# Patient Record
Sex: FEMALE | Race: OTHER | Employment: UNEMPLOYED | URBAN - METROPOLITAN AREA
[De-identification: names, ages, dates, MRNs, and addresses within clinical notes are randomized per-mention and may not be internally consistent; named-entity substitution may affect disease eponyms.]

---

## 2022-03-09 ENCOUNTER — APPOINTMENT (OUTPATIENT)
Dept: GENERAL RADIOLOGY | Age: 27
DRG: 280 | End: 2022-03-09
Payer: MEDICARE

## 2022-03-09 ENCOUNTER — APPOINTMENT (OUTPATIENT)
Dept: CT IMAGING | Age: 27
DRG: 280 | End: 2022-03-09
Payer: MEDICARE

## 2022-03-09 ENCOUNTER — HOSPITAL ENCOUNTER (INPATIENT)
Age: 27
LOS: 1 days | Discharge: HOME | DRG: 280 | End: 2022-03-10
Attending: EMERGENCY MEDICINE | Admitting: INTERNAL MEDICINE
Payer: MEDICARE

## 2022-03-09 DIAGNOSIS — E80.6 HYPERBILIRUBINEMIA: Primary | ICD-10-CM

## 2022-03-09 PROBLEM — E87.6 HYPOKALEMIA: Status: ACTIVE | Noted: 2022-03-09

## 2022-03-09 PROBLEM — R79.89 ABNORMAL LFTS: Status: ACTIVE | Noted: 2022-03-09

## 2022-03-09 PROBLEM — Z90.49 S/P CHOLECYSTECTOMY: Status: ACTIVE | Noted: 2022-03-09

## 2022-03-09 PROBLEM — F10.10 ALCOHOL ABUSE: Status: ACTIVE | Noted: 2022-03-09

## 2022-03-09 PROBLEM — E88.09 HYPOALBUMINEMIA: Status: ACTIVE | Noted: 2022-03-09

## 2022-03-09 PROBLEM — K70.31 ASCITES DUE TO ALCOHOLIC CIRRHOSIS (HCC): Status: ACTIVE | Noted: 2022-03-09

## 2022-03-09 PROBLEM — K74.60 LIVER CIRRHOSIS (HCC): Status: ACTIVE | Noted: 2022-03-09

## 2022-03-09 LAB
-: ABNORMAL
ABSOLUTE EOS #: 0 K/UL (ref 0–0.44)
ABSOLUTE IMMATURE GRANULOCYTE: 0.11 K/UL (ref 0–0.3)
ABSOLUTE LYMPH #: 1.26 K/UL (ref 1.1–3.7)
ABSOLUTE MONO #: 1.26 K/UL (ref 0.1–1.2)
ALBUMIN SERPL-MCNC: 2.7 G/DL (ref 3.5–5.2)
ALBUMIN/GLOBULIN RATIO: 0.8 (ref 1–2.5)
ALP BLD-CCNC: 240 U/L (ref 35–104)
ALT SERPL-CCNC: 40 U/L (ref 5–33)
AMMONIA: 27 UMOL/L (ref 11–51)
ANION GAP SERPL CALCULATED.3IONS-SCNC: 17 MMOL/L (ref 9–17)
AST SERPL-CCNC: 285 U/L
BASOPHILS # BLD: 0 % (ref 0–2)
BASOPHILS ABSOLUTE: 0 K/UL (ref 0–0.2)
BILIRUB SERPL-MCNC: 10.81 MG/DL (ref 0.3–1.2)
BILIRUBIN DIRECT: 8.2 MG/DL
BILIRUBIN URINE: ABNORMAL
BILIRUBIN, INDIRECT: 2.61 MG/DL (ref 0–1)
BUN BLDV-MCNC: 2 MG/DL (ref 6–20)
CALCIUM SERPL-MCNC: 8 MG/DL (ref 8.6–10.4)
CASTS UA: ABNORMAL /LPF (ref 0–2)
CHLORIDE BLD-SCNC: 96 MMOL/L (ref 98–107)
CO2: 23 MMOL/L (ref 20–31)
COLOR: ABNORMAL
CREAT SERPL-MCNC: <0.2 MG/DL (ref 0.5–0.9)
EOSINOPHILS RELATIVE PERCENT: 0 % (ref 1–4)
EPITHELIAL CELLS UA: ABNORMAL /HPF (ref 0–5)
GFR AFRICAN AMERICAN: ABNORMAL ML/MIN
GFR NON-AFRICAN AMERICAN: ABNORMAL ML/MIN
GFR SERPL CREATININE-BSD FRML MDRD: ABNORMAL ML/MIN/{1.73_M2}
GLUCOSE BLD-MCNC: 79 MG/DL (ref 70–99)
GLUCOSE URINE: NEGATIVE
HCG QUALITATIVE: NEGATIVE
HCT VFR BLD CALC: 25.2 % (ref 36.3–47.1)
HEMOGLOBIN: 8.7 G/DL (ref 11.9–15.1)
IMMATURE GRANULOCYTES: 1 %
INR BLD: 1.5
KETONES, URINE: ABNORMAL
LACTIC ACID, WHOLE BLOOD: 3.7 MMOL/L (ref 0.7–2.1)
LEUKOCYTE ESTERASE, URINE: ABNORMAL
LIPASE: 11 U/L (ref 13–60)
LYMPHOCYTES # BLD: 12 % (ref 24–43)
MCH RBC QN AUTO: 36.6 PG (ref 25.2–33.5)
MCHC RBC AUTO-ENTMCNC: 34.5 G/DL (ref 28.4–34.8)
MCV RBC AUTO: 105.9 FL (ref 82.6–102.9)
MONOCYTES # BLD: 12 % (ref 3–12)
MORPHOLOGY: ABNORMAL
MORPHOLOGY: ABNORMAL
MUCUS: ABNORMAL
NITRITE, URINE: NEGATIVE
NRBC AUTOMATED: 0 PER 100 WBC
PDW BLD-RTO: 22.9 % (ref 11.8–14.4)
PH UA: 5.5 (ref 5–8)
PLATELET # BLD: 167 K/UL (ref 138–453)
PMV BLD AUTO: 11.6 FL (ref 8.1–13.5)
POTASSIUM SERPL-SCNC: 3.2 MMOL/L (ref 3.7–5.3)
PROTEIN UA: NEGATIVE
PROTHROMBIN TIME: 15.5 SEC (ref 9.1–12.3)
RBC # BLD: 2.38 M/UL (ref 3.95–5.11)
RBC UA: ABNORMAL /HPF (ref 0–2)
REASON FOR REJECTION: NORMAL
SEG NEUTROPHILS: 75 % (ref 36–65)
SEGMENTED NEUTROPHILS ABSOLUTE COUNT: 7.87 K/UL (ref 1.5–8.1)
SODIUM BLD-SCNC: 136 MMOL/L (ref 135–144)
SPECIFIC GRAVITY UA: 1.02 (ref 1–1.03)
TOTAL PROTEIN: 5.9 G/DL (ref 6.4–8.3)
TURBIDITY: ABNORMAL
URINE HGB: NEGATIVE
UROBILINOGEN, URINE: NORMAL
WBC # BLD: 10.5 K/UL (ref 3.5–11.3)
WBC UA: ABNORMAL /HPF (ref 0–5)
ZZ NTE CLEAN UP: ORDERED TEST: NORMAL
ZZ NTE WITH NAME CLEAN UP: SPECIMEN SOURCE: NORMAL

## 2022-03-09 PROCEDURE — C9113 INJ PANTOPRAZOLE SODIUM, VIA: HCPCS | Performed by: EMERGENCY MEDICINE

## 2022-03-09 PROCEDURE — 80076 HEPATIC FUNCTION PANEL: CPT

## 2022-03-09 PROCEDURE — 81001 URINALYSIS AUTO W/SCOPE: CPT

## 2022-03-09 PROCEDURE — 85025 COMPLETE CBC W/AUTO DIFF WBC: CPT

## 2022-03-09 PROCEDURE — 6360000002 HC RX W HCPCS: Performed by: EMERGENCY MEDICINE

## 2022-03-09 PROCEDURE — 87186 SC STD MICRODIL/AGAR DIL: CPT

## 2022-03-09 PROCEDURE — 72040 X-RAY EXAM NECK SPINE 2-3 VW: CPT

## 2022-03-09 PROCEDURE — 2580000003 HC RX 258

## 2022-03-09 PROCEDURE — 83605 ASSAY OF LACTIC ACID: CPT

## 2022-03-09 PROCEDURE — 96375 TX/PRO/DX INJ NEW DRUG ADDON: CPT

## 2022-03-09 PROCEDURE — 6360000002 HC RX W HCPCS

## 2022-03-09 PROCEDURE — A4216 STERILE WATER/SALINE, 10 ML: HCPCS | Performed by: EMERGENCY MEDICINE

## 2022-03-09 PROCEDURE — 99285 EMERGENCY DEPT VISIT HI MDM: CPT

## 2022-03-09 PROCEDURE — 2580000003 HC RX 258: Performed by: STUDENT IN AN ORGANIZED HEALTH CARE EDUCATION/TRAINING PROGRAM

## 2022-03-09 PROCEDURE — 84703 CHORIONIC GONADOTROPIN ASSAY: CPT

## 2022-03-09 PROCEDURE — 6370000000 HC RX 637 (ALT 250 FOR IP)

## 2022-03-09 PROCEDURE — 83690 ASSAY OF LIPASE: CPT

## 2022-03-09 PROCEDURE — 87086 URINE CULTURE/COLONY COUNT: CPT

## 2022-03-09 PROCEDURE — 87077 CULTURE AEROBIC IDENTIFY: CPT

## 2022-03-09 PROCEDURE — 99223 1ST HOSP IP/OBS HIGH 75: CPT | Performed by: INTERNAL MEDICINE

## 2022-03-09 PROCEDURE — 2580000003 HC RX 258: Performed by: EMERGENCY MEDICINE

## 2022-03-09 PROCEDURE — 6360000004 HC RX CONTRAST MEDICATION: Performed by: EMERGENCY MEDICINE

## 2022-03-09 PROCEDURE — 74177 CT ABD & PELVIS W/CONTRAST: CPT

## 2022-03-09 PROCEDURE — 85610 PROTHROMBIN TIME: CPT

## 2022-03-09 PROCEDURE — 82140 ASSAY OF AMMONIA: CPT

## 2022-03-09 PROCEDURE — 1200000000 HC SEMI PRIVATE

## 2022-03-09 PROCEDURE — 96374 THER/PROPH/DIAG INJ IV PUSH: CPT

## 2022-03-09 PROCEDURE — 6360000002 HC RX W HCPCS: Performed by: STUDENT IN AN ORGANIZED HEALTH CARE EDUCATION/TRAINING PROGRAM

## 2022-03-09 PROCEDURE — 80048 BASIC METABOLIC PNL TOTAL CA: CPT

## 2022-03-09 RX ORDER — LORAZEPAM 2 MG/ML
1 INJECTION INTRAMUSCULAR
Status: DISCONTINUED | OUTPATIENT
Start: 2022-03-09 | End: 2022-03-10 | Stop reason: HOSPADM

## 2022-03-09 RX ORDER — 0.9 % SODIUM CHLORIDE 0.9 %
1000 INTRAVENOUS SOLUTION INTRAVENOUS ONCE
Status: COMPLETED | OUTPATIENT
Start: 2022-03-09 | End: 2022-03-09

## 2022-03-09 RX ORDER — LORAZEPAM 1 MG/1
1 TABLET ORAL
Status: DISCONTINUED | OUTPATIENT
Start: 2022-03-09 | End: 2022-03-10 | Stop reason: HOSPADM

## 2022-03-09 RX ORDER — POLYETHYLENE GLYCOL 3350 17 G/17G
17 POWDER, FOR SOLUTION ORAL DAILY PRN
Status: DISCONTINUED | OUTPATIENT
Start: 2022-03-09 | End: 2022-03-10 | Stop reason: HOSPADM

## 2022-03-09 RX ORDER — POTASSIUM CHLORIDE 20 MEQ/1
40 TABLET, EXTENDED RELEASE ORAL PRN
Status: DISCONTINUED | OUTPATIENT
Start: 2022-03-09 | End: 2022-03-10 | Stop reason: HOSPADM

## 2022-03-09 RX ORDER — SODIUM CHLORIDE 0.9 % (FLUSH) 0.9 %
5-40 SYRINGE (ML) INJECTION PRN
Status: DISCONTINUED | OUTPATIENT
Start: 2022-03-09 | End: 2022-03-10 | Stop reason: HOSPADM

## 2022-03-09 RX ORDER — LORAZEPAM 2 MG/ML
1 INJECTION INTRAMUSCULAR ONCE
Status: DISCONTINUED | OUTPATIENT
Start: 2022-03-09 | End: 2022-03-09

## 2022-03-09 RX ORDER — ONDANSETRON 2 MG/ML
4 INJECTION INTRAMUSCULAR; INTRAVENOUS EVERY 6 HOURS PRN
Status: DISCONTINUED | OUTPATIENT
Start: 2022-03-09 | End: 2022-03-10 | Stop reason: HOSPADM

## 2022-03-09 RX ORDER — SODIUM CHLORIDE 0.9 % (FLUSH) 0.9 %
5-40 SYRINGE (ML) INJECTION EVERY 12 HOURS SCHEDULED
Status: DISCONTINUED | OUTPATIENT
Start: 2022-03-09 | End: 2022-03-10 | Stop reason: HOSPADM

## 2022-03-09 RX ORDER — SODIUM CHLORIDE 9 MG/ML
10 INJECTION INTRAVENOUS DAILY
Status: DISCONTINUED | OUTPATIENT
Start: 2022-03-09 | End: 2022-03-10

## 2022-03-09 RX ORDER — LORAZEPAM 2 MG/ML
4 INJECTION INTRAMUSCULAR
Status: DISCONTINUED | OUTPATIENT
Start: 2022-03-09 | End: 2022-03-10 | Stop reason: HOSPADM

## 2022-03-09 RX ORDER — ONDANSETRON 4 MG/1
4 TABLET, ORALLY DISINTEGRATING ORAL EVERY 8 HOURS PRN
Status: DISCONTINUED | OUTPATIENT
Start: 2022-03-09 | End: 2022-03-10 | Stop reason: HOSPADM

## 2022-03-09 RX ORDER — SODIUM CHLORIDE 9 MG/ML
25 INJECTION, SOLUTION INTRAVENOUS PRN
Status: DISCONTINUED | OUTPATIENT
Start: 2022-03-09 | End: 2022-03-10 | Stop reason: HOSPADM

## 2022-03-09 RX ORDER — MORPHINE SULFATE 4 MG/ML
4 INJECTION, SOLUTION INTRAMUSCULAR; INTRAVENOUS ONCE
Status: COMPLETED | OUTPATIENT
Start: 2022-03-09 | End: 2022-03-09

## 2022-03-09 RX ORDER — LORAZEPAM 2 MG/1
2 TABLET ORAL
Status: DISCONTINUED | OUTPATIENT
Start: 2022-03-09 | End: 2022-03-10 | Stop reason: HOSPADM

## 2022-03-09 RX ORDER — PANTOPRAZOLE SODIUM 40 MG/10ML
40 INJECTION, POWDER, LYOPHILIZED, FOR SOLUTION INTRAVENOUS ONCE
Status: COMPLETED | OUTPATIENT
Start: 2022-03-09 | End: 2022-03-09

## 2022-03-09 RX ORDER — POTASSIUM CHLORIDE 7.45 MG/ML
10 INJECTION INTRAVENOUS PRN
Status: DISCONTINUED | OUTPATIENT
Start: 2022-03-09 | End: 2022-03-10 | Stop reason: HOSPADM

## 2022-03-09 RX ORDER — LORAZEPAM 2 MG/ML
3 INJECTION INTRAMUSCULAR
Status: DISCONTINUED | OUTPATIENT
Start: 2022-03-09 | End: 2022-03-10 | Stop reason: HOSPADM

## 2022-03-09 RX ORDER — SODIUM CHLORIDE 9 MG/ML
10 INJECTION INTRAVENOUS ONCE
Status: COMPLETED | OUTPATIENT
Start: 2022-03-09 | End: 2022-03-09

## 2022-03-09 RX ORDER — HEPARIN SODIUM 5000 [USP'U]/ML
5000 INJECTION, SOLUTION INTRAVENOUS; SUBCUTANEOUS EVERY 8 HOURS SCHEDULED
Status: DISCONTINUED | OUTPATIENT
Start: 2022-03-09 | End: 2022-03-10

## 2022-03-09 RX ORDER — LORAZEPAM 2 MG/ML
2 INJECTION INTRAMUSCULAR
Status: DISCONTINUED | OUTPATIENT
Start: 2022-03-09 | End: 2022-03-10 | Stop reason: HOSPADM

## 2022-03-09 RX ORDER — LORAZEPAM 2 MG/1
4 TABLET ORAL
Status: DISCONTINUED | OUTPATIENT
Start: 2022-03-09 | End: 2022-03-10 | Stop reason: HOSPADM

## 2022-03-09 RX ORDER — MORPHINE SULFATE 2 MG/ML
1 INJECTION, SOLUTION INTRAMUSCULAR; INTRAVENOUS ONCE
Status: COMPLETED | OUTPATIENT
Start: 2022-03-09 | End: 2022-03-09

## 2022-03-09 RX ORDER — PANTOPRAZOLE SODIUM 40 MG/10ML
40 INJECTION, POWDER, LYOPHILIZED, FOR SOLUTION INTRAVENOUS DAILY
Status: DISCONTINUED | OUTPATIENT
Start: 2022-03-10 | End: 2022-03-10

## 2022-03-09 RX ORDER — ACETAMINOPHEN 650 MG/1
650 SUPPOSITORY RECTAL EVERY 6 HOURS PRN
Status: DISCONTINUED | OUTPATIENT
Start: 2022-03-09 | End: 2022-03-10 | Stop reason: HOSPADM

## 2022-03-09 RX ORDER — FOLIC ACID 1 MG/1
1 TABLET ORAL DAILY
Status: DISCONTINUED | OUTPATIENT
Start: 2022-03-09 | End: 2022-03-10 | Stop reason: HOSPADM

## 2022-03-09 RX ORDER — ONDANSETRON 2 MG/ML
4 INJECTION INTRAMUSCULAR; INTRAVENOUS ONCE
Status: COMPLETED | OUTPATIENT
Start: 2022-03-09 | End: 2022-03-09

## 2022-03-09 RX ORDER — LANOLIN ALCOHOL/MO/W.PET/CERES
100 CREAM (GRAM) TOPICAL DAILY
Status: DISCONTINUED | OUTPATIENT
Start: 2022-03-09 | End: 2022-03-10 | Stop reason: HOSPADM

## 2022-03-09 RX ORDER — ACETAMINOPHEN 325 MG/1
650 TABLET ORAL EVERY 6 HOURS PRN
Status: DISCONTINUED | OUTPATIENT
Start: 2022-03-09 | End: 2022-03-10 | Stop reason: HOSPADM

## 2022-03-09 RX ORDER — LORAZEPAM 2 MG/ML
0.5 INJECTION INTRAMUSCULAR ONCE
Status: COMPLETED | OUTPATIENT
Start: 2022-03-09 | End: 2022-03-09

## 2022-03-09 RX ORDER — FENTANYL CITRATE 50 UG/ML
50 INJECTION, SOLUTION INTRAMUSCULAR; INTRAVENOUS ONCE
Status: COMPLETED | OUTPATIENT
Start: 2022-03-09 | End: 2022-03-09

## 2022-03-09 RX ADMIN — FENTANYL CITRATE 50 MCG: 50 INJECTION, SOLUTION INTRAMUSCULAR; INTRAVENOUS at 12:22

## 2022-03-09 RX ADMIN — IOPAMIDOL 75 ML: 755 INJECTION, SOLUTION INTRAVENOUS at 14:18

## 2022-03-09 RX ADMIN — FOLIC ACID 1 MG: 1 TABLET ORAL at 21:47

## 2022-03-09 RX ADMIN — HEPARIN SODIUM 5000 UNITS: 5000 INJECTION, SOLUTION INTRAVENOUS; SUBCUTANEOUS at 21:48

## 2022-03-09 RX ADMIN — CEFTRIAXONE SODIUM 1000 MG: 1 INJECTION, POWDER, FOR SOLUTION INTRAMUSCULAR; INTRAVENOUS at 15:26

## 2022-03-09 RX ADMIN — SODIUM CHLORIDE, PRESERVATIVE FREE 10 ML: 5 INJECTION INTRAVENOUS at 20:52

## 2022-03-09 RX ADMIN — PANTOPRAZOLE SODIUM 40 MG: 40 INJECTION, POWDER, FOR SOLUTION INTRAVENOUS at 12:22

## 2022-03-09 RX ADMIN — POTASSIUM CHLORIDE 40 MEQ: 1500 TABLET, EXTENDED RELEASE ORAL at 18:33

## 2022-03-09 RX ADMIN — Medication 100 MG: at 21:47

## 2022-03-09 RX ADMIN — ONDANSETRON 4 MG: 2 INJECTION INTRAMUSCULAR; INTRAVENOUS at 12:22

## 2022-03-09 RX ADMIN — MORPHINE SULFATE 4 MG: 4 INJECTION INTRAVENOUS at 14:36

## 2022-03-09 RX ADMIN — SODIUM CHLORIDE 10 ML: 9 INJECTION, SOLUTION INTRAMUSCULAR; INTRAVENOUS; SUBCUTANEOUS at 12:22

## 2022-03-09 RX ADMIN — MORPHINE SULFATE 1 MG: 2 INJECTION, SOLUTION INTRAMUSCULAR; INTRAVENOUS at 20:38

## 2022-03-09 RX ADMIN — SODIUM CHLORIDE 1000 ML: 9 INJECTION, SOLUTION INTRAVENOUS at 12:21

## 2022-03-09 RX ADMIN — LORAZEPAM 0.5 MG: 2 INJECTION INTRAMUSCULAR; INTRAVENOUS at 15:25

## 2022-03-09 ASSESSMENT — PAIN DESCRIPTION - PAIN TYPE
TYPE: ACUTE PAIN
TYPE: ACUTE PAIN
TYPE: OTHER (COMMENT)

## 2022-03-09 ASSESSMENT — ENCOUNTER SYMPTOMS
SORE THROAT: 0
COUGH: 0
COLOR CHANGE: 1
FACIAL SWELLING: 0
BLOOD IN STOOL: 0
CONSTIPATION: 0
SINUS PAIN: 0
BACK PAIN: 0
VOMITING: 0
ABDOMINAL PAIN: 1
RHINORRHEA: 0
ABDOMINAL DISTENTION: 1
DIARRHEA: 0
WHEEZING: 0
NAUSEA: 0
ALLERGIC/IMMUNOLOGIC NEGATIVE: 1
CONSTIPATION: 1
CHEST TIGHTNESS: 0
SHORTNESS OF BREATH: 0

## 2022-03-09 ASSESSMENT — PAIN SCALES - GENERAL
PAINLEVEL_OUTOF10: 7
PAINLEVEL_OUTOF10: 9
PAINLEVEL_OUTOF10: 8
PAINLEVEL_OUTOF10: 0
PAINLEVEL_OUTOF10: 9

## 2022-03-09 ASSESSMENT — PAIN DESCRIPTION - LOCATION: LOCATION: ABDOMEN;NECK

## 2022-03-09 ASSESSMENT — PAIN DESCRIPTION - FREQUENCY: FREQUENCY: CONTINUOUS

## 2022-03-09 ASSESSMENT — PAIN DESCRIPTION - ONSET: ONSET: ON-GOING

## 2022-03-09 ASSESSMENT — PAIN DESCRIPTION - ORIENTATION: ORIENTATION: POSTERIOR

## 2022-03-09 ASSESSMENT — PAIN - FUNCTIONAL ASSESSMENT
PAIN_FUNCTIONAL_ASSESSMENT: ACTIVITIES ARE NOT PREVENTED
PAIN_FUNCTIONAL_ASSESSMENT: 0-10

## 2022-03-09 ASSESSMENT — PAIN DESCRIPTION - PROGRESSION: CLINICAL_PROGRESSION: NOT CHANGED

## 2022-03-09 ASSESSMENT — PAIN DESCRIPTION - DESCRIPTORS: DESCRIPTORS: DULL;ACHING

## 2022-03-09 NOTE — ED PROVIDER NOTES
[facility] HANDOFF       Handoff taken on the following patient from prior Attending Physician:  Pt Name: Luis Ortiz  PCP:  Jaclyn Robledo  I was available and discussed any additional care issues that arose and coordinated the management plans with the resident(s) caring for the patient during my duty period. Any areas of disagreement with resident's documentation of care or procedures are noted on the chart. I was personally present for the key portions of any/all procedures during my duty period. I have documented in the chart those procedures where I was not present during the key portions. CHIEF COMPLAINT       Chief Complaint   Patient presents with    Abdominal Pain     x3-4 days    Bloated    Jaundice     approx 1 week         CURRENT MEDICATIONS     Previous Medications  Current Discharge Medication List      CONTINUE these medications which have NOT CHANGED    Details   !! acetaminophen-codeine (TYLENOL/CODEINE #3) 300-30 MG per tablet Take 1 tablet by mouth 3 times daily as needed for Pain for up to 10 doses. Qty: 10 tablet, Refills: 0      !! ibuprofen (ADVIL;MOTRIN) 600 MG tablet Take 1 tablet by mouth every 6 hours as needed for Pain. Qty: 30 tablet, Refills: 0      !! acetaminophen-codeine (TYLENOL/CODEINE #3) 300-30 MG per tablet Take 1 tablet by mouth 3 times daily as needed for Pain for up to 10 doses. Qty: 10 tablet, Refills: 0      !! ibuprofen (ADVIL;MOTRIN) 800 MG tablet Take 1 tablet by mouth every 8 hours as needed for Pain. Qty: 30 tablet, Refills: 0      venlafaxine (EFFEXOR-XR) 150 MG XR capsule Take 1 capsule by mouth daily (with breakfast). Qty: 30 capsule, Refills: 0      ARIPiprazole (ABILIFY) 10 MG tablet Take 1 tablet by mouth daily. Qty: 30 tablet, Refills: 0       !! - Potential duplicate medications found. Please discuss with provider.           Encounter Medications  Orders Placed This Encounter   Medications  0.9 % sodium chloride bolus    AND Linked Order Group     pantoprazole (PROTONIX) injection 40 mg     sodium chloride (PF) 0.9 % injection 10 mL    ondansetron (ZOFRAN) injection 4 mg    fentaNYL (SUBLIMAZE) injection 50 mcg    iopamidol (ISOVUE-370) 76 % injection 75 mL    morphine injection 4 mg    DISCONTD: LORazepam (ATIVAN) injection 1 mg    LORazepam (ATIVAN) injection 0.5 mg    cefTRIAXone (ROCEPHIN) 1000 mg IVPB in 50 mL D5W minibag     Order Specific Question:   Antimicrobial Indications     Answer:   Intra-Abdominal Infection       ALLERGIES     has No Known Allergies. RECENT VITALS:   Temp: 99.3 °F (37.4 °C),  Pulse: 113, Resp: 22, BP: 110/75    RADIOLOGY:   CT ABDOMEN PELVIS W IV CONTRAST Additional Contrast? None   Final Result   Severe fatty infiltration of the liver with hepatomegaly. Recanalized   periumbilical vein as well as several portosystemic collaterals consistent   with portal hypertension      Colonic wall thickening with fat deposition suspicious for chronic colitis   which may be infectious or inflammatory in nature. Status post cholecystectomy. Ill-defined nodular infiltrate in the right lower lobe suspicious for   atypical infectious etiology.       Ascites      RECOMMENDATIONS:   Unavailable             LABS:  Labs Reviewed   URINALYSIS WITH MICROSCOPIC - Abnormal; Notable for the following components:       Result Value    Color, UA Dark Yellow (*)     Turbidity UA Cloudy (*)     Bilirubin Urine LARGE (*)     Ketones, Urine LARGE (*)     Leukocyte Esterase, Urine SMALL (*)     Mucus, UA 3+ (*)     All other components within normal limits   PROTIME-INR - Abnormal; Notable for the following components:    Protime 15.5 (*)     All other components within normal limits   CBC WITH AUTO DIFFERENTIAL - Abnormal; Notable for the following components:    RBC 2.38 (*)     Hemoglobin 8.7 (*)     Hematocrit 25.2 (*)     .9 (*)     MCH 36.6 (*)     RDW 22.9 (*) Immature Granulocytes 1 (*)     Seg Neutrophils 75 (*)     Lymphocytes 12 (*)     Eosinophils % 0 (*)     Absolute Mono # 1.26 (*)     All other components within normal limits   LACTIC ACID - Abnormal; Notable for the following components:    Lactic Acid, Whole Blood 3.7 (*)     All other components within normal limits   BASIC METABOLIC PANEL - Abnormal; Notable for the following components:    BUN 2 (*)     CREATININE <0.20 (*)     Calcium 8.0 (*)     Potassium 3.2 (*)     Chloride 96 (*)     All other components within normal limits   LIPASE - Abnormal; Notable for the following components:    Lipase 11 (*)     All other components within normal limits   HEPATIC FUNCTION PANEL - Abnormal; Notable for the following components:    Albumin 2.7 (*)     Alkaline Phosphatase 240 (*)     ALT 40 (*)      (*)     Total Bilirubin 10.81 (*)     Bilirubin, Direct 8.20 (*)     Bilirubin, Indirect 2.61 (*)     Total Protein 5.9 (*)     Albumin/Globulin Ratio 0.8 (*)     All other components within normal limits   CULTURE, URINE   HCG, SERUM, QUALITATIVE   SPECIMEN REJECTION   AMMONIA   LACTATE, SEPSIS   PREVIOUS SPECIMEN     Alcoholic liver disease, having abdominal pain, bloating, jaundice. Worsening laboratory studies. Will treat for spontaneous bacterial peritonitis, pending diagnostic tap per the admitting team.      PLAN/ TASKS OUTSTANDING     Admission, awaiting bed placement      (Please note that portions of this note were completed with a voice recognition program.  Efforts were made to edit the dictations but occasionally words are mis-transcribed. )    Nataliya Barnett MD,, MD, F.A.C.E.P.   Attending Emergency Physician        Nataliya Barnett MD  03/09/22 0469

## 2022-03-09 NOTE — ED PROVIDER NOTES
[facility]  ED  Emergency Department  Emergency Medicine Resident Sign-out     Care of Ciaran Contreras was assumed from Dr. Stephan Ham and is being seen for Abdominal Pain (x3-4 days), Bloated, and Jaundice (approx 1 week)  . The patient's initial evaluation and plan have been discussed with the prior provider who initially evaluated the patient.      EMERGENCY DEPARTMENT COURSE / MEDICAL DECISION MAKING:       MEDICATIONS GIVEN:  Orders Placed This Encounter   Medications    0.9 % sodium chloride bolus    AND Linked Order Group     pantoprazole (PROTONIX) injection 40 mg     sodium chloride (PF) 0.9 % injection 10 mL    ondansetron (ZOFRAN) injection 4 mg    fentaNYL (SUBLIMAZE) injection 50 mcg    iopamidol (ISOVUE-370) 76 % injection 75 mL    morphine injection 4 mg    DISCONTD: LORazepam (ATIVAN) injection 1 mg    LORazepam (ATIVAN) injection 0.5 mg       LABS / RADIOLOGY:     Labs Reviewed   URINALYSIS WITH MICROSCOPIC - Abnormal; Notable for the following components:       Result Value    Color, UA Dark Yellow (*)     Turbidity UA Cloudy (*)     Bilirubin Urine LARGE (*)     Ketones, Urine LARGE (*)     Leukocyte Esterase, Urine SMALL (*)     Mucus, UA 3+ (*)     All other components within normal limits   PROTIME-INR - Abnormal; Notable for the following components:    Protime 15.5 (*)     All other components within normal limits   CBC WITH AUTO DIFFERENTIAL - Abnormal; Notable for the following components:    RBC 2.38 (*)     Hemoglobin 8.7 (*)     Hematocrit 25.2 (*)     .9 (*)     MCH 36.6 (*)     RDW 22.9 (*)     Immature Granulocytes 1 (*)     Seg Neutrophils 75 (*)     Lymphocytes 12 (*)     Eosinophils % 0 (*)     Absolute Mono # 1.26 (*)     All other components within normal limits   LACTIC ACID - Abnormal; Notable for the following components:    Lactic Acid, Whole Blood 3.7 (*)     All other components within normal limits   BASIC METABOLIC PANEL - Abnormal; Notable for the following components:    BUN 2 (*)     CREATININE <0.20 (*)     Calcium 8.0 (*)     Potassium 3.2 (*)     Chloride 96 (*)     All other components within normal limits   LIPASE - Abnormal; Notable for the following components:    Lipase 11 (*)     All other components within normal limits   HEPATIC FUNCTION PANEL - Abnormal; Notable for the following components:    Albumin 2.7 (*)     Alkaline Phosphatase 240 (*)     ALT 40 (*)      (*)     Total Bilirubin 10.81 (*)     Bilirubin, Direct 8.20 (*)     Bilirubin, Indirect 2.61 (*)     Total Protein 5.9 (*)     Albumin/Globulin Ratio 0.8 (*)     All other components within normal limits   CULTURE, URINE   HCG, SERUM, QUALITATIVE   SPECIMEN REJECTION   LACTATE, SEPSIS   PREVIOUS SPECIMEN   AMMONIA       No results found. RECENT VITALS:     Temp: 99.3 °F (37.4 °C),  Pulse: 110, Resp: 16, BP: 114/77, SpO2: 98 %      This patient is a 27 y.o. Female with abdominal pain, cirrhosis secondary to alcohol abuse, scleral icterus, ascites, admission 2 weeks ago, left ama, elvated cresencio with elevated lactic acid, recommend dx tap (paracentesis),            OUTSTANDING TASKS / RECOMMENDATIONS:    1. Ct pending  2. Possible tap  3. Admit to medicine      FINAL IMPRESSION:     1. Hyperbilirubinemia        DISPOSITION:         DISPOSITION:  []  Discharge   []  Transfer -    [x]  Admission - MEDICINE     []  Against Medical Advice   []  Eloped   FOLLOW-UP: No follow-up provider specified.    DISCHARGE MEDICATIONS: New Prescriptions    No medications on file          Tereso Guerrier MD  Emergency Medicine Resident  [facility]       Tereso Guerrier MD  Resident  03/09/22 0439

## 2022-03-09 NOTE — H&P
Attending Supervising Physicians Attestation Statement  I have seen and examined Javier Kent and the key elements of all parts of the encounter have been performed by me. I agree with the assessment, plan and orders as documented by the Advanced Practice Provider. I discussed the findings and plans with the resident physician and agree as documented in their note . In addition to the pertinent positives and negatives as stated within HPI and the review of systems as documented in the notes, all other systems were reviewed when able to and are reported negative. Additional Comments:   27 F presented with abd pain/distension  Symptoms for the past few days  CT abd demonstrating ascites, LFTs elevated  History of heavy alcohol use, 1/2 pint per day     Principal Problem:    Ascites due to alcoholic cirrhosis ([address]  Active Problems:    Hyperbilirubinemia    Liver cirrhosis (HCC)    Alcohol abuse  Resolved Problems:    * No resolved hospital problems.  *    - GI consult  - paracentesis  - CIWA  - alcohol cessation     Electronically signed by Seferino Pedraza MD on 3/9/2022 at 5:27 PM

## 2022-03-09 NOTE — ED NOTES
Report given to Blanco Carlson. All questions comments and concerns addressed.       Socorro Hart RN  03/09/22 0140

## 2022-03-09 NOTE — H&P
[facility]     Department of Internal Medicine - Staff Internal Medicine Teaching Service          ADMISSION NOTE/HISTORY AND PHYSICAL EXAMINATION   Date: 3/9/2022  Patient Name: Francisco Peres  Date of admission: 3/9/2022 11:25 AM  YOB: 1995  PCP: Lea Cox  History Obtained From: chart review and patient    CHIEF COMPLAINT     Chief complaint: Abdominal distention, abdominal pain    HISTORY OF PRESENTING ILLNESS     The patient is a 27 y.o. female who has a past medical history of liver cirrhosis secondary to alcohol abuse presents to the ED today due to abdominal distention and pain. As per the patient it has been ongoing for about 4 days, patient initially did not come to ED as she thought it would resolve on its own but it is worsening, so she came in today. Patient denied any fevers, chills, chest pain, change in urinary or bowel habits. Patient also has history of alcohol abuse, her last alcohol intake was last night about a pint. As per patient and chart review patient was admitted in [facility] on 2/26 for recurrent pancreatitis, alcohol use disorder. Patient received transfusion there for a hemoglobin below 7, she left AMA from there. On arrival to the ED, patient was awake, alert and oriented to time, place and person. T-max 99.3, tachycardic at around 121 otherwise other vital signs were stable. Was saturating well on room air. Pertinent labs were ordered, potassium 3.2, chloride 96, albumin 2.7, LFTs were deranged, bilirubin 10.81, ammonia was normal though. CBC-hemoglobin 8.7, WBC 10.5 and platelets 568. CT abdomen was done which showed colonic wall thickening with fat deposition suspicions of chronic colitis, severe fatty infiltration of the liver with hepatomegaly. When I went to evaluate the patient, the patient was mildly tachycardic, otherwise other vital signs were stable.   Patient was awake, alert and oriented to time, place and person. Patient had scleral icterus, jaundice, abdomen was mildly distended. Recent Hospitalization:  -In [facility] on 2/26 for epigastric/abdominal pain: Was evaluated by GI, they performed an EGD on 2/28 which showed multifocal white adherence in esophagus consistent with Candida. Hemoglobin was 6.8, was replaced with 1 unit packed RBC. But before other treatment could be started patient left AMA. PMH:  -Alcohol abuse  -Liver cirrhosis  -S/p cholecystectomy in April 2021    Social:  -Tobacco: Smoker 5 cigarettes/day for 15 years  -Alcohol: Chronic alcohol use  -Illicit Drug Use: Negative    Review of Systems:  Review of Systems   Constitutional: Positive for fatigue. Negative for diaphoresis and fever. HENT: Negative for facial swelling, hearing loss, sinus pain, sore throat and tinnitus. Respiratory: Negative for cough, chest tightness, shortness of breath and wheezing. Cardiovascular: Negative for chest pain, palpitations and leg swelling. Gastrointestinal: Positive for abdominal distention and abdominal pain (generalized). Negative for constipation, diarrhea, nausea and vomiting. Endocrine: Negative. Genitourinary: Negative for difficulty urinating, dysuria, flank pain and hematuria. Musculoskeletal: Negative for back pain. Skin: Positive for color change. Allergic/Immunologic: Negative. Neurological: Positive for weakness. Negative for dizziness, seizures and headaches. Hematological: Negative. Psychiatric/Behavioral: Negative for agitation, confusion, hallucinations and suicidal ideas. PAST MEDICAL HISTORY     Past Medical History:   Diagnosis Date    ADHD (attention deficit hyperactivity disorder)     Anxiety     Bipolar 1 disorder ([facility] [address]     Depression        PAST SURGICAL HISTORY     History reviewed. No pertinent surgical history. ALLERGIES     Patient has no known allergies.     MEDICATIONS PRIOR TO ADMISSION     Prior to Admission medications    Medication Sig Start Date End Date Taking? Authorizing Provider   acetaminophen-codeine (TYLENOL/CODEINE #3) 300-30 MG per tablet Take 1 tablet by mouth 3 times daily as needed for Pain for up to 10 doses. 2/19/15   Dany Cooper PA-C   ibuprofen (ADVIL;MOTRIN) 600 MG tablet Take 1 tablet by mouth every 6 hours as needed for Pain. 14   Mp Villela MD   acetaminophen-codeine (TYLENOL/CODEINE #3) 300-30 MG per tablet Take 1 tablet by mouth 3 times daily as needed for Pain for up to 10 doses. 11/3/14   GEOFFREY Riley   ibuprofen (ADVIL;MOTRIN) 800 MG tablet Take 1 tablet by mouth every 8 hours as needed for Pain. 10/30/14   Satya Villaseñor MD   venlafaxine (EFFEXOR-XR) 150 MG XR capsule Take 1 capsule by mouth daily (with breakfast). 9/15/14   Cristy Haskins MD   ARIPiprazole (ABILIFY) 10 MG tablet Take 1 tablet by mouth daily. 9/15/14   Cristy Haskins MD       SOCIAL HISTORY     Social History     Tobacco Use    Smoking status: Current Every Day Smoker     Packs/day: 0.50     Types: Cigarettes    Smokeless tobacco: Never Used   Substance Use Topics    Alcohol use: Yes     Comment: pt states approx 1/2 pint of wine per day since she was 11yo    Drug use: Yes     Types: Marijuana Don Safer)     Comment: Homegrown pot         FAMILY HISTORY     Family History   Problem Relation Age of Onset    Hypertension Mother     Depression Mother     Bipolar Disorder Father     Alcohol Abuse Father     Cancer Other         Uncle ? PHYSICAL EXAM     Vitals: /65   Pulse 113   Temp 99.7 °F (37.6 °C) (Oral)   Resp 16   Ht 5' 4\" (1.626 m)   Wt 106 lb (48.1 kg)   SpO2 99%   BMI 18.19 kg/m²   Tmax: Temp (24hrs), Av.5 °F (37.5 °C), Min:99.3 °F (37.4 °C), Max:99.7 °F (37.6 °C)    Last Body weight:   Wt Readings from Last 3 Encounters:   22 106 lb (48.1 kg)     Body Mass Index : Body mass index is 18.19 kg/m². PHYSICAL EXAMINATION:  Physical Exam  Vitals reviewed. Constitutional:       General: She is awake. Appearance: Normal appearance. She is normal weight. HENT:      Head: Normocephalic. Eyes:      General: Lids are normal. Scleral icterus present. Pupils: Pupils are equal, round, and reactive to light. Cardiovascular:      Rate and Rhythm: Regular rhythm. Tachycardia present. Pulses: Normal pulses. Heart sounds: Normal heart sounds. Pulmonary:      Effort: Pulmonary effort is normal.      Breath sounds: Normal breath sounds and air entry. Abdominal:      General: Bowel sounds are normal. There is distension (mild). Tenderness: There is generalized abdominal tenderness (mild). Musculoskeletal:      Right lower leg: No swelling. Left lower leg: No swelling. Skin:     General: Skin is warm. Coloration: Skin is jaundiced. Neurological:      Mental Status: She is alert and oriented to person, place, and time. Psychiatric:         Attention and Perception: Attention normal.         Mood and Affect: Mood normal.         Speech: Speech normal.         Behavior: Behavior is cooperative. INVESTIGATIONS     Laboratory Testing:     Recent Results (from the past 24 hour(s))   HCG Qualitative, Serum    Collection Time: 03/09/22 11:54 AM   Result Value Ref Range    hCG Qual NEGATIVE NEGATIVE   Protime-INR    Collection Time: 03/09/22 11:54 AM   Result Value Ref Range    Protime 15.5 (H) 9.1 - 12.3 sec    INR 1.5    SPECIMEN REJECTION    Collection Time: 03/09/22 11:54 AM   Result Value Ref Range    Specimen Source . BLOOD     Ordered Test CDP LACDS, LIP, LIVP, BMP     Reason for Rejection Unable to perform testing: Specimen clotted.     Urinalysis with Microscopic    Collection Time: 03/09/22 12:21 PM   Result Value Ref Range    Color, UA Dark Yellow (A) Yellow    Turbidity UA Cloudy (A) Clear    Glucose, Ur NEGATIVE NEGATIVE    Bilirubin Urine LARGE (A) NEGATIVE    Ketones, Urine LARGE (A) NEGATIVE    Specific [place], UA [place] Can not be calculated >60 mL/min    GFR Comment         Lipase    Collection Time: 03/09/22 12:22 PM   Result Value Ref Range    Lipase 11 (L) 13 - 60 U/L   Hepatic Function Panel    Collection Time: 03/09/22 12:22 PM   Result Value Ref Range    Albumin 2.7 (L) 3.5 - 5.2 g/dL    Alkaline Phosphatase 240 (H) 35 - 104 U/L    ALT 40 (H) 5 - 33 U/L     (H) <32 U/L    Total Bilirubin 10.81 (H) 0.3 - 1.2 mg/dL    Bilirubin, Direct 8.20 (H) <0.31 mg/dL    Bilirubin, Indirect 2.61 (H) 0.00 - 1.00 mg/dL    Total Protein 5.9 (L) 6.4 - 8.3 g/dL    Albumin/Globulin Ratio 0.8 (L) 1.0 - 2.5   Ammonia    Collection Time: 03/09/22  2:41 PM   Result Value Ref Range    Ammonia 27 11 - 51 umol/L       Imaging:   CT ABDOMEN PELVIS W IV CONTRAST Additional Contrast? None    Result Date: 3/9/2022  Severe fatty infiltration of the liver with hepatomegaly. Recanalized periumbilical vein as well as several portosystemic collaterals consistent with portal hypertension Colonic wall thickening with fat deposition suspicious for chronic colitis which may be infectious or inflammatory in nature. Status post cholecystectomy. Ill-defined nodular infiltrate in the right lower lobe suspicious for atypical infectious etiology. Ascites RECOMMENDATIONS: Unavailable       ASSESSMENT & PLAN     Principal Problem:    Ascites due to alcoholic cirrhosis (HCC)  Active Problems:    Hyperbilirubinemia    Liver cirrhosis (HCC)    Alcohol abuse    S/P cholecystectomy    Abnormal LFTs    Hypokalemia    Hypoalbuminemia  Resolved Problems:    * No resolved hospital problems. *    IMPRESSION  This is a 27 y.o. female who presented with abdominal distention, abdominal pain and found to have elevated bilirubin, ascites. Patient admitted to inpatient status for the management of ascites. Ascites likely secondary to liver cirrhosis secondary to alcohol abuse. -IR consulted for paracentesis. Fluid cytology.   Start on ceftriaxone for prophylaxis for SBP.  GI consulted. Continue to monitor LFTs. Alcohol abuse, with history of withdrawals in the past.  -CIWA protocol in place. Continue thiamine and folic acid. Advised on cessation. Social work consulted. UDS ordered. Hypokalemia. -Replace potassium, continue to monitor. Check magnesium levels. History of recurrent pancreatitis. S/p cholecystectomy.     DVT ppx: Heparin  GI ppx: Protonix  Diet: Regular diet  PT/OT: Consulted  Case Management: Consulted  Social work: Christian Pinzon MD  PGY-1, Internal Medicine Resident  [place], [place]         3/9/2022, 7:32 PM

## 2022-03-09 NOTE — ED PROVIDER NOTES
[facility] ED  [address] [facility] [address]  Phone: [phone]        Pt Name: Roxana Love  MRN: [id number]  [place] 1995  Date of evaluation: 3/9/22      CHIEF COMPLAINT     Chief Complaint   Patient presents with    Abdominal Pain     x3-4 days    Bloated    Jaundice     approx 1 week         HISTORY OF PRESENT ILLNESS  (Location/Symptom, Timing/Onset, Context/Setting, Quality, Duration, Modifying Factors, Severity.)    Roxana Love is a 27 y.o. female who presents with epigastric abdominal pain for 3-4 days. The patient describes the pain as stabbing, burning and pressure. It radiates throughout her abdomen. She is bloated. She is not nauseated or vomiting. She feels constipated. She had a small, hard bowel movement this morning. She denies fever. She does report chills. She denies dysuria, frequency, or urgency. No vaginal bleeding or discharge. Her LMP was 2/24/22 and was lighter than usual. No hematochezia, melena, or hematemesis. She has had a cholecystectomy in the past (2021). The patient drinks a half of pint to a full pint of wine per day since age 13years old. The patient also reports neck discomfort. Review of Care Everywhere shows that the patient had a [facility]) for alcohol use disorder, recurrent pancreatitis, esophageal candidiasis. The patient required a blood transfusion during that admission due to a hgb of 6.8. No recorded hematemesis. She was under [facility] protocol, and had vitamin replacement. An abdominal ultrasound showed hepatic steatosis without signs of intrahepatic or extrahepatic duct dilation. The patient ultimately left AMA from that admission. REVIEW OF SYSTEMS    (2-9 systems for level 4, 10 or more for level 5)     Review of Systems   Constitutional: Positive for chills. Negative for fever. HENT: Negative for congestion, rhinorrhea and sore throat.     Respiratory: Negative for cough, chest tightness and shortness of breath. Cardiovascular: Positive for palpitations. Negative for chest pain. Gastrointestinal: Positive for abdominal pain and constipation. Negative for blood in stool, nausea and vomiting. Genitourinary: Negative for dysuria, frequency and urgency. Musculoskeletal: Negative for back pain and neck pain. Skin: Positive for color change. Negative for rash and wound. Neurological: Positive for headaches. Negative for dizziness and light-headedness. Hematological: Negative for adenopathy. Bruises/bleeds easily. PAST MEDICAL HISTORY    has a past medical history of ADHD (attention deficit hyperactivity disorder), Anxiety, Bipolar 1 disorder ([facility]), and Depression. SURGICAL HISTORY      has no past surgical history on file. CURRENTMEDICATIONS       Previous Medications    ACETAMINOPHEN-CODEINE (TYLENOL/CODEINE #3) 300-30 MG PER TABLET    Take 1 tablet by mouth 3 times daily as needed for Pain for up to 10 doses. ACETAMINOPHEN-CODEINE (TYLENOL/CODEINE #3) 300-30 MG PER TABLET    Take 1 tablet by mouth 3 times daily as needed for Pain for up to 10 doses. ARIPIPRAZOLE (ABILIFY) 10 MG TABLET    Take 1 tablet by mouth daily. IBUPROFEN (ADVIL;MOTRIN) 600 MG TABLET    Take 1 tablet by mouth every 6 hours as needed for Pain. IBUPROFEN (ADVIL;MOTRIN) 800 MG TABLET    Take 1 tablet by mouth every 8 hours as needed for Pain. VENLAFAXINE (EFFEXOR-XR) 150 MG XR CAPSULE    Take 1 capsule by mouth daily (with breakfast). ALLERGIES     has No Known Allergies. FAMILY HISTORY     She indicated that the status of her mother is unknown. She indicated that the status of her father is unknown. She indicated that the status of her other is unknown.     family history includes Alcohol Abuse in her father; Bipolar Disorder in her father; Cancer in an other family member; Depression in her mother; Hypertension in her mother.     SOCIAL HISTORY      reports that she has been smoking cigarettes. She has been smoking about 0.50 packs per day. She has never used smokeless tobacco. She reports current alcohol use. She reports current drug use. Drug: Marijuana Don Safer). PHYSICAL EXAM    (up to 7 for level 4, 8 or more for level 5)   INITIAL VITALS:  height is 5' 4\" (1.626 m) and weight is 106 lb (48.1 kg). Her oral temperature is 99.3 °F (37.4 °C). Her blood pressure is 110/75 and her pulse is 113. Her respiration is 22 and oxygen saturation is 100%. Physical Exam  Vitals and nursing note reviewed. Constitutional:       Appearance: She is ill-appearing. HENT:      Head: Normocephalic and atraumatic. Cardiovascular:      Rate and Rhythm: Regular rhythm. Tachycardia present. Heart sounds: Normal heart sounds. No murmur heard. No friction rub. No gallop. Pulmonary:      Effort: Pulmonary effort is normal.      Breath sounds: Normal breath sounds. No wheezing, rhonchi or rales. Abdominal:      General: Abdomen is protuberant. Bowel sounds are normal. There is distension. Palpations: Abdomen is soft. Tenderness: There is abdominal tenderness in the epigastric area. Negative signs include Padilla's sign and Rovsing's sign. Skin:     General: Skin is warm and dry. Capillary Refill: Capillary refill takes less than 2 seconds. Coloration: Skin is jaundiced. Neurological:      General: No focal deficit present. Mental Status: She is alert and oriented to person, place, and time. Psychiatric:         Mood and Affect: Mood normal.         Behavior: Behavior normal.         DIFFERENTIAL DIAGNOSIS/ MDM:     Abdominal pain with history of alcohol abuse. Plan for admission,.      DIAGNOSTIC RESULTS     EKG: All EKG's are interpreted by the Emergency Department Physician who either signs or Co-signs this chart in the absence of a cardiologist.    none    RADIOLOGY:        Interpretation per the Radiologist below, if available at the time of this note:    CT ABDOMEN PELVIS W IV CONTRAST Additional Contrast? None    Result Date: 3/9/2022  EXAMINATION: CT OF THE ABDOMEN AND PELVIS WITH CONTRAST 3/9/2022 2:13 pm TECHNIQUE: CT of the abdomen and pelvis was performed with the administration of intravenous contrast. Multiplanar reformatted images are provided for review. Dose modulation, iterative reconstruction, and/or weight based adjustment of the mA/kV was utilized to reduce the radiation dose to as low as reasonably achievable. COMPARISON: None. HISTORY: ORDERING SYSTEM PROVIDED HISTORY: abdominal pain TECHNOLOGIST PROVIDED HISTORY: abdominal pain Decision Support Exception - unselect if not a suspected or confirmed emergency medical condition->Emergency Medical Condition (MA) Reason for Exam: abdominal pain FINDINGS: Lower Chest: There is ill-defined nodular interstitial infiltrate in the right lower lobe without pleural effusion. Organs: There is severe fatty infiltration of the liver. The adrenal glands, kidneys, pancreas are unremarkable in appearance the patient is status post cholecystectomy. There is hepatomegaly. .  Portosystemic collaterals consistent with portal hypertension GI/Bowel: There is wall thickening in the colon most pronounced in the cecum and ascending colon but no evidence for bowel obstruction. Pelvis: The bladder is unremarkable in appearance. There is ascites in the pelvis. Peritoneum/Retroperitoneum: Negative for aneurysm Bones/Soft Tissues: There is a small amount of ascites scattered in the mesentery     Severe fatty infiltration of the liver with hepatomegaly. Recanalized periumbilical vein as well as several portosystemic collaterals consistent with portal hypertension Colonic wall thickening with fat deposition suspicious for chronic colitis which may be infectious or inflammatory in nature. Status post cholecystectomy. Ill-defined nodular infiltrate in the right lower lobe suspicious for atypical infectious etiology. Ascites RECOMMENDATIONS: Unavailable       LABS:  Results for orders placed or performed during the hospital encounter of 03/09/22   HCG Qualitative, Serum   Result Value Ref Range    hCG Qual NEGATIVE NEGATIVE   Urinalysis with Microscopic   Result Value Ref Range    Color, UA Dark Yellow (A) Yellow    Turbidity UA Cloudy (A) Clear    Glucose, Ur NEGATIVE NEGATIVE    Bilirubin Urine LARGE (A) NEGATIVE    Ketones, Urine LARGE (A) NEGATIVE    Specific Gravity, UA 1.017 1.005 - 1.030    Urine Hgb NEGATIVE NEGATIVE    pH, UA 5.5 5.0 - 8.0    Protein, UA NEGATIVE NEGATIVE    Urobilinogen, Urine Normal Normal    Nitrite, Urine NEGATIVE NEGATIVE    Leukocyte Esterase, Urine SMALL (A) NEGATIVE    -          WBC, UA 10 TO 20 0 - 5 /HPF    RBC, UA 0 TO 2 0 - 2 /HPF    Casts UA 5 TO 10 0 - 2 /LPF    Epithelial Cells UA 5 TO 10 0 - 5 /HPF    Mucus, UA 3+ (A) None   Protime-INR   Result Value Ref Range    Protime 15.5 (H) 9.1 - 12.3 sec    INR 1.5    SPECIMEN REJECTION   Result Value Ref Range    Specimen Source . BLOOD     Ordered Test CDP LACDS, LIP, LIVP, BMP     Reason for Rejection Unable to perform testing: Specimen clotted.     CBC with Auto Differential   Result Value Ref Range    WBC 10.5 3.5 - 11.3 k/uL    RBC 2.38 (L) 3.95 - 5.11 m/uL    Hemoglobin 8.7 (L) 11.9 - 15.1 g/dL    Hematocrit 25.2 (L) 36.3 - 47.1 %    .9 (H) 82.6 - 102.9 fL    MCH 36.6 (H) 25.2 - 33.5 pg    MCHC 34.5 28.4 - 34.8 g/dL    RDW 22.9 (H) 11.8 - 14.4 %    Platelets 398 653 - 879 k/uL    MPV 11.6 8.1 - 13.5 fL    NRBC Automated 0.0 0.0 per 100 WBC    Immature Granulocytes 1 (H) 0 %    Seg Neutrophils 75 (H) 36 - 65 %    Lymphocytes 12 (L) 24 - 43 %    Monocytes 12 3 - 12 %    Eosinophils % 0 (L) 1 - 4 %    Basophils 0 0 - 2 %    Absolute Immature Granulocyte 0.11 0.00 - 0.30 k/uL    Segs Absolute 7.87 1.50 - 8.10 k/uL    Absolute Lymph # 1.26 1.10 - 3.70 k/uL    Absolute Mono # 1.26 (H) 0.10 - 1.20 k/uL    Absolute Eos # 0.00 0.00 - 0.44 k/uL    Basophils Absolute 0.00 0.00 - 0.20 k/uL    Morphology MACROCYTOSIS PRESENT     Morphology ANISOCYTOSIS PRESENT    Lactic Acid   Result Value Ref Range    Lactic Acid, Whole Blood 3.7 (H) 0.7 - 2.1 mmol/L   Basic Metabolic Panel   Result Value Ref Range    Glucose 79 70 - 99 mg/dL    BUN 2 (L) 6 - 20 mg/dL    CREATININE <0.20 (L) 0.50 - 0.90 mg/dL    Calcium 8.0 (L) 8.6 - 10.4 mg/dL    Sodium 136 135 - 144 mmol/L    Potassium 3.2 (L) 3.7 - 5.3 mmol/L    Chloride 96 (L) 98 - 107 mmol/L    CO2 23 20 - 31 mmol/L    Anion Gap 17 9 - 17 mmol/L    GFR Non-African American Can not be calculated >60 mL/min    GFR  Can not be calculated >60 mL/min    GFR Comment         Lipase   Result Value Ref Range    Lipase 11 (L) 13 - 60 U/L   Hepatic Function Panel   Result Value Ref Range    Albumin 2.7 (L) 3.5 - 5.2 g/dL    Alkaline Phosphatase 240 (H) 35 - 104 U/L    ALT 40 (H) 5 - 33 U/L     (H) <32 U/L    Total Bilirubin 10.81 (H) 0.3 - 1.2 mg/dL    Bilirubin, Direct 8.20 (H) <0.31 mg/dL    Bilirubin, Indirect 2.61 (H) 0.00 - 1.00 mg/dL    Total Protein 5.9 (L) 6.4 - 8.3 g/dL    Albumin/Globulin Ratio 0.8 (L) 1.0 - 2.5       +anemia, elevated lactic acid    EMERGENCY DEPARTMENT COURSE:   Vitals:    Vitals:    03/09/22 1257 03/09/22 1259 03/09/22 1329 03/09/22 1359   BP:  110/78 114/76 110/75   Pulse: 110 110 111 113   Resp:   25 22   Temp:       TempSrc:       SpO2: 98% 99% 99% 100%   Weight:       Height:         -------------------------  BP: 110/75, Temp: 99.3 °F (37.4 °C), Pulse: 113, Resp: 22          CONSULTS:  none    PROCEDURES:  None    FINAL IMPRESSION      1. Hyperbilirubinemia          DISPOSITION/PLAN   DISPOSITION        CONDITION ON DISPOSITION:   fair    PATIENT REFERRED TO:  No follow-up provider specified.     DISCHARGE MEDICATIONS:  New Prescriptions    No medications on file       (Please note that portions of this note were completed with a voicerecognition program.  Efforts were made to edit the dictations but occasionally words are mis-transcribed.)    Mariana Brandt MD  Attending Emergency Medicine Physician        Mariana Brandt MD  03/09/22 [id number]

## 2022-03-09 NOTE — ED NOTES
The following labs labeled with pt sticker and tubed to lab:     [x] Blue     [x] Lavender   [x] on ice  [] Green/yellow  [x] Green/black [] on ice  [x] Yellow  [] Red  [] Pink      [] COVID-19 swab    [] Rapid  [] PCR  [] Flu swab  [] Peds Viral Panel     [] Urine Sample  [] Pelvic Cultures  [] Blood Cultures                   Taylor Carcamo RN  03/09/22 [id number]

## 2022-03-09 NOTE — ED TRIAGE NOTES
Patient to ED with c/o abd pain for approx 3 days and states distention. Pt concerned for constipation with last known BM unknown per pt. Pt states she is a daily ETOH drinker since age 13. States approx 1/2 pint of wine daily. States her amount of ETOH per day has slowly decreased. Pt has yellow eyes upon ED arrival and denies having this issue in the past. States approx 1 week of noticing the jaundiced eyes. Denies hx of liver dx or known liver failure. Pt denies n/v at this time. Pt states last drink was last evening. No ETOH consumption today.

## 2022-03-10 ENCOUNTER — APPOINTMENT (OUTPATIENT)
Dept: ULTRASOUND IMAGING | Age: 27
DRG: 280 | End: 2022-03-10
Payer: MEDICARE

## 2022-03-10 VITALS
RESPIRATION RATE: 17 BRPM | SYSTOLIC BLOOD PRESSURE: 96 MMHG | WEIGHT: 106 LBS | DIASTOLIC BLOOD PRESSURE: 62 MMHG | HEIGHT: 64 IN | BODY MASS INDEX: 18.1 KG/M2 | OXYGEN SATURATION: 96 % | HEART RATE: 108 BPM | TEMPERATURE: 98.9 F

## 2022-03-10 PROBLEM — K76.0 FATTY LIVER: Status: ACTIVE | Noted: 2022-03-09

## 2022-03-10 PROBLEM — K76.6 PORTAL HYPERTENSION (HCC): Status: ACTIVE | Noted: 2022-03-10

## 2022-03-10 PROBLEM — K70.11 ASCITES DUE TO ALCOHOLIC HEPATITIS: Status: ACTIVE | Noted: 2022-03-09

## 2022-03-10 LAB
ABSOLUTE EOS #: 0.15 K/UL (ref 0–0.4)
ABSOLUTE IMMATURE GRANULOCYTE: 0 K/UL (ref 0–0.3)
ABSOLUTE LYMPH #: 1.2 K/UL (ref 1–4.8)
ABSOLUTE MONO #: 0.6 K/UL (ref 0.1–0.8)
ALBUMIN SERPL-MCNC: 2.4 G/DL (ref 3.5–5.2)
ALBUMIN/GLOBULIN RATIO: 0.9 (ref 1–2.5)
ALP BLD-CCNC: 198 U/L (ref 35–104)
ALT SERPL-CCNC: 33 U/L (ref 5–33)
ANION GAP SERPL CALCULATED.3IONS-SCNC: 10 MMOL/L (ref 9–17)
AST SERPL-CCNC: 235 U/L
BASOPHILS # BLD: 0 % (ref 0–2)
BASOPHILS ABSOLUTE: 0 K/UL (ref 0–0.2)
BILIRUB SERPL-MCNC: 11.08 MG/DL (ref 0.3–1.2)
BUN BLDV-MCNC: <2 MG/DL (ref 6–20)
CALCIUM SERPL-MCNC: 7.6 MG/DL (ref 8.6–10.4)
CHLORIDE BLD-SCNC: 99 MMOL/L (ref 98–107)
CO2: 25 MMOL/L (ref 20–31)
CREAT SERPL-MCNC: <0.2 MG/DL (ref 0.5–0.9)
EOSINOPHILS RELATIVE PERCENT: 2 % (ref 1–4)
GFR AFRICAN AMERICAN: ABNORMAL ML/MIN
GFR NON-AFRICAN AMERICAN: ABNORMAL ML/MIN
GFR SERPL CREATININE-BSD FRML MDRD: ABNORMAL ML/MIN/{1.73_M2}
GLUCOSE BLD-MCNC: 62 MG/DL (ref 70–99)
HCT VFR BLD CALC: 22.9 % (ref 36.3–47.1)
HEMOGLOBIN: 7.5 G/DL (ref 11.9–15.1)
IMMATURE GRANULOCYTES: 0 %
LYMPHOCYTES # BLD: 16 % (ref 24–44)
MAGNESIUM: 1.6 MG/DL (ref 1.6–2.6)
MCH RBC QN AUTO: 36.4 PG (ref 25.2–33.5)
MCHC RBC AUTO-ENTMCNC: 32.8 G/DL (ref 28.4–34.8)
MCV RBC AUTO: 111.2 FL (ref 82.6–102.9)
MONOCYTES # BLD: 8 % (ref 1–7)
MORPHOLOGY: ABNORMAL
MORPHOLOGY: ABNORMAL
NRBC AUTOMATED: 0 PER 100 WBC
PDW BLD-RTO: 22.8 % (ref 11.8–14.4)
PLATELET # BLD: ABNORMAL K/UL (ref 138–453)
PLATELET, FLUORESCENCE: 154 K/UL (ref 138–453)
PLATELET, IMMATURE FRACTION: 6.3 % (ref 1.1–10.3)
POTASSIUM SERPL-SCNC: 3.4 MMOL/L (ref 3.7–5.3)
RBC # BLD: 2.06 M/UL (ref 3.95–5.11)
RBC # BLD: ABNORMAL 10*6/UL
SEG NEUTROPHILS: 74 % (ref 36–66)
SEGMENTED NEUTROPHILS ABSOLUTE COUNT: 5.55 K/UL (ref 1.8–7.7)
SODIUM BLD-SCNC: 134 MMOL/L (ref 135–144)
TOTAL PROTEIN: 5.2 G/DL (ref 6.4–8.3)
WBC # BLD: 7.5 K/UL (ref 3.5–11.3)

## 2022-03-10 PROCEDURE — 2580000003 HC RX 258

## 2022-03-10 PROCEDURE — 80053 COMPREHEN METABOLIC PANEL: CPT

## 2022-03-10 PROCEDURE — 99239 HOSP IP/OBS DSCHRG MGMT >30: CPT | Performed by: INTERNAL MEDICINE

## 2022-03-10 PROCEDURE — 76705 ECHO EXAM OF ABDOMEN: CPT

## 2022-03-10 PROCEDURE — 36415 COLL VENOUS BLD VENIPUNCTURE: CPT

## 2022-03-10 PROCEDURE — 6370000000 HC RX 637 (ALT 250 FOR IP): Performed by: INTERNAL MEDICINE

## 2022-03-10 PROCEDURE — 83735 ASSAY OF MAGNESIUM: CPT

## 2022-03-10 PROCEDURE — 99254 IP/OBS CNSLTJ NEW/EST MOD 60: CPT | Performed by: INTERNAL MEDICINE

## 2022-03-10 PROCEDURE — 6360000002 HC RX W HCPCS

## 2022-03-10 PROCEDURE — C9113 INJ PANTOPRAZOLE SODIUM, VIA: HCPCS

## 2022-03-10 PROCEDURE — 85025 COMPLETE CBC W/AUTO DIFF WBC: CPT

## 2022-03-10 PROCEDURE — 85055 RETICULATED PLATELET ASSAY: CPT

## 2022-03-10 PROCEDURE — 6370000000 HC RX 637 (ALT 250 FOR IP)

## 2022-03-10 RX ORDER — MAGNESIUM SULFATE IN WATER 40 MG/ML
2000 INJECTION, SOLUTION INTRAVENOUS ONCE
Status: COMPLETED | OUTPATIENT
Start: 2022-03-10 | End: 2022-03-10

## 2022-03-10 RX ORDER — FLUCONAZOLE 200 MG/1
200 TABLET ORAL DAILY
Status: DISCONTINUED | OUTPATIENT
Start: 2022-03-10 | End: 2022-03-10 | Stop reason: HOSPADM

## 2022-03-10 RX ORDER — POLYETHYLENE GLYCOL 3350 17 G/17G
17 POWDER, FOR SOLUTION ORAL ONCE
Status: COMPLETED | OUTPATIENT
Start: 2022-03-10 | End: 2022-03-10

## 2022-03-10 RX ORDER — FLUCONAZOLE 200 MG/1
200 TABLET ORAL DAILY
Qty: 14 TABLET | Refills: 0 | Status: SHIPPED | OUTPATIENT
Start: 2022-03-10 | End: 2022-03-24

## 2022-03-10 RX ORDER — LANOLIN ALCOHOL/MO/W.PET/CERES
100 CREAM (GRAM) TOPICAL DAILY
Qty: 30 TABLET | Refills: 3 | Status: SHIPPED | OUTPATIENT
Start: 2022-03-11

## 2022-03-10 RX ORDER — FOLIC ACID 1 MG/1
1 TABLET ORAL DAILY
Qty: 30 TABLET | Refills: 3 | Status: SHIPPED | OUTPATIENT
Start: 2022-03-11

## 2022-03-10 RX ORDER — CYCLOBENZAPRINE HCL 5 MG
5 TABLET ORAL ONCE
Status: COMPLETED | OUTPATIENT
Start: 2022-03-10 | End: 2022-03-10

## 2022-03-10 RX ADMIN — BISACODYL 10 MG: 5 TABLET, COATED ORAL at 12:20

## 2022-03-10 RX ADMIN — Medication 100 MG: at 09:29

## 2022-03-10 RX ADMIN — POTASSIUM CHLORIDE 40 MEQ: 1500 TABLET, EXTENDED RELEASE ORAL at 10:42

## 2022-03-10 RX ADMIN — SODIUM CHLORIDE, PRESERVATIVE FREE 10 ML: 5 INJECTION INTRAVENOUS at 09:32

## 2022-03-10 RX ADMIN — FLUCONAZOLE 200 MG: 200 TABLET ORAL at 12:20

## 2022-03-10 RX ADMIN — CYCLOBENZAPRINE HYDROCHLORIDE 5 MG: 5 TABLET, FILM COATED ORAL at 09:29

## 2022-03-10 RX ADMIN — SODIUM CHLORIDE, PRESERVATIVE FREE 10 ML: 5 INJECTION INTRAVENOUS at 09:29

## 2022-03-10 RX ADMIN — MAGNESIUM SULFATE 2000 MG: 2 INJECTION INTRAVENOUS at 10:47

## 2022-03-10 RX ADMIN — PANTOPRAZOLE SODIUM 40 MG: 40 INJECTION, POWDER, FOR SOLUTION INTRAVENOUS at 09:29

## 2022-03-10 RX ADMIN — FOLIC ACID 1 MG: 1 TABLET ORAL at 09:29

## 2022-03-10 RX ADMIN — SODIUM CHLORIDE 25 ML: 9 INJECTION, SOLUTION INTRAVENOUS at 10:44

## 2022-03-10 RX ADMIN — POLYETHYLENE GLYCOL 3350 17 G: 17 POWDER, FOR SOLUTION ORAL at 10:42

## 2022-03-10 NOTE — PROGRESS NOTES
US of all 4 abdominal quadrants showed trace amounts of ascites not suitable for percutaneous US guided paracentesis.

## 2022-03-10 NOTE — CARE COORDINATION
Consult received for consideration of rehab  Met with pt who stated she lives with her boyfriend. Stated she does not work, has [place] [facility] in place  Pt reports she drinks daily and has drank most days since age 13. Stated she drinks a 1/2 pint - 1 pint of wine per day. She denies all drug use. Pt stated she is \"not really\" concerned about her drinking as she feels she is slowing down. No prior tx. Pt stated she was not interested in getting into a tx program.  Pt stated she plans to eventually quit but wants to wean herself off. Pt stated she tried to quit before a few months ago and lasted 5 days, went thru withdrawal.  Pt is not agreeable to a tx program but did agree to receiving tx resources, which were provided. Pt to advise if she changes her mind and wishes to pursue tx at discharge.

## 2022-03-10 NOTE — CARE COORDINATION
Discharge 1 [facility] Case Management Department  Written by: Sallie Washington RN    Patient Name: Aurelio Valencia  Attending Provider: Kaleb Piper MD  Admit Date: 3/9/2022 11:25 AM  MRN: [id number]  Account: [de-identified]                     : 1995  Discharge Date: 3/10      Disposition: home with boyfriend    Sallie Washington RN

## 2022-03-10 NOTE — CARE COORDINATION
Case Management Initial Discharge Plan  Gurwinder Alejandro,             Met with:patient to discuss discharge plans. Information verified: address, contacts, phone number, , insurance Yes  Insurance Provider: Fritz Carlson [employer]    Emergency Contact/Next of Kin name & number: hitesh Young [phone]  Who are involved in patient's support system? boyfriend    PCP: Charlotte Medina  Date of last visit: unknown      Discharge Planning    Living Arrangements:  Spouse/Significant Alvino Mijares has 1 stories  4 stairs to climb to get into front door, 0 stairs to climb to reach second floor  Location of bedroom/bathroom in home main level    Patient able to perform ADL's:Independent    Current Services (outpatient & in home) n/a  DME equipment: n/a  DME provider: n/a    Is patient receiving oral anticoagulation therapy? No        Potential Assistance Needed:  N/A    Patient agreeable to home care: No  [place] of choice provided:  n/a    Prior SNF/Rehab Placement and Facility: no  Agreeable to SNF/Rehab: No  [place] of choice provided: n/a     Evaluation: yes    Expected Discharge date:  22    Patient expects to be discharged to: home      If home: is the family and/or caregiver wiling & able to provide support at home? yes  Who will be providing this support? boyfriend    Follow Up Appointment: Best Day/ Time:     Transportation provider: family or boyfriend  Transportation arrangements needed for discharge: No    Readmission Risk              Risk of Unplanned Readmission:  10             Does patient have a readmission risk score greater than 14?: No  If yes, follow-up appointment must be made within 7 days of discharge.      Goals of Care: safety      Educated patient on transitional options, provided freedom of choice and are agreeable with plan      Discharge Plan: home, independent with family          Electronically signed by Jimmie Bass RN on 3/10/22 at 9:09 AM EST

## 2022-03-10 NOTE — PROGRESS NOTES
Spoke with  Dr. Wayman Goodpasture, MD regarding the patient's morning lab work. No new orders noted at this time.

## 2022-03-10 NOTE — PROGRESS NOTES
Physical Therapy        Physical Therapy Cancel Note      DATE: 3/10/2022    NAME: Jerrell Andre  MRN: [id number]   : 1995      Patient not seen this date for Physical Therapy due to:    Patient independent with functional mobility. Will defer PT evaluation at this time. Please reorder PT if future needs arise. Discussed with RN and pt who both agree pt has been getting around in her room independently. Pt denies PT needs.   Educated pt re: being up in the chair throughout the day/complications of bed rest.  Pt agreeable to sit up after testing procedure done this AM.        Electronically signed by Allie Rodriguez PT on 3/10/2022 at 8:15 AM

## 2022-03-10 NOTE — PROGRESS NOTES
[facility]  Internal Medicine Teaching Residency Program  Inpatient Daily Progress Note  ______________________________________________________________________________    Patient: Zelda Larios  YOB: 1995   CMU:[id number]    Acct: [de-identified]     Room: [address] [place], [place] [address]  Admit date: 3/9/2022  Today's date: 03/10/22  Number of days in the hospital: 1    SUBJECTIVE   CC: Abdominal Pain (x3-4 days), Bloated, and Jaundice (approx 1 week)    Pt examined at bedside. Chart & results reviewed. - VSS, pt is saturating well on RA. Afebrile. - labs reviewed, K 3.4. Neck pain overnight, xray neck unremarkable. C/o abdominal pain  - hb 8.7>7.5  - no acute events overnight.   - Patient denies headache, vision problems, nausea, vomiting, chest pain, cough, changes in bowel or urinary habits, and swelling.     ROS:  General ROS: Completed and except as mentioned above were negative   HEENT ROS: Completed and except as mentioned above were negative   Allergy and Immunology ROS:  Completed and except as mentioned above were negative  Hematological and Lymphatic ROS:  Completed and except as mentioned above were negative  Respiratory ROS:  Completed and except as mentioned above were negative  Cardiovascular ROS:  Completed and except as mentioned above were negative  Gastrointestinal ROS: Completed and except as mentioned above were negative  Genito-Urinary ROS:  Completed and except as mentioned above were negative  Musculoskeletal ROS:  Completed and except as mentioned above were negative  Neurological ROS:  Completed and except as mentioned above were negative  Skin & Dermatological ROS:  Completed and except as mentioned above were negative  Psychological ROS:  Completed and except as mentioned above were negative  BRIEF HISTORY     The patient is a 27 y.o. female who has a past medical history of liver cirrhosis secondary to alcohol abuse presents to the ED today due to abdominal distention and pain. As per the patient it has been ongoing for about 4 days, patient initially did not come to ED as she thought it would resolve on its own but it is worsening, so she came in today. Patient denied any fevers, chills, chest pain, change in urinary or bowel habits. Patient also has history of alcohol abuse, her last alcohol intake was last night about a pint. As per patient and chart review patient was admitted in [facility] on 2/26 for recurrent pancreatitis, alcohol use disorder. Patient received transfusion there for a hemoglobin below 7, she left AMA from there.     On arrival to the ED, patient was awake, alert and oriented to time, place and person. T-max 99.3, tachycardic at around 121 otherwise other vital signs were stable. Was saturating well on room air. Pertinent labs were ordered, potassium 3.2, chloride 96, albumin 2.7, LFTs were deranged, bilirubin 10.81, ammonia was normal though. CBC-hemoglobin 8.7, WBC 10.5 and platelets 897. CT abdomen was done which showed colonic wall thickening with fat deposition suspicions of chronic colitis, severe fatty infiltration of the liver with hepatomegaly.     When I went to evaluate the patient, the patient was mildly tachycardic, otherwise other vital signs were stable. Patient was awake, alert and oriented to time, place and person. Patient had scleral icterus, jaundice, abdomen was mildly distended.      Recent Hospitalization:  -In [facility] on 2/26 for epigastric/abdominal pain: Was evaluated by GI, they performed an EGD on 2/28 which showed multifocal white adherence in esophagus consistent with Candida. Hemoglobin was 6.8, was replaced with 1 unit packed RBC. But before other treatment could be started patient left AMA.      PMH:  -Alcohol abuse  -Liver cirrhosis  -S/p cholecystectomy in April 2021    OBJECTIVE     Vital Signs:  /72   Pulse 123   Temp 99.7 °F (37.6 °C) (Oral)   Resp 16   Ht 5' 4\" (1.626 m)   Wt 106 lb (48.1 kg)   SpO2 98%   BMI 18.19 kg/m²     Temp (24hrs), Av.6 °F (37.6 °C), Min:99.3 °F (37.4 °C), Max:99.7 °F (37.6 °C)    In: 1000   Out: -     Physical Exam:  Vitals reviewed. Constitutional:       General: She is awake. Appearance: Normal appearance. She is normal weight. HENT:      Head: Normocephalic. Eyes:      General: Lids are normal. Scleral icterus present. Pupils: Pupils are equal, round, and reactive to light. Cardiovascular:      Rate and Rhythm: Regular rhythm. Tachycardia present. Pulses: Normal pulses. Heart sounds: Normal heart sounds. Pulmonary:      Effort: Pulmonary effort is normal.      Breath sounds: Normal breath sounds and air entry. Abdominal:      General: Bowel sounds are normal. There is distension (mild). Tenderness: There is generalized abdominal tenderness (mild). Musculoskeletal:      Right lower leg: No swelling. Left lower leg: No swelling. Skin:     General: Skin is warm. Coloration: Skin is jaundiced. Neurological:      Mental Status: She is alert and oriented to person, place, and time. Psychiatric:         Attention and Perception: Attention normal.         Mood and Affect: Mood normal.         Speech: Speech normal.         Behavior: Behavior is cooperative.         Medications:  Scheduled Medications:    sodium chloride flush  5-40 mL IntraVENous 2 times per day    heparin (porcine)  5,000 Units SubCUTAneous 3 times per day    cefTRIAXone (ROCEPHIN) IV  1,000 mg IntraVENous Q24H    thiamine  100 mg Oral Daily    folic acid  1 mg Oral Daily    pantoprazole  40 mg IntraVENous Daily    And    sodium chloride (PF)  10 mL IntraVENous Daily     Continuous Infusions:    sodium chloride       PRN Medicationssodium chloride flush, 5-40 mL, PRN  sodium chloride, 25 mL, PRN  ondansetron, 4 mg, Q8H PRN   Or  ondansetron, 4 mg, Q6H PRN  polyethylene glycol, 17 g, Daily PRN  acetaminophen, 650 mg, Q6H PRN   Or  acetaminophen, 650 mg, Q6H PRN  potassium chloride, 40 mEq, PRN   Or  potassium alternative oral replacement, 40 mEq, PRN   Or  potassium chloride, 10 mEq, PRN  LORazepam, 1 mg, Q1H PRN   Or  LORazepam, 1 mg, Q1H PRN   Or  LORazepam, 2 mg, Q1H PRN   Or  LORazepam, 2 mg, Q1H PRN   Or  LORazepam, 3 mg, Q1H PRN   Or  LORazepam, 3 mg, Q1H PRN   Or  LORazepam, 4 mg, Q1H PRN   Or  LORazepam, 4 mg, Q1H PRN        Diagnostic Labs:  CBC:   Recent Labs     03/09/22  1222   WBC 10.5   RBC 2.38*   HGB 8.7*   HCT 25.2*   .9*   RDW 22.9*        BMP:   Recent Labs     03/09/22  1222      K 3.2*   CL 96*   CO2 23   BUN 2*   CREATININE <0.20*     BNP: No results for input(s): BNP in the last 72 hours. PT/INR:   Recent Labs     03/09/22  1154   PROTIME 15.5*   INR 1.5     APTT: No results for input(s): APTT in the last 72 hours. CARDIAC ENZYMES: No results for input(s): CKMB, CKMBINDEX, TROPONINI in the last 72 hours. Invalid input(s): CKTOTAL;3  FASTING LIPID PANEL:No results found for: CHOL, HDL, TRIG  LIVER PROFILE:   Recent Labs     03/09/22  1222   *   ALT 40*   BILIDIR 8.20*   BILITOT 10.81*   ALKPHOS 240*      MICROBIOLOGY: No results found for: CULTURE    Imaging:    XR CERVICAL SPINE (2-3 VIEWS)    Result Date: 3/10/2022  Unremarkable radiographic views of the cervical spine. CT ABDOMEN PELVIS W IV CONTRAST Additional Contrast? None    Result Date: 3/9/2022  Severe fatty infiltration of the liver with hepatomegaly. Recanalized periumbilical vein as well as several portosystemic collaterals consistent with portal hypertension Colonic wall thickening with fat deposition suspicious for chronic colitis which may be infectious or inflammatory in nature. Status post cholecystectomy. Ill-defined nodular infiltrate in the right lower lobe suspicious for atypical infectious etiology.  Ascites RECOMMENDATIONS: Unavailable       ASSESSMENT & PLAN     Principal Problem:    Ascites due to alcoholic cirrhosis (HCC)  Active Problems:    Hyperbilirubinemia    Liver cirrhosis (HCC)    Alcohol abuse    S/P cholecystectomy    Abnormal LFTs    Hypokalemia    Hypoalbuminemia  Resolved Problems:    * No resolved hospital problems. *    IMPRESSION  This is a 27 y.o. female who presented with abdominal distention, abdominal pain and found to have elevated bilirubin, ascites. Patient admitted to inpatient status for the management of ascites.      Ascites likely secondary to liver cirrhosis secondary to alcohol abuse. -Going for IR US guided paracentesis. Fluid cytology. Continue ceftriaxone for prophylaxis for SBP. GI consulted. Continue to monitor LFTs. Alcohol abuse, with history of withdrawals in the past.  -CIWA protocol in place. Continue thiamine and folic acid. Advised on cessation. Social work consulted. UDS pending. Hypokalemia. -Replace potassium as needed, continue to monitor. Mag- 1.6- ordered replacement. History of recurrent pancreatitis. S/p cholecystectomy.     DVT ppx: Heparin  GI ppx: Protonix  Diet: Regular diet  PT/OT: Consulted  Case Management: Consulted  Social work: Consulted    Disposition: Monitor inpatient.      Kriss White MD  PGY-1, Internal Medicine Resident  [facility], [facility]         3/10/2022, 4:06 AM

## 2022-03-10 NOTE — CONSULTS
[facility]    GASTROENTEROLOGY CONSULT    Patient:   José Luis Wylie   :    1995   Facility:   [facility]   Date:    3/10/2022  Admission Dx:  Hyperbilirubinemia [E80.6]  [facility] [K74.60]  Requesting physician: Faustino Sharpe MD  Reason for consult:  Hyperbilirubinemia h/o cirrhosis   CC : Abdominal pain and distention     SUBJECTIVE     HISTORY OF PRESENT ILLNESS  This is a 27 y.o.  female who was admitted 3/9/2022 with Hyperbilirubinemia [E80.6]  Liver cirrhosis ([facility] [K74.60]. We have been asked to see the patient in consultation by Faustino Sharpe MD for hyperbilirubinemia h/o cirrhosis. 53-year-old female with a history of ADHD, bipolar disorder, anxiety/depression, cholecystectomy 2021, recent COVID, alcohol abuse with alcohol risk associated recurrent pancreatitis, severe hepatic steatosis with hepatomegaly, portal hypertension, ascites, thrombocytopenia and microcytic anemia who presented to the hospital with reports of 4-day history of abdominal distention and pain. Patient was seen and examined. Patient reports she has been experiencing abdominal distention and diffuse abdominal pain for the past 4 days. No nausea, vomiting, or fevers. She does admit to mild constipation with last stool yesterday though very small stool. No melena or hematochezia. Patient had CT abdomen pelvis completed showing small amount of ascitic fluid. Patient went for ultrasound today which showed too little fluid for. prparacentesis. Patient denies any prior paracentesis. She reports drinking 1.0-1.5 pints of wine daily since age 13. She smokes 5 cigarettes/day. No history of illicit drug use. Patient was seen at [facility] for epigastric abdominal pain. Patient was found to be anemic with hemoglobin 6.8. She received 1 unit PRBC 2022. EGD 2022 showed mild focal white adherence in esophagus consistent with Candida. HIV was negative. Patient left AMA on 3/2/2022 before follow-up arrangements and medical management discussed. Summary of imaging completed at this time:      Summary of labs completed at this time:        Previous GI history:   EGD 02/28/2022    Impression:           - Normal proximal esophagus and distal                                 esophagus. Biopsied.                                 - Mild focal patchy white adherent exudate in                                 the mid esophagus (possible Candida fundus vs                                 pill debris). Biopsied.                                 - Z-line, 35 cm from the incisors.                                 - Normal cardia and gastric fundus.                                 - Mild nodular mucosa in the gastric body and                                 antrum of the stomach, biopsied. Random                                 incisura angularis biopsy also obtained.                                 - Benign appearing nodule appreciated in the                                 pyloric channel of the stomach. Biopsied.                                 - Normal duodenal bulb and second portion of                                 the duodenum. Biopsied. Recommendation:   - [facility] for ongoing                                 care.                                 - Await pathology results.                                 - Follow an antireflux regimen. He Montalvo MD   2/28/2022 1:31:06 PM       Final Pathologic Diagnosis  1. Second portion of duodenum, biopsy:      Fragments of duodenal mucosa with no histologic abnormalities. 2. Duodenal bulb, biopsy:      Duodenal bulb mucosa with no histologic abnormalities. 3. Pyloric channel, biopsy:      Mild-to-moderate chronic gastritis.      No Helicobacter pylori organisms seen on H&E stain.     4. Stomach, biopsy:      Mild-to-moderate chronic gastritis.      No Helicobacter pylori organisms seen on H&E stain. Comment: Confirmatory immunostains for Helicobacter pylori organisms are performed on part 3 and part 4 and are negative. 5. Distal esophagus, biopsy:      Esophageal squamous mucosa with no histologic abnormalities.      No chronic or active inflammation, eosinophilia or dysplasia identified. 6. Mid esophagus, biopsy:      Candida esophagitis. Comment: PAS stain for fungi is performed with appropriate controls and demonstrates numerous fungal pseudohyphae morphologically consistent with Candida. 7. Proximal esophagus, biopsy:      Esophageal squamous mucosa with no histologic abnormalities.      No chronic or active inflammation, eosinophilia or dysplasia identified.                         **Report Electronically Signed Out**  nxk/3/2/2022 Judith Hill MD         OBJECTIVE:     PAST MEDICAL/SURGICAL HISTORY  Past Medical History:   Diagnosis Date    ADHD (attention deficit hyperactivity disorder)     Anxiety     Bipolar 1 disorder ([facility]     Depression      History reviewed. No pertinent surgical history. ALLERGIES:  No Known Allergies    HOME MEDICATIONS:  Prior to Admission medications    Medication Sig Start Date End Date Taking? Authorizing Provider   acetaminophen-codeine (TYLENOL/CODEINE #3) 300-30 MG per tablet Take 1 tablet by mouth 3 times daily as needed for Pain for up to 10 doses. 2/19/15   Dany Cooper PA-C   ibuprofen (ADVIL;MOTRIN) 600 MG tablet Take 1 tablet by mouth every 6 hours as needed for Pain. 11/8/14   Jessica Cantu MD   acetaminophen-codeine (TYLENOL/CODEINE #3) 300-30 MG per tablet Take 1 tablet by mouth 3 times daily as needed for Pain for up to 10 doses. 11/3/14   GEOFFREY Riley   ibuprofen (ADVIL;MOTRIN) 800 MG tablet Take 1 tablet by mouth every 8 hours as needed for Pain. 10/30/14   Krista Chavez MD   venlafaxine (EFFEXOR-XR) 150 MG XR capsule Take 1 capsule by mouth daily (with breakfast).  9/15/14   Kym Harris MD ARIPiprazole (ABILIFY) 10 MG tablet Take 1 tablet by mouth daily. 9/15/14   Gaudencio Solano MD       CURRENT MEDICATIONS:  Scheduled Meds:   magnesium sulfate  2,000 mg IntraVENous Once    bisacodyl  10 mg Oral Once    sodium chloride flush  5-40 mL IntraVENous 2 times per day    thiamine  100 mg Oral Daily    folic acid  1 mg Oral Daily     Continuous Infusions:   sodium chloride 25 mL (03/10/22 1044)     PRN Meds:sodium chloride flush, sodium chloride, ondansetron **OR** ondansetron, polyethylene glycol, acetaminophen **OR** acetaminophen, potassium chloride **OR** potassium alternative oral replacement **OR** potassium chloride, LORazepam **OR** LORazepam **OR** LORazepam **OR** LORazepam **OR** LORazepam **OR** LORazepam **OR** LORazepam **OR** LORazepam    SOCIAL HISTORY:     Tobacco:   reports that she has been smoking cigarettes. She has been smoking about 0.50 packs per day. She has never used smokeless tobacco.  Alcohol:   reports current alcohol use. Illicit drugs:  reports current drug use. Drug: Marijuana Fermín Dior. FAMILY HISTORY:     Family History   Problem Relation Age of Onset    Hypertension Mother     Depression Mother     Bipolar Disorder Father     Alcohol Abuse Father     Cancer Other         Uncle ? REVIEW OF SYSTEMS:    Constitutional: No fever, no chills, no lethargy, no weakness. HEENT:  No headache, otalgia, itchy eyes, nasal discharge or sore throat. Cardiac:  No chest pain, dyspnea, orthopnea or PND. Chest:   No cough, phlegm or wheezing. Abdomen:  + abdominal pain and distention, no nausea or vomiting. Neuro:  No focal weakness, abnormal movements or seizure like activity. Skin:   No rashes, no itching. :   No hematuria, no pyuria, no dysuria, no flank pain. Extremities:  No swelling or joint pains. ROS was otherwise negative except as mentioned in the [address]      PHYSICAL EXAM:    BP 96/62   Pulse 108   Temp 98.9 °F (37.2 °C) (Temporal)   Resp 17   Ht 5' 4\" (1.626 m)   Wt 106 lb (48.1 kg)   SpO2 96%   BMI 18.19 kg/m²     GENERAL:  Jaundice, Well developed, undernourished, No apparent distress  HEAD:   Normocephalic, Atraumatic  EENT:   EOMI, Sclera not icteric, Oropharynx moist   NECK:   Supple, Trachea midline  LUNGS:  CTA Bilaterally  HEART:  RRR, No murmur  ABDOMEN:   Soft, mild tenderness to palpation, mildly distended, BS WNL  EXT:   No clubbing. No cyanosis. No edema. SKIN:   No rashes. Jaundice. No stigmata of liver disease. MUSC/SKEL:   Adequate muscle bulk for patient's age, No significant synovitis, No deformities  NEURO:  A&O x Three, CN II- XII grossly intact      LABS AND IMAGING:     CBC  Recent Labs     03/09/22 1222 03/10/22  0614   WBC 10.5 7.5   HGB 8.7* 7.5*   HCT 25.2* 22.9*   .9* 111.2*   MCH 36.6* 36.4*   MCHC 34.5 32.8    See Reflexed IPF Result       IMMATURE PLTs  Lab Results   Component Value Date    PLTFLUORE 154 03/10/2022       BMP  Recent Labs     03/09/22  1222 03/10/22  0614    134*   K 3.2* 3.4*   CL 96* 99   CO2 23 25   BUN 2* <2*   CREATININE <0.20* <0.20*   GLUCOSE 79 62*   CALCIUM 8.0* 7.6*       LFTS  Recent Labs     03/09/22  1222 03/10/22  0614   ALKPHOS 240* 198*   ALT 40* 33   * 235*   PROT 5.9* 5.2*   BILITOT 10.81* 11.08*   BILIDIR 8.20*  --    LABALBU 2.7* 2.4*       AMYLASE/LIPASE/AMMONIA  Recent Labs     03/09/22  1222 03/09/22  1441   LIPASE 11*  --    AMMONIA  --  27       PT/INR  Recent Labs     03/09/22  1154   PROTIME 15.5*   INR 1.5       Lactic acid:  Recent Labs     03/09/22  1222   LACTACIDWB 3.7*           IMAGING  XR CERVICAL SPINE (2-3 VIEWS)    Result Date: 3/10/2022  EXAMINATION: 3 XRAY VIEWS OF THE CERVICAL SPINE 3/9/2022 11:51 pm COMPARISON: None. HISTORY: ORDERING SYSTEM PROVIDED HISTORY: neck pain TECHNOLOGIST PROVIDED HISTORY: neck pain FINDINGS: The cervical spine demonstrates normal lordotic curvature and alignment. Bone mineralization is within normal limits. consistent with portal hypertension Colonic wall thickening with fat deposition suspicious for chronic colitis which may be infectious or inflammatory in nature. Status post cholecystectomy. Ill-defined nodular infiltrate in the right lower lobe suspicious for atypical infectious etiology. Ascites RECOMMENDATIONS: Unavailable       IMPRESSION:     1. Alcohol use disorder with risk associated recurrent pancreatitis, severe hepatic steatosis with hepatomegaly, portal hypertension, ascites, thrombocytopenia and microcytic anemia. 2. Hyperbilirubinemia - labs consistent alcoholic hepatitis. Also has underlying severe fatty infiltration of the liver.-Anticipate transaminase levels to continue to decline while bilirubin continues to increase over the next few days then decline as long as patient abstains from alcohol. Madrey's DF -26.9. Patient does not qualify for steroid  3. Alcohol use disorder   4. Abdominal pain and distention -likely secondary to constipation. 5.  Candida esophagitis      Old records, labs and imaging reviewed. PLAN   1. Alcohol abstinence discussed with patient who is willing to get help to stop. 2.  MiraLAX 17 g x 1 with Dulcolax 10 mg x 1  3. Madrey's DF 26.9. Patient does not qualify for steroids  4. Start Fluconazole 200 mg daily x 2 weeks   5. Recommend high-protein diet-discussed with patient  6. Patient can follow-up with GI in the outpatient setting    This plan was formulated in collaboration with Dr. Kalyani Martínez   Thank you for allowing us to participate in the care of your patient. Electronically signed by: ADAM Brady CNP on 3/10/2022 at 11:08 AM     Please note that this note was generated using a voice recognition dictation software. Although every effort was made to ensure the accuracy of this automated transcription, some errors in transcription may have occurred.

## 2022-03-10 NOTE — PROGRESS NOTES
CLINICAL PHARMACY NOTE: MEDS TO BEDS    Total # of Prescriptions Filled: 4   The following medications were delivered to the patient:  · Fluconazole 903 mg tab  · Folic acid 1 mg tab  · Thiamine 100 mg tab  · Bisacodyl ec 5 mg tab    Additional Documentation:Medications delivered to the patient in room 322 @ 1:40pm .

## 2022-03-11 LAB
CULTURE: ABNORMAL
SPECIMEN DESCRIPTION: ABNORMAL

## 2022-03-13 NOTE — DISCHARGE SUMMARY
[address]     Department of Internal Medicine - Staff Internal Medicine Teaching Service    INPATIENT DISCHARGE SUMMARY      Patient Identification:  Roxana Love is a 27 y.o. female. :  1995  MRN: [id number]     Acct: [de-identified]   PCP: Trista Rucker  Admit Date:  3/9/2022  Discharge date and time: 3/10/2022  1:47 PM   Attending Provider: No att. providers found                                     [address] Problem Lists:  Principal Problem:    Ascites due to alcoholic hepatitis  Active Problems:    Hyperbilirubinemia    Fatty liver    Alcohol abuse    S/P cholecystectomy    Abnormal LFTs    Hypokalemia    Hypoalbuminemia    Portal hypertension ([address]  Resolved Problems:    * No resolved hospital problems. *      HOSPITAL STAY     Brief Inpatient course:   Roxana Love is a 27 y.o. female who presented with abdominal distention and pain since 4 days prior to admission. As per the patient, she did not come prior as she thought it would resolve on its own but it started to get worse. Patient denied any fevers, chills, chest pain, change in urinary or bowel habits. Patient does have a history of alcohol abuse, however the last alcohol intake was last night prior to admission. On arrival to the ED patient was awake, alert and oriented to time, place and person. Vital signs were stable was saturating well on room air LFTs were deranged. CT abdomen was done which showed colonic wall thickening with fat deposition suspicious of chronic colitis, severe fatty infiltration of the liver with hepatomegaly. There were concern of ascites. GI was consulted, plan was to do a paracentesis, USG just showed trace amounts of ascites not suitable for paracentesis. GI recommended alcohol abstinence and miralax. Patient had h/o candida esophagitis which was diagnosed during her admission in [facility] from where she left AMA.  Pt advised to refrain from alcohol and take her meds as prescribed. Patient discharged in a stable condition. PMH:  -Alcohol abuse  -Liver cirrhosis  -S/p cholecystectomy in April 2021       NO PROCEDURES PERFORMED    Consults:     Consults:     Final Specialist Recommendations/Findings:   IP CONSULT TO INTERNAL MEDICINE  IP CONSULT TO GI  IP CONSULT TO CASE MANAGEMENT  IP CONSULT TO SOCIAL WORK      Any Hospital Acquired Infections: none    Discharge Functional Status:  stable    DISCHARGE PLAN     Disposition: home    Patient Instructions:   Discharge Medication List as of 3/10/2022 12:26 PM      START taking these medications    Details   folic acid (FOLVITE) 1 MG tablet Take 1 tablet by mouth daily, Disp-30 tablet, R-3Normal      bisacodyl (DULCOLAX) 5 MG EC tablet Take 2 tablets by mouth daily as needed for Constipation, Disp-20 tablet, R-0Normal      thiamine 100 MG tablet Take 1 tablet by mouth daily, Disp-30 tablet, R-3Normal      fluconazole (DIFLUCAN) 200 MG tablet Take 1 tablet by mouth daily for 14 doses, Disp-14 tablet, R-0Normal         CONTINUE these medications which have NOT CHANGED    Details   ibuprofen (ADVIL;MOTRIN) 600 MG tablet Take 1 tablet by mouth every 6 hours as needed for Pain., Disp-30 tablet, R-0      venlafaxine (EFFEXOR-XR) 150 MG XR capsule Take 1 capsule by mouth daily (with breakfast). , Disp-30 capsule, R-0      ARIPiprazole (ABILIFY) 10 MG tablet Take 1 tablet by mouth daily. , Disp-30 tablet, R-0         STOP taking these medications       acetaminophen-codeine (TYLENOL/CODEINE #3) 300-30 MG per tablet Comments:   Reason for Stopping:         acetaminophen-codeine (TYLENOL/CODEINE #3) 300-30 MG per tablet Comments:   Reason for Stopping:               Activity: activity as tolerated    Diet: regular diet, high protein diet    Follow-up:    Chante Post  [facility] [id number] [phone]    Schedule an appointment as soon as possible for a visit      Ronnell Jansen MD  [address]  [address]  [phone]    Schedule an appointment as soon as possible for a visit        Patient Instructions: Follow up with PCP. Call for appointment ASAP. Follow up with GI. Call for appointment. Stop drinking. Wean off alcohol slowly. Treatment information given to be reviewed. Return immediately for new or worsening concerns. Note that over 30 minutes was spent in preparing discharge papers, discussing discharge with patient, medication review, etc.      Lavell Viera MD  Internal Medicine Resident, PGY-1  [address], [place]  3/13/2022, 10:08 AM

## 2022-12-15 ENCOUNTER — APPOINTMENT (OUTPATIENT)
Dept: CT IMAGING | Age: 27
DRG: 812 | End: 2022-12-15
Payer: MEDICARE

## 2022-12-15 ENCOUNTER — APPOINTMENT (OUTPATIENT)
Dept: GENERAL RADIOLOGY | Age: 27
DRG: 812 | End: 2022-12-15
Payer: MEDICARE

## 2022-12-15 ENCOUNTER — HOSPITAL ENCOUNTER (INPATIENT)
Age: 27
LOS: 25 days | Discharge: HOME | DRG: 812 | End: 2023-01-09
Attending: EMERGENCY MEDICINE | Admitting: INTERNAL MEDICINE
Payer: MEDICARE

## 2022-12-15 DIAGNOSIS — T50.904A DRUG OVERDOSE OF UNDETERMINED INTENT, INITIAL ENCOUNTER: Primary | ICD-10-CM

## 2022-12-15 LAB
ABSOLUTE EOS #: 0.1 K/UL (ref 0–0.4)
ABSOLUTE IMMATURE GRANULOCYTE: 0 K/UL (ref 0–0.3)
ABSOLUTE LYMPH #: 2.69 K/UL (ref 1–4.8)
ABSOLUTE MONO #: 0.91 K/UL (ref 0.1–0.8)
ACETAMINOPHEN LEVEL: <5 UG/ML (ref 10–30)
ALBUMIN SERPL-MCNC: 3.8 G/DL (ref 3.5–5.2)
ALBUMIN/GLOBULIN RATIO: 1.1 (ref 1–2.5)
ALLEN TEST: POSITIVE
ALP BLD-CCNC: 123 U/L (ref 35–104)
ALT SERPL-CCNC: 120 U/L (ref 5–33)
AMMONIA: 61 UMOL/L (ref 11–51)
AMPHETAMINE SCREEN URINE: NEGATIVE
ANION GAP SERPL CALCULATED.3IONS-SCNC: 16 MMOL/L (ref 9–17)
ANION GAP: 14 MMOL/L (ref 7–16)
AST SERPL-CCNC: 635 U/L
BARBITURATE SCREEN URINE: NEGATIVE
BASOPHILS # BLD: 0 % (ref 0–2)
BASOPHILS ABSOLUTE: 0 K/UL (ref 0–0.2)
BENZODIAZEPINE SCREEN, URINE: NEGATIVE
BILIRUB SERPL-MCNC: 0.8 MG/DL (ref 0.3–1.2)
BILIRUBIN DIRECT: 0.3 MG/DL
BILIRUBIN URINE: ABNORMAL
BILIRUBIN, INDIRECT: 0.5 MG/DL (ref 0–1)
BUN BLDV-MCNC: 2 MG/DL (ref 6–20)
CALCIUM SERPL-MCNC: 8.8 MG/DL (ref 8.6–10.4)
CANNABINOID SCREEN URINE: NEGATIVE
CASTS UA: ABNORMAL /LPF (ref 0–8)
CHLORIDE BLD-SCNC: 100 MMOL/L (ref 98–107)
CO2: 18 MMOL/L (ref 20–31)
COCAINE METABOLITE, URINE: NEGATIVE
COLOR: ABNORMAL
CREAT SERPL-MCNC: 0.33 MG/DL (ref 0.5–0.9)
EGFR, POC: >60 ML/MIN/1.73M2
EOSINOPHILS RELATIVE PERCENT: 2 % (ref 1–4)
EPITHELIAL CELLS UA: ABNORMAL /HPF (ref 0–5)
ETHANOL PERCENT: 0.27 %
ETHANOL: 266 MG/DL
FENTANYL URINE: NEGATIVE
FIO2: 100
GFR SERPL CREATININE-BSD FRML MDRD: >60 ML/MIN/1.73M2
GLUCOSE BLD-MCNC: 100 MG/DL (ref 65–105)
GLUCOSE BLD-MCNC: 130 MG/DL (ref 70–99)
GLUCOSE BLD-MCNC: 145 MG/DL (ref 74–100)
GLUCOSE URINE: NEGATIVE
HCO3 VENOUS: 20.1 MMOL/L (ref 22–29)
HCT VFR BLD CALC: 32.9 % (ref 36.3–47.1)
HEMOGLOBIN: 11.3 G/DL (ref 11.9–15.1)
IMMATURE GRANULOCYTES: 0 %
INR BLD: 1.2
KETONES, URINE: ABNORMAL
LACTIC ACID, WHOLE BLOOD: 3.4 MMOL/L (ref 0.7–2.1)
LEUKOCYTE ESTERASE, URINE: NEGATIVE
LYMPHOCYTES # BLD: 56 % (ref 24–44)
MAGNESIUM: 1.8 MG/DL (ref 1.6–2.6)
MCH RBC QN AUTO: 39.9 PG (ref 25.2–33.5)
MCHC RBC AUTO-ENTMCNC: 34.3 G/DL (ref 28.4–34.8)
MCV RBC AUTO: 116.3 FL (ref 82.6–102.9)
METHADONE SCREEN, URINE: NEGATIVE
MONOCYTES # BLD: 19 % (ref 1–7)
MORPHOLOGY: ABNORMAL
MORPHOLOGY: ABNORMAL
MYOGLOBIN: 52 NG/ML (ref 25–58)
NEGATIVE BASE EXCESS, ART: 5 (ref 0–2)
NEGATIVE BASE EXCESS, VEN: 4 (ref 0–2)
NITRITE, URINE: NEGATIVE
NRBC AUTOMATED: 0 PER 100 WBC
O2 DEVICE/FLOW/%: ABNORMAL
O2 SAT, VEN: 83 % (ref 60–85)
OPIATES, URINE: NEGATIVE
OXYCODONE SCREEN URINE: NEGATIVE
PCO2, VEN: 34.3 MM HG (ref 41–51)
PDW BLD-RTO: 15 % (ref 11.8–14.4)
PH UA: 6 (ref 5–8)
PH VENOUS: 7.38 (ref 7.32–7.43)
PHENCYCLIDINE, URINE: NEGATIVE
PLATELET # BLD: ABNORMAL K/UL (ref 138–453)
PLATELET, FLUORESCENCE: 133 K/UL (ref 138–453)
PLATELET, IMMATURE FRACTION: 5.9 % (ref 1.1–10.3)
PO2, VEN: 47.7 MM HG (ref 30–50)
POC BUN: <3 MG/DL (ref 8–26)
POC CHLORIDE: 109 MMOL/L (ref 98–107)
POC CREATININE: 0.46 MG/DL (ref 0.51–1.19)
POC HCO3: 18.5 MMOL/L (ref 21–28)
POC HEMATOCRIT: 38 % (ref 36–46)
POC HEMOGLOBIN: 13 G/DL (ref 12–16)
POC IONIZED CALCIUM: 1.08 MMOL/L (ref 1.15–1.33)
POC LACTIC ACID: 2.9 MMOL/L (ref 0.56–1.39)
POC O2 SATURATION: 100 % (ref 94–98)
POC PCO2: 29.8 MM HG (ref 35–48)
POC PH: 7.4 (ref 7.35–7.45)
POC PO2: 605.6 MM HG (ref 83–108)
POC POTASSIUM: 3.8 MMOL/L (ref 3.5–4.5)
POC SODIUM: 141 MMOL/L (ref 138–146)
POC TCO2: 19 MMOL/L (ref 22–30)
POTASSIUM SERPL-SCNC: 4 MMOL/L (ref 3.7–5.3)
PROTEIN UA: ABNORMAL
PROTHROMBIN TIME: 12.4 SEC (ref 9.1–12.3)
RBC # BLD: 2.83 M/UL (ref 3.95–5.11)
RBC UA: ABNORMAL /HPF (ref 0–4)
SALICYLATE LEVEL: <1 MG/DL (ref 3–10)
SAMPLE SITE: ABNORMAL
SARS-COV-2, RAPID: DETECTED
SEG NEUTROPHILS: 23 % (ref 36–66)
SEGMENTED NEUTROPHILS ABSOLUTE COUNT: 1.1 K/UL (ref 1.8–7.7)
SODIUM BLD-SCNC: 134 MMOL/L (ref 135–144)
SPECIFIC GRAVITY UA: 1.02 (ref 1–1.03)
SPECIMEN DESCRIPTION: ABNORMAL
TEST INFORMATION: NORMAL
TOTAL CK: 51 U/L (ref 26–192)
TOTAL PROTEIN: 7.2 G/DL (ref 6.4–8.3)
TOXIC TRICYCLIC SC,BLOOD: NEGATIVE
TURBIDITY: CLEAR
URINE HGB: ABNORMAL
UROBILINOGEN, URINE: NORMAL
WBC # BLD: 4.8 K/UL (ref 3.5–11.3)
WBC UA: ABNORMAL /HPF (ref 0–5)

## 2022-12-15 PROCEDURE — 82947 ASSAY GLUCOSE BLOOD QUANT: CPT

## 2022-12-15 PROCEDURE — 6360000002 HC RX W HCPCS

## 2022-12-15 PROCEDURE — 36600 WITHDRAWAL OF ARTERIAL BLOOD: CPT

## 2022-12-15 PROCEDURE — 94002 VENT MGMT INPAT INIT DAY: CPT

## 2022-12-15 PROCEDURE — 85014 HEMATOCRIT: CPT

## 2022-12-15 PROCEDURE — 80051 ELECTROLYTE PANEL: CPT

## 2022-12-15 PROCEDURE — 85025 COMPLETE CBC W/AUTO DIFF WBC: CPT

## 2022-12-15 PROCEDURE — 87641 MR-STAPH DNA AMP PROBE: CPT

## 2022-12-15 PROCEDURE — 80179 DRUG ASSAY SALICYLATE: CPT

## 2022-12-15 PROCEDURE — 83735 ASSAY OF MAGNESIUM: CPT

## 2022-12-15 PROCEDURE — 82803 BLOOD GASES ANY COMBINATION: CPT

## 2022-12-15 PROCEDURE — 83605 ASSAY OF LACTIC ACID: CPT

## 2022-12-15 PROCEDURE — 2500000003 HC RX 250 WO HCPCS

## 2022-12-15 PROCEDURE — 2000000000 HC ICU R&B

## 2022-12-15 PROCEDURE — 2700000000 HC OXYGEN THERAPY PER DAY

## 2022-12-15 PROCEDURE — 80048 BASIC METABOLIC PNL TOTAL CA: CPT

## 2022-12-15 PROCEDURE — 83874 ASSAY OF MYOGLOBIN: CPT

## 2022-12-15 PROCEDURE — 5A1955Z RESPIRATORY VENTILATION, GREATER THAN 96 CONSECUTIVE HOURS: ICD-10-PCS | Performed by: EMERGENCY MEDICINE

## 2022-12-15 PROCEDURE — 0BH18EZ INSERTION OF ENDOTRACHEAL AIRWAY INTO TRACHEA, VIA NATURAL OR ARTIFICIAL OPENING ENDOSCOPIC: ICD-10-PCS | Performed by: EMERGENCY MEDICINE

## 2022-12-15 PROCEDURE — 82550 ASSAY OF CK (CPK): CPT

## 2022-12-15 PROCEDURE — 82140 ASSAY OF AMMONIA: CPT

## 2022-12-15 PROCEDURE — 2500000003 HC RX 250 WO HCPCS: Performed by: HEALTH CARE PROVIDER

## 2022-12-15 PROCEDURE — 80076 HEPATIC FUNCTION PANEL: CPT

## 2022-12-15 PROCEDURE — 80143 DRUG ASSAY ACETAMINOPHEN: CPT

## 2022-12-15 PROCEDURE — 85055 RETICULATED PLATELET ASSAY: CPT

## 2022-12-15 PROCEDURE — 96374 THER/PROPH/DIAG INJ IV PUSH: CPT

## 2022-12-15 PROCEDURE — 80307 DRUG TEST PRSMV CHEM ANLYZR: CPT

## 2022-12-15 PROCEDURE — 82565 ASSAY OF CREATININE: CPT

## 2022-12-15 PROCEDURE — 81001 URINALYSIS AUTO W/SCOPE: CPT

## 2022-12-15 PROCEDURE — 6360000002 HC RX W HCPCS: Performed by: HEALTH CARE PROVIDER

## 2022-12-15 PROCEDURE — 94761 N-INVAS EAR/PLS OXIMETRY MLT: CPT

## 2022-12-15 PROCEDURE — 87040 BLOOD CULTURE FOR BACTERIA: CPT

## 2022-12-15 PROCEDURE — 87635 SARS-COV-2 COVID-19 AMP PRB: CPT

## 2022-12-15 PROCEDURE — 99285 EMERGENCY DEPT VISIT HI MDM: CPT

## 2022-12-15 PROCEDURE — 84520 ASSAY OF UREA NITROGEN: CPT

## 2022-12-15 PROCEDURE — 2580000003 HC RX 258: Performed by: HEALTH CARE PROVIDER

## 2022-12-15 PROCEDURE — G0480 DRUG TEST DEF 1-7 CLASSES: HCPCS

## 2022-12-15 PROCEDURE — 85610 PROTHROMBIN TIME: CPT

## 2022-12-15 PROCEDURE — 72125 CT NECK SPINE W/O DYE: CPT

## 2022-12-15 PROCEDURE — 99291 CRITICAL CARE FIRST HOUR: CPT | Performed by: INTERNAL MEDICINE

## 2022-12-15 PROCEDURE — 70450 CT HEAD/BRAIN W/O DYE: CPT

## 2022-12-15 PROCEDURE — 82330 ASSAY OF CALCIUM: CPT

## 2022-12-15 PROCEDURE — 71045 X-RAY EXAM CHEST 1 VIEW: CPT

## 2022-12-15 PROCEDURE — 6360000002 HC RX W HCPCS: Performed by: STUDENT IN AN ORGANIZED HEALTH CARE EDUCATION/TRAINING PROGRAM

## 2022-12-15 PROCEDURE — 2580000003 HC RX 258: Performed by: STUDENT IN AN ORGANIZED HEALTH CARE EDUCATION/TRAINING PROGRAM

## 2022-12-15 RX ORDER — PROPOFOL 10 MG/ML
INJECTION, EMULSION INTRAVENOUS
Status: COMPLETED
Start: 2022-12-15 | End: 2022-12-15

## 2022-12-15 RX ORDER — NALOXONE HYDROCHLORIDE 1 MG/ML
INJECTION INTRAMUSCULAR; INTRAVENOUS; SUBCUTANEOUS
Status: DISPENSED
Start: 2022-12-15 | End: 2022-12-16

## 2022-12-15 RX ORDER — SODIUM CHLORIDE 9 MG/ML
INJECTION, SOLUTION INTRAVENOUS CONTINUOUS
Status: DISCONTINUED | OUTPATIENT
Start: 2022-12-15 | End: 2022-12-17

## 2022-12-15 RX ORDER — MIDAZOLAM HYDROCHLORIDE 2 MG/2ML
2 INJECTION, SOLUTION INTRAMUSCULAR; INTRAVENOUS EVERY 4 HOURS
Status: DISCONTINUED | OUTPATIENT
Start: 2022-12-15 | End: 2022-12-15

## 2022-12-15 RX ORDER — DEXMEDETOMIDINE HYDROCHLORIDE 4 UG/ML
.1-1.5 INJECTION, SOLUTION INTRAVENOUS CONTINUOUS
Status: DISCONTINUED | OUTPATIENT
Start: 2022-12-15 | End: 2022-12-24

## 2022-12-15 RX ORDER — ONDANSETRON 4 MG/1
4 TABLET, ORALLY DISINTEGRATING ORAL EVERY 8 HOURS PRN
Status: DISCONTINUED | OUTPATIENT
Start: 2022-12-15 | End: 2023-01-09 | Stop reason: HOSPADM

## 2022-12-15 RX ORDER — ACETAMINOPHEN 325 MG/1
650 TABLET ORAL EVERY 6 HOURS PRN
Status: DISCONTINUED | OUTPATIENT
Start: 2022-12-15 | End: 2023-01-09 | Stop reason: HOSPADM

## 2022-12-15 RX ORDER — SODIUM CHLORIDE 0.9 % (FLUSH) 0.9 %
5-40 SYRINGE (ML) INJECTION PRN
Status: DISCONTINUED | OUTPATIENT
Start: 2022-12-15 | End: 2022-12-31 | Stop reason: SDUPTHER

## 2022-12-15 RX ORDER — POLYETHYLENE GLYCOL 3350 17 G/17G
17 POWDER, FOR SOLUTION ORAL DAILY PRN
Status: DISCONTINUED | OUTPATIENT
Start: 2022-12-15 | End: 2023-01-09 | Stop reason: HOSPADM

## 2022-12-15 RX ORDER — SODIUM CHLORIDE 0.9 % (FLUSH) 0.9 %
5-40 SYRINGE (ML) INJECTION EVERY 12 HOURS SCHEDULED
Status: DISCONTINUED | OUTPATIENT
Start: 2022-12-15 | End: 2022-12-25 | Stop reason: SDUPTHER

## 2022-12-15 RX ORDER — ACETAMINOPHEN 650 MG/1
650 SUPPOSITORY RECTAL EVERY 6 HOURS PRN
Status: DISCONTINUED | OUTPATIENT
Start: 2022-12-15 | End: 2023-01-09 | Stop reason: HOSPADM

## 2022-12-15 RX ORDER — ONDANSETRON 2 MG/ML
4 INJECTION INTRAMUSCULAR; INTRAVENOUS EVERY 6 HOURS PRN
Status: DISCONTINUED | OUTPATIENT
Start: 2022-12-15 | End: 2023-01-09 | Stop reason: HOSPADM

## 2022-12-15 RX ORDER — 0.9 % SODIUM CHLORIDE 0.9 %
1000 INTRAVENOUS SOLUTION INTRAVENOUS ONCE
Status: COMPLETED | OUTPATIENT
Start: 2022-12-15 | End: 2022-12-15

## 2022-12-15 RX ORDER — SODIUM CHLORIDE 9 MG/ML
INJECTION, SOLUTION INTRAVENOUS PRN
Status: DISCONTINUED | OUTPATIENT
Start: 2022-12-15 | End: 2022-12-27

## 2022-12-15 RX ORDER — ENOXAPARIN SODIUM 100 MG/ML
30 INJECTION SUBCUTANEOUS DAILY
Status: DISCONTINUED | OUTPATIENT
Start: 2022-12-15 | End: 2023-01-09 | Stop reason: HOSPADM

## 2022-12-15 RX ORDER — PROPOFOL 10 MG/ML
5-50 INJECTION, EMULSION INTRAVENOUS ONCE
Status: COMPLETED | OUTPATIENT
Start: 2022-12-15 | End: 2022-12-15

## 2022-12-15 RX ORDER — PROPOFOL 10 MG/ML
5-50 INJECTION, EMULSION INTRAVENOUS CONTINUOUS
Status: DISCONTINUED | OUTPATIENT
Start: 2022-12-15 | End: 2022-12-19

## 2022-12-15 RX ORDER — MIDAZOLAM HYDROCHLORIDE 2 MG/2ML
2 INJECTION, SOLUTION INTRAMUSCULAR; INTRAVENOUS EVERY 4 HOURS PRN
Status: DISCONTINUED | OUTPATIENT
Start: 2022-12-15 | End: 2022-12-16

## 2022-12-15 RX ADMIN — PROPOFOL 35 MCG/KG/MIN: 10 INJECTION, EMULSION INTRAVENOUS at 19:52

## 2022-12-15 RX ADMIN — PROPOFOL 10 MCG/KG/MIN: 10 INJECTION, EMULSION INTRAVENOUS at 13:33

## 2022-12-15 RX ADMIN — SODIUM CHLORIDE 1000 ML: 9 INJECTION, SOLUTION INTRAVENOUS at 12:26

## 2022-12-15 RX ADMIN — SODIUM CHLORIDE, PRESERVATIVE FREE 10 ML: 5 INJECTION INTRAVENOUS at 21:11

## 2022-12-15 RX ADMIN — MIDAZOLAM HYDROCHLORIDE 2 MG: 2 INJECTION, SOLUTION INTRAMUSCULAR; INTRAVENOUS at 16:15

## 2022-12-15 RX ADMIN — SODIUM CHLORIDE: 9 INJECTION, SOLUTION INTRAVENOUS at 16:28

## 2022-12-15 RX ADMIN — MIDAZOLAM HYDROCHLORIDE 2 MG: 2 INJECTION, SOLUTION INTRAMUSCULAR; INTRAVENOUS at 22:47

## 2022-12-15 RX ADMIN — THIAMINE HYDROCHLORIDE: 100 INJECTION, SOLUTION INTRAMUSCULAR; INTRAVENOUS at 16:56

## 2022-12-15 RX ADMIN — DEXMEDETOMIDINE HYDROCHLORIDE 0.2 MCG/KG/HR: 4 INJECTION, SOLUTION INTRAVENOUS at 16:48

## 2022-12-15 RX ADMIN — SODIUM CHLORIDE, PRESERVATIVE FREE 20 MG: 5 INJECTION INTRAVENOUS at 21:10

## 2022-12-15 RX ADMIN — PROPOFOL 30 MCG/KG/MIN: 10 INJECTION, EMULSION INTRAVENOUS at 18:36

## 2022-12-15 RX ADMIN — Medication 4 MG/HR: at 16:09

## 2022-12-15 ASSESSMENT — PULMONARY FUNCTION TESTS
PIF_VALUE: 16
PIF_VALUE: 16
PIF_VALUE: 21
PIF_VALUE: 16
PIF_VALUE: 21

## 2022-12-15 NOTE — ED NOTES
20 mg etomidate given by Israel Payne RN per Dr Negrito Mtz verbal orders.       Oral JAEL Valverde  12/15/22 [address], RN  12/15/22 [facility], RN  12/15/22 6246

## 2022-12-15 NOTE — ED NOTES
Labeled urine specimen sent to lab via tube system.    [x] Urine Sample   []  Clean catch   [] Straight cath   [] Urine voided   [x]  Indwelling catheter   []  Suprapubic catheter     Breanna Dimas RN  12/15/22 0171

## 2022-12-15 NOTE — FLOWSHEET NOTE
Pt transferred to MICU at 1515. Pt belongings included undergarments, shirt, socks, 2 pants, and earrings. Bedside report received from [facility]. Shortly after arrival, the pt became increasingly agitated. The pt was pulling to removes lines/tubes and hitting/kicking staff. All sedation adjustments were ordered and approved by Dr. Laura Lilly for safety and vent compliance.     Electronically signed by Cullen Buck RN on 12/15/2022 at 5:28 PM

## 2022-12-15 NOTE — ED NOTES
NG tube placed by Mil Donato. Tube at 60 cm, connected to intermittent wall suction.      Gali Cruz RN  12/15/22 1233       Gali Cruz RN  12/15/22 1247

## 2022-12-15 NOTE — H&P
Critical Care - History and Physical Examination    Patient's name:  Lucille Burris  Medical Record Number: [id number]  Patient's account/billing number: [de-identified]  Patient's YOB: 1995  Age: 32 y.o. Date of Admission: 12/15/2022 12:08 PM  Date of History and Physical Examination: 12/15/2022    Primary Care Physician: Kristofer Carroll  Attending Physician: Ashly Waller MD     Code Status: Full Code    Chief complaint:   Chief Complaint   Patient presents with    Drug Overdose       HISTORY OF PRESENT ILLNESS:   Lucille Burris is a 32 y.o. female w/ hx of recetn COVID infection, Alcoholic liver disease who presented to ER via EMS for unresponsiveness. Per report, her boyfriend was speaking with her when the patient \"suddenly became unresponsive. \"   Given 6mg of Narcan by EMS w/o improvement in mental status, however respirations were noted to have improved. Patient was GCS 3 on arrival to ER. Noted to have pinpoint, nonreactive pupils. Was intubated for airway protection. CTH/CT cervical spine negative. Notable labs on workup include:  LA 3.4  Ammonia 61  EtOH 266  //    UDS resulted negative. CXR and UA negative for any acute processes     At time of initial evaluation patient was intubated, on propofol, hemodynamically stable. Some spontaneous movement noted. In tact cough/gag/corneals. Past Medical History:        Diagnosis Date    ADHD (attention deficit hyperactivity disorder)     Anxiety     Bipolar 1 disorder (HCC)     Depression        Past Surgical History:  No past surgical history on file. Allergies:    No Known Allergies      Home Meds:   Prior to Admission medications    Medication Sig Start Date End Date Taking?  Authorizing Provider   folic acid (FOLVITE) 1 MG tablet Take 1 tablet by mouth daily 3/11/22   Caryn Oscar DO   bisacodyl (DULCOLAX) 5 MG EC tablet Take 2 tablets by mouth daily as needed for Constipation 3/10/22   Caryn Oscar DO thiamine 100 MG tablet Take 1 tablet by mouth daily 3/11/22   Johnnie Li DO   ibuprofen (ADVIL;MOTRIN) 600 MG tablet Take 1 tablet by mouth every 6 hours as needed for Pain. 11/8/14   Elisa Mitchell MD   venlafaxine (EFFEXOR-XR) 150 MG XR capsule Take 1 capsule by mouth daily (with breakfast). 9/15/14   Sherrie Deng MD   ARIPiprazole (ABILIFY) 10 MG tablet Take 1 tablet by mouth daily. 9/15/14   Sherrie Deng MD       Social History:   TOBACCO:   reports that she has been smoking cigarettes. She has been smoking an average of .5 packs per day. She has never used smokeless tobacco.  ETOH:   reports current alcohol use. DRUGS:  reports current drug use. Drug: Marijuana [employer]). OCCUPATION:      Family History:       Problem Relation Age of Onset    Hypertension Mother     Depression Mother     Bipolar Disorder Father     Alcohol Abuse Father     Cancer Other         Uncle ? REVIEW OF SYSTEMS (ROS):  Could not be evaluated - intubated. Physical Exam:    Vitals: BP (!) 112/90   Pulse 73   Temp 97.5 °F (36.4 °C) (Oral)   Resp 16   Ht 5' 4\" (1.626 m)   Wt 106 lb (48.1 kg)   SpO2 100%   BMI 18.19 kg/m²     Last Body weight:   Wt Readings from Last 3 Encounters:   12/15/22 106 lb (48.1 kg)   03/09/22 106 lb (48.1 kg)       Body Mass Index : Body mass index is 18.19 kg/m². PHYSICAL EXAMINATION :  Constitutional: intubated, sedated.  +cough/gag/corneals  EENT: PERRLA, EOMI, sclera clear, no lesions, neck supple with midline trachea, ETT in place  Neck: Supple, symmetrical, trachea midline, no adenopathy, thyroid symmetric, no jvd skin normal  Respiratory: clear to auscultation, no wheezes or rales  Cardiovascular: regular rate and rhythm, normal S1, S2, no murmur noted and 2+ pulses throughout  Abdomen: soft, nontender, nondistended, no masses or organomegaly  Extremities:  peripheral pulses normal, no pedal edema, no clubbing or cyanosis    MEDICATIONS:  Scheduled Meds:   naloxone        sodium chloride flush  5-40 mL IntraVENous 2 times per day    enoxaparin  30 mg SubCUTAneous Daily       Continuous Infusions:   sodium chloride      propofol         PRN Meds:   sodium chloride flush, 5-40 mL, PRN  sodium chloride, , PRN  ondansetron, 4 mg, Q8H PRN   Or  ondansetron, 4 mg, Q6H PRN  polyethylene glycol, 17 g, Daily PRN  acetaminophen, 650 mg, Q6H PRN   Or  acetaminophen, 650 mg, Q6H PRN    SUPPORT DEVICES: [x] Ventilator [] BIPAP  [] Nasal Cannula [] Room Air    VENT SETTINGS (Comprehensive) (if applicable): Additional Respiratory Assessments  Heart Rate: 73  Resp: 16  SpO2: 100 %  End Tidal CO2: 26 (%)  Position: Supine  Humidification Source: [facility]  No results found for: IFIO2, MODE, SETTIDVOL, SETPEEP    LABS:   No results found for: PH, PCO2, PO2, HCO3, O2SAT    CBC:   Recent Labs     12/15/22  1228   WBC 4.8   HGB 11.3*   PLT See Reflexed IPF Result     BMP:    Recent Labs     12/15/22  1211 12/15/22  1228   NA  --  134*   K  --  4.0   CL  --  100   CO2  --  18*   BUN  --  2*   CREATININE 0.46* 0.33*   GLUCOSE  --  130*     S. Calcium:  Recent Labs     12/15/22  1228   CALCIUM 8.8     S. Ionized Calcium:No results for input(s): IONCA in the last 72 hours. S. Magnesium:  Recent Labs     12/15/22  1228   MG 1.8     S. Phosphorus:No results for input(s): PHOS in the last 72 hours. S. Glucose:  Recent Labs     12/15/22  1211   POCGLU 145*     Glycosylated hemoglobin A1C: No results for input(s): LABA1C in the last 72 hours. INR:   Recent Labs     12/15/22  1228   INR 1.2     Hepatic functions:   Recent Labs     12/15/22  1228   ALKPHOS 123*   *   *   PROT 7.2   BILITOT 0.8   BILIDIR 0.3*   LABALBU 3.8     Pancreatic functions:No results for input(s): LACTA, AMYLASE in the last 72 hours. S. Lactic Acid: No results for input(s): LACTA in the last 72 hours. Cardiac enzymes:No results for input(s): CKTOTAL, CKMB, CKMBINDEX, TROPONINI in the last 72 hours.   BNP:No results for input(s): BNP in the last 72 hours.  Lipid profile: No results for input(s): CHOL, TRIG, HDL, LDL, LDLCALC in the last 72 hours.  Blood Gases: No results found for: PH, PCO2, PO2, HCO3, O2SAT  Thyroid functions: No results found for: T4, TSH     Urinalysis:   Recent Labs     12/15/22  1249   COLORU Dark Yellow*   PHUR 6.0   WBCUA 0 TO 2   RBCUA 2 TO 5   SPECGRAV 1.020   LEUKOCYTESUR NEGATIVE   UROBILINOGEN Normal   BILIRUBINUR NEGATIVE  Verified by ictotest.*       Microbiology:    RADIOLOGY:  CT HEAD WO CONTRAST   Final Result   No acute intracranial abnormality.      Ethmoid sinusitis.  Left maxillary sinusitis versus retention cyst or polyp.         CT CERVICAL SPINE WO CONTRAST   Final Result   No acute abnormality of the cervical spine.         XR CHEST PORTABLE   Final Result   No active pulmonary or pleural disease.      Tubes in place.           CARDIOLOGY:  Recent Cardiac Catheterization summary:   No results found for this or any previous visit.    Electrocardiogram:     Last Echocardiogram findings:   No results found for this or any previous visit.    No results found for this or any previous visit.      Assessment and Plan     Patient Active Problem List   Diagnosis    Bipolar I disorder, most recent episode depressed (HCC)    Hyperbilirubinemia    Fatty liver    Ascites due to alcoholic hepatitis    Alcohol abuse    S/P cholecystectomy    Abnormal LFTs    Hypokalemia    Hypoalbuminemia    Portal hypertension (HCC)    Drug overdose of undetermined intent, initial encounter     1) Altered Mental Status likely 2/2 drug overdose  2) Acute Respiratory Failure 2/2 AMS  3) Alcohol Intoxication  4) Transaminitis  5) Hx EtOH abuse  6) Hx hepatic steatosis, March 2022    PLAN/MEDICAL DECISION MAKING:  Neurologic:   Neuro intact  Neuro checks per protocol  Currently on propofol, will take off once gets to ICU to evaluate mental status/exam  Altered Mental Status likely 2/2 ?drug overdose  CTH negative  UDS negative  Urine tox  +EtOH 266  Hx of EtOH abuse, EtOH level  Monitor to EtOH withdraw  Seizure precautions  Daily thiamine and folate   CT cervical spine negative    Cardiovascular:  Hemodynamically stable, no pressor support at this time  MAP goal > 65    Pulmonary:  Maintain oxygen sats >92%  Pulmonary toilet  Acute Respiratory Failure 2/2 AMS  Intubated, on PRVC 18/450/8/100  ABG 7.4/30/605/18  No concern for infectious process on CXR    GI/Nutrition  Glycolax PRN  Ulcer Prophylaxis: Pepcid BID  Diet:Diet NPO  //  Ammonia 61    Renal/Fluid/Electrolyte  IV Fluids: [email or web address] mL/Hr   I/O: In: 1008 [I.V.:8]  Out: -   Monitor UOP  Monitor electrolytes, replace PRN   Na 134/K 4.9  BUN 2/Cr 0.33  CK 51/Santhosh 52    ID  WBC:   Lab Results   Component Value Date    WBC 4.8 12/15/2022     Tmax: Temp (24hrs), Av.5 °F (36.4 °C), Min:97.5 °F (36.4 °C), Max:97.5 °F (36.4 °C)  No concern for acute infectious process  Antimicrobials: not indicated at this time  Bcx NGTD  UA negaative    Hematology:  Recent Labs     12/15/22  1228   HGB 11.3*    Plts 133  Pt 12.4/INR 1.2    Endocrine:   glucose controlled - most recent BGL is   Recent Labs     12/15/22  1228   GLUCOSE 130*   No hx of diabetes. Cont to monitor. DVT Prophylaxis  Lovenox    Discharge Needs:  PT/OT, CM      CODE STATUS: Full Code    DISPOSITION:  [x] To remain ICU  [] OK for out of ICU from [facility], DO  Emergency Medicine Resident  Critical Care Service      Attending Physician Statement  I have discussed the care of Julia Cabrera, including pertinent history and exam findings with the resident. I have reviewed the key elements of all parts of the encounter with the resident. I have seen and examined the patient with the resident. I agree with the assessment and plan and status of the problem list as documented. Please see full H/P note by critical care resident.   Patient was seen in the emergency room on request of ER staff for admission to medical ICU. History is obtained from the nursing in the ER and from the chart patient was brought in unresponsive apparently patient was with boyfriend and became unresponsive EMS was called. Patient had received Narcan at least 2 times without much response she was intubated because of low GCS/altered mental status and placed on propofol apparently she was moving when she went for CT scan so placed on propofol. CT scan of the cervical spine and CT head was reported negative chest x-ray did not show infiltrate. Available labs shows bicarbonate of 18 anion gap was 16 AST AST is 635 ALT is 120  Serum drug screen shows alcohol level was 445 tricyclic negative acetaminophen and salicylate level were less than 5 and less than 1 respectively WBC count was 4.8. Urine drug screen was pending. When I saw the patient patient did have coronary plaques and sluggish pupil reflex she was on 40 mcg of propofol she does have some spontaneous movement on suctioning endotracheal tube she does have cough on suctioning. Acute respiratory failure/respiratory insufficiency. Altered mental status/depressed mentation. Alcohol intoxication/history of alcohol use with history of hepatic asteatosis March 2022. Patient is on ventilator. Ventilator setting PRVC/18/450/8/1 100%. ABG was 7.4 0/30/605/18. Ventilator adjustments were done decrease respiratory rate and decrease FiO2 PEEP to 5 FiO2 40% and rate to 16. Follow-up mentation and neurological status. Discussed with ER staff and asked patient go to the MICU should be taken off propofol for neurological status and mentation assessment. Will need to watch for alcohol withdrawal.  IV fluids. Thiamine and folate to be started. Follow-up urine output renal function. Discussed with nursing staff, treatment and plan discussed.   Discussed with respiratory therapist.     Total critical care time caring for this patient with life threatening, unstable organ failure, including direct patient contact, management of life support systems, review of data including imaging and labs, discussions with other team members and physicians at least 39  Min so far today, excluding procedures. Please note that this chart was generated using voice recognition Dragon dictation software. Although every effort was made to ensure the accuracy of this automated transcription, some errors in transcription may have occurred.       Shell Hernandez MD  12/15/2022 2:41 PM

## 2022-12-15 NOTE — ED NOTES
Attempted report to [address] RN, RN not on floor at this time.   Will call back     Lesly Knott RN  12/15/22 4672

## 2022-12-15 NOTE — ED NOTES
[facility] [address] [facility] with non-violent restraints in place     Orpha Showers, RN  12/15/22 0143

## 2022-12-15 NOTE — ED NOTES
4 mg narcan given by Amalia Reyes RN per Dr. Nereyda Corrales verbal orders.      Rosa Carter RN  12/15/22 [address] [facility], RN  12/15/22 6416

## 2022-12-15 NOTE — PROGRESS NOTES
Attending Physician Statement  I have discussed the care of Sulaiman Barron, including pertinent history and exam findings with the resident. I have reviewed the key elements of all parts of the encounter with the resident. I have seen and examined the patient with the resident. I agree with the assessment and plan and status of the problem list as documented. Please see full H/P note by critical care resident. Patient was seen in the emergency room on request of ER staff for admission to medical ICU. History is obtained from the nursing in the ER and from the chart patient was brought in unresponsive apparently patient was with boyfriend and became unresponsive EMS was called. Patient had received Narcan at least 2 times without much response she was intubated because of low GCS/altered mental status and placed on propofol apparently she was moving when she went for CT scan so placed on propofol. CT scan of the cervical spine and CT head was reported negative chest x-ray did not show infiltrate. Available labs shows bicarbonate of 18 anion gap was 16 AST AST is 635 ALT is 120  Serum drug screen shows alcohol level was 102 tricyclic negative acetaminophen and salicylate level were less than 5 and less than 1 respectively WBC count was 4.8. Urine drug screen was pending. When I saw the patient patient did have coronary plaques and sluggish pupil reflex she was on 40 mcg of propofol she does have some spontaneous movement on suctioning endotracheal tube she does have cough on suctioning. Acute respiratory failure/respiratory insufficiency. Altered mental status/depressed mentation. Alcohol intoxication/history of alcohol use with history of hepatic asteatosis March 2022. Patient is on ventilator. Ventilator setting PRVC/18/450/8/1 100%. ABG was 7.4 0/30/605/18. Ventilator adjustments were done decrease respiratory rate and decrease FiO2 PEEP to 5 FiO2 40% and rate to 16.   Follow-up mentation and neurological status. Discussed with ER staff and asked patient go to the MICU should be taken off propofol for neurological status and mentation assessment. Will need to watch for alcohol withdrawal.  IV fluids. Thiamine and folate to be started. Follow-up urine output renal function. Discussed with nursing staff, treatment and plan discussed. Discussed with respiratory therapist.    Total critical care time caring for this patient with life threatening, unstable organ failure, including direct patient contact, management of life support systems, review of data including imaging and labs, discussions with other team members and physicians at least 39  Min so far today, excluding procedures. Please note that this chart was generated using voice recognition Dragon dictation software. Although every effort was made to ensure the accuracy of this automated transcription, some errors in transcription may have occurred.      Brigitte Estrada MD  12/15/2022 2:41 PM

## 2022-12-15 NOTE — ED PROVIDER NOTES
[facility] ED  Emergency Department Encounter  Emergency Medicine Resident     Pt Gay Mims  MRN: [id number]  [place] 1995  Date of evaluation: 12/15/22  PCP:  Lolly Beyer      CHIEF COMPLAINT       Chief Complaint   Patient presents with    Drug Overdose       HISTORY OF PRESENT ILLNESS  (Location/Symptom, Timing/Onset, Context/Setting, Quality, Duration, Modifying Factors, Severity.)      Oswaldo Flood is a 32 y.o. female who presents with history of recent COVID infection and alcoholic liver disease presenting with EMS for unresponsiveness. Was reportedly found in bed with unknown downtime. Boyfriend reportedly was speaking with patient at the time she became unresponsive. Was given 6mg of narcan by EMS which improved respirations but did not improve GCS. Patient is GCS of 3 on arrival and unable to provide history. PAST MEDICAL / SURGICAL / SOCIAL / FAMILY HISTORY      has a past medical history of ADHD (attention deficit hyperactivity disorder), Anxiety, Bipolar 1 disorder ([address] and Depression. has no past surgical history on file.       Social History     Socioeconomic History    Marital status: Single     Spouse name: Not on file    Number of children: Not on file    Years of education: Not on file    Highest education level: Not on file   Occupational History    Not on file   Tobacco Use    Smoking status: Every Day     Packs/day: 0.50     Types: Cigarettes    Smokeless tobacco: Never   Substance and Sexual Activity    Alcohol use: Yes     Comment: pt states approx 1/2 pint of wine per day since she was 11yo    Drug use: Yes     Types: Marijuana Shellye Burkitt)     Comment: Homegrown pot    Sexual activity: Yes     Partners: Male     Comment: Patient is trying to get on disability   Other Topics Concern    Not on file   Social History Narrative    Not on file     Social Determinants of Health     Financial Resource Strain: Not on file   Food Insecurity: Not on file   Transportation Needs: Not on file   Physical Activity: Not on file   Stress: Not on file   Social Connections: Not on file   Intimate Partner Violence: Not on file   Housing Stability: Not on file       Family History   Problem Relation Age of Onset    Hypertension Mother     Depression Mother     Bipolar Disorder Father     Alcohol Abuse Father     Cancer Other         Uncle ? Allergies:  Patient has no known allergies. Home Medications:  Prior to Admission medications    Medication Sig Start Date End Date Taking? Authorizing Provider   folic acid (FOLVITE) 1 MG tablet Take 1 tablet by mouth daily 3/11/22   Billingsley Salvage, DO   bisacodyl (DULCOLAX) 5 MG EC tablet Take 2 tablets by mouth daily as needed for Constipation 3/10/22   Sanchez Salvage, DO   thiamine 100 MG tablet Take 1 tablet by mouth daily 3/11/22   Billingsley Salvage, DO   ibuprofen (ADVIL;MOTRIN) 600 MG tablet Take 1 tablet by mouth every 6 hours as needed for Pain. 11/8/14   Victoria Harrison MD   venlafaxine (EFFEXOR-XR) 150 MG XR capsule Take 1 capsule by mouth daily (with breakfast). 9/15/14   Red Amato MD   ARIPiprazole (ABILIFY) 10 MG tablet Take 1 tablet by mouth daily. 9/15/14   Red Amato MD       REVIEW OF SYSTEMS    (2-9 systems for level 4, 10 or more for level 5)      Review of Systems   Unable to perform ROS: Mental status change     PHYSICAL EXAM   (up to 7 for level 4, 8 or more for level 5)      INITIAL VITALS:   BP (!) 112/90   Pulse 73   Temp 97.5 °F (36.4 °C) (Oral)   Resp 16   Ht 5' 4\" (1.626 m)   Wt 106 lb (48.1 kg)   SpO2 100%   BMI 18.19 kg/m²     Physical Exam  Vitals reviewed. Constitutional:       General: She is not in acute distress. HENT:      Head: Normocephalic and atraumatic. Comments: Head is atraumatic     Nose: Nose normal.      Mouth/Throat:      Mouth: Mucous membranes are moist.      Pharynx: Oropharynx is clear.       Comments: Oropharynx is clear, dentition intact  Eyes: General: No scleral icterus. Conjunctiva/sclera: Conjunctivae normal.      Comments: Pupils are pinpoint, nonreactive. Same after repeat dose of Narcan   Neck:      Comments: C-collar placed at arrival  Cardiovascular:      Rate and Rhythm: Normal rate and regular rhythm. Pulses: Normal pulses. Comments: Intact radial and DP pulses  Pulmonary:      Effort: Pulmonary effort is normal. No respiratory distress. Breath sounds: Normal breath sounds. Comments: CTA BL without wheezes or rhonchi  Abdominal:      General: There is no distension. Palpations: Abdomen is soft. Tenderness: There is no abdominal tenderness. There is no guarding. Musculoskeletal:      Cervical back: No muscular tenderness. Right lower leg: No edema. Left lower leg: No edema. Comments: Extremities are atraumatic. No significant lower extremity edema   Skin:     General: Skin is warm and dry. Capillary Refill: Capillary refill takes less than 2 seconds. Comments: Residue from stickers from previous hospitalization still present on chest and abdomen   Neurological:      Mental Status: She is alert. Comments: GCS of 3. Unresponsive to pain in all 4 extremities.   No response to sternal rub or supraorbital pressure       DIFFERENTIAL  DIAGNOSIS     PLAN (LABS / IMAGING / EKG):  Orders Placed This Encounter   Procedures    Culture, Blood 1    Culture, Blood 1    XR CHEST PORTABLE    CT HEAD WO CONTRAST    CT CERVICAL SPINE WO CONTRAST    ELECTROLYTES PLUS    Hemoglobin and hematocrit, blood    CALCIUM, IONIC (POC)    Urine Drug Screen    Urinalysis with Microscopic    Lactic Acid    Hepatic Function Panel    TOX SCR, BLD, ED    CBC with Auto Differential    Basic Metabolic Panel    Magnesium    Protime-INR    Immature Platelet Fraction    Ammonia    CK    Myoglobin, Blood    CBC with Auto Differential    Basic Metabolic Panel    Magnesium    Phosphorus    Hepatic Function Panel    Diet NPO    Telemetry monitoring - 72 hour duration    Vital signs per unit routine    Daily weights    Intake and output    Place intermittent pneumatic compression device    Admission/Observation order previously placed    Up with assistance    Full Code    Inpatient consult to Critical Care    ABG draw    End Tidal CO2 Continuous    Pulse oximetry, continuous    Initiate Oxygen Therapy Protocol    Mechanical ventilation    Initiate Oxygen Therapy Protocol    Respiratory care evaluation only    Venous Blood Gas, POC    Creatinine W/GFR Point of Care    POCT urea (BUN)    Lactic Acid, POC    POCT Glucose    Arterial Blood Gas, POC    Insert peripheral IV    ADMIT TO INPATIENT    Restraints non-violent or non-self-destructive       MEDICATIONS ORDERED:  Orders Placed This Encounter   Medications    naloxone (NARCAN) 2 MG/2ML injection     TESFAYE MORSE: cabinet override    0.9 % sodium chloride bolus    propofol 1000 MG/100ML injection     Ai Brownlee: cabinet override    propofol injection     Order Specific Question:   Titrate Infusion? Answer:   Yes     Order Specific Question:   Initial Infusion Dose:      Answer:   20 mcg/kg/min     Order Specific Question:   Goal of Therapy:     Answer:   RASS of -1 to 0     Order Specific Question:   Contact Provider if:     Answer:   New onset HR less than 50 bpm     Order Specific Question:   Contact Provider if:     Answer:   New onset SBP less than 90 mmHg     Order Specific Question:   Contact Provider if:     Answer:   Patient is receiving maximum dose and is not achieving the goal of therapy     Order Specific Question:   Contact Provider if:     Answer:   Triglycerides greater than 500 mg/dL    sodium chloride flush 0.9 % injection 5-40 mL    sodium chloride flush 0.9 % injection 5-40 mL    0.9 % sodium chloride infusion    enoxaparin Sodium (LOVENOX) injection 30 mg     Order Specific Question:   Indication of Use     Answer:   Prophylaxis-DVT/PE    OR Linked Order Group     ondansetron (ZOFRAN-ODT) disintegrating tablet 4 mg     ondansetron (ZOFRAN) injection 4 mg    polyethylene glycol (GLYCOLAX) packet 17 g    OR Linked Order Group     acetaminophen (TYLENOL) tablet 650 mg     acetaminophen (TYLENOL) suppository 650 mg       DIAGNOSTIC RESULTS / EMERGENCY DEPARTMENT COURSE / MDM   LAB RESULTS:  Results for orders placed or performed during the hospital encounter of 12/15/22   Culture, Blood 1    Specimen: Blood   Result Value Ref Range    Specimen Description . BLOOD     Special Requests R FA  9ML     Culture NO GROWTH <24 HRS    Culture, Blood 1    Specimen: Blood   Result Value Ref Range    Specimen Description . BLOOD     Special Requests L FA  10ML     Culture NO GROWTH <24 HRS    ELECTROLYTES PLUS   Result Value Ref Range    POC Sodium 141 138 - 146 mmol/L    POC Potassium 3.8 3.5 - 4.5 mmol/L    POC Chloride 109 (H) 98 - 107 mmol/L    POC TCO2 19 (L) 22 - 30 mmol/L    Anion Gap 14 7 - 16 mmol/L   Hemoglobin and hematocrit, blood   Result Value Ref Range    POC Hemoglobin 13.0 12.0 - 16.0 g/dL    POC Hematocrit 38 36 - 46 %   CALCIUM, IONIC (POC)   Result Value Ref Range    POC Ionized Calcium 1.08 (L) 1.15 - 1.33 mmol/L   Urinalysis with Microscopic   Result Value Ref Range    Color, UA Dark Yellow (A) Yellow    Turbidity UA Clear Clear    Glucose, Ur NEGATIVE NEGATIVE    Bilirubin Urine NEGATIVE  Verified by ictotest. (A) NEGATIVE    Ketones, Urine SMALL (A) NEGATIVE    Specific Gravity, UA 1.020 1.005 - 1.030    Urine Hgb MODERATE (A) NEGATIVE    pH, UA 6.0 5.0 - 8.0    Protein, UA TRACE (A) NEGATIVE    Urobilinogen, Urine Normal Normal    Nitrite, Urine NEGATIVE NEGATIVE    Leukocyte Esterase, Urine NEGATIVE NEGATIVE    WBC, UA 0 TO 2 0 - 5 /HPF    RBC, UA 2 TO 5 0 - 4 /HPF    Casts UA  0 - 8 /LPF     5 TO 10 HYALINE Reference range defined for non-centrifuged specimen.     Epithelial Cells UA 2 TO 5 0 - 5 /HPF   Lactic Acid   Result Value Ref Range    Lactic Acid, Whole Blood 3.4 (H) 0.7 - 2.1 mmol/L   Hepatic Function Panel   Result Value Ref Range    Albumin 3.8 3.5 - 5.2 g/dL    Alkaline Phosphatase 123 (H) 35 - 104 U/L     (H) 5 - 33 U/L     (H) <32 U/L    Total Bilirubin 0.8 0.3 - 1.2 mg/dL    Bilirubin, Direct 0.3 (H) <0.3 mg/dL    Bilirubin, Indirect 0.5 0.0 - 1.0 mg/dL    Total Protein 7.2 6.4 - 8.3 g/dL    Albumin/Globulin Ratio 1.1 1.0 - 2.5   TOX SCR, BLD, ED   Result Value Ref Range    Acetaminophen Level <5 (L) 10 - 30 ug/mL    Ethanol 266 (H) <10 mg/dL    Ethanol percent 0.266 (H) <9.566 %    Salicylate Lvl <1 (L) 3 - 10 mg/dL    Toxic Tricyclic Sc,Blood NEGATIVE NEGATIVE   CBC with Auto Differential   Result Value Ref Range    WBC 4.8 3.5 - 11.3 k/uL    RBC 2.83 (L) 3.95 - 5.11 m/uL    Hemoglobin 11.3 (L) 11.9 - 15.1 g/dL    Hematocrit 32.9 (L) 36.3 - 47.1 %    .3 (H) 82.6 - 102.9 fL    MCH 39.9 (H) 25.2 - 33.5 pg    MCHC 34.3 28.4 - 34.8 g/dL    RDW 15.0 (H) 11.8 - 14.4 %    Platelets See Reflexed IPF Result 138 - 453 k/uL    NRBC Automated 0.0 0.0 per 100 WBC    Immature Granulocytes 0 0 %    Seg Neutrophils 23 (L) 36 - 66 %    Lymphocytes 56 (H) 24 - 44 %    Monocytes 19 (H) 1 - 7 %    Eosinophils % 2 1 - 4 %    Basophils 0 0 - 2 %    Absolute Immature Granulocyte 0.00 0.00 - 0.30 k/uL    Segs Absolute 1.10 (L) 1.8 - 7.7 k/uL    Absolute Lymph # 2.69 1.0 - 4.8 k/uL    Absolute Mono # 0.91 (H) 0.1 - 0.8 k/uL    Absolute Eos # 0.10 0.0 - 0.4 k/uL    Basophils Absolute 0.00 0.0 - 0.2 k/uL    Morphology ANISOCYTOSIS PRESENT     Morphology MACROCYTOSIS PRESENT    Basic Metabolic Panel   Result Value Ref Range    Glucose 130 (H) 70 - 99 mg/dL    BUN 2 (L) 6 - 20 mg/dL    Creatinine 0.33 (L) 0.50 - 0.90 mg/dL    Est, Glom Filt Rate >60 >60 mL/min/1.73m2    Calcium 8.8 8.6 - 10.4 mg/dL    Sodium 134 (L) 135 - 144 mmol/L    Potassium 4.0 3.7 - 5.3 mmol/L    Chloride 100 98 - 107 mmol/L    CO2 18 (L) 20 - 31 mmol/L    Anion Gap 16 9 - 17 mmol/L   Magnesium   Result Value Ref Range    Magnesium 1.8 1.6 - 2.6 mg/dL   Protime-INR   Result Value Ref Range    Protime 12.4 (H) 9.1 - 12.3 sec    INR 1.2    Immature Platelet Fraction   Result Value Ref Range    Platelet, Immature Fraction 5.9 1.1 - 10.3 %    Platelet, Fluorescence 133 (L) 138 - 453 k/uL   Ammonia   Result Value Ref Range    Ammonia 61 (H) 11 - 51 umol/L   Venous Blood Gas, POC   Result Value Ref Range    pH, Doni 7.377 7.320 - 7.430    pCO2, Doni 34.3 (L) 41.0 - 51.0 mm Hg    pO2, Doni 47.7 30.0 - 50.0 mm Hg    HCO3, Venous 20.1 (L) 22.0 - 29.0 mmol/L    Negative Base Excess, Doni 4 (H) 0.0 - 2.0    O2 Sat, Doni 83 60.0 - 85.0 %   Creatinine W/GFR Point of Care   Result Value Ref Range    POC Creatinine 0.46 (L) 0.51 - 1.19 mg/dL    eGFR, POC >60 mL/min/1.73m2   POCT urea (BUN)   Result Value Ref Range    POC BUN <3 (L) 8 - 26 mg/dL   Lactic Acid, POC   Result Value Ref Range    POC Lactic Acid 2.90 (H) 0.56 - 1.39 mmol/L   POCT Glucose   Result Value Ref Range    POC Glucose 145 (H) 74 - 100 mg/dL   Arterial Blood Gas, POC   Result Value Ref Range    POC pH 7.400 7.350 - 7.450    POC pCO2 29.8 (L) 35.0 - 48.0 mm Hg    POC PO2 605.6 (H) 83.0 - 108.0 mm Hg    POC HCO3 18.5 (L) 21.0 - 28.0 mmol/L    Negative Base Excess, Art 5 (H) 0.0 - 2.0    POC O2  (H) 94.0 - 98.0 %    O2 Device/Flow/% Adult Ventilator     Douglas Test POSITIVE     Sample Site Arterial Line     FIO2 100.0        IMPRESSION: Shey Blanca is a 32 y.o. woman presenting for unresponsiveness found by boyfriend. She is GCS of 3 on arrival.  Attempted 4 more milligrams of Narcan with no effect. Intubated for airway protection. Exam is atraumatic, but unknown history. Supposed recent covid infection. Per chart hx of alcoholic liver cirrhosis. Will proceed w/intubation and broad AMS w/u    RADIOLOGY:  CT HEAD WO CONTRAST   Final Result   No acute intracranial abnormality. Ethmoid sinusitis.   Left maxillary sinusitis versus retention cyst or polyp. CT CERVICAL SPINE WO CONTRAST   Final Result   No acute abnormality of the cervical spine. XR CHEST PORTABLE   Final Result   No active pulmonary or pleural disease. Tubes in place. EKG  none    All EKG's are interpreted by the Emergency Department Physician who either signs or Co-signs this chart in the absence of a cardiologist.    EMERGENCY DEPARTMENT COURSE:      ED Course as of 12/15/22 1440   Thu Dec 15, 2022   1255 Lactic Acid, Whole Blood(!): 3.4 [LR]   1255 WBC: 4.8 [LR]   1255 Hemoglobin Quant(!): 11.3 [LR]   1325 Endotracheal  tube tip 3 cm above ed. NG tube distal end in stomach. IMPRESSION:  No active pulmonary or pleural disease. [LR]   1346 IMPRESSION:  No acute intracranial abnormality. Ethmoid sinusitis. Left maxillary sinusitis versus retention cyst or polyp. [LR]   1347 AMMONIA, PLASMA, [id number](!): 61 [LR]   1348 BUN,BUNPL(!): 2 [LR]   1348 Creatinine(!): 0.33 [LR]   1348 Sodium(!): 134 [LR]   1348 Potassium: 4.0 [LR]   1348 CO2(!): 18 [LR]   1348 ETHANOL,ETHA(!): 266 [LR]   1348 Alk Phos(!): 123 [LR]   1348 ALT(!): 120 [LR]   1348 AST(!): 635 [LR]   1348 Bilirubin: 0.8 [LR]      ED Course User Index  [LR] Chasidy Best DO       No notes of EC Admission Criteria type on file. PROCEDURES:  PROCEDURE NOTE - EMERGENCY INTUBATION    PATIENT NAME: [address] [place] [address] N RECORD NO. [id number]  DATE: 12/15/2022  ATTENDING PHYSICIAN: Dr. York Eye DIAGNOSIS:  Acute Respiratory Failure  POSTOPERATIVE DIAGNOSIS:  Same  PROCEDURE PERFORMED:  Emergency endotracheal intubation  PERFORMING PHYSICIAN: Chasidy Best DO    MEDICATIONS: etomidate intravenously and succinycholine intravenously    DISCUSSION:  Ronaldo Guevara is a 32y.o.-year-old female who requires intubation and ventilatory support due to impending airway compromise and airway protection.   The history and physical examination were reviewed and confirmed. CONSENT: Unable to be obtained due to patient's condition. PROCEDURE:  A timeout was initiated by the bedside nurse and was confirmed by those present. The patient was placed in the appropriate position with cervical spine immobilization maintained throughout the procedure. Cricoid pressure was not required. Intubation was performed by direct laryngoscopy using a laryngoscope and a 7.5 cuffed endotracheal tube. The cuff was then inflated and the tube was secured appropriately at a distance of 23 cm to the dental ridge. Initial confirmation of placement included bilateral breath sounds, an end tidal CO2 detector, tube fogging, adequate chest rise, and adequate pulse oximetry reading. A chest x-ray to verify correct placement of the tube showed appropriate tube position. The patient tolerated the procedure well. COMPLICATIONS:  None     Chasidy Best DO  2:40 PM, 12/15/22      CONSULTS:  IP CONSULT TO CRITICAL CARE    CRITICAL CARE:  See attending note    FINAL IMPRESSION      1. Drug overdose of undetermined intent, initial encounter          [facility] Admitted 12/15/2022 02:31:53 PM      PATIENT REFERRED TO:  No follow-up provider specified.     DISCHARGE MEDICATIONS:  New Prescriptions    No medications on file       Riley Cagle DO  Emergency Medicine Resident    (Please note that portions of thisnote were completed with a voice recognition program.  Efforts were made to edit the dictations but occasionally words are mis-transcribed.)       Riley Cagle DO  Resident  12/15/22 0238

## 2022-12-15 NOTE — ED NOTES
Labeled blood specimens sent to lab via tube system.     [x] Lavender   [x] on ice   [] Blue   [] Green/yellow  [] Green/black [] on ice  [] Pink  [] Red  [] Yellow  [] Blood Cultures        Solis JAEL Cagle  12/15/22 6476

## 2022-12-15 NOTE — PLAN OF CARE
Problem: Safety - Medical Restraint  Goal: Remains free of injury from restraints (Restraint for Interference with Medical Device)  Description: INTERVENTIONS:  1. Determine that other, less restrictive measures have been tried or would not be effective before applying the restraint  2. Evaluate the patient's condition at the time of restraint application  3. Inform patient/family regarding the reason for restraint  4.  Q2H: Monitor safety, psychosocial status, comfort, nutrition and hydration  Outcome: Progressing  Flowsheets  Taken 12/15/2022 1800  Remains free of injury from restraints (restraint for interference with medical device):   Determine that other, less restrictive measures have been tried or would not be effective before applying the restraint   Evaluate the patient's condition at the time of restraint application   Inform patient/family regarding the reason for restraint   Every 2 hours: Monitor safety, psychosocial status, comfort, nutrition and hydration  Taken 12/15/2022 1606  Remains free of injury from restraints (restraint for interference with medical device):   Determine that other, less restrictive measures have been tried or would not be effective before applying the restraint   Evaluate the patient's condition at the time of restraint application   Inform patient/family regarding the reason for restraint   Every 2 hours: Monitor safety, psychosocial status, comfort, nutrition and hydration  Taken 12/15/2022 1600  Remains free of injury from restraints (restraint for interference with medical device):   Determine that other, less restrictive measures have been tried or would not be effective before applying the restraint   Evaluate the patient's condition at the time of restraint application   Inform patient/family regarding the reason for restraint   Every 2 hours: Monitor safety, psychosocial status, comfort, nutrition and hydration  Taken 12/15/2022 1533  Remains free of injury from restraints (restraint for interference with medical device):   Determine that other, less restrictive measures have been tried or would not be effective before applying the restraint   Evaluate the patient's condition at the time of restraint application   Inform patient/family regarding the reason for restraint   Every 2 hours: Monitor safety, psychosocial status, comfort, nutrition and hydration     Problem: Discharge Planning  Goal: Discharge to home or other facility with appropriate resources  Outcome: Progressing  Flowsheets (Taken 12/15/2022 1515)  Discharge to home or other facility with appropriate resources:   Identify barriers to discharge with patient and caregiver   Arrange for needed discharge resources and transportation as appropriate   Identify discharge learning needs (meds, wound care, etc)     Problem: Skin/Tissue Integrity  Goal: Absence of new skin breakdown  Description: 1. Monitor for areas of redness and/or skin breakdown  2. Assess vascular access sites hourly  3. Every 4-6 hours minimum:  Change oxygen saturation probe site  4. Every 4-6 hours:  If on nasal continuous positive airway pressure, respiratory therapy assess nares and determine need for appliance change or resting period.   Outcome: Progressing     Problem: ABCDS Injury Assessment  Goal: Absence of physical injury  Outcome: Progressing     Problem: Safety - Adult  Goal: Free from fall injury  Outcome: Progressing     Problem: Pain  Goal: Verbalizes/displays adequate comfort level or baseline comfort level  Outcome: Progressing  Flowsheets (Taken 12/15/2022 1600)  Verbalizes/displays adequate comfort level or baseline comfort level:   Assess pain using appropriate pain scale   Administer analgesics based on type and severity of pain and evaluate response   Implement non-pharmacological measures as appropriate and evaluate response     Problem: Confusion  Goal: Confusion, delirium, dementia, or psychosis is improved or at baseline  Description: INTERVENTIONS:  1. Assess for possible contributors to thought disturbance, including medications, impaired vision or hearing, underlying metabolic abnormalities, dehydration, psychiatric diagnoses, and notify attending LIP  2. [place] high risk fall precautions, as indicated  3. Provide frequent short contacts to provide reality reorientation, refocusing and direction  4. Decrease environmental stimuli, including noise as appropriate  5. Monitor and intervene to maintain adequate nutrition, hydration, elimination, sleep and activity  6. If unable to ensure safety without constant attention obtain sitter and review sitter guidelines with assigned personnel  7.  Initiate Psychosocial CNS and Spiritual Care consult, as indicated  Outcome: Progressing  Flowsheets (Taken 12/15/2022 1515)  Effect of thought disturbance (confusion, delirium, dementia, or psychosis) are managed with adequate functional status:   Assess for contributors to thought disturbance, including medications, impaired vision or hearing, underlying metabolic abnormalities, dehydration, psychiatric diagnoses, notify Esteban Scales high risk fall precautions, as indicated   Provide frequent short contacts to provide reality reorientation, refocusing and direction   Decrease environmental stimuli, including noise as appropriate

## 2022-12-15 NOTE — ED NOTES
100 mg succs given by Keren Phillips RN per Dr. Velazco Human verbal orders.       Parminder Silverio, RN  12/15/22 [id number] [facility], RN  12/15/22 [id number] [facility], RN  12/15/22 0375

## 2022-12-15 NOTE — ED NOTES
Pt arrives to ED 15 via LS4. Pt found unresponsive by LS4 in her bed. Boyfriend states they were talking and she just went unresponsive. Boyfriend states she was just recently discharge from the hospital for [facility]. Pt was found with her leg bent behind her, by LS4. LS4 administered 6 mg narcan with pinpoint pupils, airway intact. Pt is unresponsive, bed is in the lowest position. Will continue to follow plan of care.         Chantelle Prakash RN  12/15/22 [id number]

## 2022-12-15 NOTE — ED PROVIDER NOTES
[facility] ED  eMERGENCY dEPARTMENT eNCOUnter   Attending Attestation     Pt Name: Renetta Salcedo  MRN: [id number]  [place] 1995  Date of evaluation: 12/15/22       Renetta Salcedo is a 32 y.o. female who presents with Drug Overdose      History: PT presents with sudden loss of consciousness while talking to boyfriend. Pt found laying awkwardly in bed by ems. Pt given 8 of narcan without significant improvement. Pt recently in the hospital for covid. Exam: HRRR, lungs CTABL, abdomen soft and non tender. Pt is not moving, does not respond to pain. Pt has fixed pin point pupils. Pt is maintaining her airway. Pt intubated without difficulty with first pass success. Pt tolerated intubation. Plan for ams workup and admit to ICU. Uncdertain altered mental status. Metabolic vs drug related, consider RX drug overdose, consider ICH. I performed a history and physical examination of the patient and discussed management with the resident. I reviewed the residents note and agree with the documented findings and plan of care. Any areas of disagreement are noted on the chart. I was personally present for the key portions of any procedures. I have documented in the chart those procedures where I was not present during the key portions. I have personally reviewed all images and agree with the resident's interpretation. I have reviewed the emergency nurses triage note. I agree with the chief complaint, past medical history, past surgical history, allergies, medications, social and family history as documented unless otherwise noted below. Documentation of the HPI, Physical Exam and Medical Decision Making performed by medical students or scribes is based on my personal performance of the HPI, PE and MDM.  For Phys Assistant/ Nurse Practitioner cases/documentation I have had a face to face evaluation of this patient and have completed at least one if not all key elements of the E/M (history, physical exam, and MDM). Additional findings are as noted. For APC cases I have personally evaluated and examined the patient in conjunction with the APC and agree with the treatment plan and disposition of the patient as recorded by the APC.     Jono Hickman MD  Attending Emergency  Physician       Letitia Bradshaw MD  12/15/22 9510

## 2022-12-15 NOTE — ED NOTES
RRT at [facility]. Kerry pharmacist at bedside.       Leandro Winn RN  12/15/22 2084

## 2022-12-16 LAB
ABSOLUTE EOS #: 0.08 K/UL (ref 0–0.4)
ABSOLUTE IMMATURE GRANULOCYTE: 0 K/UL (ref 0–0.3)
ABSOLUTE LYMPH #: 0.89 K/UL (ref 1–4.8)
ABSOLUTE MONO #: 0.22 K/UL (ref 0.1–0.8)
ALBUMIN SERPL-MCNC: 3.1 G/DL (ref 3.5–5.2)
ALBUMIN/GLOBULIN RATIO: 1.1 (ref 1–2.5)
ALLEN TEST: POSITIVE
ALP BLD-CCNC: 102 U/L (ref 35–104)
ALT SERPL-CCNC: 91 U/L (ref 5–33)
AMMONIA: 57 UMOL/L (ref 11–51)
ANION GAP SERPL CALCULATED.3IONS-SCNC: 11 MMOL/L (ref 9–17)
AST SERPL-CCNC: 462 U/L
BASOPHILS # BLD: 0 % (ref 0–2)
BASOPHILS ABSOLUTE: 0 K/UL (ref 0–0.2)
BILIRUB SERPL-MCNC: 0.9 MG/DL (ref 0.3–1.2)
BILIRUBIN DIRECT: 0.7 MG/DL
BILIRUBIN, INDIRECT: 0.2 MG/DL (ref 0–1)
BUN BLDV-MCNC: 4 MG/DL (ref 6–20)
CALCIUM SERPL-MCNC: 7.4 MG/DL (ref 8.6–10.4)
CHLORIDE BLD-SCNC: 108 MMOL/L (ref 98–107)
CO2: 20 MMOL/L (ref 20–31)
CREAT SERPL-MCNC: 0.34 MG/DL (ref 0.5–0.9)
EOSINOPHILS RELATIVE PERCENT: 3 % (ref 1–4)
FIO2: 40
GFR SERPL CREATININE-BSD FRML MDRD: >60 ML/MIN/1.73M2
GLUCOSE BLD-MCNC: 102 MG/DL (ref 74–100)
GLUCOSE BLD-MCNC: 114 MG/DL (ref 70–99)
HCT VFR BLD CALC: 29.7 % (ref 36.3–47.1)
HEMOGLOBIN: 10 G/DL (ref 11.9–15.1)
IMMATURE GRANULOCYTES: 0 %
LYMPHOCYTES # BLD: 33 % (ref 24–44)
MAGNESIUM: 1.2 MG/DL (ref 1.6–2.6)
MCH RBC QN AUTO: 39.4 PG (ref 25.2–33.5)
MCHC RBC AUTO-ENTMCNC: 33.7 G/DL (ref 28.4–34.8)
MCV RBC AUTO: 116.9 FL (ref 82.6–102.9)
MODE: ABNORMAL
MONOCYTES # BLD: 8 % (ref 1–7)
MORPHOLOGY: NORMAL
MRSA, DNA, NASAL: NEGATIVE
NEGATIVE BASE EXCESS, ART: 3 (ref 0–2)
NRBC AUTOMATED: 0 PER 100 WBC
O2 DEVICE/FLOW/%: ABNORMAL
PDW BLD-RTO: 15.5 % (ref 11.8–14.4)
PHOSPHORUS: 2.5 MG/DL (ref 2.6–4.5)
PLATELET # BLD: ABNORMAL K/UL (ref 138–453)
PLATELET, FLUORESCENCE: 63 K/UL (ref 138–453)
PLATELET, IMMATURE FRACTION: 4.4 % (ref 1.1–10.3)
POC HCO3: 19.7 MMOL/L (ref 21–28)
POC O2 SATURATION: 100 % (ref 94–98)
POC PCO2: 26 MM HG (ref 35–48)
POC PH: 7.49 (ref 7.35–7.45)
POC PO2: 310.8 MM HG (ref 83–108)
POTASSIUM SERPL-SCNC: 3 MMOL/L (ref 3.7–5.3)
RBC # BLD: 2.54 M/UL (ref 3.95–5.11)
RBC # BLD: ABNORMAL 10*6/UL
SAMPLE SITE: ABNORMAL
SEG NEUTROPHILS: 56 % (ref 36–66)
SEGMENTED NEUTROPHILS ABSOLUTE COUNT: 1.51 K/UL (ref 1.8–7.7)
SODIUM BLD-SCNC: 139 MMOL/L (ref 135–144)
SPECIMEN DESCRIPTION: NORMAL
TOTAL PROTEIN: 5.8 G/DL (ref 6.4–8.3)
WBC # BLD: 2.7 K/UL (ref 3.5–11.3)

## 2022-12-16 PROCEDURE — 6360000002 HC RX W HCPCS: Performed by: HEALTH CARE PROVIDER

## 2022-12-16 PROCEDURE — 2580000003 HC RX 258: Performed by: HEALTH CARE PROVIDER

## 2022-12-16 PROCEDURE — 87070 CULTURE OTHR SPECIMN AEROBIC: CPT

## 2022-12-16 PROCEDURE — 6360000002 HC RX W HCPCS

## 2022-12-16 PROCEDURE — 2000000000 HC ICU R&B

## 2022-12-16 PROCEDURE — 82947 ASSAY GLUCOSE BLOOD QUANT: CPT

## 2022-12-16 PROCEDURE — 6370000000 HC RX 637 (ALT 250 FOR IP): Performed by: HEALTH CARE PROVIDER

## 2022-12-16 PROCEDURE — 94761 N-INVAS EAR/PLS OXIMETRY MLT: CPT

## 2022-12-16 PROCEDURE — 6370000000 HC RX 637 (ALT 250 FOR IP)

## 2022-12-16 PROCEDURE — 6360000002 HC RX W HCPCS: Performed by: STUDENT IN AN ORGANIZED HEALTH CARE EDUCATION/TRAINING PROGRAM

## 2022-12-16 PROCEDURE — 94003 VENT MGMT INPAT SUBQ DAY: CPT

## 2022-12-16 PROCEDURE — 80076 HEPATIC FUNCTION PANEL: CPT

## 2022-12-16 PROCEDURE — 85055 RETICULATED PLATELET ASSAY: CPT

## 2022-12-16 PROCEDURE — 6360000002 HC RX W HCPCS: Performed by: INTERNAL MEDICINE

## 2022-12-16 PROCEDURE — 82140 ASSAY OF AMMONIA: CPT

## 2022-12-16 PROCEDURE — 2500000003 HC RX 250 WO HCPCS: Performed by: INTERNAL MEDICINE

## 2022-12-16 PROCEDURE — 99291 CRITICAL CARE FIRST HOUR: CPT | Performed by: INTERNAL MEDICINE

## 2022-12-16 PROCEDURE — 2500000003 HC RX 250 WO HCPCS: Performed by: HEALTH CARE PROVIDER

## 2022-12-16 PROCEDURE — 36415 COLL VENOUS BLD VENIPUNCTURE: CPT

## 2022-12-16 PROCEDURE — 2700000000 HC OXYGEN THERAPY PER DAY

## 2022-12-16 PROCEDURE — 85025 COMPLETE CBC W/AUTO DIFF WBC: CPT

## 2022-12-16 PROCEDURE — 2580000003 HC RX 258: Performed by: INTERNAL MEDICINE

## 2022-12-16 PROCEDURE — 82803 BLOOD GASES ANY COMBINATION: CPT

## 2022-12-16 PROCEDURE — 87205 SMEAR GRAM STAIN: CPT

## 2022-12-16 PROCEDURE — 84100 ASSAY OF PHOSPHORUS: CPT

## 2022-12-16 PROCEDURE — 36600 WITHDRAWAL OF ARTERIAL BLOOD: CPT

## 2022-12-16 PROCEDURE — 83735 ASSAY OF MAGNESIUM: CPT

## 2022-12-16 PROCEDURE — 80048 BASIC METABOLIC PNL TOTAL CA: CPT

## 2022-12-16 RX ORDER — MAGNESIUM SULFATE HEPTAHYDRATE 40 MG/ML
4000 INJECTION, SOLUTION INTRAVENOUS ONCE
Status: COMPLETED | OUTPATIENT
Start: 2022-12-16 | End: 2022-12-16

## 2022-12-16 RX ORDER — ARIPIPRAZOLE 10 MG/1
10 TABLET ORAL DAILY
Status: DISCONTINUED | OUTPATIENT
Start: 2022-12-16 | End: 2022-12-16

## 2022-12-16 RX ORDER — CHLORDIAZEPOXIDE HYDROCHLORIDE 25 MG/1
25 CAPSULE, GELATIN COATED ORAL 3 TIMES DAILY
Status: DISCONTINUED | OUTPATIENT
Start: 2022-12-16 | End: 2022-12-17

## 2022-12-16 RX ORDER — MIDAZOLAM HYDROCHLORIDE 2 MG/2ML
2 INJECTION, SOLUTION INTRAMUSCULAR; INTRAVENOUS
Status: DISCONTINUED | OUTPATIENT
Start: 2022-12-16 | End: 2022-12-21

## 2022-12-16 RX ORDER — VENLAFAXINE HYDROCHLORIDE 150 MG/1
150 CAPSULE, EXTENDED RELEASE ORAL
Status: DISCONTINUED | OUTPATIENT
Start: 2022-12-17 | End: 2022-12-16

## 2022-12-16 RX ADMIN — SODIUM CHLORIDE: 9 INJECTION, SOLUTION INTRAVENOUS at 21:07

## 2022-12-16 RX ADMIN — PROPOFOL 50 MCG/KG/MIN: 10 INJECTION, EMULSION INTRAVENOUS at 04:34

## 2022-12-16 RX ADMIN — SODIUM CHLORIDE: 9 INJECTION, SOLUTION INTRAVENOUS at 08:14

## 2022-12-16 RX ADMIN — DEXMEDETOMIDINE HYDROCHLORIDE 1.2 MCG/KG/HR: 4 INJECTION, SOLUTION INTRAVENOUS at 15:22

## 2022-12-16 RX ADMIN — PROPOFOL 25 MCG/KG/MIN: 10 INJECTION, EMULSION INTRAVENOUS at 13:02

## 2022-12-16 RX ADMIN — SODIUM CHLORIDE: 9 INJECTION, SOLUTION INTRAVENOUS at 00:28

## 2022-12-16 RX ADMIN — DEXMEDETOMIDINE HYDROCHLORIDE 1.4 MCG/KG/HR: 4 INJECTION, SOLUTION INTRAVENOUS at 21:43

## 2022-12-16 RX ADMIN — POTASSIUM BICARBONATE 40 MEQ: 782 TABLET, EFFERVESCENT ORAL at 09:38

## 2022-12-16 RX ADMIN — THIAMINE HYDROCHLORIDE: 100 INJECTION, SOLUTION INTRAMUSCULAR; INTRAVENOUS at 08:51

## 2022-12-16 RX ADMIN — ACETAMINOPHEN 650 MG: 325 TABLET ORAL at 02:35

## 2022-12-16 RX ADMIN — MAGNESIUM SULFATE HEPTAHYDRATE 4000 MG: 40 INJECTION, SOLUTION INTRAVENOUS at 09:50

## 2022-12-16 RX ADMIN — CHLORDIAZEPOXIDE HYDROCHLORIDE 25 MG: 25 CAPSULE ORAL at 20:16

## 2022-12-16 RX ADMIN — SODIUM CHLORIDE, PRESERVATIVE FREE 20 MG: 5 INJECTION INTRAVENOUS at 08:53

## 2022-12-16 RX ADMIN — SODIUM CHLORIDE, PRESERVATIVE FREE 10 ML: 5 INJECTION INTRAVENOUS at 08:52

## 2022-12-16 RX ADMIN — MIDAZOLAM HYDROCHLORIDE 2 MG: 2 INJECTION, SOLUTION INTRAMUSCULAR; INTRAVENOUS at 00:50

## 2022-12-16 RX ADMIN — THIAMINE HYDROCHLORIDE: 100 INJECTION, SOLUTION INTRAMUSCULAR; INTRAVENOUS at 08:14

## 2022-12-16 RX ADMIN — DEXMEDETOMIDINE HYDROCHLORIDE 1.2 MCG/KG/HR: 4 INJECTION, SOLUTION INTRAVENOUS at 02:07

## 2022-12-16 RX ADMIN — DEXMEDETOMIDINE HYDROCHLORIDE 1.2 MCG/KG/HR: 4 INJECTION, SOLUTION INTRAVENOUS at 08:57

## 2022-12-16 RX ADMIN — CHLORDIAZEPOXIDE HYDROCHLORIDE 25 MG: 25 CAPSULE ORAL at 15:24

## 2022-12-16 RX ADMIN — SODIUM CHLORIDE, PRESERVATIVE FREE 20 MG: 5 INJECTION INTRAVENOUS at 20:16

## 2022-12-16 RX ADMIN — POTASSIUM BICARBONATE 40 MEQ: 782 TABLET, EFFERVESCENT ORAL at 20:16

## 2022-12-16 RX ADMIN — Medication 5 MG/HR: at 06:52

## 2022-12-16 ASSESSMENT — PULMONARY FUNCTION TESTS
PIF_VALUE: 22
PIF_VALUE: 16
PIF_VALUE: 13
PIF_VALUE: 18
PIF_VALUE: 17
PIF_VALUE: 16
PIF_VALUE: 13
PIF_VALUE: 13

## 2022-12-16 NOTE — PROGRESS NOTES
Writer attempted to call mother, Valerie Cole but the phone number listed was incorrect. Will attempt to get phone number from boyfriend.   Electronically signed by Tamara Bateman RN on 12/15/2022 at 10:16 PM

## 2022-12-16 NOTE — CONSULTS
Comprehensive Nutrition Assessment    Type and Reason for Visit:  Initial, Consult (TF Order and Management)    Nutrition Recommendations/Plan:   As able, start Tube Feedings of Standard without Fiber (Osmolite 1.2) at 10 mL/hr with slow advancement to goal 45 mL/hr = 1296 kcals, 60 gm pro/day. Monitor TF tolerance/adequacy of intakes and rate of propofol - modify as needed. Will monitor labs, weights, and plan of care. Malnutrition Assessment:  Malnutrition Status:  Insufficient data (12/16/22 1504)    Context:  Acute Illness     Findings of the 6 clinical characteristics of malnutrition:  Energy Intake:  Mild decrease in energy intake  Weight Loss:  Unable to assess     Body Fat Loss:  Unable to assess   Muscle Mass Loss:  Unable to assess  Fluid Accumulation:  No significant fluid accumulation   Strength:  Not Performed    Nutrition Assessment:    Consulted for Tube Feedings. Admitted s/p found unresponsive/drug overdose. Pt currently intubated and sedated. Propofol running at 7.2 mL/hr. Pt with +COVID-19 and under droplet plus isolation. Labs reviewed: K 3.0- mmol/L<  mmol/L, Mg 1.2 mg/dL, Phos 2.5 mg/dL, Ammonia 57 umol/L. Meds include: Effer-K. Nutrition Related Findings:    Labs/Meds reviewed. Hypoactive bowel sounds. Wound Type: None       Current Nutrition Intake & Therapies:    Average Meal Intake: NPO  Average Supplements Intake: NPO  Diet NPO  ADULT TUBE FEEDING; Orogastric; Standard without Fiber; Continuous; 10; Yes; 10; Q 4 hours; 45; 30; Q 4 hours  Additional Calorie Sources:  Propofol running at 7.2 mL/hr = 190 kcals/day    Anthropometric Measures:  Height: 5' 5\" (165.1 cm)  Ideal Body Weight (IBW): 125 lbs (57 kg)    Admission Body Weight: 102 lb 15.3 oz (46.7 kg)  Current Body Weight: 109 lb 2 oz (49.5 kg), 87.3 % IBW.  Weight Source: Bed Scale  Current BMI (kg/m2): 18.2                          BMI Categories: Underweight (BMI less than 18.5)    Estimated Daily Nutrient Needs:  Energy Requirements Based On: Kcal/kg  Weight Used for Energy Requirements: Admission  Energy (kcal/day): 5856-9972 kcals/day  Weight Used for Protein Requirements: Admission  Protein (g/day): 60-75 gm pro/day  Method Used for Fluid Requirements: Other (Comment)  Fluid (ml/day): per MD    Nutrition Diagnosis:   Inadequate oral intake related to impaired respiratory function as evidenced by NPO or clear liquid status due to medical condition (need for enteral nutrition support)    Nutrition Interventions:   Food and/or Nutrient Delivery: Continue NPO, Start Tube Feeding (Suggest Tube Feedings of Standard without Fiber formula slowly advancing to goal rate of 45 mL/hr = 1260 kcals, 60 gm pro/day.)  Nutrition Education/Counseling: No recommendation at this time  Coordination of Nutrition Care: Continue to monitor while inpatient  Plan of Care discussed with: RN    Goals:  Previous Goal Met:  (Goal Set)  Goals: Meet at least 75% of estimated needs, prior to discharge       Nutrition Monitoring and Evaluation:   Behavioral-Environmental Outcomes: None Identified  Food/Nutrient Intake Outcomes: Enteral Nutrition Intake/Tolerance  Physical Signs/Symptoms Outcomes: Biochemical Data, GI Status, Fluid Status or Edema, Nutrition Focused Physical Findings, Skin, Weight    Discharge Planning:     Too soon to determine     Usman Chiang, [address] [place] [address],   Contact: [phone]

## 2022-12-16 NOTE — PLAN OF CARE
Problem: Respiratory - Adult  Goal: Achieves optimal ventilation and oxygenation  Outcome: Progressing  Flowsheets (Taken 12/16/2022 0134)  Achieves optimal ventilation and oxygenation:   Assess for changes in respiratory status   Position to facilitate oxygenation and minimize respiratory effort   Assess the need for suctioning and aspirate as needed   Respiratory therapy support as indicated   Assess and instruct to report shortness of breath or any respiratory difficulty   Encourage broncho-pulmonary hygiene including cough, deep breathe, incentive spirometry   Oxygen supplementation based on oxygen saturation or arterial blood gases   Assess for changes in mentation and behavior

## 2022-12-16 NOTE — PLAN OF CARE
Problem: Safety - Medical Restraint  Goal: Remains free of injury from restraints (Restraint for Interference with Medical Device)  Description: INTERVENTIONS:  1. Determine that other, less restrictive measures have been tried or would not be effective before applying the restraint  2. Evaluate the patient's condition at the time of restraint application  3. Inform patient/family regarding the reason for restraint  4.  Q2H: Monitor safety, psychosocial status, comfort, nutrition and hydration  12/16/2022 0914 by Jeanmarie Sahu RN  Outcome: Progressing  12/16/2022 0646 by Kana Merino RN  Outcome: Progressing  Flowsheets  Taken 12/16/2022 0600  Remains free of injury from restraints (restraint for interference with medical device): Every 2 hours: Monitor safety, psychosocial status, comfort, nutrition and hydration  Taken 12/16/2022 0400  Remains free of injury from restraints (restraint for interference with medical device): Every 2 hours: Monitor safety, psychosocial status, comfort, nutrition and hydration  Taken 12/16/2022 0200  Remains free of injury from restraints (restraint for interference with medical device): Every 2 hours: Monitor safety, psychosocial status, comfort, nutrition and hydration  Taken 12/16/2022 0000  Remains free of injury from restraints (restraint for interference with medical device): Every 2 hours: Monitor safety, psychosocial status, comfort, nutrition and hydration  Taken 12/15/2022 2200  Remains free of injury from restraints (restraint for interference with medical device): Every 2 hours: Monitor safety, psychosocial status, comfort, nutrition and hydration  Taken 12/15/2022 2000  Remains free of injury from restraints (restraint for interference with medical device):   Determine that other, less restrictive measures have been tried or would not be effective before applying the restraint   Evaluate the patient's condition at the time of restraint application   Inform patient/family regarding the reason for restraint   Every 2 hours: Monitor safety, psychosocial status, comfort, nutrition and hydration     Problem: Discharge Planning  Goal: Discharge to home or other facility with appropriate resources  12/16/2022 0914 by Riley Lucero RN  Outcome: Progressing  12/16/2022 0646 by Marques Dennison RN  Outcome: Progressing     Problem: Skin/Tissue Integrity  Goal: Absence of new skin breakdown  Description: 1. Monitor for areas of redness and/or skin breakdown  2. Assess vascular access sites hourly  3. Every 4-6 hours minimum:  Change oxygen saturation probe site  4. Every 4-6 hours:  If on nasal continuous positive airway pressure, respiratory therapy assess nares and determine need for appliance change or resting period.   12/16/2022 0914 by Riley Lucero RN  Outcome: Progressing  12/16/2022 0646 by Marques Dennison RN  Outcome: Progressing     Problem: ABCDS Injury Assessment  Goal: Absence of physical injury  12/16/2022 0914 by Riley Lucero RN  Outcome: Progressing  Flowsheets (Taken 12/16/2022 0900)  Absence of Physical Injury: Implement safety measures based on patient assessment  12/16/2022 0646 by Marques Dennison RN  Outcome: Progressing  Flowsheets (Taken 12/16/2022 0646)  Absence of Physical Injury: Implement safety measures based on patient assessment     Problem: Safety - Adult  Goal: Free from fall injury  12/16/2022 0914 by Riley Lucero RN  Outcome: Progressing  Flowsheets (Taken 12/16/2022 0900)  Free From Fall Injury: Based on caregiver fall risk screen, instruct family/caregiver to ask for assistance with transferring infant if caregiver noted to have fall risk factors  12/16/2022 0646 by Marques Dennison RN  Outcome: Progressing  Flowsheets (Taken 12/16/2022 0646)  Free From Fall Injury: Instruct family/caregiver on patient safety     Problem: Pain  Goal: Verbalizes/displays adequate comfort level or baseline comfort level  12/16/2022 0914 by Michael Sheehan RN  Outcome: Progressing  12/16/2022 0646 by Desiree Merritt RN  Outcome: Progressing  Flowsheets (Taken 12/15/2022 2000)  Verbalizes/displays adequate comfort level or baseline comfort level: Encourage patient to monitor pain and request assistance     Problem: Confusion  Goal: Confusion, delirium, dementia, or psychosis is improved or at baseline  Description: INTERVENTIONS:  1. Assess for possible contributors to thought disturbance, including medications, impaired vision or hearing, underlying metabolic abnormalities, dehydration, psychiatric diagnoses, and notify attending LIP  2. [place] high risk fall precautions, as indicated  3. Provide frequent short contacts to provide reality reorientation, refocusing and direction  4. Decrease environmental stimuli, including noise as appropriate  5. Monitor and intervene to maintain adequate nutrition, hydration, elimination, sleep and activity  6. If unable to ensure safety without constant attention obtain sitter and review sitter guidelines with assigned personnel  7.  Initiate Psychosocial CNS and Spiritual Care consult, as indicated  Outcome: Progressing  Flowsheets (Taken 12/16/2022 0800)  Effect of thought disturbance (confusion, delirium, dementia, or psychosis) are managed with adequate functional status:   Assess for contributors to thought disturbance, including medications, impaired vision or hearing, underlying metabolic abnormalities, dehydration, psychiatric diagnoses, notify [facility] high risk fall precautions, as indicated     Problem: Respiratory - Adult  Goal: Achieves optimal ventilation and oxygenation  12/16/2022 0914 by Michael Sheehan RN  Outcome: Progressing  Flowsheets (Taken 12/16/2022 0800)  Achieves optimal ventilation and oxygenation: Position to facilitate oxygenation and minimize respiratory effort  12/16/2022 0134 by Lor Emerson RCP  Outcome: Progressing  Flowsheets (Taken 12/16/2022 0134)  Desiree Merritt optimal ventilation and oxygenation:   Assess for changes in respiratory status   Position to facilitate oxygenation and minimize respiratory effort   Assess the need for suctioning and aspirate as needed   Respiratory therapy support as indicated   Assess and instruct to report shortness of breath or any respiratory difficulty   Encourage broncho-pulmonary hygiene including cough, deep breathe, incentive spirometry   Oxygen supplementation based on oxygen saturation or arterial blood gases   Assess for changes in mentation and behavior

## 2022-12-16 NOTE — PROGRESS NOTES
Called patients boyfriend to further discuss patients substance abuse. Alexis (boyfriend) reports that patient has been drinking since around age 13, she drinks everyday, typically 1 bottle of wine. States he is not aware of her using any drugs (at least not since they have been together), no hx of abusing opioids. Asked about whether or not she takes her Abilify and Effexor, he said no. Discussed what happened when patient became unresponsive, states that she was sitting down and her eyes were open and she was apparently breathing ok but was not responding to voice. When patient arrived to ED GCS was 3. Alexis says this has never happened before.      Electronically signed by Susy Andujar MD on 12/16/2022 at 12:54 PM

## 2022-12-16 NOTE — PLAN OF CARE
Problem: Safety - Medical Restraint  Goal: Remains free of injury from restraints (Restraint for Interference with Medical Device)  Description: INTERVENTIONS:  1. Determine that other, less restrictive measures have been tried or would not be effective before applying the restraint  2. Evaluate the patient's condition at the time of restraint application  3. Inform patient/family regarding the reason for restraint  4.  Q2H: Monitor safety, psychosocial status, comfort, nutrition and hydration  12/16/2022 0646 by Socorro Tafoya RN  Outcome: Progressing  Flowsheets  Taken 12/16/2022 0600  Remains free of injury from restraints (restraint for interference with medical device): Every 2 hours: Monitor safety, psychosocial status, comfort, nutrition and hydration  Taken 12/16/2022 0400  Remains free of injury from restraints (restraint for interference with medical device): Every 2 hours: Monitor safety, psychosocial status, comfort, nutrition and hydration  Taken 12/16/2022 0200  Remains free of injury from restraints (restraint for interference with medical device): Every 2 hours: Monitor safety, psychosocial status, comfort, nutrition and hydration  Taken 12/16/2022 0000  Remains free of injury from restraints (restraint for interference with medical device): Every 2 hours: Monitor safety, psychosocial status, comfort, nutrition and hydration  Taken 12/15/2022 2200  Remains free of injury from restraints (restraint for interference with medical device): Every 2 hours: Monitor safety, psychosocial status, comfort, nutrition and hydration  Taken 12/15/2022 2000  Remains free of injury from restraints (restraint for interference with medical device):   Determine that other, less restrictive measures have been tried or would not be effective before applying the restraint   Evaluate the patient's condition at the time of restraint application   Inform patient/family regarding the reason for restraint   Every 2 hours: Monitor safety, psychosocial status, comfort, nutrition and hydration     Problem: Discharge Planning  Goal: Discharge to home or other facility with appropriate resources  12/16/2022 0646 by Meaghan Polo RN  Outcome: Progressing     Problem: Skin/Tissue Integrity  Goal: Absence of new skin breakdown  Description: 1. Monitor for areas of redness and/or skin breakdown  2. Assess vascular access sites hourly  3. Every 4-6 hours minimum:  Change oxygen saturation probe site  4. Every 4-6 hours:  If on nasal continuous positive airway pressure, respiratory therapy assess nares and determine need for appliance change or resting period.   12/16/2022 0646 by Meaghan Polo RN  Outcome: Progressing     Problem: ABCDS Injury Assessment  Goal: Absence of physical injury  12/16/2022 0646 by Meaghan Polo RN  Outcome: Progressing     Problem: Safety - Adult  Goal: Free from fall injury  12/16/2022 0646 by Meaghan Polo RN  Outcome: Progressing     Problem: Pain  Goal: Verbalizes/displays adequate comfort level or baseline comfort level  12/16/2022 0646 by Meaghan Polo RN  Outcome: Progressing  Flowsheets (Taken 12/15/2022 2000)  Verbalizes/displays adequate comfort level or baseline comfort level: Encourage patient to monitor pain and request assistance

## 2022-12-16 NOTE — PLAN OF CARE
BRONCHOSPASM/BRONCHOCONSTRICTION     [x]         IMPROVE AERATION/BREATH SOUNDS  [x]   ADMINISTER BRONCHODILATOR THERAPY AS APPROPRIATE  [x]   ASSESS BREATH SOUNDS  [x]   IMPLEMENT AEROSOL/MDI PROTOCOL  [x]   PATIENT EDUCATION AS NEEDED   PROVIDE ADEQUATE OXYGENATION WITH ACCEPTABLE SP02/ABG'S    [x]  IDENTIFY APPROPRIATE OXYGEN THERAPY  [x]   MONITOR SP02/ABG'S AS NEEDED   [x]   PATIENT EDUCATION AS NEEDED   MECHANICAL VENTILATION     [x]  PROVIDE OPTIMAL VENTILATION  [x]   ASSESS FOR EXTUBATION READINESS  [x]   ASSESS FOR WEANING READINESS  [x]  EXTUBATE AS TOLERATED  [x]  IMPLEMENT ADULT MECHANICAL VENTILATION PROTOCOL  [x]  MAINTAIN ADEQUATE OXYGENATION    Problem: Respiratory - Adult  Goal: Achieves optimal ventilation and oxygenation  12/16/2022 0948 by Jossue Cruz RCP  Outcome: Progressing  12/16/2022 0914 by Woody Hall RN  Outcome: Progressing  Flowsheets (Taken 12/16/2022 0800)  Achieves optimal ventilation and oxygenation: Position to facilitate oxygenation and minimize respiratory effort  12/16/2022 0134 by Michaela Delgado RCP  Outcome: Progressing  Flowsheets (Taken 12/16/2022 0134)  Achieves optimal ventilation and oxygenation:   Assess for changes in respiratory status   Position to facilitate oxygenation and minimize respiratory effort   Assess the need for suctioning and aspirate as needed   Respiratory therapy support as indicated   Assess and instruct to report shortness of breath or any respiratory difficulty   Encourage broncho-pulmonary hygiene including cough, deep breathe, incentive spirometry   Oxygen supplementation based on oxygen saturation or arterial blood gases   Assess for changes in mentation and behavior     PERFORM SPONTANEOUS WEANING TRIAL AS TOLERATED

## 2022-12-17 LAB
ABSOLUTE EOS #: 0 K/UL (ref 0–0.4)
ABSOLUTE IMMATURE GRANULOCYTE: 0 K/UL (ref 0–0.3)
ABSOLUTE LYMPH #: 0.95 K/UL (ref 1–4.8)
ABSOLUTE MONO #: 0.65 K/UL (ref 0.1–0.8)
ALLEN TEST: POSITIVE
ANION GAP SERPL CALCULATED.3IONS-SCNC: 11 MMOL/L (ref 9–17)
BASOPHILS # BLD: 1 % (ref 0–2)
BASOPHILS ABSOLUTE: 0.04 K/UL (ref 0–0.2)
BUN BLDV-MCNC: 4 MG/DL (ref 6–20)
CALCIUM IONIZED: 0.83 MMOL/L (ref 1.13–1.33)
CALCIUM SERPL-MCNC: 7.2 MG/DL (ref 8.6–10.4)
CHLORIDE BLD-SCNC: 105 MMOL/L (ref 98–107)
CO2: 15 MMOL/L (ref 20–31)
CREAT SERPL-MCNC: 0.2 MG/DL (ref 0.5–0.9)
CULTURE: ABNORMAL
DIRECT EXAM: ABNORMAL
EOSINOPHILS RELATIVE PERCENT: 0 % (ref 1–4)
FIO2: 30
GFR SERPL CREATININE-BSD FRML MDRD: >60 ML/MIN/1.73M2
GLUCOSE BLD-MCNC: 109 MG/DL (ref 70–99)
GLUCOSE BLD-MCNC: 130 MG/DL (ref 74–100)
HCT VFR BLD CALC: 31.9 % (ref 36.3–47.1)
HEMOGLOBIN: 10.2 G/DL (ref 11.9–15.1)
IMMATURE GRANULOCYTES: 0 %
LYMPHOCYTES # BLD: 22 % (ref 24–44)
MAGNESIUM: 2.1 MG/DL (ref 1.6–2.6)
MCH RBC QN AUTO: 39.1 PG (ref 25.2–33.5)
MCHC RBC AUTO-ENTMCNC: 32 G/DL (ref 28.4–34.8)
MCV RBC AUTO: 122.2 FL (ref 82.6–102.9)
MODE: ABNORMAL
MONOCYTES # BLD: 15 % (ref 1–7)
MORPHOLOGY: ABNORMAL
MORPHOLOGY: ABNORMAL
NEGATIVE BASE EXCESS, ART: 4 (ref 0–2)
NRBC AUTOMATED: 0 PER 100 WBC
O2 DEVICE/FLOW/%: ABNORMAL
PDW BLD-RTO: 15.4 % (ref 11.8–14.4)
PHOSPHORUS: 3.4 MG/DL (ref 2.6–4.5)
PLATELET # BLD: 71 K/UL (ref 138–453)
PMV BLD AUTO: 11.4 FL (ref 8.1–13.5)
POC HCO3: 19.5 MMOL/L (ref 21–28)
POC O2 SATURATION: 99 % (ref 94–98)
POC PCO2: 29.8 MM HG (ref 35–48)
POC PH: 7.42 (ref 7.35–7.45)
POC PO2: 131.1 MM HG (ref 83–108)
POTASSIUM SERPL-SCNC: 4.1 MMOL/L (ref 3.7–5.3)
RBC # BLD: 2.61 M/UL (ref 3.95–5.11)
SAMPLE SITE: ABNORMAL
SEG NEUTROPHILS: 62 % (ref 36–66)
SEGMENTED NEUTROPHILS ABSOLUTE COUNT: 2.66 K/UL (ref 1.8–7.7)
SODIUM BLD-SCNC: 131 MMOL/L (ref 135–144)
SPECIMEN DESCRIPTION: ABNORMAL
WBC # BLD: 4.3 K/UL (ref 3.5–11.3)

## 2022-12-17 PROCEDURE — 2580000003 HC RX 258: Performed by: INTERNAL MEDICINE

## 2022-12-17 PROCEDURE — 36600 WITHDRAWAL OF ARTERIAL BLOOD: CPT

## 2022-12-17 PROCEDURE — 84100 ASSAY OF PHOSPHORUS: CPT

## 2022-12-17 PROCEDURE — 82947 ASSAY GLUCOSE BLOOD QUANT: CPT

## 2022-12-17 PROCEDURE — 2500000003 HC RX 250 WO HCPCS: Performed by: INTERNAL MEDICINE

## 2022-12-17 PROCEDURE — 82330 ASSAY OF CALCIUM: CPT

## 2022-12-17 PROCEDURE — 2000000000 HC ICU R&B

## 2022-12-17 PROCEDURE — 83735 ASSAY OF MAGNESIUM: CPT

## 2022-12-17 PROCEDURE — 36415 COLL VENOUS BLD VENIPUNCTURE: CPT

## 2022-12-17 PROCEDURE — 2500000003 HC RX 250 WO HCPCS: Performed by: HEALTH CARE PROVIDER

## 2022-12-17 PROCEDURE — 85025 COMPLETE CBC W/AUTO DIFF WBC: CPT

## 2022-12-17 PROCEDURE — 2580000003 HC RX 258: Performed by: HEALTH CARE PROVIDER

## 2022-12-17 PROCEDURE — 94761 N-INVAS EAR/PLS OXIMETRY MLT: CPT

## 2022-12-17 PROCEDURE — 99291 CRITICAL CARE FIRST HOUR: CPT | Performed by: INTERNAL MEDICINE

## 2022-12-17 PROCEDURE — 6360000002 HC RX W HCPCS: Performed by: STUDENT IN AN ORGANIZED HEALTH CARE EDUCATION/TRAINING PROGRAM

## 2022-12-17 PROCEDURE — 2580000003 HC RX 258

## 2022-12-17 PROCEDURE — 82803 BLOOD GASES ANY COMBINATION: CPT

## 2022-12-17 PROCEDURE — 80048 BASIC METABOLIC PNL TOTAL CA: CPT

## 2022-12-17 PROCEDURE — 2700000000 HC OXYGEN THERAPY PER DAY

## 2022-12-17 PROCEDURE — 6370000000 HC RX 637 (ALT 250 FOR IP)

## 2022-12-17 PROCEDURE — 6370000000 HC RX 637 (ALT 250 FOR IP): Performed by: HEALTH CARE PROVIDER

## 2022-12-17 PROCEDURE — 94003 VENT MGMT INPAT SUBQ DAY: CPT

## 2022-12-17 PROCEDURE — 6360000002 HC RX W HCPCS: Performed by: HEALTH CARE PROVIDER

## 2022-12-17 PROCEDURE — 6360000002 HC RX W HCPCS: Performed by: INTERNAL MEDICINE

## 2022-12-17 RX ORDER — CALCIUM GLUCONATE 20 MG/ML
2000 INJECTION, SOLUTION INTRAVENOUS ONCE
Status: COMPLETED | OUTPATIENT
Start: 2022-12-17 | End: 2022-12-17

## 2022-12-17 RX ORDER — SODIUM CHLORIDE 9 MG/ML
INJECTION, SOLUTION INTRAVENOUS CONTINUOUS
Status: DISCONTINUED | OUTPATIENT
Start: 2022-12-17 | End: 2022-12-24

## 2022-12-17 RX ORDER — CHLORDIAZEPOXIDE HYDROCHLORIDE 25 MG/1
50 CAPSULE, GELATIN COATED ORAL 3 TIMES DAILY
Status: DISCONTINUED | OUTPATIENT
Start: 2022-12-17 | End: 2022-12-18

## 2022-12-17 RX ADMIN — DEXMEDETOMIDINE HYDROCHLORIDE 1.2 MCG/KG/HR: 4 INJECTION, SOLUTION INTRAVENOUS at 17:47

## 2022-12-17 RX ADMIN — Medication 5 MG/HR: at 02:01

## 2022-12-17 RX ADMIN — PROPOFOL 20 MCG/KG/MIN: 10 INJECTION, EMULSION INTRAVENOUS at 21:16

## 2022-12-17 RX ADMIN — CALCIUM GLUCONATE 2000 MG: 20 INJECTION, SOLUTION INTRAVENOUS at 05:32

## 2022-12-17 RX ADMIN — CHLORDIAZEPOXIDE HYDROCHLORIDE 50 MG: 25 CAPSULE ORAL at 20:08

## 2022-12-17 RX ADMIN — ACETAMINOPHEN 650 MG: 325 TABLET ORAL at 23:57

## 2022-12-17 RX ADMIN — CHLORDIAZEPOXIDE HYDROCHLORIDE 25 MG: 25 CAPSULE ORAL at 08:12

## 2022-12-17 RX ADMIN — SODIUM CHLORIDE, PRESERVATIVE FREE 20 MG: 5 INJECTION INTRAVENOUS at 08:21

## 2022-12-17 RX ADMIN — SODIUM CHLORIDE, PRESERVATIVE FREE 20 MG: 5 INJECTION INTRAVENOUS at 20:08

## 2022-12-17 RX ADMIN — DEXMEDETOMIDINE HYDROCHLORIDE 1.4 MCG/KG/HR: 4 INJECTION, SOLUTION INTRAVENOUS at 23:57

## 2022-12-17 RX ADMIN — ENOXAPARIN SODIUM 30 MG: 100 INJECTION SUBCUTANEOUS at 08:30

## 2022-12-17 RX ADMIN — CHLORDIAZEPOXIDE HYDROCHLORIDE 50 MG: 25 CAPSULE ORAL at 13:09

## 2022-12-17 RX ADMIN — Medication 8 MG/HR: at 21:11

## 2022-12-17 RX ADMIN — SODIUM CHLORIDE, PRESERVATIVE FREE 10 ML: 5 INJECTION INTRAVENOUS at 08:15

## 2022-12-17 RX ADMIN — THIAMINE HYDROCHLORIDE: 100 INJECTION, SOLUTION INTRAMUSCULAR; INTRAVENOUS at 09:35

## 2022-12-17 RX ADMIN — PROPOFOL 15 MCG/KG/MIN: 10 INJECTION, EMULSION INTRAVENOUS at 02:02

## 2022-12-17 RX ADMIN — DEXMEDETOMIDINE HYDROCHLORIDE 1 MCG/KG/HR: 4 INJECTION, SOLUTION INTRAVENOUS at 09:37

## 2022-12-17 RX ADMIN — THIAMINE HYDROCHLORIDE: 100 INJECTION, SOLUTION INTRAMUSCULAR; INTRAVENOUS at 08:21

## 2022-12-17 RX ADMIN — POLYETHYLENE GLYCOL 3350 17 G: 17 POWDER, FOR SOLUTION ORAL at 08:12

## 2022-12-17 RX ADMIN — SODIUM CHLORIDE: 9 INJECTION, SOLUTION INTRAVENOUS at 05:26

## 2022-12-17 RX ADMIN — SODIUM CHLORIDE: 9 INJECTION, SOLUTION INTRAVENOUS at 21:13

## 2022-12-17 RX ADMIN — DEXMEDETOMIDINE HYDROCHLORIDE 1.4 MCG/KG/HR: 4 INJECTION, SOLUTION INTRAVENOUS at 03:56

## 2022-12-17 ASSESSMENT — PULMONARY FUNCTION TESTS
PIF_VALUE: 18
PIF_VALUE: 19
PIF_VALUE: 13
PIF_VALUE: 19

## 2022-12-17 NOTE — PLAN OF CARE
Problem: Safety - Medical Restraint  Goal: Remains free of injury from restraints (Restraint for Interference with Medical Device)  Description: INTERVENTIONS:  1. Determine that other, less restrictive measures have been tried or would not be effective before applying the restraint  2. Evaluate the patient's condition at the time of restraint application  3. Inform patient/family regarding the reason for restraint  4.  Q2H: Monitor safety, psychosocial status, comfort, nutrition and hydration  Outcome: Progressing  Flowsheets  Taken 12/17/2022 0400 by Marques Dennison RN  Remains free of injury from restraints (restraint for interference with medical device): Every 2 hours: Monitor safety, psychosocial status, comfort, nutrition and hydration  Taken 12/17/2022 0200 by Marques Dennison RN  Remains free of injury from restraints (restraint for interference with medical device): Every 2 hours: Monitor safety, psychosocial status, comfort, nutrition and hydration  Taken 12/17/2022 0000 by Marques Dennison RN  Remains free of injury from restraints (restraint for interference with medical device): Every 2 hours: Monitor safety, psychosocial status, comfort, nutrition and hydration  Taken 12/16/2022 2200 by Marques Dennison RN  Remains free of injury from restraints (restraint for interference with medical device): Every 2 hours: Monitor safety, psychosocial status, comfort, nutrition and hydration  Taken 12/16/2022 2000 by Marques Dennison RN  Remains free of injury from restraints (restraint for interference with medical device):   Determine that other, less restrictive measures have been tried or would not be effective before applying the restraint   Evaluate the patient's condition at the time of restraint application   Inform patient/family regarding the reason for restraint   Every 2 hours: Monitor safety, psychosocial status, comfort, nutrition and hydration    Problem: Discharge Planning  Goal: Discharge to home or other facility with appropriate resources  Outcome: Progressing  Flowsheets (Taken 12/16/2022 2000)  Discharge to home or other facility with appropriate resources: Identify barriers to discharge with patient and caregiver     Problem: Skin/Tissue Integrity  Goal: Absence of new skin breakdown  Description: 1. Monitor for areas of redness and/or skin breakdown  2. Assess vascular access sites hourly  3. Every 4-6 hours minimum:  Change oxygen saturation probe site  4. Every 4-6 hours:  If on nasal continuous positive airway pressure, respiratory therapy assess nares and determine need for appliance change or resting period.   Outcome: Progressing     Problem: ABCDS Injury Assessment  Goal: Absence of physical injury  Outcome: Progressing     Problem: Safety - Adult  Goal: Free from fall injury  Outcome: Progressing

## 2022-12-17 NOTE — PROGRESS NOTES
Critical Care Team - Daily Progress Note      Date and time: 12/17/2022 6:49 AM  Patient's name:  Virgil Brown  Medical Record Number: [id number]  Patient's account/billing number: [de-identified]  Patient's YOB: 1995  Age: 32 y.o. Date of Admission: 12/15/2022 12:08 PM  Length of stay during current admission: 2      Primary Care Physician: Sasha Hendricks  ICU Attending Physician: Dr. Rene Thomas Status: Full Code    Reason for ICU admission:   Chief Complaint   Patient presents with    Drug Overdose       Subjective:     OVERNIGHT EVENTS:         Patient seen and examined at bedside, overnight team report that Lovenox was held, as patient was apparently having some vaginal bleeding and clots from menses. Hb remains stable today at 10.2 which is around her baseline. Discussed with nurse about coming down on propofol as patient is not arousable at all, she did tolerate CPAP trial yesterday for several hours. ID consulted due to Covid-19 positive   Platelets 85-> 71  Tube feeds started   Hold off on Effexor and Abilify, as boyfriend reports patient was not taking the medication  Librium 25 TID added yesterday    Propofol 15 mcg, Versed 4 mg, Precedex 1.4 mcg    Ventilator: PRVC 8/460/5/30, tolerated CPAP trial on 12/16 for 11-12 hours per nurse.   ABG: pH 7.4, pCO2 29.8, HCO3 19.5, pO2 131  Vitals: Tmax: 99.3, RR 15, HR 79, BP 97/67 (MAP 77)  I: 3428, O: 520  IV     AWAKE & FOLLOWING COMMANDS:  [x] No   [] Yes    CURRENT VENTILATION STATUS:     [x] Ventilator  [] BIPAP  [] Nasal Cannula [] Room Air      IF INTUBATED, ET TUBE MARKING AT LOWER LIP:       cms    SECRETIONS Amount:  [x] Small [] Moderate  [] Large  [] None  Color:     [] White [] Colored  [] Bloody    SEDATION:  RAAS Score:  [x] Propofol gtt  [x] Versed gtt  [] Ativan gtt   [] No Sedation    PARALYZED:  [x] No    [] Yes    DIARRHEA:                [x] No                [] Yes  (C. Difficile status: [] positive [] negative                                                                                                                     [] pending)    VASOPRESSORS:  [x] No    [] Yes    If yes -   [] Levophed       [] Dopamine     [] Vasopressin       [] Dobutamine  [] Phenylephrine         [] Epinephrine    CENTRAL LINES:     [x] No   [] Yes   (Date of Insertion:   )           If yes -     [] Right IJ     [] Left IJ [] Right Femoral [] Left Femoral                   [] Right Subclavian [] Left Subclavian       PLUMMER'S CATHETER:   [] No   [x] Yes  (Date of Insertion:   )     URINE OUTPUT:            [x] Good   [] Low              [] Anuric    REVIEW OF SYSTEMS:Unable to be performed       OBJECTIVE:     VITAL SIGNS:  BP 97/67   Pulse 79   Temp 99 °F (37.2 °C) (Bladder)   Resp 15   Ht 5' 5\" (1.651 m)   Wt 109 lb 2 oz (49.5 kg)   SpO2 100%   BMI 18.16 kg/m²   Tmax over 24 hours:  Temp (24hrs), Av.7 °F (37.1 °C), Min:97.5 °F (36.4 °C), Max:99.3 °F (37.4 °C)      Patient Vitals for the past 8 hrs:   BP Temp Temp src Pulse Resp SpO2   22 0600 97/67 99 °F (37.2 °C) Bladder 79 15 100 %   22 0508 -- -- -- 81 12 100 %   22 0500 96/66 -- -- 81 14 100 %   22 0453 -- -- -- 81 14 100 %   22 0413 -- -- -- 86 19 100 %   22 0400 92/64 99 °F (37.2 °C) Bladder 83 15 100 %   22 0300 94/67 -- -- 84 13 100 %   22 0200 93/65 99 °F (37.2 °C) Bladder 80 15 100 %   22 0100 94/67 -- -- 79 15 100 %   22 0001 -- -- -- 80 17 100 %   22 0000 96/69 99.3 °F (37.4 °C) Bladder 80 19 100 %   22 2300 93/65 -- -- 77 16 100 %         Intake/Output Summary (Last 24 hours) at 2022 0649  Last data filed at 2022 0648  Gross per 24 hour   Intake 3428.93 ml   Output 520 ml   Net 2908.93 ml     Date 22 0000 - 22 2359   Shift 0769-3643 5565-7298 7063-2633 24 Hour Total INTAKE   I.V.(mL/kg) 893. 6(18.1)   893. 6(18.1)   NG/GT(mL/kg) 314(6.3)   314(6.3)   IV Piggyback(mL/kg) 49.6(1)   49.6(1)   Shift Total(mL/kg) 1257. 2(25.4)   1257. 2(25.4)   OUTPUT   Urine(mL/kg/hr) 305   305   Shift Total(mL/kg) 305(6.2)   305(6.2)   Weight (kg) 49.5 49.5 49.5 49.5     Wt Readings from Last 3 Encounters:   12/16/22 109 lb 2 oz (49.5 kg)   03/09/22 106 lb (48.1 kg)     Body mass index is 18.16 kg/m². PHYSICAL EXAM:  Constitutional: intubated, sedated  HEENT: sclera clear, anicteric  Respiratory: clear to auscultation, no wheezes or rales and unlabored breathing. Cardiovascular: regular rate and rhythm, normal S1, S2, no murmur noted and 2+ pulses throughout  Abdomen: soft, nontender, nondistended, no masses or organomegaly  NEUROLOGIC: Intubated and sedated, no arousable  Extremities:  peripheral pulses normal, no pedal edema,.       Any additional physical findings:      MEDICATIONS:  Scheduled Meds:   calcium gluconate  2,000 mg IntraVENous Once    chlordiazePOXIDE  25 mg Oral TID    sodium chloride flush  5-40 mL IntraVENous 2 times per day    [Held by provider] enoxaparin  30 mg SubCUTAneous Daily    thiamine and folic acid IVPB   IntraVENous Daily    thiamine and folic acid IVPB   IntraVENous Daily    famotidine (PEPCID) injection  20 mg IntraVENous BID     Continuous Infusions:   sodium chloride      propofol 15 mcg/kg/min (12/17/22 3223)    sodium chloride 125 mL/hr at 12/17/22 0526    midazolam 4 mg/hr (12/17/22 4128)    dexmedetomidine 1.4 mcg/kg/hr (12/17/22 6981)     PRN Meds:   midazolam, 2 mg, Q2H PRN  sodium phosphate IVPB, 0.16 mmol/kg, PRN   Or  sodium phosphate IVPB, 0.32 mmol/kg, PRN  sodium chloride flush, 5-40 mL, PRN  sodium chloride, , PRN  ondansetron, 4 mg, Q8H PRN   Or  ondansetron, 4 mg, Q6H PRN  polyethylene glycol, 17 g, Daily PRN  acetaminophen, 650 mg, Q6H PRN   Or  acetaminophen, 650 mg, Q6H PRN        SUPPORT DEVICES: [x] Ventilator [] BIPAP  [] Nasal Cannula [] Room Air    VENT SETTINGS (Comprehensive) (if applicable):  Vent Information  Ventilator ID: [id number]  Equipment Changed: HME, Expiratory Filter  Ventilator Initiate: Yes  Vent Mode: AC/PRVC  Additional Respiratory Assessments  Heart Rate: 79  Resp: 15  SpO2: 100 %  End Tidal CO2: 29 (%)  Position: Semi-Chang's  Humidification Source: HME    ABGs:     Lab Results   Component Value Date/Time    FIO2 30.0 12/17/2022 04:40 AM     Lactic Acid: No results found for: LACTA      DATA:  Complete Blood Count:   Recent Labs     12/15/22  1228 12/16/22  0743 12/17/22  0424   WBC 4.8 2.7* 4.3   HGB 11.3* 10.0* 10.2*   .3* 116.9* 122.2*   PLT See Reflexed IPF Result See Reflexed IPF Result 71*   RBC 2.83* 2.54* 2.61*   HCT 32.9* 29.7* 31.9*   MCH 39.9* 39.4* 39.1*   MCHC 34.3 33.7 32.0   RDW 15.0* 15.5* 15.4*   MPV  --   --  11.4        PT/INR:    Lab Results   Component Value Date/Time    PROTIME 12.4 12/15/2022 12:28 PM    INR 1.2 12/15/2022 12:28 PM     PTT:  No results found for: APTT, PTT    Basal Metabolic Profile:   Recent Labs     12/15/22  1228 12/16/22  0743 12/17/22  0424   * 139 131*   K 4.0 3.0* 4.1   BUN 2* 4* 4*   CREATININE 0.33* 0.34* 0.20*    108* 105   CO2 18* 20 15*      Magnesium:   Lab Results   Component Value Date/Time    MG 2.1 12/17/2022 04:24 AM    MG 1.2 12/16/2022 07:43 AM    MG 1.8 12/15/2022 12:28 PM     Phosphorus:   Lab Results   Component Value Date/Time    PHOS 3.4 12/17/2022 04:24 AM    PHOS 2.5 12/16/2022 07:43 AM     S. Calcium:  Recent Labs     12/17/22 0424   CALCIUM 7.2*     S. Ionized Calcium:No results for input(s): IONCA in the last 72 hours.       Urinalysis:   Lab Results   Component Value Date/Time    NITRU NEGATIVE 12/15/2022 12:49 PM    COLORU Dark Yellow 12/15/2022 12:49 PM    PHUR 6.0 12/15/2022 12:49 PM    WBCUA 0 TO 2 12/15/2022 12:49 PM    RBCUA 2 TO 5 12/15/2022 12:49 PM    MUCUS 3+ 03/09/2022 12:21 PM    TRICHOMONAS NOT REPORTED 09/20/2014 06:19 AM    YEAST NOT REPORTED 09/20/2014 06:19 AM    BACTERIA FEW 09/20/2014 06:19 AM    CLARITYU slightly cloudy 07/22/2014 11:58 AM    SPECGRAV 1.020 12/15/2022 12:49 PM    LEUKOCYTESUR NEGATIVE 12/15/2022 12:49 PM    UROBILINOGEN Normal 12/15/2022 12:49 PM    BILIRUBINUR NEGATIVE  Verified by ictotest. 12/15/2022 12:49 PM    BILIRUBINUR negative 07/22/2014 11:58 AM    BLOODU negative 07/22/2014 11:58 AM    GLUCOSEU NEGATIVE 12/15/2022 12:49 PM    KETUA SMALL 12/15/2022 12:49 PM    AMORPHOUS NOT REPORTED 09/20/2014 06:19 AM       CARDIAC ENZYMES: No results for input(s): CKMB, CKMBINDEX, TROPONINI in the last 72 hours. Invalid input(s): CKTOTAL;3  BNP: No results for input(s): BNP in the last 72 hours. LFTS  Recent Labs     12/15/22  1228 12/16/22  0743   ALKPHOS 123* 102   * 91*   * 462*   BILITOT 0.8 0.9   BILIDIR 0.3* 0.7*   LABALBU 3.8 3.1*       AMYLASE/LIPASE/AMMONIA  Recent Labs     12/15/22  1255 12/16/22  0743   AMMONIA 61* 57*       Last 3 Blood Glucose:   Recent Labs     12/15/22  1228 12/16/22  0743 12/17/22  0424   GLUCOSE 130* 114* 109*      HgBA1c:  No results found for: LABA1C      TSH:  No results found for: TSH  ANEMIA STUDIES  No results for input(s): LABIRON, TIBC, FERRITIN, TLCQEEYR22, FOLATE, OCCULTBLD in the last 72 hours. ASSESSMENT:     Principal Problem:    Drug overdose of undetermined intent, initial encounter  Resolved Problems:    * No resolved hospital problems.  *        PLAN:     Neurologic  -Altered Mental Status likely 2/2 alcohol intoxication  - UDS negative  -Hx of EtOH abuse, +EtOH 266  - Monitor for EtOH withdraw  - Seizure precautions  - Daily thiamine and folate   -Neuro checks per protocol  -Wean sedation, started on Librium 25mg TID on 12/16  -CT cervical spine negative  -Hx Bipolar disorder, does not take her Abilify and Effexor      Cardiovascular  -Hemodynamically stable, no pressor support at this time  -MAP goal > 65     Pulmonary  -Acute Respiratory Failure, intubated for airway protection  -Covid-19 positive, ID consulted  -ABG: pH 7.44, pCO2 26, HCO3 19.7, pO2 310  -Maintain oxygen sats >92%  -Pulmonary toilet  -No concern for infectious process on CXR  - Tolerating breathing trials     GI/Nutrition  -Transaminitis, improving  -Hepatic steatosis, march 2022  -Glycolax PRN  -Ulcer Prophylaxis: Pepcid BID  -Diet:Tube feeds  -->462/->91/->102  -Ammonia 61->57     Renal/Fluid/Electrolyte  -IV Fluids: [email or web address] mL/Hr   -I: 3428, O: 520     ID  -No concern for acute infectious process  -Antimicrobials: not indicated at this time  -UA negaative     Hematology  -Plts 63->71, thrombocytopenia      Endocrine:   -glucose controlled  -No hx of diabetes. Cont to monitor. DVT Prophylaxis: Lovenox   Discharge Needs:  PT/OT, CM    CODE STATUS: Full Code     DISPOSITION:  [x] To remain ICU  [] OK for out of ICU from [facility] [place],           Department of Internal Medicine/ Critical care  [facility] ([place]             12/17/2022, 6:49 AM    Attending Physician Statement  I have discussed the care of Denise Downey, including pertinent history and exam findings with the resident. I have reviewed the key elements of all parts of the encounter with the resident. I have seen and examined the patient with the resident. I agree with the assessment and plan and status of the problem list as documented. I seen the patient during around today, chart reviewed, labs and medications reviewed overnight events noted. Patient is on Precedex drip on Versed drip and on propofol but on lower dose of propofol. Platelet count is stable 71 today there was some vaginal bleeding reported we will continue with Lovenox hemoglobin has been stable. Ventilator setting PRVC/8/460/5/30 percent ABG was 7.4 0/30/131/19. Will increase respiratory rate to 14 patient has been breathing 18.   Will wean off propofol continue with Precedex and Versed. Will increase Librium to 50 mg 3 times a day when I saw the patient patient was arousable restless did not follow command but respond to name. Will start tube feeding. Decrease IV fluid to 50 mils an hour. Continue with ventilatory support/daily spontaneous breathing trial    Discussed with nursing staff, treatment and plan discussed. Discussed with respiratory therapist.    Total critical care time caring for this patient with life threatening, unstable organ failure, including direct patient contact, management of life support systems, review of data including imaging and labs, discussions with other team members and physicians at least 35 min so far today, excluding procedures. Please note that this chart was generated using voice recognition Dragon dictation software. Although every effort was made to ensure the accuracy of this automated transcription, some errors in transcription may have occurred.      Yessica Winter MD  12/17/2022 12:27 PM

## 2022-12-17 NOTE — PLAN OF CARE
Problem: Safety - Medical Restraint  Goal: Remains free of injury from restraints (Restraint for Interference with Medical Device)  Description: INTERVENTIONS:  1. Determine that other, less restrictive measures have been tried or would not be effective before applying the restraint  2. Evaluate the patient's condition at the time of restraint application  3. Inform patient/family regarding the reason for restraint  4.  Q2H: Monitor safety, psychosocial status, comfort, nutrition and hydration  12/17/2022 0729 by Jadyn Noe RN  Outcome: Progressing  Flowsheets (Taken 12/17/2022 0600 by Kaylen Eldridge RN)  Remains free of injury from restraints (restraint for interference with medical device): Every 2 hours: Monitor safety, psychosocial status, comfort, nutrition and hydration  12/17/2022 0504 by Kaylen Eldridge RN  Outcome: Progressing  Flowsheets  Taken 12/17/2022 0400 by Kaylen Eldridge RN  Remains free of injury from restraints (restraint for interference with medical device): Every 2 hours: Monitor safety, psychosocial status, comfort, nutrition and hydration  Taken 12/17/2022 0200 by Kaylen Eldridge RN  Remains free of injury from restraints (restraint for interference with medical device): Every 2 hours: Monitor safety, psychosocial status, comfort, nutrition and hydration  Taken 12/17/2022 0000 by Kaylen Eldridge RN  Remains free of injury from restraints (restraint for interference with medical device): Every 2 hours: Monitor safety, psychosocial status, comfort, nutrition and hydration  Taken 12/16/2022 2200 by Kaylen Eldridge RN  Remains free of injury from restraints (restraint for interference with medical device): Every 2 hours: Monitor safety, psychosocial status, comfort, nutrition and hydration  Taken 12/16/2022 2000 by Kaylen Eldridge RN  Remains free of injury from restraints (restraint for interference with medical device):   Determine that other, less restrictive measures have been tried or would not be effective before applying the restraint   Evaluate the patient's condition at the time of restraint application   Inform patient/family regarding the reason for restraint   Every 2 hours: Monitor safety, psychosocial status, comfort, nutrition and hydration  Taken 12/16/2022 1800 by Subhash Newton RN  Remains free of injury from restraints (restraint for interference with medical device): Every 2 hours: Monitor safety, psychosocial status, comfort, nutrition and hydration  Taken 12/16/2022 1600 by Jadyn Noe RN  Remains free of injury from restraints (restraint for interference with medical device): Every 2 hours: Monitor safety, psychosocial status, comfort, nutrition and hydration     Problem: Discharge Planning  Goal: Discharge to home or other facility with appropriate resources  12/17/2022 0729 by Jadyn Noe RN  Outcome: Progressing  12/17/2022 0504 by Kaylen Eldridge RN  Outcome: Progressing  Flowsheets (Taken 12/16/2022 2000)  Discharge to home or other facility with appropriate resources: Identify barriers to discharge with patient and caregiver     Problem: Skin/Tissue Integrity  Goal: Absence of new skin breakdown  Description: 1. Monitor for areas of redness and/or skin breakdown  2. Assess vascular access sites hourly  3. Every 4-6 hours minimum:  Change oxygen saturation probe site  4. Every 4-6 hours:  If on nasal continuous positive airway pressure, respiratory therapy assess nares and determine need for appliance change or resting period.   12/17/2022 0729 by Jadyn Noe RN  Outcome: Progressing  12/17/2022 0504 by Kaylen Eldridge RN  Outcome: Progressing     Problem: ABCDS Injury Assessment  Goal: Absence of physical injury  12/17/2022 0729 by Jadyn Noe RN  Outcome: Progressing  Flowsheets (Taken 12/17/2022 0727)  Absence of Physical Injury: Implement safety measures based on patient assessment  12/17/2022 0504 by Kaylen Eldridge RN  Outcome: Progressing     Problem: Safety - Adult  Goal: Free from fall injury  12/17/2022 0729 by Sherie Marrero RN  Outcome: Progressing  Flowsheets (Taken 12/17/2022 0727)  Free From Fall Injury: Based on caregiver fall risk screen, instruct family/caregiver to ask for assistance with transferring infant if caregiver noted to have fall risk factors  12/17/2022 0504 by Estuardo Galeana RN  Outcome: Progressing     Problem: Pain  Goal: Verbalizes/displays adequate comfort level or baseline comfort level  Outcome: Progressing  Flowsheets (Taken 12/17/2022 0400 by Estuardo Galeana RN)  Verbalizes/displays adequate comfort level or baseline comfort level: Encourage patient to monitor pain and request assistance     Problem: Confusion  Goal: Confusion, delirium, dementia, or psychosis is improved or at baseline  Description: INTERVENTIONS:  1. Assess for possible contributors to thought disturbance, including medications, impaired vision or hearing, underlying metabolic abnormalities, dehydration, psychiatric diagnoses, and notify attending LIP  2. [place] high risk fall precautions, as indicated  3. Provide frequent short contacts to provide reality reorientation, refocusing and direction  4. Decrease environmental stimuli, including noise as appropriate  5. Monitor and intervene to maintain adequate nutrition, hydration, elimination, sleep and activity  6. If unable to ensure safety without constant attention obtain sitter and review sitter guidelines with assigned personnel  7.  Initiate Psychosocial CNS and Spiritual Care consult, as indicated  Outcome: Progressing     Problem: Respiratory - Adult  Goal: Achieves optimal ventilation and oxygenation  Outcome: Progressing     Problem: Nutrition Deficit:  Goal: Optimize nutritional status  Outcome: Progressing

## 2022-12-17 NOTE — PROGRESS NOTES
12/17/22 0808   NICU Vent Information   Vent Mode (S)  CPAP   Pt weaned on CPAP from 0443-1686. RN relates pt became combative and developed increase WOB, with increased RR and decrease VT Continue with supportive care   - guaifenesin, nasal saline, duonebs PRN.

## 2022-12-18 PROBLEM — R06.89 ACUTE RESPIRATORY INSUFFICIENCY: Status: ACTIVE | Noted: 2022-12-18

## 2022-12-18 PROBLEM — U07.1 COVID-19: Status: ACTIVE | Noted: 2022-12-18

## 2022-12-18 PROBLEM — F10.929 ALCOHOLIC INTOXICATION WITH COMPLICATION (HCC): Status: ACTIVE | Noted: 2022-12-18

## 2022-12-18 PROBLEM — R41.82 ALTERED MENTAL STATUS: Status: ACTIVE | Noted: 2022-12-18

## 2022-12-18 LAB
ABSOLUTE EOS #: 0.04 K/UL (ref 0–0.4)
ABSOLUTE IMMATURE GRANULOCYTE: 0.04 K/UL (ref 0–0.3)
ABSOLUTE LYMPH #: 1.6 K/UL (ref 1–4.8)
ABSOLUTE MONO #: 0.53 K/UL (ref 0.1–0.8)
ALLEN TEST: POSITIVE
ANION GAP SERPL CALCULATED.3IONS-SCNC: 12 MMOL/L (ref 9–17)
ANION GAP SERPL CALCULATED.3IONS-SCNC: 9 MMOL/L (ref 9–17)
BASOPHILS # BLD: 0 % (ref 0–2)
BASOPHILS ABSOLUTE: 0 K/UL (ref 0–0.2)
BUN BLDV-MCNC: 4 MG/DL (ref 6–20)
CALCIUM IONIZED: 1.17 MMOL/L (ref 1.13–1.33)
CALCIUM SERPL-MCNC: 7.7 MG/DL (ref 8.6–10.4)
CHLORIDE BLD-SCNC: 104 MMOL/L (ref 98–107)
CHLORIDE BLD-SCNC: 105 MMOL/L (ref 98–107)
CO2: 17 MMOL/L (ref 20–31)
CO2: 19 MMOL/L (ref 20–31)
CREAT SERPL-MCNC: 0.32 MG/DL (ref 0.5–0.9)
EOSINOPHILS RELATIVE PERCENT: 1 % (ref 1–4)
GFR SERPL CREATININE-BSD FRML MDRD: >60 ML/MIN/1.73M2
GLUCOSE BLD-MCNC: 171 MG/DL (ref 74–100)
GLUCOSE BLD-MCNC: 174 MG/DL (ref 70–99)
HCT VFR BLD CALC: 29.6 % (ref 36.3–47.1)
HEMOGLOBIN: 9.7 G/DL (ref 11.9–15.1)
IMMATURE GRANULOCYTES: 1 %
LYMPHOCYTES # BLD: 42 % (ref 24–44)
MAGNESIUM: 1.6 MG/DL (ref 1.6–2.6)
MCH RBC QN AUTO: 39.4 PG (ref 25.2–33.5)
MCHC RBC AUTO-ENTMCNC: 32.8 G/DL (ref 28.4–34.8)
MCV RBC AUTO: 120.3 FL (ref 82.6–102.9)
MODE: AC
MONOCYTES # BLD: 14 % (ref 1–7)
MORPHOLOGY: ABNORMAL
MORPHOLOGY: ABNORMAL
NEGATIVE BASE EXCESS, ART: 4 (ref 0–2)
NRBC AUTOMATED: 0 PER 100 WBC
NUCLEATED RED BLOOD CELLS: 1 PER 100 WBC
PDW BLD-RTO: 14.9 % (ref 11.8–14.4)
PHOSPHORUS: 3.2 MG/DL (ref 2.6–4.5)
PLATELET # BLD: 73 K/UL (ref 138–453)
PMV BLD AUTO: 12.2 FL (ref 8.1–13.5)
POC HCO3: 19.1 MMOL/L (ref 21–28)
POC O2 SATURATION: 99 % (ref 94–98)
POC PCO2: 29.2 MM HG (ref 35–48)
POC PH: 7.42 (ref 7.35–7.45)
POC PO2: 126.9 MM HG (ref 83–108)
POTASSIUM SERPL-SCNC: 3 MMOL/L (ref 3.7–5.3)
POTASSIUM SERPL-SCNC: 4 MMOL/L (ref 3.7–5.3)
RBC # BLD: 2.46 M/UL (ref 3.95–5.11)
SAMPLE SITE: ABNORMAL
SEG NEUTROPHILS: 42 % (ref 36–66)
SEGMENTED NEUTROPHILS ABSOLUTE COUNT: 1.59 K/UL (ref 1.8–7.7)
SODIUM BLD-SCNC: 130 MMOL/L (ref 135–144)
SODIUM BLD-SCNC: 136 MMOL/L (ref 135–144)
WBC # BLD: 3.8 K/UL (ref 3.5–11.3)

## 2022-12-18 PROCEDURE — 82330 ASSAY OF CALCIUM: CPT

## 2022-12-18 PROCEDURE — 6370000000 HC RX 637 (ALT 250 FOR IP): Performed by: HEALTH CARE PROVIDER

## 2022-12-18 PROCEDURE — 2700000000 HC OXYGEN THERAPY PER DAY

## 2022-12-18 PROCEDURE — 84100 ASSAY OF PHOSPHORUS: CPT

## 2022-12-18 PROCEDURE — 99222 1ST HOSP IP/OBS MODERATE 55: CPT | Performed by: NURSE PRACTITIONER

## 2022-12-18 PROCEDURE — 6360000002 HC RX W HCPCS: Performed by: HEALTH CARE PROVIDER

## 2022-12-18 PROCEDURE — 2580000003 HC RX 258: Performed by: INTERNAL MEDICINE

## 2022-12-18 PROCEDURE — 6360000002 HC RX W HCPCS: Performed by: INTERNAL MEDICINE

## 2022-12-18 PROCEDURE — 6360000002 HC RX W HCPCS: Performed by: STUDENT IN AN ORGANIZED HEALTH CARE EDUCATION/TRAINING PROGRAM

## 2022-12-18 PROCEDURE — 6360000002 HC RX W HCPCS

## 2022-12-18 PROCEDURE — 80051 ELECTROLYTE PANEL: CPT

## 2022-12-18 PROCEDURE — 2580000003 HC RX 258: Performed by: HEALTH CARE PROVIDER

## 2022-12-18 PROCEDURE — 2500000003 HC RX 250 WO HCPCS: Performed by: INTERNAL MEDICINE

## 2022-12-18 PROCEDURE — 6370000000 HC RX 637 (ALT 250 FOR IP)

## 2022-12-18 PROCEDURE — 82803 BLOOD GASES ANY COMBINATION: CPT

## 2022-12-18 PROCEDURE — 82947 ASSAY GLUCOSE BLOOD QUANT: CPT

## 2022-12-18 PROCEDURE — 2000000000 HC ICU R&B

## 2022-12-18 PROCEDURE — 94761 N-INVAS EAR/PLS OXIMETRY MLT: CPT

## 2022-12-18 PROCEDURE — 36600 WITHDRAWAL OF ARTERIAL BLOOD: CPT

## 2022-12-18 PROCEDURE — 36415 COLL VENOUS BLD VENIPUNCTURE: CPT

## 2022-12-18 PROCEDURE — 94003 VENT MGMT INPAT SUBQ DAY: CPT

## 2022-12-18 PROCEDURE — 83735 ASSAY OF MAGNESIUM: CPT

## 2022-12-18 PROCEDURE — 85025 COMPLETE CBC W/AUTO DIFF WBC: CPT

## 2022-12-18 PROCEDURE — 99291 CRITICAL CARE FIRST HOUR: CPT | Performed by: INTERNAL MEDICINE

## 2022-12-18 PROCEDURE — 80048 BASIC METABOLIC PNL TOTAL CA: CPT

## 2022-12-18 PROCEDURE — 2500000003 HC RX 250 WO HCPCS: Performed by: HEALTH CARE PROVIDER

## 2022-12-18 RX ORDER — MAGNESIUM SULFATE IN WATER 40 MG/ML
2000 INJECTION, SOLUTION INTRAVENOUS ONCE
Status: COMPLETED | OUTPATIENT
Start: 2022-12-18 | End: 2022-12-18

## 2022-12-18 RX ORDER — CHLORDIAZEPOXIDE HYDROCHLORIDE 25 MG/1
50 CAPSULE, GELATIN COATED ORAL EVERY 8 HOURS
Status: DISCONTINUED | OUTPATIENT
Start: 2022-12-18 | End: 2022-12-20

## 2022-12-18 RX ORDER — MIDODRINE HYDROCHLORIDE 5 MG/1
5 TABLET ORAL 3 TIMES DAILY
Status: DISCONTINUED | OUTPATIENT
Start: 2022-12-18 | End: 2022-12-18

## 2022-12-18 RX ORDER — POTASSIUM CHLORIDE 20MEQ/15ML
40 LIQUID (ML) ORAL 2 TIMES DAILY
Status: DISCONTINUED | OUTPATIENT
Start: 2022-12-18 | End: 2022-12-18

## 2022-12-18 RX ORDER — POTASSIUM CHLORIDE 20MEQ/15ML
40 LIQUID (ML) ORAL 2 TIMES DAILY
Status: COMPLETED | OUTPATIENT
Start: 2022-12-18 | End: 2022-12-19

## 2022-12-18 RX ORDER — MIDODRINE HYDROCHLORIDE 5 MG/1
5 TABLET ORAL 3 TIMES DAILY PRN
Status: DISCONTINUED | OUTPATIENT
Start: 2022-12-18 | End: 2022-12-30

## 2022-12-18 RX ADMIN — SODIUM CHLORIDE, PRESERVATIVE FREE 10 ML: 5 INJECTION INTRAVENOUS at 08:45

## 2022-12-18 RX ADMIN — ACETAMINOPHEN 650 MG: 325 TABLET ORAL at 23:42

## 2022-12-18 RX ADMIN — MAGNESIUM SULFATE HEPTAHYDRATE 2000 MG: 40 INJECTION, SOLUTION INTRAVENOUS at 09:55

## 2022-12-18 RX ADMIN — CHLORDIAZEPOXIDE HYDROCHLORIDE 50 MG: 25 CAPSULE ORAL at 16:10

## 2022-12-18 RX ADMIN — SODIUM CHLORIDE, PRESERVATIVE FREE 20 MG: 5 INJECTION INTRAVENOUS at 08:44

## 2022-12-18 RX ADMIN — POTASSIUM CHLORIDE 40 MEQ: 40 SOLUTION ORAL at 22:10

## 2022-12-18 RX ADMIN — THIAMINE HYDROCHLORIDE: 100 INJECTION, SOLUTION INTRAMUSCULAR; INTRAVENOUS at 09:17

## 2022-12-18 RX ADMIN — DEXMEDETOMIDINE HYDROCHLORIDE 1.4 MCG/KG/HR: 4 INJECTION, SOLUTION INTRAVENOUS at 22:52

## 2022-12-18 RX ADMIN — SODIUM CHLORIDE, PRESERVATIVE FREE 20 MG: 5 INJECTION INTRAVENOUS at 22:10

## 2022-12-18 RX ADMIN — THIAMINE HYDROCHLORIDE: 100 INJECTION, SOLUTION INTRAMUSCULAR; INTRAVENOUS at 08:45

## 2022-12-18 RX ADMIN — CHLORDIAZEPOXIDE HYDROCHLORIDE 50 MG: 25 CAPSULE ORAL at 08:36

## 2022-12-18 RX ADMIN — MIDODRINE HYDROCHLORIDE 5 MG: 5 TABLET ORAL at 09:06

## 2022-12-18 RX ADMIN — SODIUM CHLORIDE, PRESERVATIVE FREE 10 ML: 5 INJECTION INTRAVENOUS at 22:21

## 2022-12-18 RX ADMIN — POTASSIUM CHLORIDE 40 MEQ: 40 SOLUTION ORAL at 09:04

## 2022-12-18 RX ADMIN — ENOXAPARIN SODIUM 30 MG: 100 INJECTION SUBCUTANEOUS at 08:44

## 2022-12-18 RX ADMIN — POLYETHYLENE GLYCOL 3350 17 G: 17 POWDER, FOR SOLUTION ORAL at 08:36

## 2022-12-18 RX ADMIN — DEXMEDETOMIDINE HYDROCHLORIDE 1.4 MCG/KG/HR: 4 INJECTION, SOLUTION INTRAVENOUS at 16:20

## 2022-12-18 RX ADMIN — Medication 7 MG/HR: at 11:34

## 2022-12-18 RX ADMIN — DEXMEDETOMIDINE HYDROCHLORIDE 1.4 MCG/KG/HR: 4 INJECTION, SOLUTION INTRAVENOUS at 05:36

## 2022-12-18 RX ADMIN — DEXMEDETOMIDINE HYDROCHLORIDE 1.4 MCG/KG/HR: 4 INJECTION, SOLUTION INTRAVENOUS at 11:31

## 2022-12-18 RX ADMIN — CHLORDIAZEPOXIDE HYDROCHLORIDE 50 MG: 25 CAPSULE ORAL at 23:42

## 2022-12-18 ASSESSMENT — PULMONARY FUNCTION TESTS
PIF_VALUE: 18
PIF_VALUE: 12
PIF_VALUE: 19
PIF_VALUE: 20
PIF_VALUE: 13
PIF_VALUE: 19
PIF_VALUE: 18
PIF_VALUE: 13

## 2022-12-18 NOTE — PLAN OF CARE
Problem: Respiratory - Adult  Goal: Achieves optimal ventilation and oxygenation  Outcome: Progressing  Flowsheets (Taken 12/18/2022 0014)  Achieves optimal ventilation and oxygenation:   Assess for changes in respiratory status   Assess for changes in mentation and behavior   Position to facilitate oxygenation and minimize respiratory effort   Oxygen supplementation based on oxygen saturation or arterial blood gases   Initiate smoking cessation protocol as indicated   Encourage broncho-pulmonary hygiene including cough, deep breathe, incentive spirometry   Assess the need for suctioning and aspirate as needed   Assess and instruct to report shortness of breath or any respiratory difficulty   Respiratory therapy support as indicated

## 2022-12-18 NOTE — PROGRESS NOTES
2015- Pt agitated and kicking writing. Writer gave patient ordered prn sedation and titrated up on sedation gtts. Bilateral ankle restraints applied as patient was still kicking writer. Order obtained per Dr. Templeton Found.   2100- Pt still agitated and biting rube, RR 30-35. Writer was instructed per Dr. Templeton Found to restart propofol gtt at this time and to try to wean off slowly later when patient was more calm.   Electronically signed by Danette Beauchamp RN on 12/17/2022 at 11:49 PM

## 2022-12-18 NOTE — PLAN OF CARE
Problem: Respiratory - Adult  Goal: Achieves optimal ventilation and oxygenation  12/18/2022 1338 by [place] Mayoros, RCP  Flowsheets (Taken 12/18/2022 1338)  Achieves optimal ventilation and oxygenation:   Assess for changes in respiratory status   Position to facilitate oxygenation and minimize respiratory effort   Assess the need for suctioning and aspirate as needed   Respiratory therapy support as indicated   Assess and instruct to report shortness of breath or any respiratory difficulty   Oxygen supplementation based on oxygen saturation or arterial blood gases   Assess for changes in mentation and behavior

## 2022-12-18 NOTE — PROGRESS NOTES
Critical Care Team - Daily Progress Note      Date and time: 12/18/2022 6:49 AM  Patient's name:  Corwin Aguirre  Medical Record Number: [id number]  Patient's account/billing number: [de-identified]  Patient's YOB: 1995  Age: 32 y.o. Date of Admission: 12/15/2022 12:08 PM  Length of stay during current admission: 3      Primary Care Physician: Kenyatta Mreino  ICU Attending Physician: Dr. Sedrick Pereira Status: Full Code    Reason for ICU admission:   Chief Complaint   Patient presents with    Drug Overdose       Subjective:     OVERNIGHT EVENTS:         Patient seen and examined at bedside. Night team reports patient became agitated last night requiring restarting propofol and using 4 point restraints. Patient currently off of propofol this morning, nurse states they are using it intermittently. Librium was increased to 50 mg TID, nurse explained that she is getting is every 6 hours and not throughout the night which causes pt to be agitated in the AM. Dosing switched to q8h. Tolerating CPAP trials but will remain intubated until we can get down on sedation. ID has been consulted for covid-19, has not yet been seen.   Platelets 64-> 73  Hb 10.2-> 9.7   K 3.0, replaced  Mg 1.6, replaced  Tolerating tube feeds     Off propofol, versed 7 mg, precedex 1.4 mcg    Ventilator: PRVC 14/460/5/30  ABG:pH 7.4,pCO2 29.2, HCO3 19.1, pO2 126.9  Vitals: Tmax 101.3, RR 15, HR 74, BP 80/50 (MAP 59-83),Midodrine PRN started if MAP <65  I:3410 O: 2615  IV NS 50cc/h    AWAKE & FOLLOWING COMMANDS:  [x] No   [] Yes    CURRENT VENTILATION STATUS:     [x] Ventilator  [] BIPAP  [] Nasal Cannula [] Room Air      IF INTUBATED, ET TUBE MARKING AT LOWER LIP:    23   cms    SECRETIONS Amount:  [x] Small [] Moderate  [] Large  [] None  Color:     [] White [] Colored  [] Bloody    SEDATION:  RAAS Score:  [] Propofol gtt  [x] Versed gtt  [] Ativan gtt   [] No Sedation    PARALYZED:  [x] No    [] Yes    DIARRHEA:                [x] No [] Yes  (C. Difficile status: [] positive                                                                                                                       [] negative                                                                                                                     [] pending)    VASOPRESSORS:  [x] No    [] Yes    If yes -   [] Levophed       [] Dopamine     [] Vasopressin       [] Dobutamine  [] Phenylephrine         [] Epinephrine    CENTRAL LINES:     [x] No   [] Yes   (Date of Insertion:   )           If yes -     [] Right IJ     [] Left IJ [] Right Femoral [] Left Femoral                   [] Right Subclavian [] Left Subclavian       PLUMMER'S CATHETER:   [] No   [x] Yes  (Date of Insertion:   )     URINE OUTPUT:            [x] Good   [] Low              [] Anuric    REVIEW OF SYSTEMS: Unable to be performed       OBJECTIVE:     VITAL SIGNS:  BP (!) 86/53   Pulse 77   Temp 98.8 °F (37.1 °C) (Bladder)   Resp 16   Ht 5' 5\" (1.651 m)   Wt 112 lb 10.5 oz (51.1 kg)   SpO2 100%   BMI 18.75 kg/m²   Tmax over 24 hours:  Temp (24hrs), Av.1 °F (37.8 °C), Min:98.8 °F (37.1 °C), Max:101.3 °F (38.5 °C)      Patient Vitals for the past 8 hrs:   BP Temp Temp src Pulse Resp SpO2 Weight   22 0536 -- -- -- -- -- -- 112 lb 10.5 oz (51.1 kg)   22 0500 (!) 86/53 -- -- 77 16 100 % --   22 0418 -- -- -- 81 15 100 % --   22 0400 (!) 90/54 98.8 °F (37.1 °C) Bladder 83 16 100 % --   22 0305 -- -- -- 79 14 100 % --   22 0300 (!) 87/49 -- -- 78 14 -- --   22 0200 (!) 90/54 99.7 °F (37.6 °C) Bladder 79 14 100 % --   22 0100 (!) 93/55 -- -- 84 14 -- --   22 0006 -- -- -- 82 15 100 % --   22 0000 (!) 97/56 (!) 100.6 °F (38.1 °C) Bladder 82 16 100 % --   22 2300 (!) 98/55 -- -- 83 15 100 % --         Intake/Output Summary (Last 24 hours) at 2022 0649  Last data filed at 2022 0559  Gross per 24 hour   Intake 3334.05 ml   Output 2615 ml   Net 719.05 ml     Date 12/18/22 0000 - 12/18/22 2359   Shift 1929-1385 4050-2610 9984-1861 24 Hour Total   INTAKE   I.V.(mL/kg) 690. 9(13.5)   690. 9(13.5)   NG/GT(mL/kg) 495(9.7)   495(9.7)   Shift Total(mL/kg) 1185. 9(23.2)   1185. 9(23.2)   OUTPUT   Urine(mL/kg/hr) 535   535   Shift Total(mL/kg) 535(10.5)   535(10.5)   Weight (kg) 51.1 51.1 51.1 51.1     Wt Readings from Last 3 Encounters:   12/18/22 112 lb 10.5 oz (51.1 kg)   03/09/22 106 lb (48.1 kg)     Body mass index is 18.75 kg/m². PHYSICAL EXAM:  Constitutional: intubated, sedated  Respiratory: clear to auscultation, no wheezes or rales and unlabored breathing. Cardiovascular: regular rate and rhythm, normal S1, S2, no murmur noted and 2+ pulses throughout  Abdomen: soft, nontender, slightly distended - pt has not had BM yet  NEUROLOGIC: Intubated, sedated, not awake or following commands   Extremities:  peripheral pulses normal, no pedal edema,.     Any additional physical findings:      MEDICATIONS:  Scheduled Meds:   chlordiazePOXIDE  50 mg Oral TID    sodium chloride flush  5-40 mL IntraVENous 2 times per day    enoxaparin  30 mg SubCUTAneous Daily    thiamine and folic acid IVPB   IntraVENous Daily    thiamine and folic acid IVPB   IntraVENous Daily    famotidine (PEPCID) injection  20 mg IntraVENous BID     Continuous Infusions:   sodium chloride 50 mL/hr at 12/18/22 0559    sodium chloride      propofol Stopped (12/18/22 0457)    midazolam 8 mg/hr (12/18/22 0559)    dexmedetomidine 1.4 mcg/kg/hr (12/18/22 0559)     PRN Meds:   midazolam, 2 mg, Q2H PRN  sodium phosphate IVPB, 0.16 mmol/kg, PRN   Or  sodium phosphate IVPB, 0.32 mmol/kg, PRN  sodium chloride flush, 5-40 mL, PRN  sodium chloride, , PRN  ondansetron, 4 mg, Q8H PRN   Or  ondansetron, 4 mg, Q6H PRN  polyethylene glycol, 17 g, Daily PRN  acetaminophen, 650 mg, Q6H PRN   Or  acetaminophen, 650 mg, Q6H PRN        SUPPORT DEVICES: [x] Ventilator [] BIPAP  [] Nasal Cannula [] Room Air    VENT SETTINGS (Comprehensive) (if applicable):  Vent Information  Ventilator ID: [id number]  Equipment Changed: HME, Expiratory Filter, Suction catheter  Ventilator Initiate: Yes  Vent Mode: (S) AC/PRVC  Additional Respiratory Assessments  Heart Rate: 77  Resp: 16  SpO2: 100 %  End Tidal CO2: 27 (%)  Position: Semi-Chang's  Humidification Source: HME  Cuff Pressure (cm H2O):  (mlt)    ABGs:     Lab Results   Component Value Date/Time    FIO2 30.0 12/17/2022 04:40 AM     Lactic Acid: No results found for: LACTA      DATA:  Complete Blood Count:   Recent Labs     12/16/22  0743 12/17/22  0424 12/18/22  0554   WBC 2.7* 4.3 3.8   HGB 10.0* 10.2* 9.7*   .9* 122.2* 120.3*   PLT See Reflexed IPF Result 71* 73*   RBC 2.54* 2.61* 2.46*   HCT 29.7* 31.9* 29.6*   MCH 39.4* 39.1* 39.4*   MCHC 33.7 32.0 32.8   RDW 15.5* 15.4* 14.9*   MPV  --  11.4 12.2        PT/INR:    Lab Results   Component Value Date/Time    PROTIME 12.4 12/15/2022 12:28 PM    INR 1.2 12/15/2022 12:28 PM     PTT:  No results found for: APTT, PTT    Basal Metabolic Profile:   Recent Labs     12/15/22  1228 12/16/22  0743 12/17/22 0424   * 139 131*   K 4.0 3.0* 4.1   BUN 2* 4* 4*   CREATININE 0.33* 0.34* 0.20*    108* 105   CO2 18* 20 15*      Magnesium:   Lab Results   Component Value Date/Time    MG 2.1 12/17/2022 04:24 AM    MG 1.2 12/16/2022 07:43 AM    MG 1.8 12/15/2022 12:28 PM     Phosphorus:   Lab Results   Component Value Date/Time    PHOS 3.4 12/17/2022 04:24 AM    PHOS 2.5 12/16/2022 07:43 AM     S. Calcium:  Recent Labs     12/17/22  0424   CALCIUM 7.2*     S. Ionized Calcium:No results for input(s): IONCA in the last 72 hours.       Urinalysis:   Lab Results   Component Value Date/Time    NITRU NEGATIVE 12/15/2022 12:49 PM    COLORU Dark Yellow 12/15/2022 12:49 PM    PHUR 6.0 12/15/2022 12:49 PM    WBCUA 0 TO 2 12/15/2022 12:49 PM    RBCUA 2 TO 5 12/15/2022 12:49 PM    MUCUS 3+ 03/09/2022 12:21 PM    TRICHOMONAS NOT REPORTED 09/20/2014 06:19 AM    YEAST NOT REPORTED 09/20/2014 06:19 AM    BACTERIA FEW 09/20/2014 06:19 AM    CLARITYU slightly cloudy 07/22/2014 11:58 AM    SPECGRAV 1.020 12/15/2022 12:49 PM    LEUKOCYTESUR NEGATIVE 12/15/2022 12:49 PM    UROBILINOGEN Normal 12/15/2022 12:49 PM    BILIRUBINUR NEGATIVE  Verified by ictotest. 12/15/2022 12:49 PM    BILIRUBINUR negative 07/22/2014 11:58 AM    BLOODU negative 07/22/2014 11:58 AM    GLUCOSEU NEGATIVE 12/15/2022 12:49 PM    KETUA SMALL 12/15/2022 12:49 PM    AMORPHOUS NOT REPORTED 09/20/2014 06:19 AM       CARDIAC ENZYMES: No results for input(s): CKMB, CKMBINDEX, TROPONINI in the last 72 hours. Invalid input(s): CKTOTAL;3  BNP: No results for input(s): BNP in the last 72 hours. LFTS  Recent Labs     12/15/22  1228 12/16/22  0743   ALKPHOS 123* 102   * 91*   * 462*   BILITOT 0.8 0.9   BILIDIR 0.3* 0.7*   LABALBU 3.8 3.1*       AMYLASE/LIPASE/AMMONIA  Recent Labs     12/15/22  1255 12/16/22  0743   AMMONIA 61* 57*       Last 3 Blood Glucose:   Recent Labs     12/15/22  1228 12/16/22  0743 12/17/22  0424   GLUCOSE 130* 114* 109*      HgBA1c:  No results found for: LABA1C      TSH:  No results found for: TSH  ANEMIA STUDIES  No results for input(s): LABIRON, TIBC, FERRITIN, QFIQTFGS50, FOLATE, OCCULTBLD in the last 72 hours. ASSESSMENT:     Principal Problem:    Drug overdose of undetermined intent, initial encounter  Resolved Problems:    * No resolved hospital problems.  *        PLAN:     Neurologic  -Altered Mental Status likely 2/2 alcohol intoxication  - UDS negative  -Hx of EtOH abuse, +EtOH 266  - Monitor for EtOH withdraw  - Seizure precautions  - Daily thiamine and folate   -Neuro checks per protocol  -Wean sedation, Librium increased to 50 mg TID  -CT cervical spine negative  -Hx Bipolar disorder, does not take her Abilify and Effexor      Cardiovascular  -Hemodynamically stable, no pressor support at this time  -MAP goal > 65  -Midodrine 5mg TID PRN started for fluctuating soft BPs     Pulmonary  -Acute Respiratory Failure, intubated for airway protection  -Covid-19 positive, ID consulted  -Maintain oxygen sats >92%  -Pulmonary toilet  -No concern for infectious process on CXR  -Tolerating breathing trials     GI/Nutrition  -Transaminitis, improving  -Hepatic steatosis, march 2022  -Glycolax PRN  -Ulcer Prophylaxis: Pepcid BID  -Diet:Tube feeds  -->462/->91/->102  -Ammonia 61->57     Renal/Fluid/Electrolyte  -IV Fluids: [email or web address] mL/Hr   -O:2615     ID  -No concern for acute infectious process  -Antimicrobials: not indicated at this time  -UA negaative  -Sputum culture negative     Hematology  -Plts 71->73, thrombocytopenia      Endocrine:   -glucose controlled  -No hx of diabetes. Cont to monitor. DVT Prophylaxis: Lovenox   Discharge Needs:  PT/OT, CM    CODE STATUS: Full Code     DISPOSITION:  [x] To remain ICU  [] OK for out of ICU from Critical Care standpoint       Bren Hanson MD, MHENRIETTA. Department of Internal Medicine/ Critical care  [address] [place] [address]             12/18/2022, 6:49 AM    Attending Physician Statement  I have discussed the care of Tanja Ayers, including pertinent history and exam findings with the resident. I have reviewed the key elements of all parts of the encounter with the resident. I have seen and examined the patient with the resident. I agree with the assessment and plan and status of the problem list as documented. I seen the patient during my round today, chart reviewed, labs and overnight events noted. Patient had been on low-dose of propofol currently off propofol, she is on Versed drip 7 mg and on Precedex drip. She is on Librium 50 mg 3 times a day.   When I saw her she did not follow command per nursing staff she has been restless and agitated we will try to reduce sedation  Platelet count is 73 stable hemoglobin is 9.7 maintaining hemoglobin potassium is 3 replace magnesium and potassium. Ventilator setting PRVC/14/460/5/30 percent ABG seven-point 4/29/126/19. She did have a T-max of 101 she is COVID-positive followed with infectious disease currently. Will decrease fluid, urine output is 2650 last 24 hours. Continue with tube feeding  Daily spontaneous breathing trial and when able to come down on Versed drip plan will be to keep Librium and Precedex once delirium/withdrawal is better then will need extubation    Discussed with nursing staff, treatment and plan discussed. Discussed with respiratory therapist.    Total critical care time caring for this patient with life threatening, unstable organ failure, including direct patient contact, management of life support systems, review of data including imaging and labs, discussions with other team members and physicians at least 35 min so far today, excluding procedures. Please note that this chart was generated using voice recognition Dragon dictation software. Although every effort was made to ensure the accuracy of this automated transcription, some errors in transcription may have occurred.      Pamela Hayes MD  12/18/2022 12:38 PM

## 2022-12-18 NOTE — PLAN OF CARE
Problem: Safety - Medical Restraint  Goal: Remains free of injury from restraints (Restraint for Interference with Medical Device)  Description: INTERVENTIONS:  1. Determine that other, less restrictive measures have been tried or would not be effective before applying the restraint  2. Evaluate the patient's condition at the time of restraint application  3. Inform patient/family regarding the reason for restraint  4.  Q2H: Monitor safety, psychosocial status, comfort, nutrition and hydration  Outcome: Progressing  Flowsheets  Taken 12/18/2022 0400 by Socorro Tafoya RN  Remains free of injury from restraints (restraint for interference with medical device): Every 2 hours: Monitor safety, psychosocial status, comfort, nutrition and hydration  Taken 12/18/2022 0200 by Socorro Tafoya RN  Remains free of injury from restraints (restraint for interference with medical device): Every 2 hours: Monitor safety, psychosocial status, comfort, nutrition and hydration  Taken 12/18/2022 0000 by Socorro Tafoya RN  Remains free of injury from restraints (restraint for interference with medical device): Every 2 hours: Monitor safety, psychosocial status, comfort, nutrition and hydration  Taken 12/17/2022 2200 by Socorro Tafoya RN  Remains free of injury from restraints (restraint for interference with medical device): Every 2 hours: Monitor safety, psychosocial status, comfort, nutrition and hydration  Taken 12/17/2022 2000 by Socorro Tafoya RN  Remains free of injury from restraints (restraint for interference with medical device):   Determine that other, less restrictive measures have been tried or would not be effective before applying the restraint   Evaluate the patient's condition at the time of restraint application   Inform patient/family regarding the reason for restraint   Every 2 hours: Monitor safety, psychosocial status, comfort, nutrition and hydration       Problem: Discharge Planning  Goal: Discharge to home or other facility with appropriate resources  Outcome: Progressing     Problem: Skin/Tissue Integrity  Goal: Absence of new skin breakdown  Description: 1. Monitor for areas of redness and/or skin breakdown  2. Assess vascular access sites hourly  3. Every 4-6 hours minimum:  Change oxygen saturation probe site  4. Every 4-6 hours:  If on nasal continuous positive airway pressure, respiratory therapy assess nares and determine need for appliance change or resting period. Outcome: Progressing     Problem: ABCDS Injury Assessment  Goal: Absence of physical injury  Outcome: Progressing     Problem: Safety - Adult  Goal: Free from fall injury  Outcome: Progressing    Problem: Confusion  Goal: Confusion, delirium, dementia, or psychosis is improved or at baseline  Description: INTERVENTIONS:  1. Assess for possible contributors to thought disturbance, including medications, impaired vision or hearing, underlying metabolic abnormalities, dehydration, psychiatric diagnoses, and notify attending LIP  2. [place] high risk fall precautions, as indicated  3. Provide frequent short contacts to provide reality reorientation, refocusing and direction  4. Decrease environmental stimuli, including noise as appropriate  5. Monitor and intervene to maintain adequate nutrition, hydration, elimination, sleep and activity  6. If unable to ensure safety without constant attention obtain sitter and review sitter guidelines with assigned personnel  7.  Initiate Psychosocial CNS and Spiritual Care consult, as indicated  Outcome: Progressing     Problem: Nutrition Deficit:  Goal: Optimize nutritional status  Outcome: Progressing

## 2022-12-18 NOTE — CONSULTS
[facility] -   Infectious diseases evaluation  admission date 12/15/2022    reason for consultation:   Covid Positive. Impression :   Current:  Covid positive   Altered Mental Status. Intubated to protect airway. Alcoholic Liver Disease    Other:    Discussion / summary of stay / plan of care     Recommendations   No treatment for Covid needed at this time. Follow CBC and CXR  Supportive care. Discussed with RN. Infection Control Recommendations   [place] Precautions  Droplet Plus Precautions until 12/24/2022    Antimicrobial Stewardship Recommendations   Simplification of therapy  Targeted therapy    History of Present Illness:   Initial history:  Ronaldo Guevara is a 32y.o.-year-old female with history of recent Covid infection, alcoholic liver disease who presented to the ED 12/15/2022 via EMS for unresponsiveness. Per report, her boyfriend was speaking with her when she became \"suddenly unresponsive. \"  Patient was given Narcan 6 mg per EMS without improvement to her mental status, however, respirations were noted to improve. GCS 3 on arrival to ED. Intubated for airway protection. UDS was negative. CXR and UA negative for any acute processes. Asked to see in consult for Covid 19. Interval changes  12/18/2022   Patient Vitals for the past 8 hrs:   BP Temp Temp src Pulse Resp SpO2 Weight   12/18/22 0800 -- -- -- 75 20 100 % --   12/18/22 0700 (!) 80/50 -- -- 74 15 100 % --   12/18/22 0600 (!) 86/53 97.7 °F (36.5 °C) Bladder 75 15 100 % --   12/18/22 0536 -- -- -- -- -- -- 112 lb 10.5 oz (51.1 kg)   12/18/22 0500 (!) 86/53 -- -- 77 16 100 % --   12/18/22 0418 -- -- -- 81 15 100 % --   12/18/22 0400 (!) 90/54 98.8 °F (37.1 °C) Bladder 83 16 100 % --   12/18/22 0305 -- -- -- 79 14 100 % --   12/18/22 0300 (!) 87/49 -- -- 78 14 -- --   12/18/22 0200 (!) 90/54 99.7 °F (37.6 °C) Bladder 79 14 100 % --   Afebrile. Intubated. Sedated.   Currently weaning. Young. Urine clear. Blood and Sputum cx unremarkable. Summary of relevant labs:  Labs:  Cre: 0.34-0.20-0.32  WBC: 4.8-2.7-4.3-3.8    Micro:  12/15 BC x 2-Negative to date. 12/15 Covid 19 detected. 12/15 MRSA Nasal DNA-Negative. 12/16 Sputum Cx-GPC in pairs, Normal Respiratory Anisha    Imaging:      I have personally reviewed the past medical history, past surgical history, medications, social history, and family history, and I haveupdated the database accordingly. Allergies:   Patient has no known allergies. Review of Systems:     Review of Systems   Reason unable to perform ROS: Intubated, sedated. Physical Examination :       Physical Exam  Vitals and nursing note reviewed. Constitutional:       Comments: Intubated, sedated. HENT:      Head: Normocephalic and atraumatic. Right Ear: External ear normal.      Left Ear: External ear normal.      Nose: Nose normal.      Mouth/Throat:      Mouth: Mucous membranes are dry. Pharynx: Oropharynx is clear. Eyes:      General:         Right eye: No discharge. Left eye: No discharge. Cardiovascular:      Rate and Rhythm: Normal rate and regular rhythm. Heart sounds: Normal heart sounds. No murmur heard. Pulmonary:      Effort: Pulmonary effort is normal.      Breath sounds: Normal breath sounds. No wheezing or rhonchi. Comments: Intubated. Abdominal:      General: Bowel sounds are normal. There is no distension. Palpations: Abdomen is soft. Tenderness: There is no abdominal tenderness. Genitourinary:     Comments: Young. Urine clear. Musculoskeletal:      Cervical back: No rigidity. Right lower leg: No edema. Left lower leg: No edema. Skin:     General: Skin is warm and dry. Capillary Refill: Capillary refill takes less than 2 seconds. Findings: No rash. Neurological:      Comments: Intubated, sedated.    Psychiatric:      Comments: CHARLEE       Past Medical History: Past Medical History:   Diagnosis Date    ADHD (attention deficit hyperactivity disorder)     Anxiety     Bipolar 1 disorder (HCC)     Depression        Past Surgical  History:   No past surgical history on file. Medications:      chlordiazePOXIDE  50 mg Oral Q8H    magnesium sulfate  2,000 mg IntraVENous Once    potassium chloride  40 mEq Per NG tube BID    sodium chloride flush  5-40 mL IntraVENous 2 times per day    enoxaparin  30 mg SubCUTAneous Daily    thiamine and folic acid IVPB   IntraVENous Daily    thiamine and folic acid IVPB   IntraVENous Daily    famotidine (PEPCID) injection  20 mg IntraVENous BID       Social History:     Social History     Socioeconomic History    Marital status: Single     Spouse name: Not on file    Number of children: Not on file    Years of education: Not on file    Highest education level: Not on file   Occupational History    Not on file   Tobacco Use    Smoking status: Every Day     Packs/day: 0.50     Types: Cigarettes    Smokeless tobacco: Never   Substance and Sexual Activity    Alcohol use: Yes     Comment: pt states approx 1/2 pint of wine per day since she was 9yo    Drug use: Yes     Types: Marijuana Evonne Alex)     Comment: Homegrown pot    Sexual activity: Yes     Partners: Male     Comment: Patient is trying to get on disability   Other Topics Concern    Not on file   Social History Narrative    Not on file     Social Determinants of Health     Financial Resource Strain: Not on file   Food Insecurity: Not on file   Transportation Needs: Not on file   Physical Activity: Not on file   Stress: Not on file   Social Connections: Not on file   Intimate Partner Violence: Not on file   Housing Stability: Not on file       Family History:     Family History   Problem Relation Age of Onset    Hypertension Mother     Depression Mother     Bipolar Disorder Father     Alcohol Abuse Father     Cancer Other         Uncle ?       Medical Decision Making:   I have independently reviewed/ordered the following labs:    CBC with Differential:   Recent Labs     12/17/22  0424 12/18/22  0554   WBC 4.3 3.8   HGB 10.2* 9.7*   HCT 31.9* 29.6*   PLT 71* 73*   LYMPHOPCT 22* 42   MONOPCT 15* 14*     BMP:  Recent Labs     12/17/22  0424 12/18/22  0554   * 130*   K 4.1 3.0*    104   CO2 15* 17*   BUN 4* 4*   CREATININE 0.20* 0.32*   MG 2.1 1.6     Hepatic Function Panel:   Recent Labs     12/15/22  1228 12/16/22  0743   PROT 7.2 5.8*   LABALBU 3.8 3.1*   BILIDIR 0.3* 0.7*   IBILI 0.5 0.2   BILITOT 0.8 0.9   ALKPHOS 123* 102   * 91*   * 462*     No results for input(s): RPR in the last 72 hours.  No results for input(s): HIV in the last 72 hours.  No results for input(s): BC in the last 72 hours.  Lab Results   Component Value Date/Time    CREATININE 0.32 12/18/2022 05:54 AM    GLUCOSE 174 12/18/2022 05:54 AM       Detailed results:        Thank you for allowing us to participate in the care of this patient.Please call with questions.    This note is created with the assistance of a speech recognition program.  While intending to generate adocument that actually reflects the content of the visit, the document can still have some errors including those of syntax and sound a like substitutions which may escape proof reading.  It such instances, actual meaningcan be extrapolated by contextual diversion.    Uma Man, APRN - CNP  Office: [phone]  Perfect serve / office [phone]

## 2022-12-18 NOTE — PLAN OF CARE
Problem: Safety - Medical Restraint  Goal: Remains free of injury from restraints (Restraint for Interference with Medical Device)  Description: INTERVENTIONS:  1. Determine that other, less restrictive measures have been tried or would not be effective before applying the restraint  2. Evaluate the patient's condition at the time of restraint application  3. Inform patient/family regarding the reason for restraint  4.  Q2H: Monitor safety, psychosocial status, comfort, nutrition and hydration  12/18/2022 1012 by Rex Ramirez RN  Outcome: Progressing  Flowsheets (Taken 12/18/2022 0600 by Annalisa Smalls RN)  Remains free of injury from restraints (restraint for interference with medical device): Every 2 hours: Monitor safety, psychosocial status, comfort, nutrition and hydration  12/18/2022 0515 by Annalisa Smalls RN  Outcome: Progressing  Flowsheets  Taken 12/18/2022 0400 by Annalisa Smalls RN  Remains free of injury from restraints (restraint for interference with medical device): Every 2 hours: Monitor safety, psychosocial status, comfort, nutrition and hydration  Taken 12/18/2022 0200 by Annalisa Smalls RN  Remains free of injury from restraints (restraint for interference with medical device): Every 2 hours: Monitor safety, psychosocial status, comfort, nutrition and hydration  Taken 12/18/2022 0000 by Annalisa Smalls RN  Remains free of injury from restraints (restraint for interference with medical device): Every 2 hours: Monitor safety, psychosocial status, comfort, nutrition and hydration  Taken 12/17/2022 2200 by Annalisa Smalls RN  Remains free of injury from restraints (restraint for interference with medical device): Every 2 hours: Monitor safety, psychosocial status, comfort, nutrition and hydration  Taken 12/17/2022 2000 by Annalisa Smalls RN  Remains free of injury from restraints (restraint for interference with medical device):   Determine that other, less restrictive measures have been tried or would not be effective before applying the restraint   Evaluate the patient's condition at the time of restraint application   Inform patient/family regarding the reason for restraint   Every 2 hours: Monitor safety, psychosocial status, comfort, nutrition and hydration  Taken 12/17/2022 1800 by Subhash Salinas RN  Remains free of injury from restraints (restraint for interference with medical device): Every 2 hours: Monitor safety, psychosocial status, comfort, nutrition and hydration  Taken 12/17/2022 1600 by Alis Christine RN  Remains free of injury from restraints (restraint for interference with medical device): Every 2 hours: Monitor safety, psychosocial status, comfort, nutrition and hydration     Problem: Discharge Planning  Goal: Discharge to home or other facility with appropriate resources  12/18/2022 1012 by Alis Christine RN  Outcome: Progressing  12/18/2022 0515 by Lydia Noriega RN  Outcome: Progressing     Problem: Skin/Tissue Integrity  Goal: Absence of new skin breakdown  Description: 1. Monitor for areas of redness and/or skin breakdown  2. Assess vascular access sites hourly  3. Every 4-6 hours minimum:  Change oxygen saturation probe site  4. Every 4-6 hours:  If on nasal continuous positive airway pressure, respiratory therapy assess nares and determine need for appliance change or resting period.   12/18/2022 1012 by Alis Christine RN  Outcome: Progressing  12/18/2022 0515 by Lydia Noriega RN  Outcome: Progressing     Problem: ABCDS Injury Assessment  Goal: Absence of physical injury  12/18/2022 1012 by Alis Christine RN  Outcome: Progressing  Flowsheets  Taken 12/18/2022 0800 by Alis Christine RN  Absence of Physical Injury: Implement safety measures based on patient assessment  Taken 12/18/2022 0516 by Lydia Noriega RN  Absence of Physical Injury: Implement safety measures based on patient assessment  12/18/2022 0515 by Lydia Noriega RN  Outcome: Progressing Problem: Safety - Adult  Goal: Free from fall injury  12/18/2022 1012 by Moe Espinoza RN  Outcome: Progressing  Flowsheets  Taken 12/18/2022 0800 by Moe Espinoza RN  Free From Fall Injury: Based on caregiver fall risk screen, instruct family/caregiver to ask for assistance with transferring infant if caregiver noted to have fall risk factors  Taken 12/18/2022 0516 by Lesly Grigsby RN  Free From Fall Injury: Instruct family/caregiver on patient safety  12/18/2022 0515 by Lesly Grigsby RN  Outcome: Progressing     Problem: Pain  Goal: Verbalizes/displays adequate comfort level or baseline comfort level  12/18/2022 1012 by Moe Espinoza RN  Outcome: Progressing  Flowsheets (Taken 12/18/2022 0800)  Verbalizes/displays adequate comfort level or baseline comfort level: Assess pain using appropriate pain scale  12/18/2022 0515 by Lesly Grigsby RN  Outcome: Progressing  Flowsheets (Taken 12/17/2022 1600 by Moe Espinoza RN)  Verbalizes/displays adequate comfort level or baseline comfort level: Assess pain using appropriate pain scale     Problem: Confusion  Goal: Confusion, delirium, dementia, or psychosis is improved or at baseline  Description: INTERVENTIONS:  1. Assess for possible contributors to thought disturbance, including medications, impaired vision or hearing, underlying metabolic abnormalities, dehydration, psychiatric diagnoses, and notify attending LIP  2. [place] high risk fall precautions, as indicated  3. Provide frequent short contacts to provide reality reorientation, refocusing and direction  4. Decrease environmental stimuli, including noise as appropriate  5. Monitor and intervene to maintain adequate nutrition, hydration, elimination, sleep and activity  6. If unable to ensure safety without constant attention obtain sitter and review sitter guidelines with assigned personnel  7.  Initiate Psychosocial CNS and Spiritual Care consult, as indicated  12/18/2022 1012 by Subhash NIELSON Steve Herman RN  Outcome: Progressing  12/18/2022 0515 by Dexter Dee RN  Outcome: Progressing     Problem: Respiratory - Adult  Goal: Achieves optimal ventilation and oxygenation  12/18/2022 1012 by Sheri De La Cruz RN  Outcome: Progressing  12/18/2022 0014 by Jeffrey Amezcua RCP  Outcome: Progressing  Flowsheets (Taken 12/18/2022 0014)  Achieves optimal ventilation and oxygenation:   Assess for changes in respiratory status   Assess for changes in mentation and behavior   Position to facilitate oxygenation and minimize respiratory effort   Oxygen supplementation based on oxygen saturation or arterial blood gases   Initiate smoking cessation protocol as indicated   Encourage broncho-pulmonary hygiene including cough, deep breathe, incentive spirometry   Assess the need for suctioning and aspirate as needed   Assess and instruct to report shortness of breath or any respiratory difficulty   Respiratory therapy support as indicated     Problem: Nutrition Deficit:  Goal: Optimize nutritional status  12/18/2022 1012 by Sheri De La Cruz RN  Outcome: Progressing  12/18/2022 0515 by Dexter Dee RN  Outcome: Progressing

## 2022-12-18 NOTE — CARE COORDINATION
Called first emergency contact, Claire Kmuar (Chris), no answer. Unable to do initial assessment at this time.

## 2022-12-19 LAB
ABSOLUTE EOS #: 0 K/UL (ref 0–0.4)
ABSOLUTE IMMATURE GRANULOCYTE: 0 K/UL (ref 0–0.3)
ABSOLUTE LYMPH #: 0.86 K/UL (ref 1–4.8)
ABSOLUTE MONO #: 1.25 K/UL (ref 0.1–0.8)
ALLEN TEST: POSITIVE
ANION GAP SERPL CALCULATED.3IONS-SCNC: 11 MMOL/L (ref 9–17)
BASOPHILS # BLD: 0 % (ref 0–2)
BASOPHILS ABSOLUTE: 0 K/UL (ref 0–0.2)
BUN BLDV-MCNC: 3 MG/DL (ref 6–20)
CALCIUM SERPL-MCNC: 8.2 MG/DL (ref 8.6–10.4)
CHLORIDE BLD-SCNC: 103 MMOL/L (ref 98–107)
CO2: 16 MMOL/L (ref 20–31)
CREAT SERPL-MCNC: 0.21 MG/DL (ref 0.5–0.9)
EOSINOPHILS RELATIVE PERCENT: 0 % (ref 1–4)
FIO2: 30
GFR SERPL CREATININE-BSD FRML MDRD: >60 ML/MIN/1.73M2
GLUCOSE BLD-MCNC: 113 MG/DL (ref 74–100)
GLUCOSE BLD-MCNC: 143 MG/DL (ref 70–99)
HCT VFR BLD CALC: 30 % (ref 36.3–47.1)
HEMOGLOBIN: 10.1 G/DL (ref 11.9–15.1)
IMMATURE GRANULOCYTES: 0 %
LYMPHOCYTES # BLD: 20 % (ref 24–44)
MAGNESIUM: 1.7 MG/DL (ref 1.6–2.6)
MCH RBC QN AUTO: 40.1 PG (ref 25.2–33.5)
MCHC RBC AUTO-ENTMCNC: 33.7 G/DL (ref 28.4–34.8)
MCV RBC AUTO: 119 FL (ref 82.6–102.9)
MONOCYTES # BLD: 29 % (ref 1–7)
MORPHOLOGY: ABNORMAL
MORPHOLOGY: ABNORMAL
NEGATIVE BASE EXCESS, ART: 4 (ref 0–2)
NRBC AUTOMATED: 0 PER 100 WBC
O2 DEVICE/FLOW/%: ABNORMAL
PDW BLD-RTO: 15.3 % (ref 11.8–14.4)
PHOSPHORUS: 3.3 MG/DL (ref 2.6–4.5)
PLATELET # BLD: 186 K/UL (ref 138–453)
PMV BLD AUTO: 11.4 FL (ref 8.1–13.5)
POC HCO3: 19.3 MMOL/L (ref 21–28)
POC O2 SATURATION: 98 % (ref 94–98)
POC PCO2: 29.2 MM HG (ref 35–48)
POC PH: 7.43 (ref 7.35–7.45)
POC PO2: 93.5 MM HG (ref 83–108)
POTASSIUM SERPL-SCNC: 4.2 MMOL/L (ref 3.7–5.3)
RBC # BLD: 2.52 M/UL (ref 3.95–5.11)
REASON FOR REJECTION: NORMAL
REASON FOR REJECTION: NORMAL
SAMPLE SITE: ABNORMAL
SEG NEUTROPHILS: 51 % (ref 36–66)
SEGMENTED NEUTROPHILS ABSOLUTE COUNT: 2.19 K/UL (ref 1.8–7.7)
SODIUM BLD-SCNC: 130 MMOL/L (ref 135–144)
WBC # BLD: 4.3 K/UL (ref 3.5–11.3)
ZZ NTE CLEAN UP: ORDERED TEST: NORMAL
ZZ NTE CLEAN UP: ORDERED TEST: NORMAL
ZZ NTE WITH NAME CLEAN UP: SPECIMEN SOURCE: NORMAL
ZZ NTE WITH NAME CLEAN UP: SPECIMEN SOURCE: NORMAL

## 2022-12-19 PROCEDURE — 83735 ASSAY OF MAGNESIUM: CPT

## 2022-12-19 PROCEDURE — 82947 ASSAY GLUCOSE BLOOD QUANT: CPT

## 2022-12-19 PROCEDURE — 80048 BASIC METABOLIC PNL TOTAL CA: CPT

## 2022-12-19 PROCEDURE — 2000000000 HC ICU R&B

## 2022-12-19 PROCEDURE — 94003 VENT MGMT INPAT SUBQ DAY: CPT

## 2022-12-19 PROCEDURE — 36600 WITHDRAWAL OF ARTERIAL BLOOD: CPT

## 2022-12-19 PROCEDURE — 2580000003 HC RX 258

## 2022-12-19 PROCEDURE — 6370000000 HC RX 637 (ALT 250 FOR IP)

## 2022-12-19 PROCEDURE — 36415 COLL VENOUS BLD VENIPUNCTURE: CPT

## 2022-12-19 PROCEDURE — 6360000002 HC RX W HCPCS: Performed by: HEALTH CARE PROVIDER

## 2022-12-19 PROCEDURE — 94761 N-INVAS EAR/PLS OXIMETRY MLT: CPT

## 2022-12-19 PROCEDURE — 2580000003 HC RX 258: Performed by: INTERNAL MEDICINE

## 2022-12-19 PROCEDURE — 82803 BLOOD GASES ANY COMBINATION: CPT

## 2022-12-19 PROCEDURE — 2500000003 HC RX 250 WO HCPCS: Performed by: INTERNAL MEDICINE

## 2022-12-19 PROCEDURE — 6370000000 HC RX 637 (ALT 250 FOR IP): Performed by: INTERNAL MEDICINE

## 2022-12-19 PROCEDURE — 2580000003 HC RX 258: Performed by: HEALTH CARE PROVIDER

## 2022-12-19 PROCEDURE — 2500000003 HC RX 250 WO HCPCS: Performed by: HEALTH CARE PROVIDER

## 2022-12-19 PROCEDURE — 6370000000 HC RX 637 (ALT 250 FOR IP): Performed by: STUDENT IN AN ORGANIZED HEALTH CARE EDUCATION/TRAINING PROGRAM

## 2022-12-19 PROCEDURE — 6360000002 HC RX W HCPCS: Performed by: STUDENT IN AN ORGANIZED HEALTH CARE EDUCATION/TRAINING PROGRAM

## 2022-12-19 PROCEDURE — 85025 COMPLETE CBC W/AUTO DIFF WBC: CPT

## 2022-12-19 PROCEDURE — 2700000000 HC OXYGEN THERAPY PER DAY

## 2022-12-19 PROCEDURE — 99291 CRITICAL CARE FIRST HOUR: CPT | Performed by: INTERNAL MEDICINE

## 2022-12-19 PROCEDURE — 6360000002 HC RX W HCPCS: Performed by: INTERNAL MEDICINE

## 2022-12-19 PROCEDURE — 84100 ASSAY OF PHOSPHORUS: CPT

## 2022-12-19 RX ORDER — FAMOTIDINE 20 MG/1
20 TABLET, FILM COATED ORAL 2 TIMES DAILY
Status: DISCONTINUED | OUTPATIENT
Start: 2022-12-19 | End: 2023-01-05

## 2022-12-19 RX ORDER — MULTIVITAMIN WITH IRON
1 TABLET ORAL DAILY
Status: DISCONTINUED | OUTPATIENT
Start: 2022-12-19 | End: 2023-01-09 | Stop reason: HOSPADM

## 2022-12-19 RX ORDER — FOLIC ACID 1 MG/1
1 TABLET ORAL DAILY
Status: DISCONTINUED | OUTPATIENT
Start: 2022-12-20 | End: 2023-01-09 | Stop reason: HOSPADM

## 2022-12-19 RX ORDER — LANOLIN ALCOHOL/MO/W.PET/CERES
100 CREAM (GRAM) TOPICAL DAILY
Status: DISCONTINUED | OUTPATIENT
Start: 2022-12-20 | End: 2023-01-09 | Stop reason: HOSPADM

## 2022-12-19 RX ADMIN — MIDAZOLAM HYDROCHLORIDE 2 MG: 2 INJECTION, SOLUTION INTRAMUSCULAR; INTRAVENOUS at 01:00

## 2022-12-19 RX ADMIN — MIDAZOLAM HYDROCHLORIDE 2 MG: 2 INJECTION, SOLUTION INTRAMUSCULAR; INTRAVENOUS at 16:21

## 2022-12-19 RX ADMIN — MIDAZOLAM HYDROCHLORIDE 2 MG: 2 INJECTION, SOLUTION INTRAMUSCULAR; INTRAVENOUS at 18:30

## 2022-12-19 RX ADMIN — SODIUM CHLORIDE: 9 INJECTION, SOLUTION INTRAVENOUS at 01:45

## 2022-12-19 RX ADMIN — MIDAZOLAM HYDROCHLORIDE 2 MG: 2 INJECTION, SOLUTION INTRAMUSCULAR; INTRAVENOUS at 23:06

## 2022-12-19 RX ADMIN — DEXMEDETOMIDINE HYDROCHLORIDE 1.4 MCG/KG/HR: 4 INJECTION, SOLUTION INTRAVENOUS at 03:06

## 2022-12-19 RX ADMIN — CHLORDIAZEPOXIDE HYDROCHLORIDE 50 MG: 25 CAPSULE ORAL at 16:16

## 2022-12-19 RX ADMIN — Medication 7 MG/HR: at 01:20

## 2022-12-19 RX ADMIN — CHLORDIAZEPOXIDE HYDROCHLORIDE 50 MG: 25 CAPSULE ORAL at 08:41

## 2022-12-19 RX ADMIN — THIAMINE HYDROCHLORIDE: 100 INJECTION, SOLUTION INTRAMUSCULAR; INTRAVENOUS at 09:05

## 2022-12-19 RX ADMIN — SODIUM CHLORIDE, PRESERVATIVE FREE 10 ML: 5 INJECTION INTRAVENOUS at 20:56

## 2022-12-19 RX ADMIN — MIDAZOLAM HYDROCHLORIDE 2 MG: 2 INJECTION, SOLUTION INTRAMUSCULAR; INTRAVENOUS at 11:40

## 2022-12-19 RX ADMIN — FAMOTIDINE 20 MG: 20 TABLET, FILM COATED ORAL at 20:55

## 2022-12-19 RX ADMIN — ALCOHOL 1 TABLET: 70.47 GEL TOPICAL at 14:10

## 2022-12-19 RX ADMIN — POTASSIUM CHLORIDE 40 MEQ: 40 SOLUTION ORAL at 08:41

## 2022-12-19 RX ADMIN — DEXMEDETOMIDINE HYDROCHLORIDE 1.4 MCG/KG/HR: 4 INJECTION, SOLUTION INTRAVENOUS at 15:17

## 2022-12-19 RX ADMIN — DEXMEDETOMIDINE HYDROCHLORIDE 1.4 MCG/KG/HR: 4 INJECTION, SOLUTION INTRAVENOUS at 21:04

## 2022-12-19 RX ADMIN — DEXMEDETOMIDINE HYDROCHLORIDE 1.4 MCG/KG/HR: 4 INJECTION, SOLUTION INTRAVENOUS at 09:21

## 2022-12-19 RX ADMIN — Medication 3 MG/HR: at 16:26

## 2022-12-19 RX ADMIN — ENOXAPARIN SODIUM 30 MG: 100 INJECTION SUBCUTANEOUS at 08:42

## 2022-12-19 RX ADMIN — SODIUM CHLORIDE, PRESERVATIVE FREE 10 ML: 5 INJECTION INTRAVENOUS at 08:42

## 2022-12-19 RX ADMIN — SODIUM CHLORIDE, PRESERVATIVE FREE 20 MG: 5 INJECTION INTRAVENOUS at 08:41

## 2022-12-19 ASSESSMENT — PULMONARY FUNCTION TESTS
PIF_VALUE: 13
PIF_VALUE: 13
PIF_VALUE: 12
PIF_VALUE: 15
PIF_VALUE: 19
PIF_VALUE: 13
PIF_VALUE: 19
PIF_VALUE: 13
PIF_VALUE: 19
PIF_VALUE: 13
PIF_VALUE: 13
PIF_VALUE: 12
PIF_VALUE: 13
PIF_VALUE: 21
PIF_VALUE: 13
PIF_VALUE: 19
PIF_VALUE: 18
PIF_VALUE: 21

## 2022-12-19 NOTE — PLAN OF CARE
Problem: Safety - Medical Restraint  Goal: Remains free of injury from restraints (Restraint for Interference with Medical Device)  Description: INTERVENTIONS:  1. Determine that other, less restrictive measures have been tried or would not be effective before applying the restraint  2. Evaluate the patient's condition at the time of restraint application  3. Inform patient/family regarding the reason for restraint  4.  Q2H: Monitor safety, psychosocial status, comfort, nutrition and hydration  12/19/2022 1113 by Poly Jolley RN  Outcome: Progressing  Flowsheets  Taken 12/19/2022 1000 by Poly Jolley RN  Remains free of injury from restraints (restraint for interference with medical device): Every 2 hours: Monitor safety, psychosocial status, comfort, nutrition and hydration  Taken 12/19/2022 0800 by Poly Jolley RN  Remains free of injury from restraints (restraint for interference with medical device): Every 2 hours: Monitor safety, psychosocial status, comfort, nutrition and hydration  Taken 12/19/2022 0600 by Dilan Singh RN  Remains free of injury from restraints (restraint for interference with medical device): Every 2 hours: Monitor safety, psychosocial status, comfort, nutrition and hydration  12/19/2022 0501 by Dilan Singh RN  Outcome: Progressing  Flowsheets  Taken 12/19/2022 0400  Remains free of injury from restraints (restraint for interference with medical device): Every 2 hours: Monitor safety, psychosocial status, comfort, nutrition and hydration  Taken 12/19/2022 0200  Remains free of injury from restraints (restraint for interference with medical device): Every 2 hours: Monitor safety, psychosocial status, comfort, nutrition and hydration  Taken 12/19/2022 0000  Remains free of injury from restraints (restraint for interference with medical device): Every 2 hours: Monitor safety, psychosocial status, comfort, nutrition and hydration  Taken 12/18/2022 2200  Remains free of injury from restraints (restraint for interference with medical device): Every 2 hours: Monitor safety, psychosocial status, comfort, nutrition and hydration     Problem: Discharge Planning  Goal: Discharge to home or other facility with appropriate resources  12/19/2022 1113 by Tylor Benitez RN  Outcome: Progressing  12/19/2022 0501 by Estuardo Galeana RN  Outcome: Progressing     Problem: Skin/Tissue Integrity  Goal: Absence of new skin breakdown  Description: 1. Monitor for areas of redness and/or skin breakdown  2. Assess vascular access sites hourly  3. Every 4-6 hours minimum:  Change oxygen saturation probe site  4. Every 4-6 hours:  If on nasal continuous positive airway pressure, respiratory therapy assess nares and determine need for appliance change or resting period.   12/19/2022 1113 by Tylor Bentiez RN  Outcome: Progressing  12/19/2022 0501 by Estuardo Galeana RN  Outcome: Progressing     Problem: ABCDS Injury Assessment  Goal: Absence of physical injury  12/19/2022 1113 by Tylor Benitez RN  Outcome: Progressing  12/19/2022 0501 by Estuardo Galeana RN  Outcome: Progressing     Problem: Safety - Adult  Goal: Free from fall injury  12/19/2022 1113 by Tylor Benitez RN  Outcome: Progressing  12/19/2022 0501 by Estuardo Galeana RN  Outcome: Progressing     Problem: Pain  Goal: Verbalizes/displays adequate comfort level or baseline comfort level  12/19/2022 1113 by Tylor Benitez RN  Outcome: Progressing  Flowsheets (Taken 12/19/2022 0800)  Verbalizes/displays adequate comfort level or baseline comfort level:   Assess pain using appropriate pain scale   Administer analgesics based on type and severity of pain and evaluate response   Implement non-pharmacological measures as appropriate and evaluate response   Consider cultural and social influences on pain and pain management   Notify Licensed Independent Practitioner if interventions unsuccessful or patient reports new pain  12/19/2022 0501 by Andria Faye RN  Outcome: Progressing  Flowsheets (Taken 12/19/2022 0000)  Verbalizes/displays adequate comfort level or baseline comfort level: Encourage patient to monitor pain and request assistance     Problem: Confusion  Goal: Confusion, delirium, dementia, or psychosis is improved or at baseline  Description: INTERVENTIONS:  1. Assess for possible contributors to thought disturbance, including medications, impaired vision or hearing, underlying metabolic abnormalities, dehydration, psychiatric diagnoses, and notify attending LIP  2. [place] high risk fall precautions, as indicated  3. Provide frequent short contacts to provide reality reorientation, refocusing and direction  4. Decrease environmental stimuli, including noise as appropriate  5. Monitor and intervene to maintain adequate nutrition, hydration, elimination, sleep and activity  6. If unable to ensure safety without constant attention obtain sitter and review sitter guidelines with assigned personnel  7.  Initiate Psychosocial CNS and Spiritual Care consult, as indicated  12/19/2022 1113 by Lilian Angelucci, RN  Outcome: Progressing  Flowsheets (Taken 12/19/2022 0800)  Effect of thought disturbance (confusion, delirium, dementia, or psychosis) are managed with adequate functional status:   Assess for contributors to thought disturbance, including medications, impaired vision or hearing, underlying metabolic abnormalities, dehydration, psychiatric diagnoses, notify [facility] high risk fall precautions, as indicated   Provide frequent short contacts to provide reality reorientation, refocusing and direction   Decrease environmental stimuli, including noise as appropriate   Monitor and intervene to maintain adequate nutrition, hydration, elimination, sleep and activity  12/19/2022 0501 by Andria Faye RN  Outcome: Progressing     Problem: Respiratory - Adult  Goal: Achieves optimal ventilation and oxygenation  Outcome: Progressing     Problem: Nutrition Deficit:  Goal: Optimize nutritional status  12/19/2022 1113 by Nabeel Perez RN  Outcome: Progressing  12/19/2022 0501 by Meaghan Polo RN  Outcome: Progressing

## 2022-12-19 NOTE — PLAN OF CARE
Problem: Respiratory - Adult  Goal: Achieves optimal ventilation and oxygenation  12/19/2022 1652 by Sarah Judd RCP  Outcome: Progressing  Flowsheets (Taken 12/19/2022 1652)  Achieves optimal ventilation and oxygenation:   Assess for changes in respiratory status   Position to facilitate oxygenation and minimize respiratory effort   Assess the need for suctioning and aspirate as needed   Respiratory therapy support as indicated   Assess and instruct to report shortness of breath or any respiratory difficulty   Oxygen supplementation based on oxygen saturation or arterial blood gases   Assess for changes in mentation and behavior

## 2022-12-19 NOTE — PLAN OF CARE
Problem: Respiratory - Adult  Goal: Achieves optimal ventilation and oxygenation  12/18/2022 2109 by Nora Jin RCP  Outcome: Progressing  Flowsheets (Taken 12/18/2022 2109)  Achieves optimal ventilation and oxygenation:   Assess for changes in respiratory status   Assess for changes in mentation and behavior   Position to facilitate oxygenation and minimize respiratory effort   Oxygen supplementation based on oxygen saturation or arterial blood gases   Encourage broncho-pulmonary hygiene including cough, deep breathe, incentive spirometry   Initiate smoking cessation protocol as indicated   Assess the need for suctioning and aspirate as needed   Assess and instruct to report shortness of breath or any respiratory difficulty   Respiratory therapy support as indicated

## 2022-12-19 NOTE — PROGRESS NOTES
Comprehensive Nutrition Assessment    Type and Reason for Visit:  Reassess    Nutrition Recommendations/Plan:   Continue current TF of Standard without Fiber (Osmolite 1.2) formula at goal 45 mL/hr. Monitor TF tolerance/adequacy of intakes - modify as needed. Will monitor labs, weights, and plan of care. Malnutrition Assessment:  Malnutrition Status:  Insufficient data (12/16/22 1504)    Context:  Acute Illness       Nutrition Assessment:    Chart reviewed. Pt remains on the vent. Tolerating TF of Standard without Fiber at goal rate of 45 mL/hr without issues. No recorded BM since admission. Labs reviewed: Na 130 mmol/L. Meds reviewed. Weight fluctuations noted. Nutrition Related Findings:    Labs/Meds reviewed. No recorded BM since admission. Wound Type: None       Current Nutrition Intake & Therapies:    Average Meal Intake: NPO  Average Supplements Intake: NPO  Diet NPO  ADULT TUBE FEEDING; Orogastric; Standard without Fiber; Continuous; 10; Yes; 10; Q 4 hours; 45; 30; Q 4 hours  Additional Calorie Sources:  None    Anthropometric Measures:  Height: 5' 5\" (165.1 cm)  Ideal Body Weight (IBW): 125 lbs (57 kg)    Admission Body Weight: 102 lb 15.3 oz (46.7 kg)  Current Body Weight: 118 lb 9.7 oz (53.8 kg), 94.9 % IBW. Weight Source: Bed Scale  Current BMI (kg/m2): 19.7                          BMI Categories: Normal Weight (BMI 18.5-24. 9)    Estimated Daily Nutrient Needs:  Energy Requirements Based On: Kcal/kg  Weight Used for Energy Requirements: Admission  Energy (kcal/day): 8852-2060 kcals/day  Weight Used for Protein Requirements: Admission  Protein (g/day): 60-75 gm pro/day  Method Used for Fluid Requirements: Other (Comment)  Fluid (ml/day): per MD    Nutrition Diagnosis:   Inadequate oral intake related to impaired respiratory function as evidenced by NPO or clear liquid status due to medical condition, nutrition support - enteral nutrition    Nutrition Interventions:   Food and/or Nutrient Delivery: Continue Current Tube Feeding  Nutrition Education/Counseling: No recommendation at this time  Coordination of Nutrition Care: Continue to monitor while inpatient  Plan of Care discussed with: RN    Goals:  Previous Goal Met: Progressing toward Goal(s)  Goals: Meet at least 75% of estimated needs, prior to discharge       Nutrition Monitoring and Evaluation:   Behavioral-Environmental Outcomes: None Identified  Food/Nutrient Intake Outcomes: Enteral Nutrition Intake/Tolerance  Physical Signs/Symptoms Outcomes: Biochemical Data, GI Status, Fluid Status or Edema, Nutrition Focused Physical Findings, Skin, Weight    Discharge Planning:     Too soon to determine     Teto Milligan, [address] [place] [address], LD  Contact: [phone]

## 2022-12-19 NOTE — CARE COORDINATION
Received social work consult for substance abuse. Currently pt is intubated, will follow and complete assessment once pt is able to participate.

## 2022-12-19 NOTE — PROGRESS NOTES
INTENSIVE CARE UNIT  Resident Physician Progress Note    Patient - Corwin Aguirre  Date of Admission -  12/15/2022 12:08 PM  Date of Evaluation -  2022  Room and Bed Number -  5130/6550-69   Hospital Day - 4    HPI:     32year old who became unresponsive while with her boyfriend, EMS was called. Patient got Narcan with minimal response. Intubated due to GCS of 3 and airway protection. Initially placed on propofol. In ED LFTs were elevated, CT cervical spine unremarkable, CT head negative, CXR unremarkable. Alcohol level 266, UDS negative. Found to be covid positive. SUBJECTIVE:     OVERNIGHT EVENTS:    Patient remains intermittently on propofol for agitation.  Librium 50 mg TID    AWAKE & FOLLOWING COMMANDS:  [x] No   [] Yes    SECRETIONS Amount:  [] Small [] Moderate  [] Large  [x] None  Color:     [] White [] Colored  [] Bloody    SEDATION:  RAAS Score:  [] Propofol gtt  [x] Versed gtt  [] Ativan gtt   [] No Sedation    PARALYZED:  [x] No    [] Yes    VASOPRESSORS:  [x] No    [] Yes  [] Levophed [] Dopamine [] Vasopressin  [] Dobutamine [] Phenylephrine [] Epinephrine      OBJECTIVE:     VITAL SIGNS:  /71   Pulse 84   Temp 99.5 °F (37.5 °C) (Bladder)   Resp 19   Ht 5' 5\" (1.651 m)   Wt 118 lb 9.7 oz (53.8 kg)   SpO2 100%   BMI 19.74 kg/m²   Tmax over 24 hours:  Temp (24hrs), Av.3 °F (37.4 °C), Min:97 °F (36.1 °C), Max:101.1 °F (38.4 °C)      Patient Vitals for the past 8 hrs:   BP Temp Temp src Pulse Resp SpO2 Weight   22 0600 104/71 99.5 °F (37.5 °C) Bladder 84 19 -- --   22 0500 99/61 -- -- 83 15 100 % --   22 0457 -- -- -- 83 15 100 % --   22 0400 102/63 100 °F (37.8 °C) Bladder 88 20 98 % --   22 0323 -- -- -- 87 16 100 % --   22 0300 105/ -- -- 87 15 100 % --   22 0243 -- -- -- -- -- -- 118 lb 9.7 oz (53.8 kg)   22 020 107 (!) 100.9 °F (38.3 °C) Bladder 85 15 99 % --   22 0100 102/ -- -- 87 21 92 % -- Intake/Output Summary (Last 24 hours) at 12/19/2022 0802  Last data filed at 12/19/2022 0600  Gross per 24 hour   Intake 2281.33 ml   Output 1985 ml   Net 296.33 ml     Date 12/19/22 0000 - 12/19/22 2359   Shift 2110-0386 7096-0491 9159-5708 24 Hour Total   INTAKE   I.V.(mL/kg) 325. 5(6)   325. 5(6)   NG/GT(mL/kg) 261(4.9)   261(4.9)   Shift Total(mL/kg) 586. 5(10.9)   586. 5(10.9)   OUTPUT   Urine(mL/kg/hr) 980(2.3)   980   Shift Total(mL/kg) 980(18.2)   980(18.2)   Weight (kg) 53.8 53.8 53.8 53.8     Wt Readings from Last 3 Encounters:   12/19/22 118 lb 9.7 oz (53.8 kg)   03/09/22 106 lb (48.1 kg)     Body mass index is 19.74 kg/m².         PHYSICAL EXAM:  GEN:  Intubated, sedated  EYES:   pupils equal, round, and reactive to light  HEENT:  Normocepalic, without obvious abnormality  LUNGS:  good air exchange and clear to auscultation  CV:    regular rate and rhythm and normal S1 and S2  ABDOMEN:   soft and non-distended  MSK:    there is no redness, warmth, or swelling of the joints  NEURO[de-identified]   Intubated, sedated  SKIN:   Normal skin color, texture, turgor  EXTREMITIES:  No pedal or leg edema, no calf tenderness/swelling, no erythema, distal pulses intact       MEDICATIONS:  Scheduled Meds:   chlordiazePOXIDE  50 mg Oral Q8H    potassium chloride  40 mEq Per NG tube BID    sodium chloride flush  5-40 mL IntraVENous 2 times per day    enoxaparin  30 mg SubCUTAneous Daily    thiamine and folic acid IVPB   IntraVENous Daily    thiamine and folic acid IVPB   IntraVENous Daily    famotidine (PEPCID) injection  20 mg IntraVENous BID     Continuous Infusions:   sodium chloride 25 mL/hr at 12/19/22 0600    sodium chloride      propofol Stopped (12/18/22 0457)    midazolam 7 mg/hr (12/19/22 0600)    dexmedetomidine 1.4 mcg/kg/hr (12/19/22 0600)     PRN Meds:   midodrine, 5 mg, TID PRN  midazolam, 2 mg, Q2H PRN  sodium phosphate IVPB, 0.16 mmol/kg, PRN   Or  sodium phosphate IVPB, 0.32 mmol/kg, PRN  sodium chloride flush, 5-40 mL, PRN  sodium chloride, , PRN  ondansetron, 4 mg, Q8H PRN   Or  ondansetron, 4 mg, Q6H PRN  polyethylene glycol, 17 g, Daily PRN  acetaminophen, 650 mg, Q6H PRN   Or  acetaminophen, 650 mg, Q6H PRN        SUPPORT DEVICES: [x] Ventilator [] BIPAP  [] Nasal Cannula [] Room Air    VENT SETTINGS (Comprehensive) (if applicable): PRVC mode, FiO2 30%, PEEP 5, Respiratory Rate 14, Tidal Volume 460  Vent Information  Ventilator ID: [id number]  Equipment Changed: Expiratory Filter, HME  Ventilator Initiate: Yes  Vent Mode: (S) AC/PRVC  Additional Respiratory Assessments  Heart Rate: 84  Resp: 19  SpO2: 100 %  End Tidal CO2: 27 (%)  Position: Semi-Chang's  Humidification Source: HME  Cuff Pressure (cm H2O):  (mlt)    ABGs:   Arterial Blood Gas result:  pH 7.427; pCO2 29; pO2 93; HCO3 19.3.   Lab Results   Component Value Date/Time    FIO2 30.0 12/19/2022 04:28 AM         DATA:  Complete Blood Count:   Recent Labs     12/17/22 0424 12/18/22  0554 12/19/22  0322   WBC 4.3 3.8 4.3   RBC 2.61* 2.46* 2.52*   HGB 10.2* 9.7* 10.1*   HCT 31.9* 29.6* 30.0*   .2* 120.3* 119.0*   MCH 39.1* 39.4* 40.1*   MCHC 32.0 32.8 33.7   RDW 15.4* 14.9* 15.3*   PLT 71* 73* 186   MPV 11.4 12.2 11.4        Last 3 Blood Glucose:   Recent Labs     12/17/22  0424 12/18/22  0554 12/19/22  0322   GLUCOSE 109* 174* 143*        PT/INR:    Lab Results   Component Value Date/Time    PROTIME 12.4 12/15/2022 12:28 PM    INR 1.2 12/15/2022 12:28 PM     PTT:  No results found for: APTT, PTT    Comprehensive Metabolic Profile:   Recent Labs     12/17/22  0424 12/18/22  0554 12/18/22  1357 12/19/22  0322   * 130* 136 130*   K 4.1 3.0* 4.0 4.2    104 105 103   CO2 15* 17* 19* 16*   BUN 4* 4*  --  3*   CREATININE 0.20* 0.32*  --  0.21*   GLUCOSE 109* 174*  --  143*   CALCIUM 7.2* 7.7*  --  8.2*      Magnesium:   Lab Results   Component Value Date/Time    MG 1.7 12/19/2022 03:22 AM    MG 1.6 12/18/2022 05:54 AM    MG 2.1 12/17/2022 04:24 AM Phosphorus:   Lab Results   Component Value Date/Time    PHOS 3.3 12/19/2022 03:22 AM    PHOS 3.2 12/18/2022 05:54 AM    PHOS 3.4 12/17/2022 04:24 AM     Ionized Calcium:   Lab Results   Component Value Date/Time    CAION 1.17 12/18/2022 05:54 AM    CAION 0.83 12/17/2022 04:24 AM        Urinalysis:   Lab Results   Component Value Date/Time    NITRU NEGATIVE 12/15/2022 12:49 PM    COLORU Dark Yellow 12/15/2022 12:49 PM    PHUR 6.0 12/15/2022 12:49 PM    WBCUA 0 TO 2 12/15/2022 12:49 PM    RBCUA 2 TO 5 12/15/2022 12:49 PM    MUCUS 3+ 03/09/2022 12:21 PM    TRICHOMONAS NOT REPORTED 09/20/2014 06:19 AM    YEAST NOT REPORTED 09/20/2014 06:19 AM    BACTERIA FEW 09/20/2014 06:19 AM    CLARITYU slightly cloudy 07/22/2014 11:58 AM    SPECGRAV 1.020 12/15/2022 12:49 PM    LEUKOCYTESUR NEGATIVE 12/15/2022 12:49 PM    UROBILINOGEN Normal 12/15/2022 12:49 PM    BILIRUBINUR NEGATIVE  Verified by ictotest. 12/15/2022 12:49 PM    BILIRUBINUR negative 07/22/2014 11:58 AM    BLOODU negative 07/22/2014 11:58 AM    GLUCOSEU NEGATIVE 12/15/2022 12:49 PM    KETUA SMALL 12/15/2022 12:49 PM    AMORPHOUS NOT REPORTED 09/20/2014 06:19 AM       HgBA1c:  No results found for: LABA1C  TSH:  No results found for: TSH  Lactic Acid: No results found for: LACTA   Troponin: No results for input(s): TROPONINI in the last 72 hours.       ASSESSMENT:     Patient Active Problem List    Diagnosis Date Noted    COVID-19 12/18/2022    Acute respiratory insufficiency 33/21/6637    Alcoholic intoxication with complication ([facility] [place] [address] 84/03/8627    Altered mental status 12/18/2022    Drug overdose of undetermined intent, initial encounter 12/15/2022    Portal hypertension ([facility] [place] [address] 03/10/2022    Hyperbilirubinemia 03/09/2022    Fatty liver 03/09/2022    Ascites due to alcoholic hepatitis 82/44/8580    Alcohol abuse 03/09/2022    S/P cholecystectomy 03/09/2022    Abnormal LFTs 03/09/2022    Hypokalemia 03/09/2022    Hypoalbuminemia 03/09/2022    Bipolar I disorder, most recent episode depressed ([facility] [address] 09/10/2014          PLAN:     Neuro  Agitated, was intermittently requiring propofol for agitation which has been weaned  Versed gtt  Precedex gtt  Librium 50 q 8h  Hx EtOH abuse, daily thiamine and folate  Neuro checks per protocol  Hx bipolar disorder, home meds abilify and effexor    Cardiology  Hemodynamically stable, no pressor support  Midodrine 5 mg TID PRN    Pulmonary  Covid positive, ID consulted  Acute respiratory failure, intubated for airway protection  Pulmonary toilet  Tolerating SBTs    Renal  I/O: 1985 ml  Young in place  IV fluids: NS @ 50  ml/hr    GI  Hx EtOH use, +EtOH 266  Transaminitis, improving   (635), ALT 91 (120), Alk Phos 102 (123), Ammonia 57 (61)  Monitor for EtOH withdrawal, seizure precautions  Daily thiamine and folate  Glycolax PRN  Ulcer Ppx: Pepcid BID  Tube feeding Diet NPO  ADULT TUBE FEEDING; Orogastric; Standard without Fiber; Continuous; 10; Yes; 10; Q 4 hours; 45; 30; Q 4 hours    ID  WBC 4.3  Tmax 38.3 overnight  Covid positive  ID consulted    F.A.S.T. M. H.U.G.S. B.I.D. Feeding Diet: Diet NPO  ADULT TUBE FEEDING; Orogastric; Standard without Fiber; Continuous; 10; Yes; 10; Q 4 hours; 45; 30; Q 4 hours  Fluids: NS @ 50 ml/hr, TF @ 45 ml/hr, versed gtt, precedex gtt  Analgesic: Tylenol q6 PRN, versed 2 mg q 2 PRN, versed gtt  Sedation: Versed gtt, precedex gtt  Thrombo-prophylaxis: [x] Enoxaparin, [] Unfract. Heparin Subcutaneously, [] EPC Cuffs  Mobility: Intubated, sedated  Heads up: HOB 30 degrees  Ulcer prophylaxis: [] PPI Agent, [x] [id number], [] Sucralfate, [] Other:  Glycemic control: N/A, glucose 143  Spontaneous breathing trial: Tolerating CPAP  Bowel regimen/urine output: Glycolax PRN / Real Alexis  Indwelling catheter/lines: Peripheral Ivs, Young catheter  De-escalation: To remain ICU    Tosha Louis M.D.   Emergency Medicine Resident, PGY-2  12/19/2022 8:02 AM

## 2022-12-19 NOTE — CARE COORDINATION
12/19/22 1503   Service Assessment   Patient Orientation Unable to Assess; Sedated   History Provided By Child/Family  (pt's mother, [facility] Setting)   Support Systems Family Members;Spouse/Significant Other;Parent   PCP Verified by CM Yes  Brie Divine (mother is unsure of whether or not this is PCP, and she does not know last time pt saw PCP))   Prior Functional Level Independent in ADLs/IADLs   Current Functional Level Other (see comment)  (pt is intubated/ sedated)   Can patient return to prior living arrangement Unknown at present   Ability to make needs known: Unable   Family able to assist with home care needs: No   Financial Resources Medicaid  ([employer])   CM/SW Referral Other (see comment)  (substance abuse)   Social/Functional History   Home Layout One level   Home Access Stairs to enter with rails   Entrance Stairs - Number of Steps 4   Entrance Stairs - Rails Both   ADL Assistance Independent   Transfer Assistance Needs assistance   Active  No   Occupation Unemployed   Discharge [address]; Other (Comment)  (Home is goal)   Current Services Prior To Admission None   Potential [address]; Long Term Acute Care   Potential Assistance Purchasing Medications No   Type of Home Care Services Nursing Services;OT;PT   Services At/After Discharge   [facility] Information Provided? No   Mode of Transport at Discharge Other (see comment)  (dependent on destination. may need transport.)   Condition of Participation: Discharge Planning   The Plan for Transition of Care is related to the following treatment goals: safety, safe discharge   The Patient and/or Patient Representative was provided with a Choice of Provider? Patient Representative  (Pt's mother [facility] Setting)   Name of the Patient Representative who was provided with the Choice of Provider and agrees with the Discharge Plan?   [facility] Setting   The Patient and/Or Patient Representative agree with the Discharge Plan? Yes   Freedom of Choice list was provided with basic dialogue that supports the patient's individualized plan of care/goals, treatment preferences, and shares the quality data associated with the providers? Yes  (Monitoring for pt needs once extubated. will provide freedom of choice if needed.)    Case Management Assessment  Initial Evaluation    Date/Time of Evaluation: 12/19/2022 3:27 PM  Assessment Completed by: Surendra Alfonso RN    If patient is discharged prior to next notation, then this note serves as note for discharge by case management. Patient Name: Virgil Brown                   YOB: 1995  Diagnosis: Drug overdose of undetermined intent, initial encounter Jasper Kim                   Date / Time: 12/15/2022 12:08 PM    Patient Admission Status: Inpatient   Readmission Risk (Low < 19, Mod (19-27), High > 27): Readmission Risk Score: 13.8    Current PCP: Sasha Hendricks  PCP verified by CM? Yes Sasha Hendricks (mother is unsure of whether or not this is PCP, and she does not know last time pt saw PCP))    Chart Reviewed: Yes      History Provided by: Child/Family (pt's mother, Claire Kumar)  Patient Orientation: Unable to Assess, Sedated    Patient Cognition:      Hospitalization in the last 30 days (Readmission):  No    If yes, Readmission Assessment in CM Navigator will be completed.     Advance Directives:      Code Status: Full Code   Patient's Primary Decision Maker is: Legal Next of Kin      Discharge Planning:    Patient lives with:   Type of Home: (P) House, Other (Comment) (Home is goal)  Primary Care Giver:    Patient Support Systems include: Family Members, Spouse/Significant Other, Parent   Current Financial resources: Medicaid ([employer])  Current community resources:    Current services prior to admission: (P) None            Current DME:              Type of Home Care services:  (P) Nursing Services, OT, PT    ADLS  Prior functional level: Independent in ADLs/IADLs  Current functional level: Other (see comment) (pt is intubated/ sedated)    PT AM-PAC:   /24  OT AM-PAC:   /24    Family can provide assistance at DC: No  Would you like Case Management to discuss the discharge plan with any other family members/significant others, and if so, who? Plans to Return to Present Housing: Unknown at present  Other Identified Issues/Barriers to RETURNING to current housing: unsure at this time  Potential Assistance needed at discharge: (P) [address], Long Term Acute Care            Potential DME:    Patient expects to discharge to:    Plan for transportation at discharge:      Financial    Payor: SeeOn / Plan: SeeOn / Product Type: *No Product type* /     Does insurance require precert for SNF: yes    Potential assistance Purchasing Medications: (P) No  Meds-to-Beds request: Yes    No Pharmacies Listed    Notes:    Factors facilitating achievement of predicted outcomes: unable to determine at this time    Barriers to discharge: Medical complications    Additional Case Management Notes: Intubated/ Sedated FiO2 30%, PEEP of 5, NG for TF, follows commands. SW consulted for substance abuse    The Plan for Transition of Care is related to the following treatment goals of Drug overdose of undetermined intent, initial encounter [A23.973M]    IF APPLICABLE: The Patient and/or patient representative Cielo Cummings and her family were provided with a choice of provider and agrees with the discharge plan. Freedom of choice list with basic dialogue that supports the patient's individualized plan of care/goals and shares the quality data associated with the providers was provided to: (P) Patient Representative (Pt's mother Reyes Bland)   Patient Representative Name: (P) Reyes Bland     The Patient and/or Patient Representative Agree with the Discharge Plan?  (P) Yes    ANAIS MONTANA, RN  Case Management Department  Ph: [phone] Fax: [phone]

## 2022-12-19 NOTE — PLAN OF CARE
Problem: Safety - Medical Restraint  Goal: Remains free of injury from restraints (Restraint for Interference with Medical Device)  Description: INTERVENTIONS:  1. Determine that other, less restrictive measures have been tried or would not be effective before applying the restraint  2. Evaluate the patient's condition at the time of restraint application  3. Inform patient/family regarding the reason for restraint  4. Q2H: Monitor safety, psychosocial status, comfort, nutrition and hydration  12/19/2022 0501 by Antoinette Hardin RN  Outcome: Progressing  Flowsheets  Taken 12/19/2022 0400  Remains free of injury from restraints (restraint for interference with medical device): Every 2 hours: Monitor safety, psychosocial status, comfort, nutrition and hydration  Taken 12/19/2022 0200  Remains free of injury from restraints (restraint for interference with medical device): Every 2 hours: Monitor safety, psychosocial status, comfort, nutrition and hydration  Taken 12/19/2022 0000  Remains free of injury from restraints (restraint for interference with medical device): Every 2 hours: Monitor safety, psychosocial status, comfort, nutrition and hydration  Taken 12/18/2022 2200  Remains free of injury from restraints (restraint for interference with medical device): Every 2   Problem: Discharge Planning  Goal: Discharge to home or other facility with appropriate resources  12/19/2022 0501 by Antoinette Hardin RN  Outcome: Progressing     Problem: Skin/Tissue Integrity  Goal: Absence of new skin breakdown  Description: 1. Monitor for areas of redness and/or skin breakdown  2. Assess vascular access sites hourly  3. Every 4-6 hours minimum:  Change oxygen saturation probe site  4. Every 4-6 hours:  If on nasal continuous positive airway pressure, respiratory therapy assess nares and determine need for appliance change or resting period.   12/19/2022 0501 by Antoinette Hardin RN  Outcome: Progressing     Problem: ABCDS Injury Assessment  Goal: Absence of physical injury  12/19/2022 0501 by Omero Herrera RN  Outcome: Progressing     Problem: Safety - Adult  Goal: Free from fall injury  12/19/2022 0501 by Omero Herrera RN  Outcome: Progressing     Problem: Pain  Goal: Verbalizes/displays adequate comfort level or baseline comfort level  12/19/2022 0501 by Omero Herrera RN  Outcome: Progressing  Flowsheets (Taken 12/19/2022 0000)  Verbalizes/displays adequate comfort level or baseline comfort level: Encourage patient to monitor pain and request assistance     Flowsheets  Taken 12/18/2022 2000 by Omero Herrera RN  Remains free of injury from restraints (restraint for interference with medical device):   Determine that other, less restrictive measures have been tried or would not be effective before applying the restraint   Evaluate the patient's condition at the time of restraint application   Inform patient/family regarding the reason for restraint   Every 2 hours: Monitor safety, psychosocial status, comfort, nutrition and hydration

## 2022-12-20 LAB
ABSOLUTE EOS #: 0.08 K/UL (ref 0–0.4)
ABSOLUTE IMMATURE GRANULOCYTE: 0 K/UL (ref 0–0.3)
ABSOLUTE LYMPH #: 1.36 K/UL (ref 1–4.8)
ABSOLUTE MONO #: 3.04 K/UL (ref 0.1–0.8)
ALLEN TEST: POSITIVE
ANION GAP SERPL CALCULATED.3IONS-SCNC: 11 MMOL/L (ref 9–17)
BASOPHILS # BLD: 0 % (ref 0–2)
BASOPHILS ABSOLUTE: 0 K/UL (ref 0–0.2)
BUN BLDV-MCNC: 3 MG/DL (ref 6–20)
CALCIUM SERPL-MCNC: 8.7 MG/DL (ref 8.6–10.4)
CHLORIDE BLD-SCNC: 103 MMOL/L (ref 98–107)
CO2: 20 MMOL/L (ref 20–31)
CREAT SERPL-MCNC: 0.23 MG/DL (ref 0.5–0.9)
CULTURE: NORMAL
CULTURE: NORMAL
EOSINOPHILS RELATIVE PERCENT: 1 % (ref 1–4)
FIO2: 30
GFR SERPL CREATININE-BSD FRML MDRD: >60 ML/MIN/1.73M2
GLUCOSE BLD-MCNC: 123 MG/DL (ref 70–99)
GLUCOSE BLD-MCNC: 141 MG/DL (ref 74–100)
HCT VFR BLD CALC: 30.8 % (ref 36.3–47.1)
HEMOGLOBIN: 10.1 G/DL (ref 11.9–15.1)
IMMATURE GRANULOCYTES: 0 %
LYMPHOCYTES # BLD: 17 % (ref 24–44)
Lab: NORMAL
Lab: NORMAL
MAGNESIUM: 1.4 MG/DL (ref 1.6–2.6)
MCH RBC QN AUTO: 38.5 PG (ref 25.2–33.5)
MCHC RBC AUTO-ENTMCNC: 32.8 G/DL (ref 28.4–34.8)
MCV RBC AUTO: 117.6 FL (ref 82.6–102.9)
MONOCYTES # BLD: 38 % (ref 1–7)
MORPHOLOGY: ABNORMAL
MORPHOLOGY: ABNORMAL
NEGATIVE BASE EXCESS, ART: 1 (ref 0–2)
NRBC AUTOMATED: 0 PER 100 WBC
PDW BLD-RTO: 15.4 % (ref 11.8–14.4)
PHOSPHORUS: 4.7 MG/DL (ref 2.6–4.5)
PLATELET # BLD: ABNORMAL K/UL (ref 138–453)
PLATELET, FLUORESCENCE: 142 K/UL (ref 138–453)
PLATELET, IMMATURE FRACTION: 7.5 % (ref 1.1–10.3)
POC HCO3: 22.6 MMOL/L (ref 21–28)
POC O2 SATURATION: 98 % (ref 94–98)
POC PCO2: 33.9 MM HG (ref 35–48)
POC PH: 7.43 (ref 7.35–7.45)
POC PO2: 109.4 MM HG (ref 83–108)
POTASSIUM SERPL-SCNC: 4.2 MMOL/L (ref 3.7–5.3)
RBC # BLD: 2.62 M/UL (ref 3.95–5.11)
SAMPLE SITE: ABNORMAL
SEG NEUTROPHILS: 44 % (ref 36–66)
SEGMENTED NEUTROPHILS ABSOLUTE COUNT: 3.52 K/UL (ref 1.8–7.7)
SODIUM BLD-SCNC: 134 MMOL/L (ref 135–144)
SPECIMEN DESCRIPTION: NORMAL
SPECIMEN DESCRIPTION: NORMAL
WBC # BLD: 8 K/UL (ref 3.5–11.3)

## 2022-12-20 PROCEDURE — 6370000000 HC RX 637 (ALT 250 FOR IP): Performed by: INTERNAL MEDICINE

## 2022-12-20 PROCEDURE — 2580000003 HC RX 258: Performed by: HEALTH CARE PROVIDER

## 2022-12-20 PROCEDURE — 80048 BASIC METABOLIC PNL TOTAL CA: CPT

## 2022-12-20 PROCEDURE — 36600 WITHDRAWAL OF ARTERIAL BLOOD: CPT

## 2022-12-20 PROCEDURE — 82947 ASSAY GLUCOSE BLOOD QUANT: CPT

## 2022-12-20 PROCEDURE — 6370000000 HC RX 637 (ALT 250 FOR IP): Performed by: STUDENT IN AN ORGANIZED HEALTH CARE EDUCATION/TRAINING PROGRAM

## 2022-12-20 PROCEDURE — 6360000002 HC RX W HCPCS: Performed by: STUDENT IN AN ORGANIZED HEALTH CARE EDUCATION/TRAINING PROGRAM

## 2022-12-20 PROCEDURE — 6370000000 HC RX 637 (ALT 250 FOR IP): Performed by: HEALTH CARE PROVIDER

## 2022-12-20 PROCEDURE — 82803 BLOOD GASES ANY COMBINATION: CPT

## 2022-12-20 PROCEDURE — 36415 COLL VENOUS BLD VENIPUNCTURE: CPT

## 2022-12-20 PROCEDURE — 2700000000 HC OXYGEN THERAPY PER DAY

## 2022-12-20 PROCEDURE — 84100 ASSAY OF PHOSPHORUS: CPT

## 2022-12-20 PROCEDURE — 85055 RETICULATED PLATELET ASSAY: CPT

## 2022-12-20 PROCEDURE — 2500000003 HC RX 250 WO HCPCS: Performed by: HEALTH CARE PROVIDER

## 2022-12-20 PROCEDURE — 6360000002 HC RX W HCPCS: Performed by: HEALTH CARE PROVIDER

## 2022-12-20 PROCEDURE — 85025 COMPLETE CBC W/AUTO DIFF WBC: CPT

## 2022-12-20 PROCEDURE — 83735 ASSAY OF MAGNESIUM: CPT

## 2022-12-20 PROCEDURE — 94761 N-INVAS EAR/PLS OXIMETRY MLT: CPT

## 2022-12-20 PROCEDURE — 2000000000 HC ICU R&B

## 2022-12-20 PROCEDURE — 6370000000 HC RX 637 (ALT 250 FOR IP)

## 2022-12-20 PROCEDURE — 99291 CRITICAL CARE FIRST HOUR: CPT | Performed by: INTERNAL MEDICINE

## 2022-12-20 PROCEDURE — 94003 VENT MGMT INPAT SUBQ DAY: CPT

## 2022-12-20 PROCEDURE — 2580000003 HC RX 258

## 2022-12-20 RX ORDER — MAGNESIUM SULFATE IN WATER 40 MG/ML
2000 INJECTION, SOLUTION INTRAVENOUS ONCE
Status: COMPLETED | OUTPATIENT
Start: 2022-12-20 | End: 2022-12-20

## 2022-12-20 RX ORDER — MAGNESIUM SULFATE 1 G/100ML
1000 INJECTION INTRAVENOUS ONCE
Status: DISCONTINUED | OUTPATIENT
Start: 2022-12-20 | End: 2022-12-20

## 2022-12-20 RX ORDER — CHLORDIAZEPOXIDE HYDROCHLORIDE 25 MG/1
75 CAPSULE, GELATIN COATED ORAL 3 TIMES DAILY
Status: DISCONTINUED | OUTPATIENT
Start: 2022-12-20 | End: 2022-12-20

## 2022-12-20 RX ORDER — CHLORDIAZEPOXIDE HYDROCHLORIDE 25 MG/1
100 CAPSULE, GELATIN COATED ORAL 3 TIMES DAILY
Status: DISCONTINUED | OUTPATIENT
Start: 2022-12-20 | End: 2022-12-26

## 2022-12-20 RX ADMIN — CHLORDIAZEPOXIDE HYDROCHLORIDE 100 MG: 25 CAPSULE ORAL at 20:32

## 2022-12-20 RX ADMIN — DEXMEDETOMIDINE HYDROCHLORIDE 1.5 MCG/KG/HR: 4 INJECTION, SOLUTION INTRAVENOUS at 13:53

## 2022-12-20 RX ADMIN — FAMOTIDINE 20 MG: 20 TABLET, FILM COATED ORAL at 20:32

## 2022-12-20 RX ADMIN — DEXMEDETOMIDINE HYDROCHLORIDE 1.5 MCG/KG/HR: 4 INJECTION, SOLUTION INTRAVENOUS at 18:56

## 2022-12-20 RX ADMIN — FAMOTIDINE 20 MG: 20 TABLET, FILM COATED ORAL at 09:06

## 2022-12-20 RX ADMIN — SODIUM CHLORIDE, PRESERVATIVE FREE 10 ML: 5 INJECTION INTRAVENOUS at 20:34

## 2022-12-20 RX ADMIN — MAGNESIUM SULFATE HEPTAHYDRATE 2000 MG: 40 INJECTION, SOLUTION INTRAVENOUS at 13:56

## 2022-12-20 RX ADMIN — SODIUM CHLORIDE, PRESERVATIVE FREE 10 ML: 5 INJECTION INTRAVENOUS at 09:06

## 2022-12-20 RX ADMIN — Medication 100 MG: at 09:06

## 2022-12-20 RX ADMIN — CHLORDIAZEPOXIDE HYDROCHLORIDE 100 MG: 25 CAPSULE ORAL at 13:56

## 2022-12-20 RX ADMIN — DEXMEDETOMIDINE HYDROCHLORIDE 1.4 MCG/KG/HR: 4 INJECTION, SOLUTION INTRAVENOUS at 03:09

## 2022-12-20 RX ADMIN — MIDAZOLAM HYDROCHLORIDE 2 MG: 2 INJECTION, SOLUTION INTRAMUSCULAR; INTRAVENOUS at 04:47

## 2022-12-20 RX ADMIN — SODIUM CHLORIDE: 9 INJECTION, SOLUTION INTRAVENOUS at 20:48

## 2022-12-20 RX ADMIN — DEXMEDETOMIDINE HYDROCHLORIDE 1.5 MCG/KG/HR: 4 INJECTION, SOLUTION INTRAVENOUS at 09:10

## 2022-12-20 RX ADMIN — Medication 4 MG/HR: at 16:57

## 2022-12-20 RX ADMIN — POLYETHYLENE GLYCOL 3350 17 G: 17 POWDER, FOR SOLUTION ORAL at 00:12

## 2022-12-20 RX ADMIN — ALCOHOL 1 TABLET: 70.47 GEL TOPICAL at 09:06

## 2022-12-20 RX ADMIN — CHLORDIAZEPOXIDE HYDROCHLORIDE 75 MG: 25 CAPSULE ORAL at 09:05

## 2022-12-20 RX ADMIN — MIDAZOLAM HYDROCHLORIDE 2 MG: 2 INJECTION, SOLUTION INTRAMUSCULAR; INTRAVENOUS at 07:24

## 2022-12-20 RX ADMIN — MIDAZOLAM HYDROCHLORIDE 2 MG: 2 INJECTION, SOLUTION INTRAMUSCULAR; INTRAVENOUS at 10:01

## 2022-12-20 RX ADMIN — ENOXAPARIN SODIUM 30 MG: 100 INJECTION SUBCUTANEOUS at 09:06

## 2022-12-20 RX ADMIN — CHLORDIAZEPOXIDE HYDROCHLORIDE 50 MG: 25 CAPSULE ORAL at 00:11

## 2022-12-20 RX ADMIN — FOLIC ACID 1 MG: 1 TABLET ORAL at 09:06

## 2022-12-20 ASSESSMENT — PULMONARY FUNCTION TESTS
PIF_VALUE: 15
PIF_VALUE: 15
PIF_VALUE: 14
PIF_VALUE: 15
PIF_VALUE: 16
PIF_VALUE: 15
PIF_VALUE: 20
PIF_VALUE: 19

## 2022-12-20 NOTE — PLAN OF CARE
Problem: Safety - Medical Restraint  Goal: Remains free of injury from restraints (Restraint for Interference with Medical Device)  Description: INTERVENTIONS:  1. Determine that other, less restrictive measures have been tried or would not be effective before applying the restraint  2. Evaluate the patient's condition at the time of restraint application  3. Inform patient/family regarding the reason for restraint  4.  Q2H: Monitor safety, psychosocial status, comfort, nutrition and hydration  12/20/2022 1643 by Radha Elliott RN  Outcome: Progressing  Flowsheets  Taken 12/20/2022 1600 by Radha Elliott RN  Remains free of injury from restraints (restraint for interference with medical device): Every 2 hours: Monitor safety, psychosocial status, comfort, nutrition and hydration  Taken 12/20/2022 1400 by Radha Elliott RN  Remains free of injury from restraints (restraint for interference with medical device): Every 2 hours: Monitor safety, psychosocial status, comfort, nutrition and hydration  Taken 12/20/2022 1200 by Radha Elliott RN  Remains free of injury from restraints (restraint for interference with medical device): Every 2 hours: Monitor safety, psychosocial status, comfort, nutrition and hydration  Taken 12/20/2022 1000 by Radha Elliott RN  Remains free of injury from restraints (restraint for interference with medical device): Every 2 hours: Monitor safety, psychosocial status, comfort, nutrition and hydration  Taken 12/20/2022 0800 by Radha Elliott RN  Remains free of injury from restraints (restraint for interference with medical device):   Determine that other, less restrictive measures have been tried or would not be effective before applying the restraint   Evaluate the patient's condition at the time of restraint application   Inform patient/family regarding the reason for restraint   Every 2 hours: Monitor safety, psychosocial status, comfort, nutrition and hydration  Taken 12/20/2022 0600 by Estuardo Galeana RN  Remains free of injury from restraints (restraint for interference with medical device): Every 2 hours: Monitor safety, psychosocial status, comfort, nutrition and hydration  Taken 12/20/2022 0400 by Estuardo Galeana RN  Remains free of injury from restraints (restraint for interference with medical device): Every 2 hours: Monitor safety, psychosocial status, comfort, nutrition and hydration  12/20/2022 0339 by Estuardo Galeana RN  Outcome: Progressing  Flowsheets  Taken 12/20/2022 0200 by Estuardo Galeana RN  Remains free of injury from restraints (restraint for interference with medical device): Every 2 hours: Monitor safety, psychosocial status, comfort, nutrition and hydration  Taken 12/20/2022 0000 by Estuardo Galeana RN  Remains free of injury from restraints (restraint for interference with medical device): Every 2 hours: Monitor safety, psychosocial status, comfort, nutrition and hydration  Taken 12/19/2022 2200 by Estuardo Galeana RN  Remains free of injury from restraints (restraint for interference with medical device): Every 2 hours: Monitor safety, psychosocial status, comfort, nutrition and hydration  Taken 12/19/2022 2000 by Estuardo Galeana RN  Remains free of injury from restraints (restraint for interference with medical device):   Determine that other, less restrictive measures have been tried or would not be effective before applying the restraint   Evaluate the patient's condition at the time of restraint application   Inform patient/family regarding the reason for restraint   Every 2 hours: Monitor safety, psychosocial status, comfort, nutrition and hydration  Taken 12/19/2022 1800 by Tylor Benitez RN  Remains free of injury from restraints (restraint for interference with medical device): Every 2 hours: Monitor safety, psychosocial status, comfort, nutrition and hydration  Taken 12/19/2022 1600 by Tylor Benitez RN  Remains free of injury from restraints (restraint for interference with medical device): Every 2 hours: Monitor safety, psychosocial status, comfort, nutrition and hydration  Taken 12/19/2022 1400 by Alisa Aldrich RN  Remains free of injury from restraints (restraint for interference with medical device): Every 2 hours: Monitor safety, psychosocial status, comfort, nutrition and hydration     Problem: Discharge Planning  Goal: Discharge to home or other facility with appropriate resources  12/20/2022 1643 by Rafael Valencia RN  Outcome: Progressing  Flowsheets (Taken 12/20/2022 0800)  Discharge to home or other facility with appropriate resources:   Arrange for needed discharge resources and transportation as appropriate   Identify barriers to discharge with patient and caregiver   Identify discharge learning needs (meds, wound care, etc)  12/20/2022 0339 by Socorro Tafoya RN  Outcome: Progressing  Flowsheets (Taken 12/19/2022 2000)  Discharge to home or other facility with appropriate resources: Identify barriers to discharge with patient and caregiver     Problem: Skin/Tissue Integrity  Goal: Absence of new skin breakdown  Description: 1. Monitor for areas of redness and/or skin breakdown  2. Assess vascular access sites hourly  3. Every 4-6 hours minimum:  Change oxygen saturation probe site  4. Every 4-6 hours:  If on nasal continuous positive airway pressure, respiratory therapy assess nares and determine need for appliance change or resting period.   12/20/2022 1643 by Rafael Valencia RN  Outcome: Progressing  12/20/2022 0339 by Socorro Tafoya RN  Outcome: Progressing     Problem: ABCDS Injury Assessment  Goal: Absence of physical injury  12/20/2022 1643 by Rafael Valencia RN  Outcome: Progressing  Flowsheets (Taken 12/20/2022 0340 by Socorro Tafoya RN)  Absence of Physical Injury: Implement safety measures based on patient assessment  12/20/2022 0339 by Socorro Tafoya RN  Outcome: Progressing     Problem: Safety - Adult  Goal: Free from fall injury  12/20/2022 1643 by Pattie Mercado RN  Outcome: Progressing  Flowsheets (Taken 12/20/2022 0340 by Omero Herrera, RN)  Free From Fall Injury: Instruct family/caregiver on patient safety  12/20/2022 0339 by Omero Herrera RN  Outcome: Progressing     Problem: Pain  Goal: Verbalizes/displays adequate comfort level or baseline comfort level  12/20/2022 1643 by Pattie Mercado RN  Outcome: Progressing  12/20/2022 0339 by Omero Herrera RN  Outcome: Progressing  Flowsheets  Taken 12/20/2022 0000  Verbalizes/displays adequate comfort level or baseline comfort level: Assess pain using appropriate pain scale  Taken 12/19/2022 2000  Verbalizes/displays adequate comfort level or baseline comfort level: Assess pain using appropriate pain scale     Problem: Confusion  Goal: Confusion, delirium, dementia, or psychosis is improved or at baseline  Description: INTERVENTIONS:  1. Assess for possible contributors to thought disturbance, including medications, impaired vision or hearing, underlying metabolic abnormalities, dehydration, psychiatric diagnoses, and notify attending LIP  2. [place] high risk fall precautions, as indicated  3. Provide frequent short contacts to provide reality reorientation, refocusing and direction  4. Decrease environmental stimuli, including noise as appropriate  5. Monitor and intervene to maintain adequate nutrition, hydration, elimination, sleep and activity  6. If unable to ensure safety without constant attention obtain sitter and review sitter guidelines with assigned personnel  7.  Initiate Psychosocial CNS and Spiritual Care consult, as indicated  12/20/2022 1643 by Pattie Mercado RN  Outcome: Progressing  Flowsheets (Taken 12/20/2022 0800)  Effect of thought disturbance (confusion, delirium, dementia, or psychosis) are managed with adequate functional status:   Assess for contributors to thought disturbance, including medications, impaired vision or hearing, underlying metabolic abnormalities, dehydration, psychiatric diagnoses, notify [facility] high risk fall precautions, as indicated   Provide frequent short contacts to provide reality reorientation, refocusing and direction  12/20/2022 0339 by Lydia Noriega RN  Outcome: Progressing     Problem: Respiratory - Adult  Goal: Achieves optimal ventilation and oxygenation  12/20/2022 1643 by Julieth Anderson RN  Outcome: Progressing  12/20/2022 1418 by Arabella Maria RCP  Outcome: Progressing     Problem: Nutrition Deficit:  Goal: Optimize nutritional status  12/20/2022 1643 by Julieth Anderson RN  Outcome: Progressing  12/20/2022 0339 by Lydia Noriega RN  Outcome: Progressing     Problem: Risk for Elopement  Goal: Patient will not exit the unit/facility without proper excort  Outcome: Progressing

## 2022-12-20 NOTE — PROGRESS NOTES
INTENSIVE CARE UNIT  Resident Physician Progress Note    Patient - Mary Cook  Date of Admission -  12/15/2022 12:08 PM  Date of Evaluation -  2022  Room and Bed Number -  3514/3552-58   Hospital Day - 5    HPI:     32year old who became unresponsive while with her boyfriend, EMS was called. Patient got Narcan with minimal response. Intubated due to GCS of 3 and airway protection. Initially placed on propofol. In ED LFTs were elevated, CT cervical spine unremarkable, CT head negative, CXR unremarkable. Alcohol level 266, UDS negative. Found to be covid positive. : Tolerating CPAP trials. Following command both on and off sedation. Off propofol. : Weaning versed as tolerated. Librium increased. SUBJECTIVE:     OVERNIGHT EVENTS:    Versed weaned to 4. Patient tolerated. Urine output 3385 ml.     AWAKE & FOLLOWING COMMANDS:  [] No  [x] Yes    SECRETIONS Amount:  [] Small [] Moderate  [] Large  [x] None  Color:     [] White [] Colored  [] Bloody    SEDATION:  RAAS Score:  [] Propofol gtt  [x] Versed gtt  [] Ativan gtt   [] No Sedation    PARALYZED:  [x] No    [] Yes    VASOPRESSORS:  [x] No    [] Yes  [] Levophed [] Dopamine [] Vasopressin  [] Dobutamine [] Phenylephrine [] Epinephrine      OBJECTIVE:     VITAL SIGNS:  /65   Pulse 82   Temp 100 °F (37.8 °C) (Bladder)   Resp 14   Ht 5' 5\" (1.651 m)   Wt 117 lb 11.6 oz (53.4 kg)   SpO2 100%   BMI 19.59 kg/m²   Tmax over 24 hours:  Temp (24hrs), Av.4 °F (37.4 °C), Min:99 °F (37.2 °C), Max:100 °F (37.8 °C)      Patient Vitals for the past 8 hrs:   BP Temp Temp src Pulse Resp SpO2 Weight   22 0600 105/65 100 °F (37.8 °C) Bladder 82 14 100 % --   22 0557 -- -- -- -- -- -- 117 lb 11.6 oz (53.4 kg)   22 0500 101/62 -- -- 88 18 99 % --   22 0457 -- -- -- 88 20 97 % --   22 0400 104/ 99.7 °F (37.6 °C) Bladder 81 14 100 % --   22 0329 -- -- -- 84 15 100 % --   22 0300 105/ -- -- 85 14 100 % --   12/20/22 0200 100/69 99.5 °F (37.5 °C) Bladder 84 13 100 % --   12/20/22 0100 105/76 -- -- 84 20 100 % --   12/20/22 0000 99/63 99.5 °F (37.5 °C) Bladder 81 23 100 % --         Intake/Output Summary (Last 24 hours) at 12/20/2022 0751  Last data filed at 12/20/2022 0700  Gross per 24 hour   Intake 2266.91 ml   Output 3385 ml   Net -1118.09 ml     Date 12/20/22 0000 - 12/20/22 2359   Shift 2917-3891 2647-7943 2935-8733 24 Hour Total   INTAKE   I.V.(mL/kg) 448.7(8.4)   448.7(8.4)   NG/GT(mL/kg) 441(8.3)   441(8.3)   Shift Total(mL/kg) 889. 7(16.7)   889. 7(16.7)   OUTPUT   Urine(mL/kg/hr) 1150   1150   Shift Total(mL/kg) 1150(21.5)   1150(21.5)   Weight (kg) 53.4 53.4 53.4 53.4     Wt Readings from Last 3 Encounters:   12/20/22 117 lb 11.6 oz (53.4 kg)   03/09/22 106 lb (48.1 kg)     Body mass index is 19.59 kg/m².         PHYSICAL EXAM:  GEN:                  Intubated, sedated  EYES:                pupils equal, round, and reactive to light  HEENT:            Normocepalic, without obvious abnormality  LUNGS:            good air exchange and clear to auscultation  CV:                     regular rate and rhythm and normal S1 and S2  ABDOMEN:     soft and non-distended  MSK:                  there is no redness, warmth, or swelling of the joints  NEURO[de-identified]           Intubated, sedated  SKIN:                 Normal skin color, texture, turgor  EXTREMITIES:  No pedal or leg edema, no calf tenderness/swelling, no erythema, distal pulses intact     MEDICATIONS:  Scheduled Meds:   multivitamin  1 tablet Per NG tube Daily    famotidine  20 mg Per NG tube BID    thiamine  100 mg Per NG tube Daily    folic acid  1 mg Per NG tube Daily    chlordiazePOXIDE  50 mg Oral Q8H    sodium chloride flush  5-40 mL IntraVENous 2 times per day    enoxaparin  30 mg SubCUTAneous Daily     Continuous Infusions:   sodium chloride 25 mL/hr at 12/20/22 0700    sodium chloride      midazolam 4 mg/hr (12/20/22 0700)    dexmedetomidine 1.5 mcg/kg/hr (12/20/22 0700)     PRN Meds:   midodrine, 5 mg, TID PRN  midazolam, 2 mg, Q2H PRN  sodium phosphate IVPB, 0.16 mmol/kg, PRN   Or  sodium phosphate IVPB, 0.32 mmol/kg, PRN  sodium chloride flush, 5-40 mL, PRN  sodium chloride, , PRN  ondansetron, 4 mg, Q8H PRN   Or  ondansetron, 4 mg, Q6H PRN  polyethylene glycol, 17 g, Daily PRN  acetaminophen, 650 mg, Q6H PRN   Or  acetaminophen, 650 mg, Q6H PRN        SUPPORT DEVICES: [x] Ventilator [] BIPAP  [] Nasal Cannula [] Room Air    VENT SETTINGS (Comprehensive) (if applicable): PRVC mode, FiO2 30%, PEEP 5, Respiratory Rate 14, Tidal Volume 460  Vent Information  Ventilator ID: [id number]  Equipment Changed: Expiratory Filter  Ventilator Initiate: Yes  Vent Mode: (S) AC/PRVC  Additional Respiratory Assessments  Heart Rate: 82  Resp: 14  SpO2: 100 %  End Tidal CO2: 30 (%)  Position: Semi-Chang's  Humidification Source: HME  Cuff Pressure (cm H2O):  (mlt)    ABGs:   Arterial Blood Gas result:  pH 7.427; pCO2 29; pO2 93; HCO3 19.3.   Lab Results   Component Value Date/Time    FIO2 30.0 12/20/2022 04:29 AM         DATA:  Complete Blood Count:   Recent Labs     12/18/22  0554 12/19/22  0322 12/20/22  0559   WBC 3.8 4.3 8.0   RBC 2.46* 2.52* 2.62*   HGB 9.7* 10.1* 10.1*   HCT 29.6* 30.0* 30.8*   .3* 119.0* 117.6*   MCH 39.4* 40.1* 38.5*   MCHC 32.8 33.7 32.8   RDW 14.9* 15.3* 15.4*   PLT 73* 186 See Reflexed IPF Result   MPV 12.2 11.4  --         Last 3 Blood Glucose:   Recent Labs     12/18/22  0554 12/19/22  0322 12/20/22  0559   GLUCOSE 174* 143* 123*        PT/INR:    Lab Results   Component Value Date/Time    PROTIME 12.4 12/15/2022 12:28 PM    INR 1.2 12/15/2022 12:28 PM     PTT:  No results found for: APTT, PTT    Comprehensive Metabolic Profile:   Recent Labs     12/18/22  0554 12/18/22  1357 12/19/22  0322 12/20/22  0559   * 136 130* 134*   K 3.0* 4.0 4.2 4.2    105 103 103   CO2 17* 19* 16* 20   BUN 4*  --  3* 3*   CREATININE 0.32*  -- 0.21* 0.23*   GLUCOSE 174*  --  143* 123*   CALCIUM 7.7*  --  8.2* 8.7      Magnesium:   Lab Results   Component Value Date/Time    MG 1.4 12/20/2022 05:59 AM    MG 1.7 12/19/2022 03:22 AM    MG 1.6 12/18/2022 05:54 AM     Phosphorus:   Lab Results   Component Value Date/Time    PHOS 4.7 12/20/2022 05:59 AM    PHOS 3.3 12/19/2022 03:22 AM    PHOS 3.2 12/18/2022 05:54 AM     Ionized Calcium:   Lab Results   Component Value Date/Time    CAION 1.17 12/18/2022 05:54 AM    CAION 0.83 12/17/2022 04:24 AM        Urinalysis:   Lab Results   Component Value Date/Time    NITRU NEGATIVE 12/15/2022 12:49 PM    COLORU Dark Yellow 12/15/2022 12:49 PM    PHUR 6.0 12/15/2022 12:49 PM    WBCUA 0 TO 2 12/15/2022 12:49 PM    RBCUA 2 TO 5 12/15/2022 12:49 PM    MUCUS 3+ 03/09/2022 12:21 PM    TRICHOMONAS NOT REPORTED 09/20/2014 06:19 AM    YEAST NOT REPORTED 09/20/2014 06:19 AM    BACTERIA FEW 09/20/2014 06:19 AM    CLARITYU slightly cloudy 07/22/2014 11:58 AM    SPECGRAV 1.020 12/15/2022 12:49 PM    LEUKOCYTESUR NEGATIVE 12/15/2022 12:49 PM    UROBILINOGEN Normal 12/15/2022 12:49 PM    BILIRUBINUR NEGATIVE  Verified by ictotest. 12/15/2022 12:49 PM    BILIRUBINUR negative 07/22/2014 11:58 AM    BLOODU negative 07/22/2014 11:58 AM    GLUCOSEU NEGATIVE 12/15/2022 12:49 PM    KETUA SMALL 12/15/2022 12:49 PM    AMORPHOUS NOT REPORTED 09/20/2014 06:19 AM       HgBA1c:  No results found for: LABA1C  TSH:  No results found for: TSH  Lactic Acid: No results found for: LACTA   Troponin: No results for input(s): TROPONINI in the last 72 hours.       ASSESSMENT:     Patient Active Problem List    Diagnosis Date Noted    COVID-19 12/18/2022    Acute respiratory insufficiency 42/63/5213    Alcoholic intoxication with complication ([facility] [address] 40/73/1539    Altered mental status 12/18/2022    Drug overdose of undetermined intent, initial encounter 12/15/2022    Portal hypertension ([facility] 03/10/2022    Hyperbilirubinemia 03/09/2022    Fatty liver 03/09/2022 Ascites due to alcoholic hepatitis 37/64/6950    Alcohol abuse 03/09/2022    S/P cholecystectomy 03/09/2022    Abnormal LFTs 03/09/2022    Hypokalemia 03/09/2022    Hypoalbuminemia 03/09/2022    Bipolar I disorder, most recent episode depressed ([facility] [address] 09/10/2014          PLAN:     Neuro  Versed gtt, weaning as tolerated  Precedex gtt  Librium 75 mg TID  Hx EtOH abuse, daily thiamine and folate  Neuro checks per protocol  Hx bipolar disorder, home meds abilify and effexor     Cardiology  Hemodynamically stable, no pressor support  Midodrine 5 mg TID PRN     Pulmonary  Covid positive, ID consulted  Acute respiratory failure, intubated for airway protection  Pulmonary toilet  Tolerating SBTs     Renal  I/O: 3385  Benites in place  IV fluids: NS @ 25 ml/hr     GI  Hx EtOH use, +EtOH 266 on arrival  Transaminitis, improving   (635), ALT 91 (120), Alk Phos 102 (123), Ammonia 57 (61)  Monitor for EtOH withdrawal, seizure precautions  Daily thiamine and folate, multivitamin  Glycolax PRN  Ulcer Ppx: Pepcid BID  Tube feeding Diet NPO  ADULT TUBE FEEDING; Orogastric; Standard without Fiber; Continuous; 10; Yes; 10; Q 4 hours; 45; 30; Q 4 hours     ID  WBC 8.0  Covid positive  ID consulted      F.A.S.T. M. H[employer]U.G.S. B.I.D. Feeding Diet: Diet NPO  ADULT TUBE FEEDING; Orogastric; Standard without Fiber; Continuous; 10; Yes; 10; Q 4 hours; 45; 30; Q 4 hours  Fluids: NS @ 50 ml/hr, tube feeds @ 45 ml/hr  Analgesic: Tylenol prn  Sedation: Versed gtt. precedex   Thrombo-prophylaxis: [x] Enoxaparin, [] Unfract. Heparin Subcutaneously, [] EPC Cuffs  Mobility: PT/OT  Heads up: HOB 30  Ulcer prophylaxis: [] PPI Agent, [x] [id number], [] Sucralfate, [] Other:  Glycemic control: None, glucose   Spontaneous breathing trial: Tolerating CPAP trials   Bowel regimen/urine output: Glycolax prn/ U out 3385  Indwelling catheter/lines: Peripheral Ivs, benites  De-escalation: to remain ICU      Dorean Brunner, M.D.   Emergency Medicine Resident, PGY-2  12/20/2022 7:51 AM

## 2022-12-20 NOTE — PLAN OF CARE
Problem: Safety - Medical Restraint  Goal: Remains free of injury from restraints (Restraint for Interference with Medical Device)  Description: INTERVENTIONS:  1. Determine that other, less restrictive measures have been tried or would not be effective before applying the restraint  2. Evaluate the patient's condition at the time of restraint application  3. Inform patient/family regarding the reason for restraint  4.  Q2H: Monitor safety, psychosocial status, comfort, nutrition and hydration  Outcome: Progressing  Flowsheets  Taken 12/20/2022 0200 by Durga Abel RN  Remains free of injury from restraints (restraint for interference with medical device): Every 2 hours: Monitor safety, psychosocial status, comfort, nutrition and hydration  Taken 12/20/2022 0000 by Durga Abel RN  Remains free of injury from restraints (restraint for interference with medical device): Every 2 hours: Monitor safety, psychosocial status, comfort, nutrition and hydration  Taken 12/19/2022 2200 by Durga Abel RN  Remains free of injury from restraints (restraint for interference with medical device): Every 2 hours: Monitor safety, psychosocial status, comfort, nutrition and hydration  Taken 12/19/2022 2000 by Durga Abel RN  Remains free of injury from restraints (restraint for interference with medical device):   Determine that other, less restrictive measures have been tried or would not be effective before applying the restraint   Evaluate the patient's condition at the time of restraint application   Inform patient/family regarding the reason for restraint   Every 2 hours: Monitor safety, psychosocial status, comfort, nutrition and hydration    Problem: Discharge Planning  Goal: Discharge to home or other facility with appropriate resources  Outcome: Progressing  Flowsheets (Taken 12/19/2022 2000)  Discharge to home or other facility with appropriate resources: Identify barriers to discharge with patient and caregiver     Problem: Skin/Tissue Integrity  Goal: Absence of new skin breakdown  Description: 1. Monitor for areas of redness and/or skin breakdown  2. Assess vascular access sites hourly  3. Every 4-6 hours minimum:  Change oxygen saturation probe site  4. Every 4-6 hours:  If on nasal continuous positive airway pressure, respiratory therapy assess nares and determine need for appliance change or resting period. Outcome: Progressing     Problem: ABCDS Injury Assessment  Goal: Absence of physical injury  Outcome: Progressing     Problem: Safety - Adult  Goal: Free from fall injury  Outcome: Progressing     Problem: Pain  Goal: Verbalizes/displays adequate comfort level or baseline comfort level  Outcome: Progressing  Flowsheets (Taken 12/19/2022 2000)  Verbalizes/displays adequate comfort level or baseline comfort level: Assess pain using appropriate pain scale     Problem: Confusion  Goal: Confusion, delirium, dementia, or psychosis is improved or at baseline  Description: INTERVENTIONS:  1. Assess for possible contributors to thought disturbance, including medications, impaired vision or hearing, underlying metabolic abnormalities, dehydration, psychiatric diagnoses, and notify attending LIP  2. [place] high risk fall precautions, as indicated  3. Provide frequent short contacts to provide reality reorientation, refocusing and direction  4. Decrease environmental stimuli, including noise as appropriate  5. Monitor and intervene to maintain adequate nutrition, hydration, elimination, sleep and activity  6. If unable to ensure safety without constant attention obtain sitter and review sitter guidelines with assigned personnel  7.  Initiate Psychosocial CNS and Spiritual Care consult, as indicated  Outcome: Progressing        Problem: Nutrition Deficit:  Goal: Optimize nutritional status  Outcome: Progressing

## 2022-12-21 LAB
ABSOLUTE EOS #: 0.07 K/UL (ref 0–0.4)
ABSOLUTE IMMATURE GRANULOCYTE: 0 K/UL (ref 0–0.3)
ABSOLUTE LYMPH #: 2 K/UL (ref 1–4.8)
ABSOLUTE MONO #: 1.86 K/UL (ref 0.1–0.8)
ALLEN TEST: POSITIVE
ANION GAP SERPL CALCULATED.3IONS-SCNC: 13 MMOL/L (ref 9–17)
BASOPHILS # BLD: 0 % (ref 0–2)
BASOPHILS ABSOLUTE: 0 K/UL (ref 0–0.2)
BUN BLDV-MCNC: 4 MG/DL (ref 6–20)
CALCIUM SERPL-MCNC: 8.5 MG/DL (ref 8.6–10.4)
CHLORIDE BLD-SCNC: 102 MMOL/L (ref 98–107)
CO2: 21 MMOL/L (ref 20–31)
CREAT SERPL-MCNC: 0.2 MG/DL (ref 0.5–0.9)
EOSINOPHILS RELATIVE PERCENT: 1 % (ref 1–4)
FIO2: 30
GFR SERPL CREATININE-BSD FRML MDRD: >60 ML/MIN/1.73M2
GLUCOSE BLD-MCNC: 111 MG/DL (ref 70–99)
GLUCOSE BLD-MCNC: 134 MG/DL (ref 74–100)
HCO3 VENOUS: 24.7 MMOL/L (ref 22–29)
HCT VFR BLD CALC: 30.7 % (ref 36.3–47.1)
HEMOGLOBIN: 9.8 G/DL (ref 11.9–15.1)
IMMATURE GRANULOCYTES: 0 %
LYMPHOCYTES # BLD: 29 % (ref 24–44)
MAGNESIUM: 1.9 MG/DL (ref 1.6–2.6)
MCH RBC QN AUTO: 38.6 PG (ref 25.2–33.5)
MCHC RBC AUTO-ENTMCNC: 31.9 G/DL (ref 28.4–34.8)
MCV RBC AUTO: 120.9 FL (ref 82.6–102.9)
MODE: ABNORMAL
MONOCYTES # BLD: 27 % (ref 1–7)
MORPHOLOGY: ABNORMAL
MORPHOLOGY: ABNORMAL
NRBC AUTOMATED: 0 PER 100 WBC
O2 DEVICE/FLOW/%: ABNORMAL
O2 SAT, VEN: 74 % (ref 60–85)
PCO2, VEN: 38.4 MM HG (ref 41–51)
PDW BLD-RTO: 15.6 % (ref 11.8–14.4)
PH VENOUS: 7.42 (ref 7.32–7.43)
PHOSPHORUS: 5.3 MG/DL (ref 2.6–4.5)
PLATELET # BLD: ABNORMAL K/UL (ref 138–453)
PLATELET, FLUORESCENCE: 148 K/UL (ref 138–453)
PLATELET, IMMATURE FRACTION: 7 % (ref 1.1–10.3)
PO2, VEN: 38.7 MM HG (ref 30–50)
POSITIVE BASE EXCESS, VEN: 0 (ref 0–3)
POTASSIUM SERPL-SCNC: 4.2 MMOL/L (ref 3.7–5.3)
RBC # BLD: 2.54 M/UL (ref 3.95–5.11)
SAMPLE SITE: ABNORMAL
SEG NEUTROPHILS: 43 % (ref 36–66)
SEGMENTED NEUTROPHILS ABSOLUTE COUNT: 2.97 K/UL (ref 1.8–7.7)
SODIUM BLD-SCNC: 136 MMOL/L (ref 135–144)
WBC # BLD: 6.9 K/UL (ref 3.5–11.3)

## 2022-12-21 PROCEDURE — 85025 COMPLETE CBC W/AUTO DIFF WBC: CPT

## 2022-12-21 PROCEDURE — 84100 ASSAY OF PHOSPHORUS: CPT

## 2022-12-21 PROCEDURE — 36415 COLL VENOUS BLD VENIPUNCTURE: CPT

## 2022-12-21 PROCEDURE — 2500000003 HC RX 250 WO HCPCS: Performed by: HEALTH CARE PROVIDER

## 2022-12-21 PROCEDURE — 85055 RETICULATED PLATELET ASSAY: CPT

## 2022-12-21 PROCEDURE — 6370000000 HC RX 637 (ALT 250 FOR IP): Performed by: INTERNAL MEDICINE

## 2022-12-21 PROCEDURE — 36600 WITHDRAWAL OF ARTERIAL BLOOD: CPT

## 2022-12-21 PROCEDURE — 99291 CRITICAL CARE FIRST HOUR: CPT | Performed by: INTERNAL MEDICINE

## 2022-12-21 PROCEDURE — 82947 ASSAY GLUCOSE BLOOD QUANT: CPT

## 2022-12-21 PROCEDURE — 6360000002 HC RX W HCPCS: Performed by: STUDENT IN AN ORGANIZED HEALTH CARE EDUCATION/TRAINING PROGRAM

## 2022-12-21 PROCEDURE — 94761 N-INVAS EAR/PLS OXIMETRY MLT: CPT

## 2022-12-21 PROCEDURE — 82803 BLOOD GASES ANY COMBINATION: CPT

## 2022-12-21 PROCEDURE — 6360000002 HC RX W HCPCS: Performed by: HEALTH CARE PROVIDER

## 2022-12-21 PROCEDURE — 94003 VENT MGMT INPAT SUBQ DAY: CPT

## 2022-12-21 PROCEDURE — 6360000002 HC RX W HCPCS

## 2022-12-21 PROCEDURE — 83735 ASSAY OF MAGNESIUM: CPT

## 2022-12-21 PROCEDURE — 6370000000 HC RX 637 (ALT 250 FOR IP): Performed by: STUDENT IN AN ORGANIZED HEALTH CARE EDUCATION/TRAINING PROGRAM

## 2022-12-21 PROCEDURE — 2700000000 HC OXYGEN THERAPY PER DAY

## 2022-12-21 PROCEDURE — 2000000000 HC ICU R&B

## 2022-12-21 PROCEDURE — 80048 BASIC METABOLIC PNL TOTAL CA: CPT

## 2022-12-21 RX ORDER — MIDAZOLAM HYDROCHLORIDE 2 MG/2ML
2 INJECTION, SOLUTION INTRAMUSCULAR; INTRAVENOUS
Status: DISCONTINUED | OUTPATIENT
Start: 2022-12-21 | End: 2022-12-21

## 2022-12-21 RX ORDER — MIDAZOLAM HYDROCHLORIDE 2 MG/2ML
2 INJECTION, SOLUTION INTRAMUSCULAR; INTRAVENOUS
Status: DISCONTINUED | OUTPATIENT
Start: 2022-12-21 | End: 2022-12-24

## 2022-12-21 RX ADMIN — CHLORDIAZEPOXIDE HYDROCHLORIDE 100 MG: 25 CAPSULE ORAL at 20:37

## 2022-12-21 RX ADMIN — FOLIC ACID 1 MG: 1 TABLET ORAL at 08:00

## 2022-12-21 RX ADMIN — HYDROCORTISONE SODIUM SUCCINATE 100 MG: 100 INJECTION, POWDER, FOR SOLUTION INTRAMUSCULAR; INTRAVENOUS at 19:27

## 2022-12-21 RX ADMIN — Medication 100 MG: at 08:00

## 2022-12-21 RX ADMIN — ENOXAPARIN SODIUM 30 MG: 100 INJECTION SUBCUTANEOUS at 08:14

## 2022-12-21 RX ADMIN — DEXMEDETOMIDINE HYDROCHLORIDE 1.5 MCG/KG/HR: 4 INJECTION, SOLUTION INTRAVENOUS at 17:38

## 2022-12-21 RX ADMIN — DEXMEDETOMIDINE HYDROCHLORIDE 1.5 MCG/KG/HR: 4 INJECTION, SOLUTION INTRAVENOUS at 05:51

## 2022-12-21 RX ADMIN — DEXMEDETOMIDINE HYDROCHLORIDE 1.5 MCG/KG/HR: 4 INJECTION, SOLUTION INTRAVENOUS at 23:38

## 2022-12-21 RX ADMIN — CHLORDIAZEPOXIDE HYDROCHLORIDE 100 MG: 25 CAPSULE ORAL at 08:00

## 2022-12-21 RX ADMIN — MIDAZOLAM 2 MG: 1 INJECTION INTRAMUSCULAR; INTRAVENOUS at 14:57

## 2022-12-21 RX ADMIN — HYDROCORTISONE SODIUM SUCCINATE 100 MG: 100 INJECTION, POWDER, FOR SOLUTION INTRAMUSCULAR; INTRAVENOUS at 12:53

## 2022-12-21 RX ADMIN — ALCOHOL 1 TABLET: 70.47 GEL TOPICAL at 08:00

## 2022-12-21 RX ADMIN — CHLORDIAZEPOXIDE HYDROCHLORIDE 100 MG: 25 CAPSULE ORAL at 13:12

## 2022-12-21 RX ADMIN — DEXMEDETOMIDINE HYDROCHLORIDE 1.5 MCG/KG/HR: 4 INJECTION, SOLUTION INTRAVENOUS at 00:32

## 2022-12-21 RX ADMIN — DEXMEDETOMIDINE HYDROCHLORIDE 1.5 MCG/KG/HR: 4 INJECTION, SOLUTION INTRAVENOUS at 11:33

## 2022-12-21 RX ADMIN — FAMOTIDINE 20 MG: 20 TABLET, FILM COATED ORAL at 08:00

## 2022-12-21 ASSESSMENT — PULMONARY FUNCTION TESTS
PIF_VALUE: 12
PIF_VALUE: 19
PIF_VALUE: 17
PIF_VALUE: 18
PIF_VALUE: 19
PIF_VALUE: 20
PIF_VALUE: 12
PIF_VALUE: 12
PIF_VALUE: 13
PIF_VALUE: 12
PIF_VALUE: 20
PIF_VALUE: 18
PIF_VALUE: 18
PIF_VALUE: 20
PIF_VALUE: 19
PIF_VALUE: 8
PIF_VALUE: 18
PIF_VALUE: 19
PIF_VALUE: 17

## 2022-12-21 NOTE — PROGRESS NOTES
[facility]  Occupational Therapy Not Seen Note    DATE: 2022    NAME: Maicol Maya  MRN: [id number]   : 1995      Patient not seen this date for Occupational Therapy due to:    Patient is not appropriate for active participation in OT evaluation/treatment at this time d/t intubated/sedated. Per chart, weaning sedatives and vent support to hopefully extubate. OT will evaluate once appropriate for OOB activity.  In the meantime, PT is following for PROM    Next Scheduled Treatment: 2022    Electronically signed by LEO Yip on 2022 at 11:48 AM

## 2022-12-21 NOTE — PLAN OF CARE
Problem: Safety - Medical Restraint  Goal: Remains free of injury from restraints (Restraint for Interference with Medical Device)  Description: INTERVENTIONS:  1. Determine that other, less restrictive measures have been tried or would not be effective before applying the restraint  2. Evaluate the patient's condition at the time of restraint application  3. Inform patient/family regarding the reason for restraint  4.  Q2H: Monitor safety, psychosocial status, comfort, nutrition and hydration  12/21/2022 0349 by Desiree Merritt RN  Outcome: Progressing  Flowsheets  Taken 12/21/2022 0200 by Desiree Merritt RN  Remains free of injury from restraints (restraint for interference with medical device): Every 2 hours: Monitor safety, psychosocial status, comfort, nutrition and hydration  Taken 12/21/2022 0000 by Desiree Merritt RN  Remains free of injury from restraints (restraint for interference with medical device): Every 2 hours: Monitor safety, psychosocial status, comfort, nutrition and hydration  Taken 12/20/2022 2200 by Desiree Merritt RN  Remains free of injury from restraints (restraint for interference with medical device): Every 2 hours: Monitor safety, psychosocial status, comfort, nutrition and hydration  Taken 12/20/2022 2000 by Desiree Merritt RN  Remains free of injury from restraints (restraint for interference with medical device):   Determine that other, less restrictive measures have been tried or would not be effective before applying the restraint   Evaluate the patient's condition at the time of restraint application   Inform patient/family regarding the reason for restraint   Every 2 hours: Monitor safety, psychosocial status, comfort, nutrition and hydration    Problem: Discharge Planning  Goal: Discharge to home or other facility with appropriate resources  12/21/2022 0349 by Desiree Merritt RN  Outcome: Progressing  Flowsheets (Taken 12/20/2022 2000)  Discharge to home or other facility with appropriate resources: Identify barriers to discharge with patient and caregiver     Problem: Skin/Tissue Integrity  Goal: Absence of new skin breakdown  Description: 1. Monitor for areas of redness and/or skin breakdown  2. Assess vascular access sites hourly  3. Every 4-6 hours minimum:  Change oxygen saturation probe site  4. Every 4-6 hours:  If on nasal continuous positive airway pressure, respiratory therapy assess nares and determine need for appliance change or resting period. 12/21/2022 0349 by Annalisa Smalls RN  Outcome: Progressing     Problem: ABCDS Injury Assessment  Goal: Absence of physical injury  12/21/2022 0349 by Annalisa Smalls RN  Outcome: Progressing  Flowsheets (Taken 12/21/2022 0025)  Absence of Physical Injury: Implement safety measures based on patient assessment     Problem: Safety - Adult  Goal: Free from fall injury  12/21/2022 0349 by Annalisa Smalls RN  Outcome: Progressing  Flowsheets (Taken 12/21/2022 0025)  Free From Fall Injury: Instruct family/caregiver on patient safety     Problem: Pain  Goal: Verbalizes/displays adequate comfort level or baseline comfort level  12/21/2022 0349 by Annalisa Smalls RN  Outcome: Progressing  Flowsheets (Taken 12/20/2022 2000)  Verbalizes/displays adequate comfort level or baseline comfort level: Encourage patient to monitor pain and request assistance     Problem: Confusion  Goal: Confusion, delirium, dementia, or psychosis is improved or at baseline  Description: INTERVENTIONS:  1. Assess for possible contributors to thought disturbance, including medications, impaired vision or hearing, underlying metabolic abnormalities, dehydration, psychiatric diagnoses, and notify attending LIP  2. [place] high risk fall precautions, as indicated  3. Provide frequent short contacts to provide reality reorientation, refocusing and direction  4. Decrease environmental stimuli, including noise as appropriate  5.  Monitor and intervene to maintain adequate nutrition, hydration, elimination, sleep and activity  6. If unable to ensure safety without constant attention obtain sitter and review sitter guidelines with assigned personnel  7.  Initiate Psychosocial CNS and Spiritual Care consult, as indicated  12/21/2022 0349 by Neo Waller RN  Outcome: Progressing  Problem: Risk for Elopement  Goal: Patient will not exit the unit/facility without proper excort  12/21/2022 0349 by Neo Waller RN  Outcome: Progressing        Taken 12/20/2022 0600 by Neo Waller RN  Remains free of injury from restraints (restraint for interference with medical device): Every 2 hours: Monitor safety, psychosocial status, comfort, nutrition and hydration  Taken 12/20/2022 0400 by Neo Waller RN  Remains free of injury from restraints (restraint for interference with medical device): Every 2 hours: Monitor safety, psychosocial status, comfort, nutrition and hydration

## 2022-12-21 NOTE — PLAN OF CARE
Problem: Safety - Medical Restraint  Goal: Remains free of injury from restraints (Restraint for Interference with Medical Device)  Description: INTERVENTIONS:  1. Determine that other, less restrictive measures have been tried or would not be effective before applying the restraint  2. Evaluate the patient's condition at the time of restraint application  3. Inform patient/family regarding the reason for restraint  4.  Q2H: Monitor safety, psychosocial status, comfort, nutrition and hydration  12/21/2022 0901 by Neida Kingston RN  Outcome: Progressing  Flowsheets  Taken 12/21/2022 0600 by Simeon Sy RN  Remains free of injury from restraints (restraint for interference with medical device): Every 2 hours: Monitor safety, psychosocial status, comfort, nutrition and hydration  Taken 12/21/2022 0400 by Simeon Sy RN  Remains free of injury from restraints (restraint for interference with medical device): Every 2 hours: Monitor safety, psychosocial status, comfort, nutrition and hydration  12/21/2022 0349 by Simeon Sy RN  Outcome: Progressing  Flowsheets  Taken 12/21/2022 0200 by Simeon Sy RN  Remains free of injury from restraints (restraint for interference with medical device): Every 2 hours: Monitor safety, psychosocial status, comfort, nutrition and hydration  Taken 12/21/2022 0000 by Simeon Sy RN  Remains free of injury from restraints (restraint for interference with medical device): Every 2 hours: Monitor safety, psychosocial status, comfort, nutrition and hydration  Taken 12/20/2022 2200 by Simeon Sy RN  Remains free of injury from restraints (restraint for interference with medical device): Every 2 hours: Monitor safety, psychosocial status, comfort, nutrition and hydration  Taken 12/20/2022 2000 by Simeon Sy RN  Remains free of injury from restraints (restraint for interference with medical device):   Determine that other, less restrictive measures have been tried or would not be effective before applying the restraint   Evaluate the patient's condition at the time of restraint application   Inform patient/family regarding the reason for restraint   Every 2 hours: Monitor safety, psychosocial status, comfort, nutrition and hydration  Taken 12/20/2022 1800 by Julieth Anderson RN  Remains free of injury from restraints (restraint for interference with medical device): Every 2 hours: Monitor safety, psychosocial status, comfort, nutrition and hydration     Problem: Discharge Planning  Goal: Discharge to home or other facility with appropriate resources  12/21/2022 0901 by Anny Smiley RN  Outcome: Progressing  Flowsheets (Taken 12/21/2022 0800)  Discharge to home or other facility with appropriate resources:   Identify barriers to discharge with patient and caregiver   Arrange for needed discharge resources and transportation as appropriate   Identify discharge learning needs (meds, wound care, etc)  12/21/2022 0349 by Lydia Noriega RN  Outcome: Progressing  Flowsheets (Taken 12/20/2022 2000)  Discharge to home or other facility with appropriate resources: Identify barriers to discharge with patient and caregiver     Problem: Skin/Tissue Integrity  Goal: Absence of new skin breakdown  Description: 1. Monitor for areas of redness and/or skin breakdown  2. Assess vascular access sites hourly  3. Every 4-6 hours minimum:  Change oxygen saturation probe site  4. Every 4-6 hours:  If on nasal continuous positive airway pressure, respiratory therapy assess nares and determine need for appliance change or resting period.   12/21/2022 0901 by Anny Smiley RN  Outcome: Progressing  12/21/2022 0349 by Lydia Noriega RN  Outcome: Progressing     Problem: ABCDS Injury Assessment  Goal: Absence of physical injury  12/21/2022 0901 by Anny Smiley RN  Outcome: Progressing  12/21/2022 0349 by Lydia Noriega RN  Outcome: Progressing  Flowsheets (Taken 12/21/2022 0025)  Absence of Physical Injury: Implement safety measures based on patient assessment     Problem: Safety - Adult  Goal: Free from fall injury  12/21/2022 0901 by Andie Quinones RN  Outcome: Progressing  12/21/2022 0349 by Palma Rodríguez RN  Outcome: Progressing  Flowsheets (Taken 12/21/2022 0025)  Free From Fall Injury: Instruct family/caregiver on patient safety     Problem: Pain  Goal: Verbalizes/displays adequate comfort level or baseline comfort level  12/21/2022 0901 by Andie Quinones RN  Outcome: Progressing  Flowsheets (Taken 12/21/2022 0800)  Verbalizes/displays adequate comfort level or baseline comfort level:   Assess pain using appropriate pain scale   Implement non-pharmacological measures as appropriate and evaluate response  12/21/2022 0349 by Palma Rodríguez RN  Outcome: Progressing  Flowsheets (Taken 12/20/2022 2000)  Verbalizes/displays adequate comfort level or baseline comfort level: Encourage patient to monitor pain and request assistance     Problem: Confusion  Goal: Confusion, delirium, dementia, or psychosis is improved or at baseline  Description: INTERVENTIONS:  1. Assess for possible contributors to thought disturbance, including medications, impaired vision or hearing, underlying metabolic abnormalities, dehydration, psychiatric diagnoses, and notify attending LIP  2. [place] high risk fall precautions, as indicated  3. Provide frequent short contacts to provide reality reorientation, refocusing and direction  4. Decrease environmental stimuli, including noise as appropriate  5. Monitor and intervene to maintain adequate nutrition, hydration, elimination, sleep and activity  6. If unable to ensure safety without constant attention obtain sitter and review sitter guidelines with assigned personnel  7.  Initiate Psychosocial CNS and Spiritual Care consult, as indicated  12/21/2022 0901 by Andie Quinones RN  Outcome: Progressing  Flowsheets (Taken 12/21/2022 0800)  Effect of thought disturbance (confusion, delirium, dementia, or psychosis) are managed with adequate functional status:   [place] high risk fall precautions, as indicated   Provide frequent short contacts to provide reality reorientation, refocusing and direction   Decrease environmental stimuli, including noise as appropriate  12/21/2022 0349 by Annalisa Smalls RN  Outcome: Progressing     Problem: Respiratory - Adult  Goal: Achieves optimal ventilation and oxygenation  12/21/2022 0901 by Marlen King RN  Outcome: Progressing  Flowsheets (Taken 12/21/2022 0800)  Achieves optimal ventilation and oxygenation:   Assess for changes in respiratory status   Position to facilitate oxygenation and minimize respiratory effort   Oxygen supplementation based on oxygen saturation or arterial blood gases  12/21/2022 0737 by Shazia Riley RCP  Outcome: Progressing     Problem: Nutrition Deficit:  Goal: Optimize nutritional status  Outcome: Progressing     Problem: Risk for Elopement  Goal: Patient will not exit the unit/facility without proper excort  12/21/2022 0901 by Marlen King RN  Outcome: Progressing  Flowsheets (Taken 12/21/2022 0800)  Nursing Interventions for Elopement Risk:   Reduce environmental triggers   Collaborate with treatment team for drug withdrawal symptoms treatment  12/21/2022 0349 by Annalisa Smalls RN  Outcome: Progressing

## 2022-12-21 NOTE — PROGRESS NOTES
INTENSIVE CARE UNIT  Resident Physician Progress Note    Patient - Robson Ellison  Date of Admission -  12/15/2022 12:08 PM  Date of Evaluation -  2022  Room and Bed Number -  5834/2736-31   Hospital Day - 6    HPI:     32year old who became unresponsive while with her boyfriend, EMS was called. Patient got Narcan with minimal response. Intubated due to GCS of 3 and airway protection. Initially placed on propofol. In ED LFTs were elevated, CT cervical spine unremarkable, CT head negative, CXR unremarkable. Alcohol level 266, UDS negative. Found to be covid positive. : Tolerating CPAP trials. Following command both on and off sedation. Off propofol. : Weaning versed as tolerated. Librium increased. SUBJECTIVE:     OVERNIGHT EVENTS:    Versed weaned to 2. Librium increased to 100 TID yesterday.     AWAKE & FOLLOWING COMMANDS:  [] No   [x] Yes    SECRETIONS Amount:  [] Small [x] Moderate  [] Large  [] None    Color:     [x] White [] Colored  [] Bloody    SEDATION:  RAAS Score:  [] Propofol gtt  [x] Versed gtt  [] Ativan gtt   [] No Sedation    PARALYZED:  [x] No    [] Yes    VASOPRESSORS:  [x] No    [] Yes  [] Levophed [] Dopamine [] Vasopressin  [] Dobutamine [] Phenylephrine [] Epinephrine      OBJECTIVE:     VITAL SIGNS:  BP 98/62   Pulse 80   Temp 99.1 °F (37.3 °C) (Bladder)   Resp 12   Ht 5' 5\" (1.651 m)   Wt 114 lb 6.7 oz (51.9 kg)   SpO2 100%   BMI 19.04 kg/m²   Tmax over 24 hours:  Temp (24hrs), Av.8 °F (37.1 °C), Min:97 °F (36.1 °C), Max:100.6 °F (38.1 °C)      Patient Vitals for the past 8 hrs:   BP Temp Temp src Pulse Resp SpO2 Weight   22 0600 98/62 99.1 °F (37.3 °C) Bladder 80 12 100 % --   22 0500 (!) 95/58 -- -- 81 14 100 % --   22 0448 -- -- -- -- -- -- 114 lb 6.7 oz (51.9 kg)   22 0400 (!) 91/55 99 °F (37.2 °C) Bladder 78 14 99 % --   22 0324 -- -- -- 79 14 99 % --   22 0300 (!) 92/57 -- -- 79 14 100 % --   22 0200 101/61 98.6 °F (37 °C) Bladder 79 15 100 % --   12/21/22 0100 95/64 -- -- 73 14 100 % --   12/21/22 0000 95/64 98.4 °F (36.9 °C) Bladder 75 14 100 % --   12/20/22 2327 -- -- -- 74 14 100 % --   12/20/22 2300 100/69 -- -- 73 14 100 % --         Intake/Output Summary (Last 24 hours) at 12/21/2022 0646  Last data filed at 12/21/2022 0601  Gross per 24 hour   Intake 2340.54 ml   Output 3460 ml   Net -1119.46 ml     Date 12/21/22 0000 - 12/21/22 2359   Shift 1937-8012 6941-0883 3380-1405 24 Hour Total   INTAKE   I.V.(mL/kg) 317. 2(6.1)   317. 2(6.1)   NG/GT(mL/kg) 213(4.1)   213(4.1)   Shift Total(mL/kg) 530. 2(10.2)   530. 2(10.2)   OUTPUT   Urine(mL/kg/hr) 290   290   Shift Total(mL/kg) 290(5.6)   290(5.6)   Weight (kg) 51.9 51.9 51.9 51.9     Wt Readings from Last 3 Encounters:   12/21/22 114 lb 6.7 oz (51.9 kg)   03/09/22 106 lb (48.1 kg)     Body mass index is 19.04 kg/m².         PHYSICAL EXAM:  GEN:                  Intubated, sedated  EYES:                pupils equal, round, and reactive to light  HEENT:            Normocepalic, without obvious abnormality  LUNGS:            good air exchange and clear to auscultation  CV:                     regular rate and rhythm and normal S1 and S2  ABDOMEN:     soft and non-distended  MSK:                  there is no redness, warmth, or swelling of the joints  NEURO[de-identified]           Intubated, sedated  SKIN:                 Normal skin color, texture, turgor  EXTREMITIES:  No pedal or leg edema, no calf tenderness/swelling, no erythema, distal pulses intact       MEDICATIONS:  Scheduled Meds:   chlordiazePOXIDE  100 mg Oral TID    multivitamin  1 tablet Per NG tube Daily    famotidine  20 mg Per NG tube BID    thiamine  100 mg Per NG tube Daily    folic acid  1 mg Per NG tube Daily    sodium chloride flush  5-40 mL IntraVENous 2 times per day    enoxaparin  30 mg SubCUTAneous Daily     Continuous Infusions:   sodium chloride 25 mL/hr at 12/21/22 0601    sodium chloride      midazolam 2 mg/hr (12/21/22 0601)    dexmedetomidine 1.5 mcg/kg/hr (12/21/22 0601)     PRN Meds:   midodrine, 5 mg, TID PRN  midazolam, 2 mg, Q2H PRN  sodium phosphate IVPB, 0.16 mmol/kg, PRN   Or  sodium phosphate IVPB, 0.32 mmol/kg, PRN  sodium chloride flush, 5-40 mL, PRN  sodium chloride, , PRN  ondansetron, 4 mg, Q8H PRN   Or  ondansetron, 4 mg, Q6H PRN  polyethylene glycol, 17 g, Daily PRN  acetaminophen, 650 mg, Q6H PRN   Or  acetaminophen, 650 mg, Q6H PRN        SUPPORT DEVICES: [x] Ventilator [] BIPAP  [] Nasal Cannula [] Room Air    VENT SETTINGS (Comprehensive) (if applicable):   PRVC mode, FiO2 30%, PEEP 5, Respiratory Rate 14, Tidal Volume 460  Vent Information  Ventilator ID: [id number]  Equipment Changed: HME, Expiratory Filter  Ventilator Initiate: Yes  Vent Mode: (S) AC/PRVC  Additional Respiratory Assessments  Heart Rate: 80  Resp: 12  SpO2: 100 %  End Tidal CO2: 30 (%)  Position: Semi-Chang's  Humidification Source: HME  Cuff Pressure (cm H2O):  (mov)    DATA:  Complete Blood Count:   Recent Labs     12/19/22 0322 12/20/22  0559 12/21/22  0409   WBC 4.3 8.0 6.9   RBC 2.52* 2.62* 2.54*   HGB 10.1* 10.1* 9.8*   HCT 30.0* 30.8* 30.7*   .0* 117.6* 120.9*   MCH 40.1* 38.5* 38.6*   MCHC 33.7 32.8 31.9   RDW 15.3* 15.4* 15.6*    See Reflexed IPF Result See Reflexed IPF Result   MPV 11.4  --   --         Last 3 Blood Glucose:   Recent Labs     12/19/22 0322 12/20/22  0559 12/21/22  0409   GLUCOSE 143* 123* 111*        PT/INR:    Lab Results   Component Value Date/Time    PROTIME 12.4 12/15/2022 12:28 PM    INR 1.2 12/15/2022 12:28 PM     PTT:  No results found for: APTT, PTT    Comprehensive Metabolic Profile:   Recent Labs     12/19/22  0322 12/20/22  0559 12/21/22  0409   * 134* 136   K 4.2 4.2 4.2    103 102   CO2 16* 20 21   BUN 3* 3* 4*   CREATININE 0.21* 0.23* 0.20*   GLUCOSE 143* 123* 111*   CALCIUM 8.2* 8.7 8.5*      Magnesium:   Lab Results   Component Value Date/Time    MG 1.9 12/21/2022 04:09 AM    MG 1.4 12/20/2022 05:59 AM    MG 1.7 12/19/2022 03:22 AM     Phosphorus:   Lab Results   Component Value Date/Time    PHOS 5.3 12/21/2022 04:09 AM    PHOS 4.7 12/20/2022 05:59 AM    PHOS 3.3 12/19/2022 03:22 AM     Ionized Calcium:   Lab Results   Component Value Date/Time    CAION 1.17 12/18/2022 05:54 AM    CAION 0.83 12/17/2022 04:24 AM        Urinalysis:   Lab Results   Component Value Date/Time    NITRU NEGATIVE 12/15/2022 12:49 PM    COLORU Dark Yellow 12/15/2022 12:49 PM    PHUR 6.0 12/15/2022 12:49 PM    WBCUA 0 TO 2 12/15/2022 12:49 PM    RBCUA 2 TO 5 12/15/2022 12:49 PM    MUCUS 3+ 03/09/2022 12:21 PM    TRICHOMONAS NOT REPORTED 09/20/2014 06:19 AM    YEAST NOT REPORTED 09/20/2014 06:19 AM    BACTERIA FEW 09/20/2014 06:19 AM    CLARITYU slightly cloudy 07/22/2014 11:58 AM    SPECGRAV 1.020 12/15/2022 12:49 PM    LEUKOCYTESUR NEGATIVE 12/15/2022 12:49 PM    UROBILINOGEN Normal 12/15/2022 12:49 PM    BILIRUBINUR NEGATIVE  Verified by ictotest. 12/15/2022 12:49 PM    BILIRUBINUR negative 07/22/2014 11:58 AM    BLOODU negative 07/22/2014 11:58 AM    GLUCOSEU NEGATIVE 12/15/2022 12:49 PM    KETUA SMALL 12/15/2022 12:49 PM    AMORPHOUS NOT REPORTED 09/20/2014 06:19 AM       HgBA1c:  No results found for: LABA1C  TSH:  No results found for: TSH  Lactic Acid: No results found for: LACTA   Troponin: No results for input(s): TROPONINI in the last 72 hours.     ASSESSMENT:     Patient Active Problem List    Diagnosis Date Noted    COVID-19 12/18/2022    Acute respiratory insufficiency 30/68/1901    Alcoholic intoxication with complication ([facility] [address] 22/47/5262    Altered mental status 12/18/2022    Drug overdose of undetermined intent, initial encounter 12/15/2022    Portal hypertension ([facility] [address] 03/10/2022    Hyperbilirubinemia 03/09/2022    Fatty liver 03/09/2022    Ascites due to alcoholic hepatitis 72/55/6005    Alcohol abuse 03/09/2022    S/P cholecystectomy 03/09/2022    Abnormal LFTs 03/09/2022 Hypokalemia 03/09/2022    Hypoalbuminemia 03/09/2022    Bipolar I disorder, most recent episode depressed ([facility] [address] 09/10/2014          PLAN:     Neuro  Versed gtt, weaning as tolerated - 2 mg/hr this AM  Precedex gtt  Librium increased to 100 mg TID  Hx EtOH abuse, daily thiamine and folate  Neuro checks per protocol  Hx bipolar disorder, home meds abilify and effexor     Cardiology  Hemodynamically stable, no pressor support  Midodrine 5 mg TID PRN     Pulmonary  Covid positive, ID consulted  Acute respiratory failure, intubated for airway protection  Pulmonary toilet  Tolerating SBTs     Renal  I/O: 3460  Benites in place  IV fluids: NS @ 25 ml/hr     GI  Hx EtOH use, +EtOH 266 on arrival  Hx transaminitis, improving  Monitor for EtOH withdrawal, seizure precautions  Daily thiamine and folate, multivitamin  Glycolax PRN  Ulcer Ppx: Pepcid BID  Tube feeding Diet NPO  ADULT TUBE FEEDING; Orogastric; Standard without Fiber; Continuous; 10; Yes; 10; Q 4 hours; 45; 30; Q 4 hours     ID  WBC 6.9  Covid positive  ID consulted    F.A[employer] [facility] [facility] B.I.D. Feeding Diet: Diet NPO  ADULT TUBE FEEDING; Orogastric; Standard without Fiber; Continuous; 10; Yes; 10; Q 4 hours; 45; 30; Q 4 hours  Fluids: NS @ 50 ml/hr, tube feeds @ 45 ml/hr  Analgesic: Tylenol prn  Sedation: Versed gtt, precedex  Thrombo-prophylaxis: [x] Enoxaparin, [] Unfract. Heparin Subcutaneously, [] EPC Cuffs  Mobility: PT/OT  Heads up: HOB 30  Ulcer prophylaxis: [] PPI Agent, [x] [id number], [] Sucralfate, [] Other:  Glycemic control: None, glucose  Spontaneous breathing trial: Tolerating CPAP trials   Bowel regimen/urine output: Glycolax prn/U out 3460  Indwelling catheter/lines: Peripheral Ivs, benites  De-escalation: to remain ICU      Romeo Dong M.D.   Emergency Medicine Resident, PGY-2  12/21/2022 6:46 AM

## 2022-12-22 LAB
ABSOLUTE EOS #: 0 K/UL (ref 0–0.4)
ABSOLUTE IMMATURE GRANULOCYTE: 0.05 K/UL (ref 0–0.3)
ABSOLUTE LYMPH #: 0.86 K/UL (ref 1–4.8)
ABSOLUTE MONO #: 0.18 K/UL (ref 0.1–0.8)
ALLEN TEST: POSITIVE
ANION GAP SERPL CALCULATED.3IONS-SCNC: 10 MMOL/L (ref 9–17)
BASOPHILS # BLD: 0 % (ref 0–2)
BASOPHILS ABSOLUTE: 0 K/UL (ref 0–0.2)
BUN BLDV-MCNC: 6 MG/DL (ref 6–20)
CALCIUM SERPL-MCNC: 8.6 MG/DL (ref 8.6–10.4)
CHLORIDE BLD-SCNC: 102 MMOL/L (ref 98–107)
CO2: 24 MMOL/L (ref 20–31)
CREAT SERPL-MCNC: 0.22 MG/DL (ref 0.5–0.9)
EOSINOPHILS RELATIVE PERCENT: 0 % (ref 1–4)
FIO2: 30
GFR SERPL CREATININE-BSD FRML MDRD: >60 ML/MIN/1.73M2
GLUCOSE BLD-MCNC: 162 MG/DL (ref 70–99)
GLUCOSE BLD-MCNC: 185 MG/DL (ref 74–100)
HCT VFR BLD CALC: 29.8 % (ref 36.3–47.1)
HEMOGLOBIN: 9.8 G/DL (ref 11.9–15.1)
IMMATURE GRANULOCYTES: 1 %
LYMPHOCYTES # BLD: 19 % (ref 24–44)
MAGNESIUM: 1.8 MG/DL (ref 1.6–2.6)
MCH RBC QN AUTO: 38 PG (ref 25.2–33.5)
MCHC RBC AUTO-ENTMCNC: 32.9 G/DL (ref 28.4–34.8)
MCV RBC AUTO: 115.5 FL (ref 82.6–102.9)
MODE: ABNORMAL
MONOCYTES # BLD: 4 % (ref 1–7)
MORPHOLOGY: ABNORMAL
NRBC AUTOMATED: 0 PER 100 WBC
O2 DEVICE/FLOW/%: ABNORMAL
PDW BLD-RTO: 15.1 % (ref 11.8–14.4)
PHOSPHORUS: 4.2 MG/DL (ref 2.6–4.5)
PLATELET # BLD: ABNORMAL K/UL (ref 138–453)
PLATELET, FLUORESCENCE: 157 K/UL (ref 138–453)
PLATELET, IMMATURE FRACTION: 9.4 % (ref 1.1–10.3)
POC HCO3: 24 MMOL/L (ref 21–28)
POC O2 SATURATION: 98 % (ref 94–98)
POC PCO2: 33.9 MM HG (ref 35–48)
POC PH: 7.46 (ref 7.35–7.45)
POC PO2: 95.9 MM HG (ref 83–108)
POSITIVE BASE EXCESS, ART: 0 (ref 0–3)
POTASSIUM SERPL-SCNC: 3.1 MMOL/L (ref 3.7–5.3)
RBC # BLD: 2.58 M/UL (ref 3.95–5.11)
SAMPLE SITE: ABNORMAL
SEG NEUTROPHILS: 76 % (ref 36–66)
SEGMENTED NEUTROPHILS ABSOLUTE COUNT: 3.41 K/UL (ref 1.8–7.7)
SODIUM BLD-SCNC: 136 MMOL/L (ref 135–144)
WBC # BLD: 4.5 K/UL (ref 3.5–11.3)

## 2022-12-22 PROCEDURE — 99291 CRITICAL CARE FIRST HOUR: CPT | Performed by: INTERNAL MEDICINE

## 2022-12-22 PROCEDURE — 6370000000 HC RX 637 (ALT 250 FOR IP): Performed by: INTERNAL MEDICINE

## 2022-12-22 PROCEDURE — 6370000000 HC RX 637 (ALT 250 FOR IP): Performed by: STUDENT IN AN ORGANIZED HEALTH CARE EDUCATION/TRAINING PROGRAM

## 2022-12-22 PROCEDURE — 2500000003 HC RX 250 WO HCPCS: Performed by: HEALTH CARE PROVIDER

## 2022-12-22 PROCEDURE — 2700000000 HC OXYGEN THERAPY PER DAY

## 2022-12-22 PROCEDURE — 6360000002 HC RX W HCPCS: Performed by: STUDENT IN AN ORGANIZED HEALTH CARE EDUCATION/TRAINING PROGRAM

## 2022-12-22 PROCEDURE — 85025 COMPLETE CBC W/AUTO DIFF WBC: CPT

## 2022-12-22 PROCEDURE — 6360000002 HC RX W HCPCS

## 2022-12-22 PROCEDURE — 2000000000 HC ICU R&B

## 2022-12-22 PROCEDURE — 80048 BASIC METABOLIC PNL TOTAL CA: CPT

## 2022-12-22 PROCEDURE — 94761 N-INVAS EAR/PLS OXIMETRY MLT: CPT

## 2022-12-22 PROCEDURE — 6360000002 HC RX W HCPCS: Performed by: HEALTH CARE PROVIDER

## 2022-12-22 PROCEDURE — 82803 BLOOD GASES ANY COMBINATION: CPT

## 2022-12-22 PROCEDURE — 83735 ASSAY OF MAGNESIUM: CPT

## 2022-12-22 PROCEDURE — 94003 VENT MGMT INPAT SUBQ DAY: CPT

## 2022-12-22 PROCEDURE — 36600 WITHDRAWAL OF ARTERIAL BLOOD: CPT

## 2022-12-22 PROCEDURE — 84100 ASSAY OF PHOSPHORUS: CPT

## 2022-12-22 PROCEDURE — 6370000000 HC RX 637 (ALT 250 FOR IP)

## 2022-12-22 PROCEDURE — 85055 RETICULATED PLATELET ASSAY: CPT

## 2022-12-22 PROCEDURE — 82947 ASSAY GLUCOSE BLOOD QUANT: CPT

## 2022-12-22 PROCEDURE — 2580000003 HC RX 258: Performed by: HEALTH CARE PROVIDER

## 2022-12-22 PROCEDURE — 36415 COLL VENOUS BLD VENIPUNCTURE: CPT

## 2022-12-22 RX ADMIN — CHLORDIAZEPOXIDE HYDROCHLORIDE 100 MG: 25 CAPSULE ORAL at 08:52

## 2022-12-22 RX ADMIN — HYDROCORTISONE SODIUM SUCCINATE 100 MG: 100 INJECTION, POWDER, FOR SOLUTION INTRAMUSCULAR; INTRAVENOUS at 11:45

## 2022-12-22 RX ADMIN — Medication 100 MG: at 08:52

## 2022-12-22 RX ADMIN — POTASSIUM BICARBONATE 40 MEQ: 782 TABLET, EFFERVESCENT ORAL at 20:21

## 2022-12-22 RX ADMIN — POTASSIUM BICARBONATE 40 MEQ: 782 TABLET, EFFERVESCENT ORAL at 08:52

## 2022-12-22 RX ADMIN — SODIUM CHLORIDE, PRESERVATIVE FREE 10 ML: 5 INJECTION INTRAVENOUS at 08:53

## 2022-12-22 RX ADMIN — FAMOTIDINE 20 MG: 20 TABLET, FILM COATED ORAL at 04:26

## 2022-12-22 RX ADMIN — HYDROCORTISONE SODIUM SUCCINATE 100 MG: 100 INJECTION, POWDER, FOR SOLUTION INTRAMUSCULAR; INTRAVENOUS at 18:27

## 2022-12-22 RX ADMIN — CHLORDIAZEPOXIDE HYDROCHLORIDE 100 MG: 25 CAPSULE ORAL at 15:42

## 2022-12-22 RX ADMIN — FAMOTIDINE 20 MG: 20 TABLET, FILM COATED ORAL at 08:52

## 2022-12-22 RX ADMIN — CHLORDIAZEPOXIDE HYDROCHLORIDE 100 MG: 25 CAPSULE ORAL at 20:21

## 2022-12-22 RX ADMIN — FAMOTIDINE 20 MG: 20 TABLET, FILM COATED ORAL at 22:13

## 2022-12-22 RX ADMIN — FOLIC ACID 1 MG: 1 TABLET ORAL at 08:52

## 2022-12-22 RX ADMIN — DEXMEDETOMIDINE HYDROCHLORIDE 1.4 MCG/KG/HR: 4 INJECTION, SOLUTION INTRAVENOUS at 16:23

## 2022-12-22 RX ADMIN — MIDAZOLAM HYDROCHLORIDE 2 MG: 2 INJECTION, SOLUTION INTRAMUSCULAR; INTRAVENOUS at 11:44

## 2022-12-22 RX ADMIN — HYDROCORTISONE SODIUM SUCCINATE 100 MG: 100 INJECTION, POWDER, FOR SOLUTION INTRAMUSCULAR; INTRAVENOUS at 04:27

## 2022-12-22 RX ADMIN — DEXMEDETOMIDINE HYDROCHLORIDE 1.4 MCG/KG/HR: 4 INJECTION, SOLUTION INTRAVENOUS at 10:52

## 2022-12-22 RX ADMIN — Medication 4 MG/HR: at 04:48

## 2022-12-22 RX ADMIN — ALCOHOL 1 TABLET: 70.47 GEL TOPICAL at 08:52

## 2022-12-22 RX ADMIN — DEXMEDETOMIDINE HYDROCHLORIDE 1.4 MCG/KG/HR: 4 INJECTION, SOLUTION INTRAVENOUS at 04:48

## 2022-12-22 RX ADMIN — DEXMEDETOMIDINE HYDROCHLORIDE 1.4 MCG/KG/HR: 4 INJECTION, SOLUTION INTRAVENOUS at 22:15

## 2022-12-22 RX ADMIN — ENOXAPARIN SODIUM 30 MG: 100 INJECTION SUBCUTANEOUS at 08:53

## 2022-12-22 ASSESSMENT — PULMONARY FUNCTION TESTS
PIF_VALUE: 19
PIF_VALUE: 18
PIF_VALUE: 13
PIF_VALUE: 13
PIF_VALUE: 16

## 2022-12-22 NOTE — PLAN OF CARE
Problem: Discharge Planning  Goal: Discharge to home or other facility with appropriate resources  Outcome: Progressing     Problem: Skin/Tissue Integrity  Goal: Absence of new skin breakdown  Description: 1. Monitor for areas of redness and/or skin breakdown  2. Assess vascular access sites hourly  3. Every 4-6 hours minimum:  Change oxygen saturation probe site  4. Every 4-6 hours:  If on nasal continuous positive airway pressure, respiratory therapy assess nares and determine need for appliance change or resting period. Outcome: Progressing     Problem: ABCDS Injury Assessment  Goal: Absence of physical injury  Outcome: Progressing     Problem: Safety - Adult  Goal: Free from fall injury  Outcome: Progressing     Problem: Pain  Goal: Verbalizes/displays adequate comfort level or baseline comfort level  Outcome: Progressing     Problem: Confusion  Goal: Confusion, delirium, dementia, or psychosis is improved or at baseline  Description: INTERVENTIONS:  1. Assess for possible contributors to thought disturbance, including medications, impaired vision or hearing, underlying metabolic abnormalities, dehydration, psychiatric diagnoses, and notify attending LIP  2. [place] high risk fall precautions, as indicated  3. Provide frequent short contacts to provide reality reorientation, refocusing and direction  4. Decrease environmental stimuli, including noise as appropriate  5. Monitor and intervene to maintain adequate nutrition, hydration, elimination, sleep and activity  6. If unable to ensure safety without constant attention obtain sitter and review sitter guidelines with assigned personnel  7.  Initiate Psychosocial CNS and Spiritual Care consult, as indicated  Outcome: Progressing     Problem: Respiratory - Adult  Goal: Achieves optimal ventilation and oxygenation  12/22/2022 1841 by Tiffanie Swan RN  Outcome: Progressing  12/22/2022 0735 by Mary Flores RCP  Outcome: Progressing     Problem: Nutrition Deficit:  Goal: Optimize nutritional status  12/22/2022 1841 by Margie Jackson RN  Outcome: Progressing  12/22/2022 1428 by Santhosh Shepherd RD, LD  Flowsheets (Taken 12/22/2022 1418)  Nutrient intake appropriate for improving, restoring, or maintaining nutritional needs: Recommend, monitor, and adjust tube feedings and TPN/PPN based on assessed needs     Problem: Risk for Elopement  Goal: Patient will not exit the unit/facility without proper excort  Outcome: Progressing

## 2022-12-22 NOTE — CARE COORDINATION
Transitional planning. Intubated/ Sedated FiO2 30%, PEEP of 5, NG for TF, follows commands. May extubate tomorrow.

## 2022-12-22 NOTE — PROGRESS NOTES
Comprehensive Nutrition Assessment    Type and Reason for Visit:  Reassess    Nutrition Recommendations/Plan:   Continue current TF of Standard without Fiber formula at goal 45 mL/hr. Monitor TF tolerance/adequacy of intakes - modify as needed. Will monitor labs, weights, and plan of care. Malnutrition Assessment:  Malnutrition Status:  Insufficient data (12/22/22 5450)    Context:  Acute Illness     Findings of the 6 clinical characteristics of malnutrition:  Energy Intake: Mild decrease in energy intake - Improved with start of nutrition support. Weight Loss:  Unable to assess - Weight fluctuations since admission noted. Body Fat Loss:  Mild body fat loss Orbital   Muscle Mass Loss: Unable to fully assess as covered by blankets and under droplet precautions. Fluid Accumulation:  No significant fluid accumulation   Strength:  Not Performed    Nutrition Assessment:    Pt remains intubated and sedated. Tolerating Standard without Fiber TF at goal rate of 45 mL/hr without issues. No recorded BM since admission. Labs reviewed: K 3.1 mmol/L, Glucose 162-185 mg/dL. Meds reviewed: Folic Acid, Solu-Cortef, MVI, Effer-K, Thiamine, Glycolax PRN. Nutrition Related Findings:    Labs/Meds reviewed. No recorded BM since admission. Wound Type: None       Current Nutrition Intake & Therapies:    Average Meal Intake: NPO  Average Supplements Intake: NPO  Diet NPO  ADULT TUBE FEEDING; Orogastric; Standard without Fiber; Continuous; 10; Yes; 10; Q 4 hours; 45; 30; Q 4 hours  Current Tube Feeding (TF) Orders:  Feeding Route: Nasogastric  Formula: Standard without Fiber  Schedule: Continuous  Feeding Regimen: 45 mL/hr  Additives/Modulars: None  Water Flushes: 30 mL every 4 hrs  Current TF & Flush Orders Provides: At 45 mL/hr = 1296 kcals, 60 gm pro/day  Goal TF & Flush Orders Provides: Standard without Fiber (Osmolite 1.2) goal 45 mL/hr = 1296 kcals, 60 gm pro/day.     Anthropometric Measures:  Height: 5' 5\" (165.1 cm)  Ideal Body Weight (IBW): 125 lbs (57 kg)    Admission Body Weight: 102 lb 15.3 oz (46.7 kg)  Current Body Weight: 114 lb 6.7 oz (51.9 kg), 91.5 % IBW. Weight Source: Bed Scale  Current BMI (kg/m2): 19                          BMI Categories: Normal Weight (BMI 18.5-24. 9)    Estimated Daily Nutrient Needs:  Energy Requirements Based On: Kcal/kg  Weight Used for Energy Requirements: Admission  Energy (kcal/day): 0783-9950 kcals/day  Weight Used for Protein Requirements: Admission  Protein (g/day): 60-75 gm pro/day  Method Used for Fluid Requirements: Other (Comment)  Fluid (ml/day): per MD    Nutrition Diagnosis:   Inadequate oral intake related to impaired respiratory function as evidenced by NPO or clear liquid status due to medical condition, nutrition support - enteral nutrition    Nutrition Interventions:   Food and/or Nutrient Delivery: Continue Current Tube Feeding  Nutrition Education/Counseling: No recommendation at this time  Coordination of Nutrition Care: Continue to monitor while inpatient  Plan of Care discussed with: RN    Goals:  Previous Goal Met: Goal(s) Achieved  Goals: Meet at least 75% of estimated needs, prior to discharge       Nutrition Monitoring and Evaluation:   Behavioral-Environmental Outcomes: None Identified  Food/Nutrient Intake Outcomes: Enteral Nutrition Intake/Tolerance  Physical Signs/Symptoms Outcomes: Biochemical Data, GI Status, Fluid Status or Edema, Nutrition Focused Physical Findings, Skin, Weight    Discharge Planning:     Too soon to determine     Sal Stacy RD, LD  Contact: [phone]

## 2022-12-22 NOTE — PROGRESS NOTES
INTENSIVE CARE UNIT  Resident Physician Progress Note    Patient - Sulaiman Barron  Date of Admission -  12/15/2022 12:08 PM  Date of Evaluation -  2022  Room and Bed Number -  6643/7950-93   Hospital Day - 7    HPI:     32year old who became unresponsive while with her boyfriend, EMS was called. Patient got Narcan with minimal response. Intubated due to GCS of 3 and airway protection. Initially placed on propofol. In ED LFTs were elevated, CT cervical spine unremarkable, CT head negative, CXR unremarkable. Alcohol level 266, UDS negative. Found to be covid positive. : Tolerating CPAP trials. Following command both on and off sedation. Off propofol. : Weaning versed as tolerated. Librium increased. : Young removed. SUBJECTIVE:     OVERNIGHT EVENTS:    Yougn removed, external catheter placed.     AWAKE & FOLLOWING COMMANDS:  [] No   [x] Yes    SECRETIONS Amount:  [] Small [x] Moderate  [] Large [] None    Color:     [x] White [] Colored  [] Bloody    SEDATION:  RAAS Score:  [] Propofol gtt  [x] Versed gtt  [] Ativan gtt   [] No Sedation    PARALYZED:  [x] No    [] Yes    VASOPRESSORS:  [x] No    [] Yes  [] Levophed [] Dopamine [] Vasopressin  [] Dobutamine [] Phenylephrine [] Epinephrine      OBJECTIVE:     VITAL SIGNS:  BP (!) 132/90   Pulse 73   Temp 98.1 °F (36.7 °C) (Oral)   Resp 13   Ht 5' 5\" (1.651 m)   Wt 114 lb 6.7 oz (51.9 kg)   SpO2 99%   BMI 19.04 kg/m²   Tmax over 24 hours:  Temp (24hrs), Av.3 °F (36.8 °C), Min:98.1 °F (36.7 °C), Max:98.5 °F (36.9 °C)      Patient Vitals for the past 8 hrs:   BP Temp Temp src Pulse Resp SpO2   22 0731 -- -- -- 73 13 99 %   22 0615 -- -- -- 77 15 97 %   22 0600 (!) 132/90 -- -- 71 18 100 %   22 0545 -- -- -- 63 18 100 %   22 0530 101/65 -- -- (!) 44 14 100 %   22 0515 -- -- -- 50 14 100 %   22 0500 103/70 -- -- 52 15 100 %   22 0445 -- -- -- 57 16 100 %   22 0443 -- -- -- 57 15 100 %   12/22/22 0430 125/82 -- -- 78 15 100 %   12/22/22 0415 113/83 -- -- 78 17 100 %   12/22/22 0400 -- 98.1 °F (36.7 °C) Oral 88 23 100 %   12/22/22 0345 -- -- -- 62 16 100 %   12/22/22 0334 -- -- -- (!) 48 14 100 %   12/22/22 0330 (!) 95/58 -- -- (!) 44 14 100 %   12/22/22 0315 -- -- -- 52 14 100 %   12/22/22 0300 99/60 -- -- (!) 49 14 100 %   12/22/22 0245 -- -- -- 52 14 100 %   12/22/22 0230 105/70 -- -- 56 15 100 %   12/22/22 0215 -- -- -- 85 20 100 %   12/22/22 0200 117/86 -- -- 83 18 100 %   12/22/22 0145 -- -- -- 87 18 100 %   12/22/22 0130 116/81 -- -- 80 16 100 %   12/22/22 0115 -- -- -- (!) 44 14 100 %   12/22/22 0100 (!) 99/59 -- -- (!) 46 14 100 %   12/22/22 0045 -- -- -- (!) 47 14 100 %   12/22/22 0030 (!) 98/59 -- -- (!) 43 14 100 %   12/22/22 0015 -- -- -- (!) 45 14 100 %   12/22/22 0000 101/69 98.3 °F (36.8 °C) Oral 53 14 100 %   12/21/22 2345 -- -- -- 60 14 98 %         Intake/Output Summary (Last 24 hours) at 12/22/2022 0742  Last data filed at 12/22/2022 0636  Gross per 24 hour   Intake 2384.89 ml   Output 1950 ml   Net 434.89 ml     Date 12/22/22 0000 - 12/22/22 2359   Shift 7503-2868 1449-0637 6171-3168 24 Hour Total   INTAKE   I.V.(mL/kg) 536. 7(10.3)   536. 7(10.3)   NG/GT(mL/kg) 390(7.5)   390(7.5)   Shift Total(mL/kg) 926.7(17.9)   926.7(17.9)   OUTPUT   Urine(mL/kg/hr) 400   400   Shift Total(mL/kg) 400(7.7)   400(7.7)   Weight (kg) 51.9 51.9 51.9 51.9     Wt Readings from Last 3 Encounters:   12/21/22 114 lb 6.7 oz (51.9 kg)   03/09/22 106 lb (48.1 kg)     Body mass index is 19.04 kg/m².         PHYSICAL EXAM:  GEN:                  Intubated, sedated  EYES:                pupils equal, round, and reactive to light  HEENT:            Normocepalic, without obvious abnormality  LUNGS:            good air exchange and clear to auscultation  CV:                     regular rate and rhythm and normal S1 and S2  ABDOMEN:     soft and non-distended  MSK:                  there is no redness, warmth, or swelling of the joints  NEURO::           Intubated, sedated  SKIN:                 Normal skin color, texture, turgor  EXTREMITIES:  No pedal or leg edema, no calf tenderness/swelling, no erythema, distal pulses intact        MEDICATIONS:  Scheduled Meds:   hydrocortisone sodium succinate PF  100 mg IntraVENous Q8H    chlordiazePOXIDE  100 mg Oral TID    multivitamin  1 tablet Per NG tube Daily    famotidine  20 mg Per NG tube BID    thiamine  100 mg Per NG tube Daily    folic acid  1 mg Per NG tube Daily    sodium chloride flush  5-40 mL IntraVENous 2 times per day    enoxaparin  30 mg SubCUTAneous Daily     Continuous Infusions:   sodium chloride 25 mL/hr at 12/22/22 0636    sodium chloride      midazolam 4 mg/hr (12/22/22 0636)    dexmedetomidine 1.4 mcg/kg/hr (12/22/22 0636)     PRN Meds:   midazolam, 2 mg, Q2H PRN  midodrine, 5 mg, TID PRN  sodium phosphate IVPB, 0.16 mmol/kg, PRN   Or  sodium phosphate IVPB, 0.32 mmol/kg, PRN  sodium chloride flush, 5-40 mL, PRN  sodium chloride, , PRN  ondansetron, 4 mg, Q8H PRN   Or  ondansetron, 4 mg, Q6H PRN  polyethylene glycol, 17 g, Daily PRN  acetaminophen, 650 mg, Q6H PRN   Or  acetaminophen, 650 mg, Q6H PRN        SUPPORT DEVICES: [x] Ventilator [] BIPAP  [] Nasal Cannula [] Room Air    VENT SETTINGS (Comprehensive) (if applicable):  PRVC mode, FiO2 30%, PEEP 5, Respiratory Rate 14, Tidal Volume 460  Vent Information  Ventilator ID: [id number]  Equipment Changed: Expiratory Filter  Ventilator Initiate: Yes  Vent Mode: (S) AC/PRVC  Additional Respiratory Assessments  Heart Rate: 73  Resp: 13  SpO2: 99 %  End Tidal CO2: 34 (%)  Position: Semi-Chang's  Humidification Source: HME  Cuff Pressure (cm H2O):  (mov)    ABGs:   Arterial Blood Gas result:  pH 7.459; pCO2 33.9; pO2 95.9; HCO3 24.  Lab Results   Component Value Date/Time    FIO2 30.0 12/22/2022 04:18 AM         DATA:  Complete Blood Count:   Recent Labs     12/20/22  0559 12/21/22  3752  12/22/22  0535   WBC 8.0 6.9 4.5   RBC 2.62* 2.54* 2.58*   HGB 10.1* 9.8* 9.8*   HCT 30.8* 30.7* 29.8*   .6* 120.9* 115.5*   MCH 38.5* 38.6* 38.0*   MCHC 32.8 31.9 32.9   RDW 15.4* 15.6* 15.1*   PLT See Reflexed IPF Result See Reflexed IPF Result See Reflexed IPF Result        Last 3 Blood Glucose:   Recent Labs     12/20/22  0559 12/21/22  0409 12/22/22  0535   GLUCOSE 123* 111* 162*        PT/INR:    Lab Results   Component Value Date/Time    PROTIME 12.4 12/15/2022 12:28 PM    INR 1.2 12/15/2022 12:28 PM     PTT:  No results found for: APTT, PTT    Comprehensive Metabolic Profile:   Recent Labs     12/20/22  0559 12/21/22  0409 12/22/22  0535   * 136 136   K 4.2 4.2 3.1*    102 102   CO2 20 21 24   BUN 3* 4* 6   CREATININE 0.23* 0.20* 0.22*   GLUCOSE 123* 111* 162*   CALCIUM 8.7 8.5* 8.6      Magnesium:   Lab Results   Component Value Date/Time    MG 1.8 12/22/2022 05:35 AM    MG 1.9 12/21/2022 04:09 AM    MG 1.4 12/20/2022 05:59 AM     Phosphorus:   Lab Results   Component Value Date/Time    PHOS 4.2 12/22/2022 05:35 AM    PHOS 5.3 12/21/2022 04:09 AM    PHOS 4.7 12/20/2022 05:59 AM     Ionized Calcium:   Lab Results   Component Value Date/Time    CAION 1.17 12/18/2022 05:54 AM    CAION 0.83 12/17/2022 04:24 AM        Urinalysis:   Lab Results   Component Value Date/Time    NITRU NEGATIVE 12/15/2022 12:49 PM    COLORU Dark Yellow 12/15/2022 12:49 PM    PHUR 6.0 12/15/2022 12:49 PM    WBCUA 0 TO 2 12/15/2022 12:49 PM    RBCUA 2 TO 5 12/15/2022 12:49 PM    MUCUS 3+ 03/09/2022 12:21 PM    TRICHOMONAS NOT REPORTED 09/20/2014 06:19 AM    YEAST NOT REPORTED 09/20/2014 06:19 AM    BACTERIA FEW 09/20/2014 06:19 AM    CLARITYU slightly cloudy 07/22/2014 11:58 AM    SPECGRAV 1.020 12/15/2022 12:49 PM    LEUKOCYTESUR NEGATIVE 12/15/2022 12:49 PM    UROBILINOGEN Normal 12/15/2022 12:49 PM    BILIRUBINUR NEGATIVE  Verified by ictotest. 12/15/2022 12:49 PM    BILIRUBINUR negative 07/22/2014 11:58 AM BLOODU negative 07/22/2014 11:58 AM    GLUCOSEU NEGATIVE 12/15/2022 12:49 PM    KETUA SMALL 12/15/2022 12:49 PM    AMORPHOUS NOT REPORTED 09/20/2014 06:19 AM       HgBA1c:  No results found for: LABA1C  TSH:  No results found for: TSH  Lactic Acid: No results found for: LACTA   Troponin: No results for input(s): TROPONINI in the last 72 hours.      ASSESSMENT:     Patient Active Problem List    Diagnosis Date Noted    COVID-19 12/18/2022    Acute respiratory insufficiency 12/18/2022    Alcoholic intoxication with complication (HCC) 12/18/2022    Altered mental status 12/18/2022    Drug overdose of undetermined intent, initial encounter 12/15/2022    Portal hypertension (HCC) 03/10/2022    Hyperbilirubinemia 03/09/2022    Fatty liver 03/09/2022    Ascites due to alcoholic hepatitis 03/09/2022    Alcohol abuse 03/09/2022    S/P cholecystectomy 03/09/2022    Abnormal LFTs 03/09/2022    Hypokalemia 03/09/2022    Hypoalbuminemia 03/09/2022    Bipolar I disorder, most recent episode depressed (HCC) 09/10/2014          PLAN:     Neuro  Versed gtt, weaning as tolerated  Precedex gtt  Librium 100 mg TID  Hx EtOH abuse, daily thiamine and folate  Neuro checks per protocol  Hx bipolar disorder, home meds abilify and effexor     Cardiology  Hemodynamically stable, no pressor support  Midodrine 5 mg TID PRN     Pulmonary  Covid positive, ID consulted  PRVC 14/460/5/30  ABG 12/22 7.459/33.9/95.9/24  Solucortef 100 q8  Acute respiratory failure, intubated for airway protection  12/21 - no air leak when ET cuff deflated     Renal  I/O:1950  Young removed 12/21, external catheter in place  IV fluids: NS @ 25 ml/hr     GI  Hx EtOH use, +EtOH 266 on arrival  Hx transaminitis, improving  Monitor for EtOH withdrawal, seizure precautions  Daily thiamine and folate, multivitamin  Glycolax PRN  Ulcer Ppx: Pepcid BID  Tube feeding Diet NPO  ADULT TUBE FEEDING; Orogastric; Standard without Fiber; Continuous; 10; Yes; 10; Q 4 hours; 45; 30; Q  4 hours     ID  WBC 4.5  Covid positive  ID consulted    KARAN GRAVESGUnrulyS. B.I.D. Feeding Diet: Diet NPO  ADULT TUBE FEEDING; Orogastric; Standard without Fiber; Continuous; 10; Yes; 10; Q 4 hours; 45; 30; Q 4 hours  Fluids: NS @ 25 ml/hr, versed gtt, precedex gtt, TF @ 45 ml/hr  Analgesic: Tylenol  Sedation: versed   Thrombo-prophylaxis: [x] Enoxaparin, [] Unfract. Heparin Subcutaneously, [] EPC Cuffs  Mobility: PT  Heads up: Head up  Ulcer prophylaxis: [] PPI Agent, [x] [id number], [] Sucralfate, [] Other:  Glycemic control: None, glucose 162  Spontaneous breathing trial: No air leak when ET cuff deflated   Bowel regimen/urine output: Glycolax prn / u out 1950  Indwelling catheter/lines: Peripheral IVs  De-escalation: Continue to monitor for preparedness for extubation v T&P      Bhavana Alonso M.D.   Emergency Medicine Resident, PGY-2  12/22/2022 7:42 AM

## 2022-12-22 NOTE — PLAN OF CARE
Problem: Discharge Planning  Goal: Discharge to home or other facility with appropriate resources  Outcome: Progressing     Problem: Safety - Adult  Goal: Free from fall injury  Outcome: Progressing     Problem: Pain  Goal: Verbalizes/displays adequate comfort level or baseline comfort level  Outcome: Progressing     Problem: Confusion  Goal: Confusion, delirium, dementia, or psychosis is improved or at baseline  Description: INTERVENTIONS:  1. Assess for possible contributors to thought disturbance, including medications, impaired vision or hearing, underlying metabolic abnormalities, dehydration, psychiatric diagnoses, and notify attending LIP  2. [place] high risk fall precautions, as indicated  3. Provide frequent short contacts to provide reality reorientation, refocusing and direction  4. Decrease environmental stimuli, including noise as appropriate  5. Monitor and intervene to maintain adequate nutrition, hydration, elimination, sleep and activity  6. If unable to ensure safety without constant attention obtain sitter and review sitter guidelines with assigned personnel  7.  Initiate Psychosocial CNS and Spiritual Care consult, as indicated  Outcome: Progressing     Problem: Respiratory - Adult  Goal: Achieves optimal ventilation and oxygenation  Outcome: Progressing

## 2022-12-22 NOTE — PLAN OF CARE
Problem: Respiratory - Adult  Goal: Achieves optimal ventilation and oxygenation  12/22/2022 0735 by Miryam Hadley RCP  Outcome: Progressing  12/22/2022 0027 by Rachel Lane RN  Outcome: Progressing

## 2022-12-23 LAB
ABSOLUTE EOS #: 0 K/UL (ref 0–0.4)
ABSOLUTE IMMATURE GRANULOCYTE: 0.06 K/UL (ref 0–0.3)
ABSOLUTE LYMPH #: 0.85 K/UL (ref 1–4.8)
ABSOLUTE MONO #: 0.73 K/UL (ref 0.1–0.8)
ALLEN TEST: POSITIVE
ANION GAP SERPL CALCULATED.3IONS-SCNC: 10 MMOL/L (ref 9–17)
BASOPHILS # BLD: 0 % (ref 0–2)
BASOPHILS ABSOLUTE: 0 K/UL (ref 0–0.2)
BUN BLDV-MCNC: 7 MG/DL (ref 6–20)
CALCIUM SERPL-MCNC: 8.3 MG/DL (ref 8.6–10.4)
CHLORIDE BLD-SCNC: 103 MMOL/L (ref 98–107)
CO2: 24 MMOL/L (ref 20–31)
CREAT SERPL-MCNC: 0.28 MG/DL (ref 0.5–0.9)
EOSINOPHILS RELATIVE PERCENT: 0 % (ref 1–4)
FIO2: 30
GFR SERPL CREATININE-BSD FRML MDRD: >60 ML/MIN/1.73M2
GLUCOSE BLD-MCNC: 115 MG/DL (ref 70–99)
GLUCOSE BLD-MCNC: 144 MG/DL (ref 74–100)
HCT VFR BLD CALC: 26.5 % (ref 36.3–47.1)
HEMOGLOBIN: 8.7 G/DL (ref 11.9–15.1)
IMMATURE GRANULOCYTES: 1 %
LYMPHOCYTES # BLD: 14 % (ref 24–44)
MCH RBC QN AUTO: 38.3 PG (ref 25.2–33.5)
MCHC RBC AUTO-ENTMCNC: 32.8 G/DL (ref 28.4–34.8)
MCV RBC AUTO: 116.7 FL (ref 82.6–102.9)
MODE: ABNORMAL
MONOCYTES # BLD: 12 % (ref 1–7)
MORPHOLOGY: ABNORMAL
MORPHOLOGY: ABNORMAL
NRBC AUTOMATED: 0 PER 100 WBC
O2 DEVICE/FLOW/%: ABNORMAL
PDW BLD-RTO: 15.5 % (ref 11.8–14.4)
PLATELET # BLD: 163 K/UL (ref 138–453)
PMV BLD AUTO: 11.3 FL (ref 8.1–13.5)
POC HCO3: 24.9 MMOL/L (ref 21–28)
POC O2 SATURATION: 99 % (ref 94–98)
POC PCO2: 29.3 MM HG (ref 35–48)
POC PH: 7.54 (ref 7.35–7.45)
POC PO2: 108.4 MM HG (ref 83–108)
POSITIVE BASE EXCESS, ART: 3 (ref 0–3)
POTASSIUM SERPL-SCNC: 3.2 MMOL/L (ref 3.7–5.3)
RBC # BLD: 2.27 M/UL (ref 3.95–5.11)
SAMPLE SITE: ABNORMAL
SEG NEUTROPHILS: 73 % (ref 36–66)
SEGMENTED NEUTROPHILS ABSOLUTE COUNT: 4.46 K/UL (ref 1.8–7.7)
SODIUM BLD-SCNC: 137 MMOL/L (ref 135–144)
WBC # BLD: 6.1 K/UL (ref 3.5–11.3)

## 2022-12-23 PROCEDURE — 94003 VENT MGMT INPAT SUBQ DAY: CPT

## 2022-12-23 PROCEDURE — 85025 COMPLETE CBC W/AUTO DIFF WBC: CPT

## 2022-12-23 PROCEDURE — 2500000003 HC RX 250 WO HCPCS

## 2022-12-23 PROCEDURE — 80048 BASIC METABOLIC PNL TOTAL CA: CPT

## 2022-12-23 PROCEDURE — 36600 WITHDRAWAL OF ARTERIAL BLOOD: CPT

## 2022-12-23 PROCEDURE — 2000000000 HC ICU R&B

## 2022-12-23 PROCEDURE — 36415 COLL VENOUS BLD VENIPUNCTURE: CPT

## 2022-12-23 PROCEDURE — 6370000000 HC RX 637 (ALT 250 FOR IP): Performed by: STUDENT IN AN ORGANIZED HEALTH CARE EDUCATION/TRAINING PROGRAM

## 2022-12-23 PROCEDURE — 6360000002 HC RX W HCPCS: Performed by: STUDENT IN AN ORGANIZED HEALTH CARE EDUCATION/TRAINING PROGRAM

## 2022-12-23 PROCEDURE — 6360000002 HC RX W HCPCS

## 2022-12-23 PROCEDURE — 6360000002 HC RX W HCPCS: Performed by: HEALTH CARE PROVIDER

## 2022-12-23 PROCEDURE — 82803 BLOOD GASES ANY COMBINATION: CPT

## 2022-12-23 PROCEDURE — 2580000003 HC RX 258: Performed by: HEALTH CARE PROVIDER

## 2022-12-23 PROCEDURE — 99291 CRITICAL CARE FIRST HOUR: CPT | Performed by: INTERNAL MEDICINE

## 2022-12-23 PROCEDURE — 82947 ASSAY GLUCOSE BLOOD QUANT: CPT

## 2022-12-23 PROCEDURE — 2500000003 HC RX 250 WO HCPCS: Performed by: HEALTH CARE PROVIDER

## 2022-12-23 PROCEDURE — 94761 N-INVAS EAR/PLS OXIMETRY MLT: CPT

## 2022-12-23 PROCEDURE — 6370000000 HC RX 637 (ALT 250 FOR IP): Performed by: INTERNAL MEDICINE

## 2022-12-23 PROCEDURE — 2700000000 HC OXYGEN THERAPY PER DAY

## 2022-12-23 RX ORDER — LIDOCAINE HYDROCHLORIDE 10 MG/ML
INJECTION, SOLUTION INFILTRATION; PERINEURAL
Status: COMPLETED
Start: 2022-12-23 | End: 2022-12-23

## 2022-12-23 RX ORDER — POTASSIUM CHLORIDE 7.45 MG/ML
10 INJECTION INTRAVENOUS
Status: DISCONTINUED | OUTPATIENT
Start: 2022-12-23 | End: 2022-12-23

## 2022-12-23 RX ADMIN — SODIUM CHLORIDE, PRESERVATIVE FREE 10 ML: 5 INJECTION INTRAVENOUS at 21:03

## 2022-12-23 RX ADMIN — FOLIC ACID 1 MG: 1 TABLET ORAL at 07:50

## 2022-12-23 RX ADMIN — DEXMEDETOMIDINE HYDROCHLORIDE 1 MCG/KG/HR: 4 INJECTION, SOLUTION INTRAVENOUS at 12:07

## 2022-12-23 RX ADMIN — FAMOTIDINE 20 MG: 20 TABLET, FILM COATED ORAL at 20:55

## 2022-12-23 RX ADMIN — POTASSIUM CHLORIDE 10 MEQ: 7.46 INJECTION, SOLUTION INTRAVENOUS at 17:16

## 2022-12-23 RX ADMIN — POTASSIUM CHLORIDE 10 MEQ: 7.46 INJECTION, SOLUTION INTRAVENOUS at 16:09

## 2022-12-23 RX ADMIN — ALCOHOL 1 TABLET: 70.47 GEL TOPICAL at 07:50

## 2022-12-23 RX ADMIN — ENOXAPARIN SODIUM 30 MG: 100 INJECTION SUBCUTANEOUS at 07:50

## 2022-12-23 RX ADMIN — HYDROCORTISONE SODIUM SUCCINATE 100 MG: 100 INJECTION, POWDER, FOR SOLUTION INTRAMUSCULAR; INTRAVENOUS at 10:52

## 2022-12-23 RX ADMIN — CHLORDIAZEPOXIDE HYDROCHLORIDE 100 MG: 25 CAPSULE ORAL at 20:55

## 2022-12-23 RX ADMIN — CHLORDIAZEPOXIDE HYDROCHLORIDE 100 MG: 25 CAPSULE ORAL at 07:50

## 2022-12-23 RX ADMIN — HYDROCORTISONE SODIUM SUCCINATE 100 MG: 100 INJECTION, POWDER, FOR SOLUTION INTRAMUSCULAR; INTRAVENOUS at 20:55

## 2022-12-23 RX ADMIN — CHLORDIAZEPOXIDE HYDROCHLORIDE 100 MG: 25 CAPSULE ORAL at 15:51

## 2022-12-23 RX ADMIN — POTASSIUM BICARBONATE 20 MEQ: 782 TABLET, EFFERVESCENT ORAL at 19:32

## 2022-12-23 RX ADMIN — FAMOTIDINE 20 MG: 20 TABLET, FILM COATED ORAL at 07:50

## 2022-12-23 RX ADMIN — Medication 100 MG: at 07:50

## 2022-12-23 RX ADMIN — HYDROCORTISONE SODIUM SUCCINATE 100 MG: 100 INJECTION, POWDER, FOR SOLUTION INTRAMUSCULAR; INTRAVENOUS at 03:25

## 2022-12-23 RX ADMIN — LIDOCAINE HYDROCHLORIDE: 10 INJECTION, SOLUTION INFILTRATION; PERINEURAL at 13:42

## 2022-12-23 RX ADMIN — DEXMEDETOMIDINE HYDROCHLORIDE 1.3 MCG/KG/HR: 4 INJECTION, SOLUTION INTRAVENOUS at 04:27

## 2022-12-23 ASSESSMENT — PULMONARY FUNCTION TESTS
PIF_VALUE: 13
PIF_VALUE: 14
PIF_VALUE: 17

## 2022-12-23 NOTE — SIGNIFICANT EVENT
Patient was extubated over a cook catheter. Tolerated well. Extubated at this time, SpO2 98% on NC. Patient with moderately strong cough.     Oral Cannon MD

## 2022-12-23 NOTE — PLAN OF CARE
Problem: Discharge Planning  Goal: Discharge to home or other facility with appropriate resources  12/23/2022 0736 by Tuyet Wylie RN  Outcome: Progressing     Problem: Skin/Tissue Integrity  Goal: Absence of new skin breakdown  Description: 1. Monitor for areas of redness and/or skin breakdown  2. Assess vascular access sites hourly  3. Every 4-6 hours minimum:  Change oxygen saturation probe site  4. Every 4-6 hours:  If on nasal continuous positive airway pressure, respiratory therapy assess nares and determine need for appliance change or resting period. 12/23/2022 0736 by Tuyet Wylie RN  Outcome: Progressing     Problem: ABCDS Injury Assessment  Goal: Absence of physical injury  12/23/2022 0736 by Tuyet Wylie RN  Outcome: Progressing     Problem: Safety - Adult  Goal: Free from fall injury  12/23/2022 0736 by Tuyet Wylie RN  Outcome: Progressing     Problem: Pain  Goal: Verbalizes/displays adequate comfort level or baseline comfort level  12/23/2022 0736 by uTyet Wylie RN  Outcome: Progressing     Problem: Confusion  Goal: Confusion, delirium, dementia, or psychosis is improved or at baseline  Description: INTERVENTIONS:  1. Assess for possible contributors to thought disturbance, including medications, impaired vision or hearing, underlying metabolic abnormalities, dehydration, psychiatric diagnoses, and notify attending LIP  2. [place] high risk fall precautions, as indicated  3. Provide frequent short contacts to provide reality reorientation, refocusing and direction  4. Decrease environmental stimuli, including noise as appropriate  5. Monitor and intervene to maintain adequate nutrition, hydration, elimination, sleep and activity  6. If unable to ensure safety without constant attention obtain sitter and review sitter guidelines with assigned personnel  7.  Initiate Psychosocial CNS and Spiritual Care consult, as indicated  12/23/2022 0736 by Tuyet Wylie RN  Outcome: Progressing     Problem: Respiratory - Adult  Goal: Achieves optimal ventilation and oxygenation  12/23/2022 0736 by Chico Goss RN  Outcome: Progressing     Problem: Nutrition Deficit:  Goal: Optimize nutritional status  12/23/2022 0736 by [place] Goss RN  Outcome: Progressing     Problem: Risk for Elopement  Goal: Patient will not exit the unit/facility without proper excort  12/23/2022 0736 by [place] Goss RN  Outcome: Progressing  Flowsheets (Taken 12/22/2022 2000 by Eron Oconnell RN)  Nursing Interventions for Elopement Risk:   Reduce environmental triggers   Collaborate with treatment team for drug withdrawal symptoms treatment

## 2022-12-23 NOTE — PLAN OF CARE
Problem: Discharge Planning  Goal: Discharge to home or other facility with appropriate resources  12/22/2022 2232 by Fuad Pérez RN  Outcome: Progressing  12/22/2022 2232 by Fuad Pérez RN  Outcome: Progressing  12/22/2022 1841 by Lori Bruner RN  Outcome: Progressing     Problem: Skin/Tissue Integrity  Goal: Absence of new skin breakdown  Description: 1. Monitor for areas of redness and/or skin breakdown  2. Assess vascular access sites hourly  3. Every 4-6 hours minimum:  Change oxygen saturation probe site  4. Every 4-6 hours:  If on nasal continuous positive airway pressure, respiratory therapy assess nares and determine need for appliance change or resting period. 12/22/2022 1841 by Lori Bruner RN  Outcome: Progressing     Problem: ABCDS Injury Assessment  Goal: Absence of physical injury  12/22/2022 1841 by Lori Bruner RN  Outcome: Progressing     Problem: Safety - Adult  Goal: Free from fall injury  12/22/2022 2232 by Fuad Pérez RN  Outcome: Progressing  12/22/2022 2232 by Fuad Pérez RN  Outcome: Progressing  12/22/2022 1841 by Lori Bruner RN  Outcome: Progressing     Problem: Pain  Goal: Verbalizes/displays adequate comfort level or baseline comfort level  12/22/2022 2232 by Fuad Pérez RN  Outcome: Progressing  12/22/2022 2232 by Fuad Pérez RN  Outcome: Progressing  12/22/2022 1841 by Lori Bruner RN  Outcome: Progressing     Problem: Confusion  Goal: Confusion, delirium, dementia, or psychosis is improved or at baseline  Description: INTERVENTIONS:  1. Assess for possible contributors to thought disturbance, including medications, impaired vision or hearing, underlying metabolic abnormalities, dehydration, psychiatric diagnoses, and notify attending LIP  2. [place] high risk fall precautions, as indicated  3. Provide frequent short contacts to provide reality reorientation, refocusing and direction  4.  Decrease environmental stimuli, including noise as appropriate  5. Monitor and intervene to maintain adequate nutrition, hydration, elimination, sleep and activity  6. If unable to ensure safety without constant attention obtain sitter and review sitter guidelines with assigned personnel  7.  Initiate Psychosocial CNS and Spiritual Care consult, as indicated  12/22/2022 2232 by Vinicius Jackman RN  Outcome: Progressing  12/22/2022 2232 by Vinicius Jackman RN  Outcome: Progressing  12/22/2022 1841 by Mari Terry RN  Outcome: Progressing     Problem: Respiratory - Adult  Goal: Achieves optimal ventilation and oxygenation  12/22/2022 2232 by Vinicius Jackman RN  Outcome: Gearold Terrell  12/22/2022 2232 by Vinicius Jackman RN  Outcome: Progressing  12/22/2022 1841 by Mari Terry RN  Outcome: Progressing     Problem: Nutrition Deficit:  Goal: Optimize nutritional status  12/22/2022 1841 by Mari Terry RN  Outcome: Progressing  12/22/2022 1428 by Whit Hill RD, LD  Flowsheets (Taken 12/22/2022 1418)  Nutrient intake appropriate for improving, restoring, or maintaining nutritional needs: Recommend, monitor, and adjust tube feedings and TPN/PPN based on assessed needs     Problem: Risk for Elopement  Goal: Patient will not exit the unit/facility without proper excort  Recent Flowsheet Documentation  Taken 12/22/2022 2000 by Vinicius Jackman RN  Nursing Interventions for Elopement Risk:   Reduce environmental triggers   Collaborate with treatment team for drug withdrawal symptoms treatment  12/22/2022 1841 by Mari Terry RN  Outcome: Progressing  Flowsheets (Taken 12/22/2022 0800)  Nursing Interventions for Elopement Risk:   Reduce environmental triggers   Collaborate with treatment team for drug withdrawal symptoms treatment

## 2022-12-23 NOTE — PLAN OF CARE
Problem: Safety - Medical Restraint  Goal: Remains free of injury from restraints (Restraint for Interference with Medical Device)  Description: INTERVENTIONS:  1. Determine that other, less restrictive measures have been tried or would not be effective before applying the restraint  2. Evaluate the patient's condition at the time of restraint application  3. Inform patient/family regarding the reason for restraint  4.  Q2H: Monitor safety, psychosocial status, comfort, nutrition and hydration  12/23/2022 0737 by Qasim Smiley RN  Outcome: Progressing  12/23/2022 0737 by Qasim Smiley RN  Outcome: Progressing  Flowsheets  Taken 12/23/2022 0600 by Lali Fernandez RN  Remains free of injury from restraints (restraint for interference with medical device): Every 2 hours: Monitor safety, psychosocial status, comfort, nutrition and hydration  Taken 12/23/2022 0400 by Lali Fernandez RN  Remains free of injury from restraints (restraint for interference with medical device): Every 2 hours: Monitor safety, psychosocial status, comfort, nutrition and hydration  Taken 12/23/2022 0200 by Lali Fernandez RN  Remains free of injury from restraints (restraint for interference with medical device): Every 2 hours: Monitor safety, psychosocial status, comfort, nutrition and hydration  Taken 12/23/2022 0000 by Lali Fernandez RN  Remains free of injury from restraints (restraint for interference with medical device): Every 2 hours: Monitor safety, psychosocial status, comfort, nutrition and hydration  Taken 12/22/2022 2200 by Lali Fernandez RN  Remains free of injury from restraints (restraint for interference with medical device): Every 2 hours: Monitor safety, psychosocial status, comfort, nutrition and hydration  Taken 12/22/2022 2000 by Lali Fernandez RN  Remains free of injury from restraints (restraint for interference with medical device): Every 2 hours: Monitor safety, psychosocial status, comfort, nutrition and hydration

## 2022-12-23 NOTE — PROGRESS NOTES
INTENSIVE CARE UNIT  Resident Physician Progress Note    Patient - Shey Blanca  Date of Admission -  12/15/2022 12:08 PM  Date of Evaluation -  2022  Room and Bed Number -  0081/4926-46   Hospital Day - 8    HPI:      32year old who became unresponsive while with her boyfriend, EMS was called. Patient got Narcan with minimal response. Intubated due to GCS of 3 and airway protection. Initially placed on propofol. In ED LFTs were elevated, CT cervical spine unremarkable, CT head negative, CXR unremarkable. Alcohol level 266, UDS negative. Found to be covid positive. : Tolerating CPAP trials. Following command both on and off sedation. Off propofol. : Weaning versed as tolerated. Librium increased. : Young removed. : Off versed. Precedex decreased. SUBJECTIVE:     OVERNIGHT EVENTS:    Episodes of bradycardia yesterday afternoon and this morning, self limiting, to HR 46. Respiratory alkalosis this morning, RR increased and TV increased. Patient is off versed. She denies pain.     AWAKE & FOLLOWING COMMANDS:  [] No   [x] Yes    SECRETIONS Amount:  [] Small [x] Moderate  [] Large [] None    Color:     [] White [x] Colored  [] Bloody    SEDATION:  RAAS Score:  [] Propofol gtt  [] Versed gtt  [] Ativan gtt   [x] No Sedation    PARALYZED:  [x] No    [] Yes    VASOPRESSORS:  [x] No    [] Yes  [] Levophed [] Dopamine [] Vasopressin  [] Dobutamine [] Phenylephrine [] Epinephrine      OBJECTIVE:     VITAL SIGNS:  /80   Pulse (!) 46   Temp 98 °F (36.7 °C) (Oral)   Resp 15   Ht 5' 5\" (1.651 m)   Wt 114 lb 6.7 oz (51.9 kg)   SpO2 100%   BMI 19.04 kg/m²   Tmax over 24 hours:  Temp (24hrs), Av.1 °F (36.7 °C), Min:97.5 °F (36.4 °C), Max:98.6 °F (37 °C)      Patient Vitals for the past 8 hrs:   BP Temp Temp src Pulse Resp SpO2   22 0522 -- -- -- (!) 46 15 100 %   22 0400 114/80 98 °F (36.7 °C) Oral 61 16 --   22 0358 -- -- -- 72 16 100 %   22 0017 -- TT LPN did Pt assist paperwork for pt. -- -- 64 17 100 %   12/23/22 0000 125/85 98 °F (36.7 °C) Axillary 73 15 --         Intake/Output Summary (Last 24 hours) at 12/23/2022 0735  Last data filed at 12/23/2022 2232  Gross per 24 hour   Intake 2128.93 ml   Output 620 ml   Net 1508.93 ml     Date 12/23/22 0000 - 12/23/22 2359   Shift 7164-0664 2489-6418 8129-7004 24 Hour Total   INTAKE   I.V.(mL/kg) 483. 3(9.3)   483. 3(9.3)   NG/GT(mL/kg) 264(5.1)   264(5.1)   Shift Total(mL/kg) 747. 3(14.4)   747. 3(14.4)   OUTPUT   Urine(mL/kg/hr) 200   200   Shift Total(mL/kg) 200(3.9)   200(3.9)   Weight (kg) 51.9 51.9 51.9 51.9     Wt Readings from Last 3 Encounters:   12/21/22 114 lb 6.7 oz (51.9 kg)   03/09/22 106 lb (48.1 kg)     Body mass index is 19.04 kg/m².         PHYSICAL EXAM:  GEN:                  Intubated, following commands  EYES:                pupils equal, round, and reactive to light  HEENT:            Normocepalic, without obvious abnormality  LUNGS:            good air exchange and clear to auscultation  CV:                     regular rate and rhythm and normal S1 and S2  ABDOMEN:     soft and non-distended  MSK:                  there is no redness, warmth, or swelling of the joints  NEURO[de-identified]           Intubated, sedated  SKIN:                 Normal skin color, texture, turgor  EXTREMITIES:  No pedal or leg edema, no calf tenderness/swelling, no erythema, distal pulses intact       MEDICATIONS:  Scheduled Meds:   hydrocortisone sodium succinate PF  100 mg IntraVENous Q8H    chlordiazePOXIDE  100 mg Oral TID    multivitamin  1 tablet Per NG tube Daily    famotidine  20 mg Per NG tube BID    thiamine  100 mg Per NG tube Daily    folic acid  1 mg Per NG tube Daily    sodium chloride flush  5-40 mL IntraVENous 2 times per day    enoxaparin  30 mg SubCUTAneous Daily     Continuous Infusions:   sodium chloride 25 mL/hr at 12/23/22 0639    sodium chloride      midazolam Stopped (12/23/22 0308)    dexmedetomidine 1 mcg/kg/hr (12/23/22 6127)     PRN Meds: midazolam, 2 mg, Q2H PRN  midodrine, 5 mg, TID PRN  sodium phosphate IVPB, 0.16 mmol/kg, PRN   Or  sodium phosphate IVPB, 0.32 mmol/kg, PRN  sodium chloride flush, 5-40 mL, PRN  sodium chloride, , PRN  ondansetron, 4 mg, Q8H PRN   Or  ondansetron, 4 mg, Q6H PRN  polyethylene glycol, 17 g, Daily PRN  acetaminophen, 650 mg, Q6H PRN   Or  acetaminophen, 650 mg, Q6H PRN        SUPPORT DEVICES: [x] Ventilator [] BIPAP  [] Nasal Cannula [] Room Air    VENT SETTINGS (Comprehensive) (if applicable):   PRVC mode, FiO2 30%, PEEP 5, Respiratory Rate 15, Tidal Volume 500  Vent Information  Ventilator ID: [id number]  Equipment Changed: Expiratory Filter  Ventilator Initiate: Yes  Vent Mode: (S) AC/PRVC  Additional Respiratory Assessments  Heart Rate: (!) 46  Resp: 15  SpO2: 100 %  End Tidal CO2: 28 (%)  Position: Semi-Chang's  Humidification Source: HME  Cuff Pressure (cm H2O):  (mov)    ABGs:   Arterial Blood Gas result:  pH 7.536; pCO2 29; pO2 108; HCO3 24.9;  Lab Results   Component Value Date/Time    FIO2 30.0 12/23/2022 04:32 AM         DATA:  Complete Blood Count:   Recent Labs     12/21/22  0409 12/22/22  0535   WBC 6.9 4.5   RBC 2.54* 2.58*   HGB 9.8* 9.8*   HCT 30.7* 29.8*   .9* 115.5*   MCH 38.6* 38.0*   MCHC 31.9 32.9   RDW 15.6* 15.1*   PLT See Reflexed IPF Result See Reflexed IPF Result        Last 3 Blood Glucose:   Recent Labs     12/21/22  0409 12/22/22  0535   GLUCOSE 111* 162*        PT/INR:    Lab Results   Component Value Date/Time    PROTIME 12.4 12/15/2022 12:28 PM    INR 1.2 12/15/2022 12:28 PM     PTT:  No results found for: APTT, PTT    Comprehensive Metabolic Profile:   Recent Labs     12/21/22  0409 12/22/22  0535    136   K 4.2 3.1*    102   CO2 21 24   BUN 4* 6   CREATININE 0.20* 0.22*   GLUCOSE 111* 162*   CALCIUM 8.5* 8.6      Magnesium:   Lab Results   Component Value Date/Time    MG 1.8 12/22/2022 05:35 AM    MG 1.9 12/21/2022 04:09 AM    MG 1.4 12/20/2022 05:59 AM Phosphorus:   Lab Results   Component Value Date/Time    PHOS 4.2 12/22/2022 05:35 AM    PHOS 5.3 12/21/2022 04:09 AM    PHOS 4.7 12/20/2022 05:59 AM     Ionized Calcium:   Lab Results   Component Value Date/Time    CAION 1.17 12/18/2022 05:54 AM    CAION 0.83 12/17/2022 04:24 AM        Urinalysis:   Lab Results   Component Value Date/Time    NITRU NEGATIVE 12/15/2022 12:49 PM    COLORU Dark Yellow 12/15/2022 12:49 PM    PHUR 6.0 12/15/2022 12:49 PM    WBCUA 0 TO 2 12/15/2022 12:49 PM    RBCUA 2 TO 5 12/15/2022 12:49 PM    MUCUS 3+ 03/09/2022 12:21 PM    TRICHOMONAS NOT REPORTED 09/20/2014 06:19 AM    YEAST NOT REPORTED 09/20/2014 06:19 AM    BACTERIA FEW 09/20/2014 06:19 AM    CLARITYU slightly cloudy 07/22/2014 11:58 AM    SPECGRAV 1.020 12/15/2022 12:49 PM    LEUKOCYTESUR NEGATIVE 12/15/2022 12:49 PM    UROBILINOGEN Normal 12/15/2022 12:49 PM    BILIRUBINUR NEGATIVE  Verified by ictotest. 12/15/2022 12:49 PM    BILIRUBINUR negative 07/22/2014 11:58 AM    BLOODU negative 07/22/2014 11:58 AM    GLUCOSEU NEGATIVE 12/15/2022 12:49 PM    KETUA SMALL 12/15/2022 12:49 PM    AMORPHOUS NOT REPORTED 09/20/2014 06:19 AM       HgBA1c:  No results found for: LABA1C  TSH:  No results found for: TSH  Lactic Acid: No results found for: LACTA   Troponin: No results for input(s): TROPONINI in the last 72 hours.     ASSESSMENT:     Patient Active Problem List    Diagnosis Date Noted    COVID-19 12/18/2022    Acute respiratory insufficiency 78/06/6278    Alcoholic intoxication with complication ([facility] [address] 44/30/6247    Altered mental status 12/18/2022    Drug overdose of undetermined intent, initial encounter 12/15/2022    Portal hypertension ([facility] [address] 03/10/2022    Hyperbilirubinemia 03/09/2022    Fatty liver 03/09/2022    Ascites due to alcoholic hepatitis 15/54/8612    Alcohol abuse 03/09/2022    S/P cholecystectomy 03/09/2022    Abnormal LFTs 03/09/2022    Hypokalemia 03/09/2022    Hypoalbuminemia 03/09/2022    Bipolar I disorder, most recent episode depressed ([facility] [address] 09/10/2014          PLAN:     Neuro  Off versed  Precedex gtt, decreased to 1 this morning  Librium 100 mg TID  Versed 2 mg q 2h PRN  Hx EtOH abuse, daily thiamine and folate  Neuro checks per protocol  Hx bipolar disorder, home meds abilify and effexor     Cardiology  Hemodynamically stable, no pressor support  Midodrine 5 mg TID PRN  Self-limiting episodes of bradycardia      Pulmonary  Covid positive, ID consulted  PRVC 15/500/5/30  ABG 12/23 7.536/29/108/24.9  Solucortef 100 q8  Acute respiratory failure, intubated for airway protection  12/22 - no air leak when ET cuff deflated  Consult ENT for tolerating CPAP but no air leak when cuff deflated, preparedness for extubation     Renal  I/O:620 + 2 unmeasured  Young removed 12/21, external catheter in place  IV fluids: NS @ 25 ml/hr  K replaced     GI  Hx EtOH use, +EtOH 266 on arrival  Hx transaminitis, improving  Monitor for EtOH withdrawal, seizure precautions  Daily thiamine and folate, multivitamin  Glycolax PRN  Ulcer Ppx: Pepcid BID  Tube feeding Diet NPO  ADULT TUBE FEEDING; Orogastric; Standard without Fiber; Continuous; 10; Yes; 10; Q 4 hours; 45; 30; Q 4 hours     ID  WBC 4.5  Covid positive  ID consulted    F.A.S.T. M. H[employer]S. B.I.D. Feeding Diet: Diet NPO  ADULT TUBE FEEDING; Orogastric; Standard without Fiber; Continuous; 10; Yes; 10; Q 4 hours; 45; 30; Q 4 hours  Fluids: precedex gtt, NS @ 25 ml/hr, TF @ 45 ml/hr  Analgesic: Tylenol prn  Sedation: None  Thrombo-prophylaxis: [x] Enoxaparin, [] Unfract.  Heparin Subcutaneously, [] EPC Cuffs  Mobility: PT  Heads up: Up  Ulcer prophylaxis: [] PPI Agent, [x] [id number], [] Sucralfate, [] Other:  Glycemic control: None, glucose 144  Spontaneous breathing trial: CPAP tolerated, no air leak when cuff deflated   Bowel regimen/urine output: Glycolax prn / U out 620 + 2 unmeasured  Indwelling catheter/lines: Peripheral IVs  De-escalation: spontaneous breathing trials, ENT consult for possible extubation without cuff leak      Vicki Martines M.D.   Emergency Medicine Resident, PGY-2  12/23/2022 7:35 AM

## 2022-12-23 NOTE — PLAN OF CARE
Problem: Safety - Medical Restraint  Goal: Remains free of injury from restraints (Restraint for Interference with Medical Device)  Description: INTERVENTIONS:  1. Determine that other, less restrictive measures have been tried or would not be effective before applying the restraint  2. Evaluate the patient's condition at the time of restraint application  3. Inform patient/family regarding the reason for restraint  4.  Q2H: Monitor safety, psychosocial status, comfort, nutrition and hydration  Outcome: Progressing  Flowsheets (Taken 12/23/2022 0600 by Regina De La Torre RN)  Remains free of injury from restraints (restraint for interference with medical device): Every 2 hours: Monitor safety, psychosocial status, comfort, nutrition and hydration

## 2022-12-24 ENCOUNTER — APPOINTMENT (OUTPATIENT)
Dept: GENERAL RADIOLOGY | Age: 27
DRG: 812 | End: 2022-12-24
Payer: MEDICARE

## 2022-12-24 LAB
ABSOLUTE EOS #: 0 K/UL (ref 0–0.44)
ABSOLUTE IMMATURE GRANULOCYTE: 0.21 K/UL (ref 0–0.3)
ABSOLUTE LYMPH #: 0.74 K/UL (ref 1.1–3.7)
ABSOLUTE MONO #: 0.84 K/UL (ref 0.1–1.2)
ANION GAP SERPL CALCULATED.3IONS-SCNC: 11 MMOL/L (ref 9–17)
ANION GAP SERPL CALCULATED.3IONS-SCNC: 11 MMOL/L (ref 9–17)
BASOPHILS # BLD: 0 % (ref 0–2)
BASOPHILS ABSOLUTE: 0 K/UL (ref 0–0.2)
BUN BLDV-MCNC: 4 MG/DL (ref 6–20)
CALCIUM SERPL-MCNC: 8.4 MG/DL (ref 8.6–10.4)
CHLORIDE BLD-SCNC: 104 MMOL/L (ref 98–107)
CHLORIDE BLD-SCNC: 105 MMOL/L (ref 98–107)
CO2: 23 MMOL/L (ref 20–31)
CO2: 25 MMOL/L (ref 20–31)
CREAT SERPL-MCNC: 0.41 MG/DL (ref 0.5–0.9)
EOSINOPHILS RELATIVE PERCENT: 0 % (ref 1–4)
GFR SERPL CREATININE-BSD FRML MDRD: >60 ML/MIN/1.73M2
GLUCOSE BLD-MCNC: 109 MG/DL (ref 70–99)
HCT VFR BLD CALC: 24.9 % (ref 36.3–47.1)
HEMOGLOBIN: 8.4 G/DL (ref 11.9–15.1)
IMMATURE GRANULOCYTES: 2 %
LYMPHOCYTES # BLD: 7 % (ref 24–43)
MAGNESIUM: 1.9 MG/DL (ref 1.6–2.6)
MCH RBC QN AUTO: 38.5 PG (ref 25.2–33.5)
MCHC RBC AUTO-ENTMCNC: 33.7 G/DL (ref 28.4–34.8)
MCV RBC AUTO: 114.2 FL (ref 82.6–102.9)
MONOCYTES # BLD: 8 % (ref 3–12)
MORPHOLOGY: ABNORMAL
MORPHOLOGY: ABNORMAL
NRBC AUTOMATED: 0.2 PER 100 WBC
PDW BLD-RTO: 15.7 % (ref 11.8–14.4)
PHOSPHORUS: 3.5 MG/DL (ref 2.6–4.5)
PLATELET # BLD: 169 K/UL (ref 138–453)
PMV BLD AUTO: 11 FL (ref 8.1–13.5)
POTASSIUM SERPL-SCNC: 2.4 MMOL/L (ref 3.7–5.3)
POTASSIUM SERPL-SCNC: 2.5 MMOL/L (ref 3.7–5.3)
RBC # BLD: 2.18 M/UL (ref 3.95–5.11)
SEG NEUTROPHILS: 83 % (ref 36–65)
SEGMENTED NEUTROPHILS ABSOLUTE COUNT: 8.71 K/UL (ref 1.5–8.1)
SODIUM BLD-SCNC: 138 MMOL/L (ref 135–144)
SODIUM BLD-SCNC: 141 MMOL/L (ref 135–144)
WBC # BLD: 10.5 K/UL (ref 3.5–11.3)

## 2022-12-24 PROCEDURE — 2580000003 HC RX 258: Performed by: HEALTH CARE PROVIDER

## 2022-12-24 PROCEDURE — 6360000002 HC RX W HCPCS: Performed by: STUDENT IN AN ORGANIZED HEALTH CARE EDUCATION/TRAINING PROGRAM

## 2022-12-24 PROCEDURE — 2500000003 HC RX 250 WO HCPCS: Performed by: STUDENT IN AN ORGANIZED HEALTH CARE EDUCATION/TRAINING PROGRAM

## 2022-12-24 PROCEDURE — 83735 ASSAY OF MAGNESIUM: CPT

## 2022-12-24 PROCEDURE — 6360000002 HC RX W HCPCS: Performed by: HEALTH CARE PROVIDER

## 2022-12-24 PROCEDURE — 80048 BASIC METABOLIC PNL TOTAL CA: CPT

## 2022-12-24 PROCEDURE — 74018 RADEX ABDOMEN 1 VIEW: CPT

## 2022-12-24 PROCEDURE — 99291 CRITICAL CARE FIRST HOUR: CPT | Performed by: INTERNAL MEDICINE

## 2022-12-24 PROCEDURE — 36415 COLL VENOUS BLD VENIPUNCTURE: CPT

## 2022-12-24 PROCEDURE — 6370000000 HC RX 637 (ALT 250 FOR IP): Performed by: STUDENT IN AN ORGANIZED HEALTH CARE EDUCATION/TRAINING PROGRAM

## 2022-12-24 PROCEDURE — 99253 IP/OBS CNSLTJ NEW/EST LOW 45: CPT | Performed by: OTOLARYNGOLOGY

## 2022-12-24 PROCEDURE — 2000000000 HC ICU R&B

## 2022-12-24 PROCEDURE — 6370000000 HC RX 637 (ALT 250 FOR IP): Performed by: INTERNAL MEDICINE

## 2022-12-24 PROCEDURE — 80051 ELECTROLYTE PANEL: CPT

## 2022-12-24 PROCEDURE — 2580000003 HC RX 258: Performed by: STUDENT IN AN ORGANIZED HEALTH CARE EDUCATION/TRAINING PROGRAM

## 2022-12-24 PROCEDURE — 84100 ASSAY OF PHOSPHORUS: CPT

## 2022-12-24 PROCEDURE — 85025 COMPLETE CBC W/AUTO DIFF WBC: CPT

## 2022-12-24 PROCEDURE — 6360000002 HC RX W HCPCS

## 2022-12-24 RX ORDER — LORAZEPAM 2 MG/ML
1 INJECTION INTRAMUSCULAR
Status: DISCONTINUED | OUTPATIENT
Start: 2022-12-24 | End: 2022-12-29

## 2022-12-24 RX ORDER — MAGNESIUM SULFATE IN WATER 40 MG/ML
2000 INJECTION, SOLUTION INTRAVENOUS ONCE
Status: COMPLETED | OUTPATIENT
Start: 2022-12-24 | End: 2022-12-24

## 2022-12-24 RX ORDER — LORAZEPAM 2 MG/1
4 TABLET ORAL
Status: DISCONTINUED | OUTPATIENT
Start: 2022-12-24 | End: 2022-12-29

## 2022-12-24 RX ORDER — POTASSIUM CHLORIDE 7.45 MG/ML
10 INJECTION INTRAVENOUS
Status: COMPLETED | OUTPATIENT
Start: 2022-12-24 | End: 2022-12-25

## 2022-12-24 RX ORDER — LORAZEPAM 2 MG/1
2 TABLET ORAL
Status: DISCONTINUED | OUTPATIENT
Start: 2022-12-24 | End: 2022-12-29

## 2022-12-24 RX ORDER — LORAZEPAM 2 MG/ML
2 INJECTION INTRAMUSCULAR
Status: DISCONTINUED | OUTPATIENT
Start: 2022-12-24 | End: 2022-12-29

## 2022-12-24 RX ORDER — LORAZEPAM 2 MG/ML
3 INJECTION INTRAMUSCULAR
Status: DISCONTINUED | OUTPATIENT
Start: 2022-12-24 | End: 2022-12-29

## 2022-12-24 RX ORDER — LORAZEPAM 2 MG/ML
4 INJECTION INTRAMUSCULAR
Status: DISCONTINUED | OUTPATIENT
Start: 2022-12-24 | End: 2022-12-29

## 2022-12-24 RX ORDER — LORAZEPAM 1 MG/1
1 TABLET ORAL
Status: DISCONTINUED | OUTPATIENT
Start: 2022-12-24 | End: 2022-12-29

## 2022-12-24 RX ORDER — POTASSIUM CHLORIDE 7.45 MG/ML
10 INJECTION INTRAVENOUS
Status: COMPLETED | OUTPATIENT
Start: 2022-12-24 | End: 2022-12-24

## 2022-12-24 RX ORDER — SODIUM CHLORIDE 9 MG/ML
INJECTION, SOLUTION INTRAVENOUS PRN
Status: DISCONTINUED | OUTPATIENT
Start: 2022-12-24 | End: 2022-12-31 | Stop reason: SDUPTHER

## 2022-12-24 RX ORDER — SODIUM CHLORIDE 0.9 % (FLUSH) 0.9 %
5-40 SYRINGE (ML) INJECTION PRN
Status: DISCONTINUED | OUTPATIENT
Start: 2022-12-24 | End: 2022-12-31 | Stop reason: SDUPTHER

## 2022-12-24 RX ORDER — DEXTROSE, SODIUM CHLORIDE, AND POTASSIUM CHLORIDE 5; .45; .15 G/100ML; G/100ML; G/100ML
INJECTION INTRAVENOUS CONTINUOUS
Status: DISCONTINUED | OUTPATIENT
Start: 2022-12-24 | End: 2022-12-26

## 2022-12-24 RX ORDER — SODIUM CHLORIDE 0.9 % (FLUSH) 0.9 %
5-40 SYRINGE (ML) INJECTION EVERY 12 HOURS SCHEDULED
Status: DISCONTINUED | OUTPATIENT
Start: 2022-12-24 | End: 2022-12-31 | Stop reason: SDUPTHER

## 2022-12-24 RX ADMIN — Medication 100 MG: at 13:16

## 2022-12-24 RX ADMIN — FOLIC ACID 1 MG: 1 TABLET ORAL at 13:16

## 2022-12-24 RX ADMIN — POTASSIUM CHLORIDE 10 MEQ: 10 INJECTION, SOLUTION INTRAVENOUS at 11:26

## 2022-12-24 RX ADMIN — SODIUM CHLORIDE, PRESERVATIVE FREE 10 ML: 5 INJECTION INTRAVENOUS at 20:38

## 2022-12-24 RX ADMIN — POTASSIUM BICARBONATE 40 MEQ: 782 TABLET, EFFERVESCENT ORAL at 13:15

## 2022-12-24 RX ADMIN — LORAZEPAM 2 MG: 2 INJECTION INTRAMUSCULAR; INTRAVENOUS at 10:51

## 2022-12-24 RX ADMIN — FAMOTIDINE 20 MG: 20 TABLET, FILM COATED ORAL at 13:15

## 2022-12-24 RX ADMIN — MIDAZOLAM HYDROCHLORIDE 2 MG: 2 INJECTION, SOLUTION INTRAMUSCULAR; INTRAVENOUS at 08:23

## 2022-12-24 RX ADMIN — POTASSIUM CHLORIDE 10 MEQ: 10 INJECTION, SOLUTION INTRAVENOUS at 13:14

## 2022-12-24 RX ADMIN — ALCOHOL 1 TABLET: 70.47 GEL TOPICAL at 13:16

## 2022-12-24 RX ADMIN — HYDROCORTISONE SODIUM SUCCINATE 100 MG: 100 INJECTION, POWDER, FOR SOLUTION INTRAMUSCULAR; INTRAVENOUS at 04:31

## 2022-12-24 RX ADMIN — POTASSIUM CHLORIDE 10 MEQ: 10 INJECTION, SOLUTION INTRAVENOUS at 09:15

## 2022-12-24 RX ADMIN — LORAZEPAM 4 MG: 2 INJECTION INTRAMUSCULAR; INTRAVENOUS at 09:45

## 2022-12-24 RX ADMIN — CHLORDIAZEPOXIDE HYDROCHLORIDE 100 MG: 25 CAPSULE ORAL at 13:17

## 2022-12-24 RX ADMIN — POTASSIUM CHLORIDE 10 MEQ: 10 INJECTION, SOLUTION INTRAVENOUS at 14:29

## 2022-12-24 RX ADMIN — POTASSIUM CHLORIDE, DEXTROSE MONOHYDRATE AND SODIUM CHLORIDE: 150; 5; 450 INJECTION, SOLUTION INTRAVENOUS at 21:30

## 2022-12-24 RX ADMIN — MAGNESIUM SULFATE HEPTAHYDRATE 2000 MG: 40 INJECTION, SOLUTION INTRAVENOUS at 14:33

## 2022-12-24 RX ADMIN — SODIUM CHLORIDE: 9 INJECTION, SOLUTION INTRAVENOUS at 09:14

## 2022-12-24 RX ADMIN — POTASSIUM CHLORIDE 10 MEQ: 7.46 INJECTION, SOLUTION INTRAVENOUS at 21:30

## 2022-12-24 RX ADMIN — ENOXAPARIN SODIUM 30 MG: 100 INJECTION SUBCUTANEOUS at 09:53

## 2022-12-24 RX ADMIN — POTASSIUM BICARBONATE 40 MEQ: 782 TABLET, EFFERVESCENT ORAL at 21:04

## 2022-12-24 RX ADMIN — POTASSIUM CHLORIDE 10 MEQ: 7.46 INJECTION, SOLUTION INTRAVENOUS at 21:29

## 2022-12-24 RX ADMIN — POTASSIUM CHLORIDE, DEXTROSE MONOHYDRATE AND SODIUM CHLORIDE: 150; 5; 450 INJECTION, SOLUTION INTRAVENOUS at 09:13

## 2022-12-24 RX ADMIN — FAMOTIDINE 20 MG: 20 TABLET, FILM COATED ORAL at 20:36

## 2022-12-24 RX ADMIN — POTASSIUM CHLORIDE 10 MEQ: 7.46 INJECTION, SOLUTION INTRAVENOUS at 22:57

## 2022-12-24 RX ADMIN — LORAZEPAM 3 MG: 2 INJECTION INTRAMUSCULAR; INTRAVENOUS at 20:36

## 2022-12-24 RX ADMIN — CHLORDIAZEPOXIDE HYDROCHLORIDE 100 MG: 25 CAPSULE ORAL at 20:36

## 2022-12-24 ASSESSMENT — PAIN SCALES - GENERAL
PAINLEVEL_OUTOF10: 5
PAINLEVEL_OUTOF10: 0

## 2022-12-24 ASSESSMENT — PAIN DESCRIPTION - DESCRIPTORS: DESCRIPTORS: ACHING

## 2022-12-24 ASSESSMENT — PAIN DESCRIPTION - FREQUENCY: FREQUENCY: CONTINUOUS

## 2022-12-24 ASSESSMENT — PAIN DESCRIPTION - ONSET: ONSET: ON-GOING

## 2022-12-24 ASSESSMENT — PAIN DESCRIPTION - LOCATION: LOCATION: HEAD

## 2022-12-24 ASSESSMENT — PAIN DESCRIPTION - PAIN TYPE: TYPE: ACUTE PAIN

## 2022-12-24 ASSESSMENT — PAIN DESCRIPTION - ORIENTATION: ORIENTATION: MID

## 2022-12-24 NOTE — PLAN OF CARE
Problem: Discharge Planning  Goal: Discharge to home or other facility with appropriate resources  12/24/2022 1757 by Rajani Macias RN  Outcome: Progressing  12/24/2022 0607 by Raegan Aden RN  Outcome: Progressing     Problem: Skin/Tissue Integrity  Goal: Absence of new skin breakdown  Description: 1. Monitor for areas of redness and/or skin breakdown  2. Assess vascular access sites hourly  3. Every 4-6 hours minimum:  Change oxygen saturation probe site  4. Every 4-6 hours:  If on nasal continuous positive airway pressure, respiratory therapy assess nares and determine need for appliance change or resting period. Outcome: Progressing     Problem: ABCDS Injury Assessment  Goal: Absence of physical injury  Outcome: Progressing  Flowsheets (Taken 12/24/2022 0800)  Absence of Physical Injury: Implement safety measures based on patient assessment     Problem: Safety - Adult  Goal: Free from fall injury  12/24/2022 1757 by Rajani Macias RN  Outcome: Progressing  Flowsheets (Taken 12/24/2022 0800)  Free From Fall Injury: Based on caregiver fall risk screen, instruct family/caregiver to ask for assistance with transferring infant if caregiver noted to have fall risk factors  12/24/2022 0607 by Raegan Aden RN  Outcome: Progressing     Problem: Pain  Goal: Verbalizes/displays adequate comfort level or baseline comfort level  12/24/2022 1757 by Rajani Macias RN  Outcome: Progressing  12/24/2022 0607 by Raegan Aden RN  Outcome: Progressing     Problem: Confusion  Goal: Confusion, delirium, dementia, or psychosis is improved or at baseline  Description: INTERVENTIONS:  1. Assess for possible contributors to thought disturbance, including medications, impaired vision or hearing, underlying metabolic abnormalities, dehydration, psychiatric diagnoses, and notify attending LIP  2. [place] high risk fall precautions, as indicated  3.  Provide frequent short contacts to provide reality reorientation, refocusing and direction  4. Decrease environmental stimuli, including noise as appropriate  5. Monitor and intervene to maintain adequate nutrition, hydration, elimination, sleep and activity  6. If unable to ensure safety without constant attention obtain sitter and review sitter guidelines with assigned personnel  7.  Initiate Psychosocial CNS and Spiritual Care consult, as indicated  12/24/2022 1757 by Woody Hall RN  Outcome: Progressing  Flowsheets (Taken 12/24/2022 0800)  Effect of thought disturbance (confusion, delirium, dementia, or psychosis) are managed with adequate functional status:   Assess for contributors to thought disturbance, including medications, impaired vision or hearing, underlying metabolic abnormalities, dehydration, psychiatric diagnoses, notify [facility] high risk fall precautions, as indicated  12/24/2022 0607 by Felicia Rae RN  Outcome: Progressing     Problem: Respiratory - Adult  Goal: Achieves optimal ventilation and oxygenation  12/24/2022 1757 by Woody Hall RN  Outcome: Progressing  Flowsheets (Taken 12/24/2022 0800)  Achieves optimal ventilation and oxygenation: Position to facilitate oxygenation and minimize respiratory effort  12/24/2022 0607 by Felicia Rae RN  Outcome: Progressing     Problem: Nutrition Deficit:  Goal: Optimize nutritional status  Outcome: Progressing     Problem: Risk for Elopement  Goal: Patient will not exit the unit/facility without proper excort  Outcome: Progressing  Flowsheets (Taken 12/24/2022 0800)  Nursing Interventions for Elopement Risk: Reduce environmental triggers

## 2022-12-24 NOTE — PROGRESS NOTES
Pt waived at writer through patient room window. Writer entered room. Pt requested writer \"can you just give me three shots. .. like of alcohol. \"     Writer educated pt on appropriate drink options for ordered diet. Pt dissatisfied with answer.

## 2022-12-24 NOTE — PROGRESS NOTES
INTENSIVE CARE UNIT  Resident Physician Progress Note    Patient - Guido Dumont  Date of Admission -  12/15/2022 12:08 PM  Date of Evaluation -  2022  Room and Bed Number -  4477/7004-86   Hospital Day - 9    HPI:     32year old who became unresponsive while with her boyfriend, EMS was called. Patient got Narcan with minimal response. Intubated due to GCS of 3 and airway protection. Initially placed on propofol. In ED LFTs were elevated, CT cervical spine unremarkable, CT head negative, CXR unremarkable. Alcohol level 266, UDS negative. Found to be covid positive. : Tolerating CPAP trials. Following command both on and off sedation. Off propofol. : Weaning versed as tolerated. Librium increased. : Young removed. : Off versed. Precedex decreased. Extubated. SUBJECTIVE:     OVERNIGHT EVENTS:    Patient agitated. Requesting shots of alcohol from staff. On librium 100 TID and versed 2 mg q 2h prn. States she \"has to get her kids out of the bathroom\".     AWAKE & FOLLOWING COMMANDS:  [] No   [x] Yes    SECRETIONS Amount:  [] Small [] Moderate  [] Large  [x] None  Color:     [] White [] Colored  [] Bloody    SEDATION:  RAAS Score:  [] Propofol gtt  [] Versed gtt  [] Ativan gtt   [x] No Sedation    PARALYZED:  [x] No    [] Yes    VASOPRESSORS:  [x] No    [] Yes  [] Levophed [] Dopamine [] Vasopressin  [] Dobutamine [] Phenylephrine [] Epinephrine      OBJECTIVE:     VITAL SIGNS:  /79   Pulse 76   Temp 98.2 °F (36.8 °C) (Oral)   Resp 25   Ht 5' 5\" (1.651 m)   Wt 114 lb 6.7 oz (51.9 kg)   SpO2 100%   BMI 19.04 kg/m²   Tmax over 24 hours:  Temp (24hrs), Av.2 °F (36.8 °C), Min:98 °F (36.7 °C), Max:98.6 °F (37 °C)      Patient Vitals for the past 8 hrs:   BP Temp Temp src Pulse Resp SpO2   22 0600 125/79 -- -- 76 25 100 %   22 0545 -- -- -- 87 22 99 %   22 0530 -- -- -- 87 22 100 %   22 0515 -- -- -- 72 25 100 %   22 0500 120/79 -- -- 93 23 100 %   12/24/22 0445 -- -- -- 90 17 100 %   12/24/22 0430 -- -- -- 95 25 --   12/24/22 0415 -- -- -- 94 14 --   12/24/22 0400 109/76 98.2 °F (36.8 °C) Oral (!) 103 22 96 %   12/24/22 0345 -- -- -- 99 20 100 %   12/24/22 0330 -- -- -- 84 20 100 %   12/24/22 0315 -- -- -- 87 24 100 %   12/24/22 0300 131/76 -- -- 96 30 --   12/24/22 0245 -- -- -- 86 23 100 %   12/24/22 0230 -- -- -- 88 26 100 %   12/24/22 0215 -- -- -- 76 25 100 %   12/24/22 0200 122/75 98 °F (36.7 °C) Oral 71 27 --   12/24/22 0145 -- -- -- 83 16 94 %   12/24/22 0130 -- -- -- 73 28 --   12/24/22 0115 -- -- -- 86 22 100 %   12/24/22 0100 119/71 -- -- 79 25 100 %   12/24/22 0045 -- -- -- 79 28 --   12/24/22 0030 -- -- -- 99 20 100 %   12/24/22 0015 -- -- -- 95 21 100 %   12/24/22 0000 (!) 122/92 98 °F (36.7 °C) Oral 80 27 100 %   12/23/22 2345 -- -- -- 86 27 100 %   12/23/22 2330 -- -- -- 69 19 100 %         Intake/Output Summary (Last 24 hours) at 12/24/2022 0727  Last data filed at 12/24/2022 0641  Gross per 24 hour   Intake 1124.49 ml   Output 150 ml   Net 974.49 ml     Date 12/24/22 0000 - 12/24/22 2359   Shift 9634-8200 7216-0298 8950-8082 24 Hour Total   INTAKE   I.V.(mL/kg) 309. 6(6)   309. 6(6)   Shift Total(mL/kg) 309. 6(6)   309. 6(6)   OUTPUT   Shift Total(mL/kg)       Weight (kg) 51.9 51.9 51.9 51.9     Wt Readings from Last 3 Encounters:   12/21/22 114 lb 6.7 oz (51.9 kg)   03/09/22 106 lb (48.1 kg)     Body mass index is 19.04 kg/m². PHYSICAL EXAM:  GEN:  Awake, alert. Not oriented. States she \"has to get her children out of the bathroom\". EYES:   pupils equal, round, and reactive to light  HEENT:  Normocephalic, without obvious abnormality  LUNGS:  good air exchange  CV:    regular rate and rhythm and normal S1 and S2  ABDOMEN:   soft, non-distended, and non-tender  MSK:    there is no redness, warmth, or swelling of the joints  NEURO[de-identified]   Not oriented.    SKIN:   Normal skin color, texture, turgor  EXTREMITIES:  No pedal or leg edema, no calf tenderness/swelling, no erythema, distal pulses intact       MEDICATIONS:  Scheduled Meds:   chlordiazePOXIDE  100 mg Oral TID    multivitamin  1 tablet Per NG tube Daily    famotidine  20 mg Per NG tube BID    thiamine  100 mg Per NG tube Daily    folic acid  1 mg Per NG tube Daily    sodium chloride flush  5-40 mL IntraVENous 2 times per day    enoxaparin  30 mg SubCUTAneous Daily     Continuous Infusions:   sodium chloride 25 mL/hr at 12/24/22 0641    sodium chloride      dexmedetomidine Stopped (12/24/22 0020)     PRN Meds:   midazolam, 2 mg, Q2H PRN  midodrine, 5 mg, TID PRN  sodium phosphate IVPB, 0.16 mmol/kg, PRN   Or  sodium phosphate IVPB, 0.32 mmol/kg, PRN  sodium chloride flush, 5-40 mL, PRN  sodium chloride, , PRN  ondansetron, 4 mg, Q8H PRN   Or  ondansetron, 4 mg, Q6H PRN  polyethylene glycol, 17 g, Daily PRN  acetaminophen, 650 mg, Q6H PRN   Or  acetaminophen, 650 mg, Q6H PRN        SUPPORT DEVICES: [] Ventilator [] BIPAP  [x] Nasal Cannula [] Room Air    DATA:  Complete Blood Count:   Recent Labs     12/22/22  0535 12/23/22  1147 12/24/22  0652   WBC 4.5 6.1 10.5   RBC 2.58* 2.27* 2.18*   HGB 9.8* 8.7* 8.4*   HCT 29.8* 26.5* 24.9*   .5* 116.7* 114.2*   MCH 38.0* 38.3* 38.5*   MCHC 32.9 32.8 33.7   RDW 15.1* 15.5* 15.7*   PLT See Reflexed IPF Result 163 169   MPV  --  11.3 11.0        Last 3 Blood Glucose:   Recent Labs     12/22/22  0535 12/23/22  1147   GLUCOSE 162* 115*        PT/INR:    Lab Results   Component Value Date/Time    PROTIME 12.4 12/15/2022 12:28 PM    INR 1.2 12/15/2022 12:28 PM     PTT:  No results found for: APTT, PTT    Comprehensive Metabolic Profile:   Recent Labs     12/22/22  0535 12/23/22  1147    137   K 3.1* 3.2*    103   CO2 24 24   BUN 6 7   CREATININE 0.22* 0.28*   GLUCOSE 162* 115*   CALCIUM 8.6 8.3*      Magnesium:   Lab Results   Component Value Date/Time    MG 1.8 12/22/2022 05:35 AM    MG 1.9 12/21/2022 04:09 AM    MG 1.4 12/20/2022 05:59 AM     Phosphorus:   Lab Results   Component Value Date/Time    PHOS 4.2 12/22/2022 05:35 AM    PHOS 5.3 12/21/2022 04:09 AM    PHOS 4.7 12/20/2022 05:59 AM     Ionized Calcium:   Lab Results   Component Value Date/Time    CAION 1.17 12/18/2022 05:54 AM    CAION 0.83 12/17/2022 04:24 AM        Urinalysis:   Lab Results   Component Value Date/Time    NITRU NEGATIVE 12/15/2022 12:49 PM    COLORU Dark Yellow 12/15/2022 12:49 PM    PHUR 6.0 12/15/2022 12:49 PM    WBCUA 0 TO 2 12/15/2022 12:49 PM    RBCUA 2 TO 5 12/15/2022 12:49 PM    MUCUS 3+ 03/09/2022 12:21 PM    TRICHOMONAS NOT REPORTED 09/20/2014 06:19 AM    YEAST NOT REPORTED 09/20/2014 06:19 AM    BACTERIA FEW 09/20/2014 06:19 AM    CLARITYU slightly cloudy 07/22/2014 11:58 AM    SPECGRAV 1.020 12/15/2022 12:49 PM    LEUKOCYTESUR NEGATIVE 12/15/2022 12:49 PM    UROBILINOGEN Normal 12/15/2022 12:49 PM    BILIRUBINUR NEGATIVE  Verified by ictotest. 12/15/2022 12:49 PM    BILIRUBINUR negative 07/22/2014 11:58 AM    BLOODU negative 07/22/2014 11:58 AM    GLUCOSEU NEGATIVE 12/15/2022 12:49 PM    KETUA SMALL 12/15/2022 12:49 PM    AMORPHOUS NOT REPORTED 09/20/2014 06:19 AM       HgBA1c:  No results found for: LABA1C  TSH:  No results found for: TSH  Lactic Acid: No results found for: LACTA   Troponin: No results for input(s): TROPONINI in the last 72 hours.       ASSESSMENT:     Patient Active Problem List    Diagnosis Date Noted    COVID-19 12/18/2022    Acute respiratory insufficiency 37/36/6547    Alcoholic intoxication with complication ([facility] 51/78/4007    Altered mental status 12/18/2022    Drug overdose of undetermined intent, initial encounter 12/15/2022    Portal hypertension ([facility] 03/10/2022    Hyperbilirubinemia 03/09/2022    Fatty liver 03/09/2022    Ascites due to alcoholic hepatitis 12/42/6538    Alcohol abuse 03/09/2022    S/P cholecystectomy 03/09/2022    Abnormal LFTs 03/09/2022    Hypokalemia 03/09/2022    Hypoalbuminemia 03/09/2022    Bipolar I disorder, most recent episode depressed ([facility] 09/10/2014          PLAN:     Neuro  Off versed, off precedex  Librium 100 mg TID  CIWA protocol  Hx EtOH abuse, daily thiamine and folate  Neuro checks per protocol  Hx bipolar disorder, home meds abilify and effexor     Cardiology  Hemodynamically stable, no pressor support  Midodrine 5 mg TID PRN  HR stable overnight     Pulmonary  Covid positive, ID consulted  Extubated 12/23  Solucortef 100 q8     Renal  I/O: 150 + 2 unmeasured  External catheter  K 2.4  Replaced PO, IV  Started D5 1/2 NS + K 20 mEQ @ 125ml/hr     GI  Hx EtOH use, +EtOH 266 on arrival  Hx transaminitis, improving  Monitor for EtOH withdrawal, seizure precautions  Daily thiamine and folate, multivitamin  Glycolax PRN  Ulcer Ppx: Pepcid BID  Diet NPO - NG insert today    ID  WBC 10.5  Covid positive  ID consulted    F.ITZEL.S.T. M. [facility].U.[facility].S. B.I.D. Feeding Diet: Diet NPO  Fluids: D5 1/2 NS + K 20 mEq @ 125ml/hr  Analgesic: Tylenol prn  Sedation: none   Thrombo-prophylaxis: [x] Enoxaparin, [] Unfract. Heparin Subcutaneously, [] EPC Cuffs  Mobility: PT, OT  Heads up: up  Ulcer prophylaxis: [] PPI Agent, [x] [id number], [] Sucralfate, [] Other:  Glycemic control: None, Gluc 109  Spontaneous breathing trial: N/A   Bowel regimen/urine output: Glycolax prn, U out 150+2 unmeasured  Indwelling catheter/lines: Peripheral IVs  De-escalation: Consider transfer out of ICU once mentation improves    Codey Fernández M.D.   Emergency Medicine Resident, PGY-2  12/24/2022 7:27 AM

## 2022-12-24 NOTE — CONSULTS
CONSULTING SERVICE: Otolaryngology-Head and Neck Surgery    REASON FOR CONSULT:  Carlos Jasso is a 32 y.o. female seen today for   Chief Complaint   Patient presents with    Drug Overdose       HISTORY OF PRESENT ILLNESS:   31 y/o F with history of drug overdose and respiratory failure, was just extubated yesterday after 7-8 days of intubation with 7.5 ETT. She was given steroids but had no leak with cuff down so primary team asked for ENT input. She was tolerating CPAP trials and they felt she was ready for extubation from a lung standpoint. No issues since extubation yesterday. REVIEW OF SYSTEMS:   General ROS: negative  Ophthalmic ROS: negative  ENT ROS: negative  Allergy and Immunology ROS: negative  Hematological and Lymphatic ROS: negative  Endocrine ROS: negative  Respiratory ROS: +cough  Cardiovascular ROS: no chest pain or dyspnea on exertion  Musculoskeletal ROS: negative    PAST HISTORY:   Past Medical History:   Diagnosis Date    ADHD (attention deficit hyperactivity disorder)     Anxiety     Bipolar 1 disorder (HCC)     Depression      No past surgical history on file. Family History   Problem Relation Age of Onset    Hypertension Mother     Depression Mother     Bipolar Disorder Father     Alcohol Abuse Father     Cancer Other         Uncle ?       Social Connections: Not on file        MEDICATIONS:   Current Facility-Administered Medications   Medication Dose Route Frequency Provider Last Rate Last Admin    magnesium sulfate 2000 mg in 50 mL IVPB premix  2,000 mg IntraVENous Once Sarah Woodward MD 25 mL/hr at 12/24/22 1433 2,000 mg at 12/24/22 1433    dextrose 5 % and 0.45 % NaCl with KCl 20 mEq infusion   IntraVENous Continuous Sarah Woodward  mL/hr at 12/24/22 0913 New Bag at 12/24/22 0913    sodium chloride flush 0.9 % injection 5-40 mL  5-40 mL IntraVENous 2 times per day Sarah Woodward MD        sodium chloride flush 0.9 % injection 5-40 mL  5-40 mL IntraVENous PRN Jacinda Quinn MD        0.9 % sodium chloride infusion   IntraVENous PRN Jacinda Quinn MD        LORazepam (ATIVAN) tablet 1 mg  1 mg Oral Q1H PRN Jacinda Quinn MD        Or    LORazepam (ATIVAN) injection 1 mg  1 mg IntraVENous Q1H PRN Jacinda Quinn MD        Or    LORazepam (ATIVAN) tablet 2 mg  2 mg Oral Q1H PRN Jacinda Quinn MD        Or    LORazepam (ATIVAN) injection 2 mg  2 mg IntraVENous Q1H PRN Jacinda Quinn MD   2 mg at 12/24/22 1051    Or    LORazepam (ATIVAN) tablet 3 mg  3 mg Oral Q1H PRN Jacinda Quinn MD        Or    LORazepam (ATIVAN) injection 3 mg  3 mg IntraVENous Q1H PRN Jacinda Quinn MD        Or    LORazepam (ATIVAN) tablet 4 mg  4 mg Oral Q1H PRN Jacinda Quinn MD        Or    LORazepam (ATIVAN) injection 4 mg  4 mg IntraVENous Q1H PRN Jacinda Quinn MD   4 mg at 12/24/22 0945    chlordiazePOXIDE (LIBRIUM) capsule 100 mg  100 mg Oral TID Jacinda Quinn MD   100 mg at 12/24/22 1317    multivitamin 1 tablet  1 tablet Per NG tube Daily Jacinda Quinn MD   1 tablet at 12/24/22 1316    famotidine (PEPCID) tablet 20 mg  20 mg Per NG tube BID Basil Son MD   20 mg at 12/24/22 1315    thiamine tablet 100 mg  100 mg Per NG tube Daily Basil Son MD   100 mg at 24/13/25 4847    folic acid (FOLVITE) tablet 1 mg  1 mg Per NG tube Daily Basil Son MD   1 mg at 12/24/22 1316    midodrine (PROAMATINE) tablet 5 mg  5 mg Oral TID PRN Yazan Gonsales MD   5 mg at 12/18/22 0906    sodium phosphate 7.92 mmol in sodium chloride 0.9 % 250 mL IVPB  0.16 mmol/kg IntraVENous PRN Yazan Gonsales MD        Or    sodium phosphate 15.84 mmol in sodium chloride 0.9 % 250 mL IVPB  0.32 mmol/kg IntraVENous PRN Yazan Gonsales MD        sodium chloride flush 0.9 % injection 5-40 mL  5-40 mL IntraVENous 2 times per day Lowell Champ, DO   10 mL at 12/23/22 2103    sodium chloride flush 0.9 % injection 5-40 mL  5-40 mL IntraVENous PRN Mingo Rochefolk, DO        0.9 % sodium chloride infusion   IntraVENous PRN Mingo Oglethorpe, DO 10 mL/hr at 12/24/22 0914 New Bag at 12/24/22 0914    enoxaparin Sodium (LOVENOX) injection 30 mg  30 mg SubCUTAneous Daily Mingo Oglethorpe, DO   30 mg at 12/24/22 0953    ondansetron (ZOFRAN-ODT) disintegrating tablet 4 mg  4 mg Oral Q8H PRN Mingo Edenlk, DO        Or    ondansetron [facility] injection 4 mg  4 mg IntraVENous Q6H PRN Mingo Oglethorpe, DO        polyethylene glycol (GLYCOLAX) packet 17 g  17 g Oral Daily PRN Mingo Oglethorpe, DO   17 g at 12/20/22 0012    acetaminophen (TYLENOL) tablet 650 mg  650 mg Oral Q6H PRN Mingo Oglethorpe, DO   650 mg at 12/18/22 2342    Or    acetaminophen (TYLENOL) suppository 650 mg  650 mg Rectal Q6H PRN Mingo Oglethorpe, DO            ALLERGIES:   No Known Allergies     PHYSICAL EXAM:  Vitals:    12/24/22 1400 12/24/22 1430 12/24/22 1500 12/24/22 1530   BP: 125/87 130/83 102/75 121/71   Pulse: 98 96 90 (!) 113   Resp: 24 27 30 27   Temp:       TempSrc:       SpO2: 100% 100% 100% 98%   Weight:       Height:          GENERAL: well developed and well nourished and in no acute distress  HEAD: normocephalic and atraumatic  EYES: sclera clear  EXTERNAL EARS: normal  NOSE: nares patent, normal mucosa  MOUTH/THROAT: moist mucous membranes  NECK: trachea midline  RESPIRATORY: No stridor  NEUROLOGICAL:  unable to assess    RELEVANT LABS/STUDIES:  CXR reviewed    IMPRESSION:  Acute respiratory failure requiring intubation s/p extubation yesterday, doing well clinically. RECOMMENDATIONS/PLAN:  With no ETT leak with cuff down, it is possible the tube was tighter at the level of the cricoid cartilage or at cord level. Currently, she is not having any signs of stridor. With a larger tube relative to airway anatomy, sometimes a delayed subglottic web can form over the next 1-3 weeks. We will monitor clinically at this time.   We will consider NP scope at bedside vs future DL/Bronch in OR.        --------------------------------------------------  Gabby Farrell MD  Otolaryngology-Head and Neck Surgery  [address] [facility]    Office  ph# [phone]    Also available in [facility]

## 2022-12-24 NOTE — PLAN OF CARE
Problem: Discharge Planning  Goal: Discharge to home or other facility with appropriate resources  Outcome: Progressing     Problem: Safety - Adult  Goal: Free from fall injury  Outcome: Progressing     Problem: Pain  Goal: Verbalizes/displays adequate comfort level or baseline comfort level  Outcome: Progressing     Problem: Confusion  Goal: Confusion, delirium, dementia, or psychosis is improved or at baseline  Description: INTERVENTIONS:  1. Assess for possible contributors to thought disturbance, including medications, impaired vision or hearing, underlying metabolic abnormalities, dehydration, psychiatric diagnoses, and notify attending LIP  2. [place] high risk fall precautions, as indicated  3. Provide frequent short contacts to provide reality reorientation, refocusing and direction  4. Decrease environmental stimuli, including noise as appropriate  5. Monitor and intervene to maintain adequate nutrition, hydration, elimination, sleep and activity  6. If unable to ensure safety without constant attention obtain sitter and review sitter guidelines with assigned personnel  7.  Initiate Psychosocial CNS and Spiritual Care consult, as indicated  Outcome: Progressing     Problem: Respiratory - Adult  Goal: Achieves optimal ventilation and oxygenation  Outcome: Progressing

## 2022-12-24 NOTE — PROGRESS NOTES
[facility]  Occupational Therapy Not Seen Note    DATE: 2022    NAME: Robson Ellison  MRN: [id number]   : 1995      Patient not seen this date for Occupational Therapy due to:    Patient is not appropriate for active participation in OT evaluation/treatment at this time d/t per RN pt remains confused and requests to defer eval this date.  Will check back         Electronically signed by ROB Mead/L on 2022 at 10:55 AM

## 2022-12-25 ENCOUNTER — APPOINTMENT (OUTPATIENT)
Dept: GENERAL RADIOLOGY | Age: 27
DRG: 812 | End: 2022-12-25
Payer: MEDICARE

## 2022-12-25 LAB
ABSOLUTE EOS #: 0 K/UL (ref 0–0.44)
ABSOLUTE IMMATURE GRANULOCYTE: 0.2 K/UL (ref 0–0.3)
ABSOLUTE LYMPH #: 1.21 K/UL (ref 1.1–3.7)
ABSOLUTE MONO #: 0.91 K/UL (ref 0.1–1.2)
ANION GAP SERPL CALCULATED.3IONS-SCNC: 7 MMOL/L (ref 9–17)
BASOPHILS # BLD: 0 % (ref 0–2)
BASOPHILS ABSOLUTE: 0 K/UL (ref 0–0.2)
BUN BLDV-MCNC: <2 MG/DL (ref 6–20)
CALCIUM SERPL-MCNC: 8.1 MG/DL (ref 8.6–10.4)
CHLORIDE BLD-SCNC: 104 MMOL/L (ref 98–107)
CO2: 22 MMOL/L (ref 20–31)
CREAT SERPL-MCNC: 0.35 MG/DL (ref 0.5–0.9)
EOSINOPHILS RELATIVE PERCENT: 0 % (ref 1–4)
GFR SERPL CREATININE-BSD FRML MDRD: >60 ML/MIN/1.73M2
GLUCOSE BLD-MCNC: 94 MG/DL (ref 70–99)
HCT VFR BLD CALC: 30.1 % (ref 36.3–47.1)
HEMOGLOBIN: 9.5 G/DL (ref 11.9–15.1)
IMMATURE GRANULOCYTES: 2 %
LYMPHOCYTES # BLD: 12 % (ref 24–43)
MCH RBC QN AUTO: 38.2 PG (ref 25.2–33.5)
MCHC RBC AUTO-ENTMCNC: 31.6 G/DL (ref 28.4–34.8)
MCV RBC AUTO: 120.9 FL (ref 82.6–102.9)
MONOCYTES # BLD: 9 % (ref 3–12)
MORPHOLOGY: ABNORMAL
MORPHOLOGY: ABNORMAL
NRBC AUTOMATED: 0.4 PER 100 WBC
PDW BLD-RTO: 15.9 % (ref 11.8–14.4)
PLATELET # BLD: 160 K/UL (ref 138–453)
PMV BLD AUTO: 11.4 FL (ref 8.1–13.5)
POTASSIUM SERPL-SCNC: 3.8 MMOL/L (ref 3.7–5.3)
RBC # BLD: 2.49 M/UL (ref 3.95–5.11)
SEG NEUTROPHILS: 77 % (ref 36–65)
SEGMENTED NEUTROPHILS ABSOLUTE COUNT: 7.78 K/UL (ref 1.5–8.1)
SODIUM BLD-SCNC: 133 MMOL/L (ref 135–144)
WBC # BLD: 10.1 K/UL (ref 3.5–11.3)

## 2022-12-25 PROCEDURE — 85025 COMPLETE CBC W/AUTO DIFF WBC: CPT

## 2022-12-25 PROCEDURE — 6360000002 HC RX W HCPCS: Performed by: HEALTH CARE PROVIDER

## 2022-12-25 PROCEDURE — 6370000000 HC RX 637 (ALT 250 FOR IP): Performed by: INTERNAL MEDICINE

## 2022-12-25 PROCEDURE — 36415 COLL VENOUS BLD VENIPUNCTURE: CPT

## 2022-12-25 PROCEDURE — 6360000002 HC RX W HCPCS: Performed by: STUDENT IN AN ORGANIZED HEALTH CARE EDUCATION/TRAINING PROGRAM

## 2022-12-25 PROCEDURE — 80048 BASIC METABOLIC PNL TOTAL CA: CPT

## 2022-12-25 PROCEDURE — 99291 CRITICAL CARE FIRST HOUR: CPT | Performed by: INTERNAL MEDICINE

## 2022-12-25 PROCEDURE — 6370000000 HC RX 637 (ALT 250 FOR IP): Performed by: STUDENT IN AN ORGANIZED HEALTH CARE EDUCATION/TRAINING PROGRAM

## 2022-12-25 PROCEDURE — 74018 RADEX ABDOMEN 1 VIEW: CPT

## 2022-12-25 PROCEDURE — 2580000003 HC RX 258: Performed by: STUDENT IN AN ORGANIZED HEALTH CARE EDUCATION/TRAINING PROGRAM

## 2022-12-25 PROCEDURE — 2000000000 HC ICU R&B

## 2022-12-25 PROCEDURE — 2580000003 HC RX 258: Performed by: HEALTH CARE PROVIDER

## 2022-12-25 PROCEDURE — 2500000003 HC RX 250 WO HCPCS: Performed by: STUDENT IN AN ORGANIZED HEALTH CARE EDUCATION/TRAINING PROGRAM

## 2022-12-25 PROCEDURE — 6370000000 HC RX 637 (ALT 250 FOR IP): Performed by: HEALTH CARE PROVIDER

## 2022-12-25 RX ADMIN — POTASSIUM CHLORIDE, DEXTROSE MONOHYDRATE AND SODIUM CHLORIDE: 150; 5; 450 INJECTION, SOLUTION INTRAVENOUS at 17:56

## 2022-12-25 RX ADMIN — POTASSIUM CHLORIDE, DEXTROSE MONOHYDRATE AND SODIUM CHLORIDE: 150; 5; 450 INJECTION, SOLUTION INTRAVENOUS at 04:30

## 2022-12-25 RX ADMIN — CHLORDIAZEPOXIDE HYDROCHLORIDE 100 MG: 25 CAPSULE ORAL at 09:16

## 2022-12-25 RX ADMIN — SODIUM CHLORIDE, PRESERVATIVE FREE 10 ML: 5 INJECTION INTRAVENOUS at 09:18

## 2022-12-25 RX ADMIN — CHLORDIAZEPOXIDE HYDROCHLORIDE 100 MG: 25 CAPSULE ORAL at 19:47

## 2022-12-25 RX ADMIN — LORAZEPAM 4 MG: 2 TABLET ORAL at 17:51

## 2022-12-25 RX ADMIN — Medication 100 MG: at 09:17

## 2022-12-25 RX ADMIN — FAMOTIDINE 20 MG: 20 TABLET, FILM COATED ORAL at 19:47

## 2022-12-25 RX ADMIN — POTASSIUM CHLORIDE, DEXTROSE MONOHYDRATE AND SODIUM CHLORIDE: 150; 5; 450 INJECTION, SOLUTION INTRAVENOUS at 10:49

## 2022-12-25 RX ADMIN — SODIUM CHLORIDE, PRESERVATIVE FREE 10 ML: 5 INJECTION INTRAVENOUS at 19:47

## 2022-12-25 RX ADMIN — FAMOTIDINE 20 MG: 20 TABLET, FILM COATED ORAL at 09:17

## 2022-12-25 RX ADMIN — LORAZEPAM 4 MG: 2 INJECTION INTRAMUSCULAR; INTRAVENOUS at 03:29

## 2022-12-25 RX ADMIN — FOLIC ACID 1 MG: 1 TABLET ORAL at 09:17

## 2022-12-25 RX ADMIN — LORAZEPAM 4 MG: 2 TABLET ORAL at 20:41

## 2022-12-25 RX ADMIN — LORAZEPAM 4 MG: 2 INJECTION INTRAMUSCULAR; INTRAVENOUS at 00:31

## 2022-12-25 RX ADMIN — POTASSIUM CHLORIDE 10 MEQ: 7.46 INJECTION, SOLUTION INTRAVENOUS at 00:00

## 2022-12-25 RX ADMIN — LORAZEPAM 4 MG: 2 INJECTION INTRAMUSCULAR; INTRAVENOUS at 19:45

## 2022-12-25 RX ADMIN — ACETAMINOPHEN 650 MG: 325 TABLET ORAL at 17:51

## 2022-12-25 RX ADMIN — ENOXAPARIN SODIUM 30 MG: 100 INJECTION SUBCUTANEOUS at 09:17

## 2022-12-25 RX ADMIN — SODIUM CHLORIDE, PRESERVATIVE FREE 10 ML: 5 INJECTION INTRAVENOUS at 22:12

## 2022-12-25 RX ADMIN — CHLORDIAZEPOXIDE HYDROCHLORIDE 100 MG: 25 CAPSULE ORAL at 14:55

## 2022-12-25 RX ADMIN — LORAZEPAM 4 MG: 2 INJECTION INTRAMUSCULAR; INTRAVENOUS at 22:11

## 2022-12-25 RX ADMIN — LORAZEPAM 4 MG: 2 INJECTION INTRAMUSCULAR; INTRAVENOUS at 15:20

## 2022-12-25 RX ADMIN — ALCOHOL 1 TABLET: 70.47 GEL TOPICAL at 09:17

## 2022-12-25 ASSESSMENT — PAIN SCALES - GENERAL
PAINLEVEL_OUTOF10: 2
PAINLEVEL_OUTOF10: 0

## 2022-12-25 NOTE — PROGRESS NOTES
INTENSIVE CARE UNIT  Resident Physician Progress Note    Patient - Leatha Landau  Date of Admission -  12/15/2022 12:08 PM  Date of Evaluation -  2022  Room and Bed Number -  9158/0798-84   Hospital Day - 10    HPI:     32year old who became unresponsive while with her boyfriend, EMS was called. Patient got Narcan with minimal response. Intubated due to GCS of 3 and airway protection. Initially placed on propofol. In ED LFTs were elevated, CT cervical spine unremarkable, CT head negative, CXR unremarkable. Alcohol level 266, UDS negative. Found to be covid positive. : Tolerating CPAP trials. Following command both on and off sedation. Off propofol. : Weaning versed as tolerated. Librium increased. : Young removed. : Off versed. Precedex decreased. Extubated. : NG placed overnight, patient pulled out her NG. SUBJECTIVE:     OVERNIGHT EVENTS:    Patient remains confused, pulled out her NG tube overnight.     AWAKE & FOLLOWING COMMANDS:  [] No   [x] Yes    SECRETIONS Amount:  [x] Small [] Moderate  [] Large  [] None  Color:     [x] White [] Colored  [] Bloody    SEDATION:  RAAS Score:  [] Propofol gtt  [] Versed gtt  [] Ativan gtt   [x] No Sedation    PARALYZED:  [x] No    [] Yes    VASOPRESSORS:  [x] No    [] Yes  [] Levophed [] Dopamine [] Vasopressin  [] Dobutamine [] Phenylephrine [] Epinephrine      OBJECTIVE:     VITAL SIGNS:  /86   Pulse (!) 103   Temp 100.3 °F (37.9 °C) (Axillary)   Resp 20   Ht 5' 5\" (1.651 m)   Wt 112 lb 7 oz (51 kg)   SpO2 96%   BMI 18.71 kg/m²   Tmax over 24 hours:  Temp (24hrs), Av.5 °F (37.5 °C), Min:98.4 °F (36.9 °C), Max:100.4 °F (38 °C)      Patient Vitals for the past 8 hrs:   BP Temp Temp src Pulse Resp SpO2   22 0700 122/86 -- -- (!) 103 20 96 %   22 0645 -- -- -- 93 19 100 %   2230 113/69 -- -- 96 17 100 %   2215 -- -- -- 94 13 100 %   22 (!) 134/96 100.3 °F (37.9 °C) Axillary 94 24 100 %   12/25/22 0545 -- -- -- (!) 102 23 100 %   12/25/22 0530 132/86 -- -- 98 27 100 %   12/25/22 0515 -- -- -- 99 26 100 %   12/25/22 0500 127/80 -- -- 96 24 100 %   12/25/22 0445 -- -- -- 97 23 100 %   12/25/22 0430 121/83 -- -- (!) 101 24 100 %   12/25/22 0415 -- -- -- 98 26 100 %   12/25/22 0400 100/72 100.4 °F (38 °C) Axillary 93 14 100 %   12/25/22 0345 -- -- -- 97 28 100 %   12/25/22 0330 119/80 -- -- 95 30 100 %   12/25/22 0315 -- -- -- 89 27 100 %   12/25/22 0300 (!) 114/104 -- -- (!) 102 24 100 %   12/25/22 0245 -- -- -- (!) 102 24 100 %   12/25/22 0230 120/87 -- -- 100 23 100 %   12/25/22 0215 -- -- -- (!) 109 24 100 %   12/25/22 0200 (!) 129/92 100.2 °F (37.9 °C) Axillary (!) 103 23 100 %   12/25/22 0145 -- -- -- (!) 109 28 100 %   12/25/22 0130 (!) 128/93 -- -- 97 17 100 %   12/25/22 0115 -- -- -- (!) 102 15 100 %   12/25/22 0100 (!) 132/90 -- -- (!) 102 25 100 %   12/25/22 0045 -- -- -- 91 27 100 %   12/25/22 0030 122/84 -- -- 97 27 100 %   12/25/22 0015 -- -- -- 100 22 100 %   12/25/22 0000 (!) 125/93 100 °F (37.8 °C) Axillary 98 26 100 %   12/24/22 2345 -- -- -- (!) 111 17 100 %         Intake/Output Summary (Last 24 hours) at 12/25/2022 0733  Last data filed at 12/25/2022 0510  Gross per 24 hour   Intake 3182.16 ml   Output --   Net 3182.16 ml     Date 12/25/22 0000 - 12/25/22 2359   Shift 4332-1014 1562-2558 1839-6220 24 Hour Total   INTAKE   I.V.(mL/kg) 1402(27.5)   1402(27.5)   IV Piggyback(mL/kg) 400(7.8)   400(7.8)   Shift Total(mL/kg) 1802(35.3)   1802(35.3)   OUTPUT   Shift Total(mL/kg)       Weight (kg) 51 51 51 51     Wt Readings from Last 3 Encounters:   12/24/22 112 lb 7 oz (51 kg)   03/09/22 106 lb (48.1 kg)     Body mass index is 18.71 kg/m². PHYSICAL EXAM:  GEN:                  Awake, alert. Not oriented.    EYES:                pupils equal, round, and reactive to light  HEENT:            Normocephalic, without obvious abnormality  LUNGS:            good air exchange  CV:                     regular rate and rhythm and normal S1 and S2  ABDOMEN:     soft, non-distended, and non-tender  MSK:                  there is no redness, warmth, or swelling of the joints  NEURO[de-identified]           Not oriented.    SKIN:                 Normal skin color, texture, turgor  EXTREMITIES:  No pedal or leg edema, no calf tenderness/swelling, no erythema, distal pulses intact       MEDICATIONS:  Scheduled Meds:   sodium chloride flush  5-40 mL IntraVENous 2 times per day    chlordiazePOXIDE  100 mg Oral TID    multivitamin  1 tablet Per NG tube Daily    famotidine  20 mg Per NG tube BID    thiamine  100 mg Per NG tube Daily    folic acid  1 mg Per NG tube Daily    sodium chloride flush  5-40 mL IntraVENous 2 times per day    enoxaparin  30 mg SubCUTAneous Daily     Continuous Infusions:   dextrose 5% and 0.45% NaCl with KCl 20 mEq 125 mL/hr at 12/25/22 0510    sodium chloride      sodium chloride 10 mL/hr at 12/24/22 1802     PRN Meds:   sodium chloride flush, 5-40 mL, PRN  sodium chloride, , PRN  LORazepam, 1 mg, Q1H PRN   Or  LORazepam, 1 mg, Q1H PRN   Or  LORazepam, 2 mg, Q1H PRN   Or  LORazepam, 2 mg, Q1H PRN   Or  LORazepam, 3 mg, Q1H PRN   Or  LORazepam, 3 mg, Q1H PRN   Or  LORazepam, 4 mg, Q1H PRN   Or  LORazepam, 4 mg, Q1H PRN  midodrine, 5 mg, TID PRN  sodium phosphate IVPB, 0.16 mmol/kg, PRN   Or  sodium phosphate IVPB, 0.32 mmol/kg, PRN  sodium chloride flush, 5-40 mL, PRN  sodium chloride, , PRN  ondansetron, 4 mg, Q8H PRN   Or  ondansetron, 4 mg, Q6H PRN  polyethylene glycol, 17 g, Daily PRN  acetaminophen, 650 mg, Q6H PRN   Or  acetaminophen, 650 mg, Q6H PRN        SUPPORT DEVICES: [] Ventilator [] BIPAP  [x] Nasal Cannula [] Room Air    DATA:  Complete Blood Count:   Recent Labs     12/23/22  1147 12/24/22  0652 12/25/22  0321   WBC 6.1 10.5 10.1   RBC 2.27* 2.18* 2.49*   HGB 8.7* 8.4* 9.5*   HCT 26.5* 24.9* 30.1*   .7* 114.2* 120.9*   MCH 38.3* 38.5* 38.2*   MCHC 32.8 33.7 31.6   RDW 15.5* 15.7* 15.9*    169 160   MPV 11.3 11.0 11.4        Last 3 Blood Glucose:   Recent Labs     12/23/22  1147 12/24/22  0652 12/25/22  0321   GLUCOSE 115* 109* 94        PT/INR:    Lab Results   Component Value Date/Time    PROTIME 12.4 12/15/2022 12:28 PM    INR 1.2 12/15/2022 12:28 PM     PTT:  No results found for: APTT, PTT    Comprehensive Metabolic Profile:   Recent Labs     12/23/22  1147 12/24/22  0652 12/24/22  1818 12/25/22  0321    141 138 133*   K 3.2* 2.4* 2.5* 3.8    105 104 104   CO2 24 25 23 22   BUN 7 4*  --  <2*   CREATININE 0.28* 0.41*  --  0.35*   GLUCOSE 115* 109*  --  94   CALCIUM 8.3* 8.4*  --  8.1*      Magnesium:   Lab Results   Component Value Date/Time    MG 1.9 12/24/2022 06:52 AM    MG 1.8 12/22/2022 05:35 AM    MG 1.9 12/21/2022 04:09 AM     Phosphorus:   Lab Results   Component Value Date/Time    PHOS 3.5 12/24/2022 06:52 AM    PHOS 4.2 12/22/2022 05:35 AM    PHOS 5.3 12/21/2022 04:09 AM     Ionized Calcium:   Lab Results   Component Value Date/Time    CAION 1.17 12/18/2022 05:54 AM    CAION 0.83 12/17/2022 04:24 AM        Urinalysis:   Lab Results   Component Value Date/Time    NITRU NEGATIVE 12/15/2022 12:49 PM    COLORU Dark Yellow 12/15/2022 12:49 PM    PHUR 6.0 12/15/2022 12:49 PM    WBCUA 0 TO 2 12/15/2022 12:49 PM    RBCUA 2 TO 5 12/15/2022 12:49 PM    MUCUS 3+ 03/09/2022 12:21 PM    TRICHOMONAS NOT REPORTED 09/20/2014 06:19 AM    YEAST NOT REPORTED 09/20/2014 06:19 AM    BACTERIA FEW 09/20/2014 06:19 AM    CLARITYU slightly cloudy 07/22/2014 11:58 AM    SPECGRAV 1.020 12/15/2022 12:49 PM    LEUKOCYTESUR NEGATIVE 12/15/2022 12:49 PM    UROBILINOGEN Normal 12/15/2022 12:49 PM    BILIRUBINUR NEGATIVE  Verified by ictotest. 12/15/2022 12:49 PM    BILIRUBINUR negative 07/22/2014 11:58 AM    BLOODU negative 07/22/2014 11:58 AM    GLUCOSEU NEGATIVE 12/15/2022 12:49 PM    KETUA SMALL 12/15/2022 12:49 PM    AMORPHOUS NOT REPORTED 09/20/2014 06:19 AM HgBA1c:  No results found for: LABA1C  TSH:  No results found for: TSH  Lactic Acid: No results found for: LACTA   Troponin: No results for input(s): TROPONINI in the last 72 hours. ASSESSMENT:     Patient Active Problem List    Diagnosis Date Noted    COVID-19 12/18/2022    Acute respiratory insufficiency 79/65/6244    Alcoholic intoxication with complication ([facility] 83/90/1587    Altered mental status 12/18/2022    Drug overdose of undetermined intent, initial encounter 12/15/2022    Portal hypertension ([facility] 03/10/2022    Hyperbilirubinemia 03/09/2022    Fatty liver 03/09/2022    Ascites due to alcoholic hepatitis 23/93/2902    Alcohol abuse 03/09/2022    S/P cholecystectomy 03/09/2022    Abnormal LFTs 03/09/2022    Hypokalemia 03/09/2022    Hypoalbuminemia 03/09/2022    Bipolar I disorder, most recent episode depressed ([facility] 09/10/2014          PLAN:     Neuro  Off versed, off precedex  Librium 100 mg TID  CIWA protocol  Hx EtOH abuse, daily thiamine and folate  Neuro checks per protocol  Hx bipolar disorder, home meds abilify and effexor     Cardiology  Hemodynamically stable, no pressor support  Midodrine 5 mg TID PRN  HR stable overnight     Pulmonary  4L NC  Covid positive, ID consulted  Extubated 12/23  Solucortef 100 q8     Renal  I/O: 9 unmeasured  External catheter  K 3.8  Na 133  D5 1/2 NS + K 20 mEQ @ 125ml/hr     GI  Hx EtOH use, +EtOH 266 on arrival  Hx transaminitis, improving  Monitor for EtOH withdrawal, seizure precautions  Daily thiamine and folate, multivitamin  Glycolax PRN  Ulcer Ppx: Pepcid BID  Diet NPO - patient pulled NG yesterday, reinsert today     ID  WBC 10.1  Covid positive  ID consulted      F.A[facility] B.I.D. Feeding Diet: Diet NPO  Fluids: D5 1/2 NS @ 75/hr  Family: will update  Analgesic: tylenol  Sedation: CIWA protocol, librium 100 TID   Thrombo-prophylaxis: [x] Enoxaparin, [] Unfract.  Heparin Subcutaneously, [] EPC Cuffs  Mobility: PT, OT  Heads up: Up  Ulcer prophylaxis: [] PPI Agent, [x] [id number], [] Sucralfate, [] Other:  Glycemic control: D5, glucose 94  Spontaneous breathing trial: N/A   Bowel regimen/urine output: glycolax prn / U out 9 unmeasured  Indwelling catheter/lines: Peripheral Ivs, NG  De-escalation: Transfer out of ICU    Codey Fernández M.D.   Emergency Medicine Resident, PGY-2  12/25/2022 7:33 AM

## 2022-12-25 NOTE — CONSULTS
Comprehensive Nutrition Assessment    Type and Reason for Visit:  Consult (TF Order and Management)    Nutrition Recommendations/Plan:   As able, start Tube Feedings of Standard without Fiber formula (Osmolite 1.2) goal rate 55 mL/hr = 1584 kcals, 73 gm pro/day. Monitor TF tolerance/adequacy of intakes - modify as needed. Continue NPO. Monitor plans for oral diet. Will monitor labs, weights, and plan of care. Malnutrition Assessment:  Malnutrition Status:  Insufficient data (12/25/22 1123)    Context:  Acute Illness     Findings of the 6 clinical characteristics of malnutrition:  Energy Intake:  Mild decrease in energy intake   Weight Loss:  Unable to assess - Weight fluctuations since admission noted. Body Fat Loss:  Mild body fat loss Orbital   Muscle Mass Loss: Unable to fully assess as covered by blankets and under droplet precautions. Fluid Accumulation:  No significant fluid accumulation   Strength:  Not Performed    Nutrition Assessment:    Consulted for Tube Feedings. Pt extubated on 12/23. NG was placed and pulled by pt yesterday. NG has been replaced today. Discussed restarting TF with RN. Labs reviewed: Na 133 mmol/L. Meds include: Folic Acid, MVI, Thiamine. Nutrition Related Findings:    Labs/Meds reviewed. Last BM 12/25. NG in place. Wound Type: None       Current Nutrition Intake & Therapies:    Average Meal Intake: NPO  Average Supplements Intake: NPO  Diet NPO  Additional Calorie Sources:  D5%0.45%NaCl with 20 mEq KCl at 125 mL/hr = 510 kcals/day    Anthropometric Measures:  Height: 5' 5\" (165.1 cm)  Ideal Body Weight (IBW): 125 lbs (57 kg)    Admission Body Weight: 102 lb 15.3 oz (46.7 kg)  Current Body Weight: 112 lb 7 oz (51 kg), 90 % IBW. Weight Source: Bed Scale  Current BMI (kg/m2): 18.7                          BMI Categories: Normal Weight (BMI 18.5-24. 9)    Estimated Daily Nutrient Needs:  Energy Requirements Based On: Kcal/kg  Weight Used for Energy Requirements: Current  Energy (kcal/day): 1500 kcals/day  Weight Used for Protein Requirements: Admission  Protein (g/day): 60-75 gm pro/day  Method Used for Fluid Requirements: Other (Comment)  Fluid (ml/day): per MD    Nutrition Diagnosis:   Inadequate oral intake related to  (recent extubation; current condition) as evidenced by NPO or clear liquid status due to medical condition (need for enteral nutrition support)    Nutrition Interventions:   Food and/or Nutrient Delivery: Start Tube Feeding (As able, start Tube Feedings of Standard without Fiber formula (Osmolite 1.2) goal rate 55 mL/hr = 1584 kcals, 73 gm pro/day.)  Nutrition Education/Counseling: No recommendation at this time  Coordination of Nutrition Care: Continue to monitor while inpatient  Plan of Care discussed with: RN    Goals:  Previous Goal Met: No Progress toward Goal(s)  Goals: Meet at least 75% of estimated needs, prior to discharge       Nutrition Monitoring and Evaluation:   Behavioral-Environmental Outcomes: None Identified  Food/Nutrient Intake Outcomes: Enteral Nutrition Intake/Tolerance  Physical Signs/Symptoms Outcomes: Biochemical Data, GI Status, Fluid Status or Edema, Nutrition Focused Physical Findings, Skin, Weight    Discharge Planning:    Too soon to determine     Qiana Varghese RD, LD  Contact: [phone]

## 2022-12-25 NOTE — PLAN OF CARE
Problem: Discharge Planning  Goal: Discharge to home or other facility with appropriate resources  12/25/2022 0127 by Dwight Prader, RN  Outcome: Progressing  12/24/2022 1757 by Rancho Carr RN  Outcome: Progressing     Problem: Skin/Tissue Integrity  Goal: Absence of new skin breakdown  Description: 1. Monitor for areas of redness and/or skin breakdown  2. Assess vascular access sites hourly  3. Every 4-6 hours minimum:  Change oxygen saturation probe site  4. Every 4-6 hours:  If on nasal continuous positive airway pressure, respiratory therapy assess nares and determine need for appliance change or resting period. 12/25/2022 0127 by Dwight Prader, RN  Outcome: Progressing  12/24/2022 1757 by Rancho Carr RN  Outcome: Progressing     Problem: ABCDS Injury Assessment  Goal: Absence of physical injury  12/25/2022 0127 by Dwight Prader, RN  Outcome: Progressing  12/24/2022 1757 by Rancho Carr RN  Outcome: Progressing  Flowsheets (Taken 12/24/2022 0800)  Absence of Physical Injury: Implement safety measures based on patient assessment     Problem: Safety - Adult  Goal: Free from fall injury  12/25/2022 0127 by Dwight Prader, RN  Outcome: Progressing  12/24/2022 1757 by Rancho Carr RN  Outcome: Progressing  Flowsheets (Taken 12/24/2022 0800)  Free From Fall Injury: Based on caregiver fall risk screen, instruct family/caregiver to ask for assistance with transferring infant if caregiver noted to have fall risk factors     Problem: Pain  Goal: Verbalizes/displays adequate comfort level or baseline comfort level  12/25/2022 0127 by Dwight Prader, RN  Outcome: Progressing  12/24/2022 1757 by Rancho Carr RN  Outcome: Progressing     Problem: Confusion  Goal: Confusion, delirium, dementia, or psychosis is improved or at baseline  Description: INTERVENTIONS:  1.  Assess for possible contributors to thought disturbance, including medications, impaired vision or hearing, underlying metabolic abnormalities, dehydration, psychiatric diagnoses, and notify attending LIP  2. [place] high risk fall precautions, as indicated  3. Provide frequent short contacts to provide reality reorientation, refocusing and direction  4. Decrease environmental stimuli, including noise as appropriate  5. Monitor and intervene to maintain adequate nutrition, hydration, elimination, sleep and activity  6. If unable to ensure safety without constant attention obtain sitter and review sitter guidelines with assigned personnel  7.  Initiate Psychosocial CNS and Spiritual Care consult, as indicated  12/25/2022 0127 by Buddy Vincent RN  Outcome: Progressing  12/24/2022 1757 by Brooke Malik RN  Outcome: Progressing  Flowsheets (Taken 12/24/2022 0800)  Effect of thought disturbance (confusion, delirium, dementia, or psychosis) are managed with adequate functional status:   Assess for contributors to thought disturbance, including medications, impaired vision or hearing, underlying metabolic abnormalities, dehydration, psychiatric diagnoses, notify [facility] high risk fall precautions, as indicated     Problem: Respiratory - Adult  Goal: Achieves optimal ventilation and oxygenation  12/25/2022 0127 by Buddy Vincent RN  Outcome: Progressing  12/24/2022 1757 by Brooke Malik RN  Outcome: Progressing  Flowsheets (Taken 12/24/2022 0800)  Achieves optimal ventilation and oxygenation: Position to facilitate oxygenation and minimize respiratory effort     Problem: Nutrition Deficit:  Goal: Optimize nutritional status  12/25/2022 0127 by Buddy Vincent RN  Outcome: Progressing  12/24/2022 1757 by Brooke Malik RN  Outcome: Progressing     Problem: Risk for Elopement  Goal: Patient will not exit the unit/facility without proper excort  12/25/2022 0127 by Buddy Vincent RN  Outcome: Progressing  12/24/2022 1757 by Brooke Malik RN  Outcome: Progressing  Flowsheets (Taken 12/24/2022 0800)  Nursing Interventions for Elopement Risk: Reduce environmental triggers

## 2022-12-25 NOTE — PLAN OF CARE
Problem: Discharge Planning  Goal: Discharge to home or other facility with appropriate resources  12/25/2022 0730 by Rex Ramirez RN  Outcome: Progressing  12/25/2022 0127 by Placido Litten, RN  Outcome: Progressing  12/24/2022 1757 by Rex Ramirez RN  Outcome: Progressing     Problem: Skin/Tissue Integrity  Goal: Absence of new skin breakdown  Description: 1. Monitor for areas of redness and/or skin breakdown  2. Assess vascular access sites hourly  3. Every 4-6 hours minimum:  Change oxygen saturation probe site  4. Every 4-6 hours:  If on nasal continuous positive airway pressure, respiratory therapy assess nares and determine need for appliance change or resting period.   12/25/2022 0730 by Rex Ramirez RN  Outcome: Progressing  12/25/2022 0127 by Placido Litten, RN  Outcome: Progressing  12/24/2022 1757 by Rex Ramirez RN  Outcome: Progressing     Problem: ABCDS Injury Assessment  Goal: Absence of physical injury  12/25/2022 0730 by Rex Ramirez RN  Outcome: Progressing  Flowsheets (Taken 12/25/2022 0728)  Absence of Physical Injury: Implement safety measures based on patient assessment  12/25/2022 0127 by Placido Litten, RN  Outcome: Progressing  12/24/2022 1757 by Rex Ramirez RN  Outcome: Progressing  Flowsheets (Taken 12/24/2022 0800)  Absence of Physical Injury: Implement safety measures based on patient assessment     Problem: Safety - Adult  Goal: Free from fall injury  12/25/2022 0730 by Rex Ramirez RN  Outcome: Elbert Gilbert (Taken 12/25/2022 0728)  Free From Fall Injury: Based on caregiver fall risk screen, instruct family/caregiver to ask for assistance with transferring infant if caregiver noted to have fall risk factors  12/25/2022 0127 by Placido Litten, RN  Outcome: Progressing  12/24/2022 1757 by Rex Ramirez RN  Outcome: Progressing  Flowsheets (Taken 12/24/2022 0800)  Free From Fall Injury: Based on caregiver fall risk screen, instruct family/caregiver to ask for assistance with transferring infant if caregiver noted to have fall risk factors     Problem: Pain  Goal: Verbalizes/displays adequate comfort level or baseline comfort level  12/25/2022 0730 by Michael Sheehan RN  Outcome: Progressing  12/25/2022 0127 by Kalin Bowie RN  Outcome: Progressing  12/24/2022 1757 by Michael Sheehan RN  Outcome: Progressing     Problem: Confusion  Goal: Confusion, delirium, dementia, or psychosis is improved or at baseline  Description: INTERVENTIONS:  1. Assess for possible contributors to thought disturbance, including medications, impaired vision or hearing, underlying metabolic abnormalities, dehydration, psychiatric diagnoses, and notify attending LIP  2. [place] high risk fall precautions, as indicated  3. Provide frequent short contacts to provide reality reorientation, refocusing and direction  4. Decrease environmental stimuli, including noise as appropriate  5. Monitor and intervene to maintain adequate nutrition, hydration, elimination, sleep and activity  6. If unable to ensure safety without constant attention obtain sitter and review sitter guidelines with assigned personnel  7.  Initiate Psychosocial CNS and Spiritual Care consult, as indicated  12/25/2022 0730 by Michael Sheehan RN  Outcome: Progressing  12/25/2022 0127 by Kalin Bowie RN  Outcome: Progressing  12/24/2022 1757 by Michael Sheehan RN  Outcome: Progressing  Flowsheets (Taken 12/24/2022 0800)  Effect of thought disturbance (confusion, delirium, dementia, or psychosis) are managed with adequate functional status:   Assess for contributors to thought disturbance, including medications, impaired vision or hearing, underlying metabolic abnormalities, dehydration, psychiatric diagnoses, notify [facility] high risk fall precautions, as indicated     Problem: Respiratory - Adult  Goal: Achieves optimal ventilation and oxygenation  12/25/2022 0730 by Subhash Christian Mitchell RN  Outcome: Progressing  12/25/2022 0127 by Pankaj Elaine RN  Outcome: Progressing  12/24/2022 1757 by Hodan Rios RN  Outcome: Progressing  Flowsheets (Taken 12/24/2022 0800)  Achieves optimal ventilation and oxygenation: Position to facilitate oxygenation and minimize respiratory effort     Problem: Nutrition Deficit:  Goal: Optimize nutritional status  12/25/2022 0730 by Hodan Rios RN  Outcome: Progressing  12/25/2022 0127 by Pankaj Elaine RN  Outcome: Progressing  12/24/2022 1757 by Hodan Rios RN  Outcome: Progressing     Problem: Risk for Elopement  Goal: Patient will not exit the unit/facility without proper excort  12/25/2022 0730 by Hodan Rios RN  Outcome: Progressing  12/25/2022 0127 by Pankaj Elaine RN  Outcome: Progressing  12/24/2022 1757 by Hodan Rios RN  Outcome: Progressing  Flowsheets (Taken 12/24/2022 0800)  Nursing Interventions for Elopement Risk: Reduce environmental triggers

## 2022-12-26 PROBLEM — J96.01 ACUTE RESPIRATORY FAILURE WITH HYPOXIA (HCC): Status: ACTIVE | Noted: 2022-12-26

## 2022-12-26 LAB
ABSOLUTE EOS #: 0.12 K/UL (ref 0–0.44)
ABSOLUTE IMMATURE GRANULOCYTE: 0.12 K/UL (ref 0–0.3)
ABSOLUTE LYMPH #: 1.55 K/UL (ref 1.1–3.7)
ABSOLUTE MONO #: 0.95 K/UL (ref 0.1–1.2)
ANION GAP SERPL CALCULATED.3IONS-SCNC: 11 MMOL/L (ref 9–17)
BASOPHILS # BLD: 0 % (ref 0–2)
BASOPHILS ABSOLUTE: 0 K/UL (ref 0–0.2)
BUN BLDV-MCNC: <2 MG/DL (ref 6–20)
CALCIUM SERPL-MCNC: 8.9 MG/DL (ref 8.6–10.4)
CHLORIDE BLD-SCNC: 101 MMOL/L (ref 98–107)
CO2: 22 MMOL/L (ref 20–31)
CREAT SERPL-MCNC: 0.35 MG/DL (ref 0.5–0.9)
EOSINOPHILS RELATIVE PERCENT: 1 % (ref 1–4)
GFR SERPL CREATININE-BSD FRML MDRD: >60 ML/MIN/1.73M2
GLUCOSE BLD-MCNC: 111 MG/DL (ref 70–99)
HCT VFR BLD CALC: 34 % (ref 36.3–47.1)
HEMOGLOBIN: 11 G/DL (ref 11.9–15.1)
IMMATURE GRANULOCYTES: 1 %
LYMPHOCYTES # BLD: 13 % (ref 24–43)
MCH RBC QN AUTO: 37.7 PG (ref 25.2–33.5)
MCHC RBC AUTO-ENTMCNC: 32.4 G/DL (ref 28.4–34.8)
MCV RBC AUTO: 116.4 FL (ref 82.6–102.9)
MONOCYTES # BLD: 8 % (ref 3–12)
MORPHOLOGY: ABNORMAL
NRBC AUTOMATED: 0 PER 100 WBC
PDW BLD-RTO: 15.3 % (ref 11.8–14.4)
PLATELET # BLD: 205 K/UL (ref 138–453)
PMV BLD AUTO: 11.2 FL (ref 8.1–13.5)
POTASSIUM SERPL-SCNC: 3.7 MMOL/L (ref 3.7–5.3)
RBC # BLD: 2.92 M/UL (ref 3.95–5.11)
SEG NEUTROPHILS: 77 % (ref 36–65)
SEGMENTED NEUTROPHILS ABSOLUTE COUNT: 9.16 K/UL (ref 1.5–8.1)
SODIUM BLD-SCNC: 134 MMOL/L (ref 135–144)
WBC # BLD: 11.9 K/UL (ref 3.5–11.3)

## 2022-12-26 PROCEDURE — 99231 SBSQ HOSP IP/OBS SF/LOW 25: CPT | Performed by: OTOLARYNGOLOGY

## 2022-12-26 PROCEDURE — 85025 COMPLETE CBC W/AUTO DIFF WBC: CPT

## 2022-12-26 PROCEDURE — 6360000002 HC RX W HCPCS: Performed by: HEALTH CARE PROVIDER

## 2022-12-26 PROCEDURE — 2580000003 HC RX 258: Performed by: STUDENT IN AN ORGANIZED HEALTH CARE EDUCATION/TRAINING PROGRAM

## 2022-12-26 PROCEDURE — 6360000002 HC RX W HCPCS: Performed by: STUDENT IN AN ORGANIZED HEALTH CARE EDUCATION/TRAINING PROGRAM

## 2022-12-26 PROCEDURE — 80048 BASIC METABOLIC PNL TOTAL CA: CPT

## 2022-12-26 PROCEDURE — 99291 CRITICAL CARE FIRST HOUR: CPT | Performed by: INTERNAL MEDICINE

## 2022-12-26 PROCEDURE — 6370000000 HC RX 637 (ALT 250 FOR IP): Performed by: STUDENT IN AN ORGANIZED HEALTH CARE EDUCATION/TRAINING PROGRAM

## 2022-12-26 PROCEDURE — 36415 COLL VENOUS BLD VENIPUNCTURE: CPT

## 2022-12-26 PROCEDURE — 2500000003 HC RX 250 WO HCPCS: Performed by: STUDENT IN AN ORGANIZED HEALTH CARE EDUCATION/TRAINING PROGRAM

## 2022-12-26 PROCEDURE — 2000000000 HC ICU R&B

## 2022-12-26 PROCEDURE — 6370000000 HC RX 637 (ALT 250 FOR IP): Performed by: INTERNAL MEDICINE

## 2022-12-26 RX ORDER — ARIPIPRAZOLE 10 MG/1
10 TABLET ORAL DAILY
Status: DISCONTINUED | OUTPATIENT
Start: 2022-12-26 | End: 2022-12-29

## 2022-12-26 RX ORDER — 0.9 % SODIUM CHLORIDE 0.9 %
500 INTRAVENOUS SOLUTION INTRAVENOUS ONCE
Status: COMPLETED | OUTPATIENT
Start: 2022-12-26 | End: 2022-12-26

## 2022-12-26 RX ORDER — 0.9 % SODIUM CHLORIDE 0.9 %
500 INTRAVENOUS SOLUTION INTRAVENOUS ONCE
Status: DISCONTINUED | OUTPATIENT
Start: 2022-12-26 | End: 2022-12-31 | Stop reason: SDUPTHER

## 2022-12-26 RX ORDER — VENLAFAXINE 37.5 MG/1
37.5 TABLET ORAL DAILY
Status: DISCONTINUED | OUTPATIENT
Start: 2022-12-26 | End: 2023-01-05

## 2022-12-26 RX ORDER — CHLORDIAZEPOXIDE HYDROCHLORIDE 25 MG/1
75 CAPSULE, GELATIN COATED ORAL 3 TIMES DAILY
Status: DISCONTINUED | OUTPATIENT
Start: 2022-12-26 | End: 2022-12-27

## 2022-12-26 RX ORDER — SODIUM CHLORIDE 9 MG/ML
1000 INJECTION, SOLUTION INTRAVENOUS CONTINUOUS
Status: DISCONTINUED | OUTPATIENT
Start: 2022-12-26 | End: 2022-12-26

## 2022-12-26 RX ORDER — CHLORDIAZEPOXIDE HYDROCHLORIDE 25 MG/1
50 CAPSULE, GELATIN COATED ORAL 3 TIMES DAILY
Status: DISCONTINUED | OUTPATIENT
Start: 2022-12-26 | End: 2022-12-26

## 2022-12-26 RX ADMIN — VENLAFAXINE 37.5 MG: 37.5 TABLET ORAL at 11:47

## 2022-12-26 RX ADMIN — FAMOTIDINE 20 MG: 20 TABLET, FILM COATED ORAL at 20:25

## 2022-12-26 RX ADMIN — LORAZEPAM 4 MG: 2 TABLET ORAL at 04:36

## 2022-12-26 RX ADMIN — ARIPIPRAZOLE 10 MG: 10 TABLET ORAL at 11:47

## 2022-12-26 RX ADMIN — SODIUM CHLORIDE 500 ML: 9 INJECTION, SOLUTION INTRAVENOUS at 11:45

## 2022-12-26 RX ADMIN — LORAZEPAM 4 MG: 2 TABLET ORAL at 03:29

## 2022-12-26 RX ADMIN — SODIUM CHLORIDE, PRESERVATIVE FREE 10 ML: 5 INJECTION INTRAVENOUS at 20:25

## 2022-12-26 RX ADMIN — LORAZEPAM 4 MG: 2 INJECTION INTRAMUSCULAR; INTRAVENOUS at 01:19

## 2022-12-26 RX ADMIN — FOLIC ACID 1 MG: 1 TABLET ORAL at 08:07

## 2022-12-26 RX ADMIN — CHLORDIAZEPOXIDE HYDROCHLORIDE 75 MG: 25 CAPSULE ORAL at 12:52

## 2022-12-26 RX ADMIN — LORAZEPAM 3 MG: 1 TABLET ORAL at 11:15

## 2022-12-26 RX ADMIN — FAMOTIDINE 20 MG: 20 TABLET, FILM COATED ORAL at 08:08

## 2022-12-26 RX ADMIN — POTASSIUM CHLORIDE, DEXTROSE MONOHYDRATE AND SODIUM CHLORIDE: 150; 5; 450 INJECTION, SOLUTION INTRAVENOUS at 03:17

## 2022-12-26 RX ADMIN — ENOXAPARIN SODIUM 30 MG: 100 INJECTION SUBCUTANEOUS at 08:07

## 2022-12-26 RX ADMIN — LORAZEPAM 2 MG: 2 INJECTION INTRAMUSCULAR; INTRAVENOUS at 09:00

## 2022-12-26 RX ADMIN — SODIUM CHLORIDE, PRESERVATIVE FREE 10 ML: 5 INJECTION INTRAVENOUS at 08:08

## 2022-12-26 RX ADMIN — CHLORDIAZEPOXIDE HYDROCHLORIDE 75 MG: 25 CAPSULE ORAL at 20:25

## 2022-12-26 RX ADMIN — Medication 100 MG: at 08:08

## 2022-12-26 RX ADMIN — ALCOHOL 1 TABLET: 70.47 GEL TOPICAL at 08:08

## 2022-12-26 RX ADMIN — LORAZEPAM 4 MG: 2 TABLET ORAL at 00:05

## 2022-12-26 RX ADMIN — LORAZEPAM 2 MG: 2 TABLET ORAL at 09:59

## 2022-12-26 RX ADMIN — CHLORDIAZEPOXIDE HYDROCHLORIDE 100 MG: 25 CAPSULE ORAL at 08:07

## 2022-12-26 RX ADMIN — LORAZEPAM 3 MG: 1 TABLET ORAL at 12:52

## 2022-12-26 RX ADMIN — LORAZEPAM 4 MG: 2 INJECTION INTRAMUSCULAR; INTRAVENOUS at 05:37

## 2022-12-26 ASSESSMENT — PAIN SCALES - GENERAL
PAINLEVEL_OUTOF10: 1
PAINLEVEL_OUTOF10: 0
PAINLEVEL_OUTOF10: 0

## 2022-12-26 NOTE — PROGRESS NOTES
ENT/OTOLARYNGOLOGY SUBSEQUENT CARE PROGRESS NOTE     REASON FOR CARE: airway     HISTORY OF PRESENT ILLNESS:   Sal Ruiz is a 32 y.o. who is being seen for follow-up of airway. Extubated about 2 days ago. Covid +. Had a large 7.5 ETT used for intubation and there was concern for no cuff leak prior to extubation. Currently with significant lung rales/ronchi and MS change. Primary team and nurses deny hearing any stridor since extubation. Pertinent Examination:   GENERAL: well developed and well nourished and in no acute distress  HEAD: normocephalic and atraumatic  EYES: no eyelid swelling, no conjunctival injection or exudate, pupils equal round and reactive to light  EXTERNAL EARS: normal  EAR EXAM: deferred  NOSE: nares patent, normal mucosa  MOUTH/THROAT: mucous membranes moist, no focal lesions, no tonsillar enlargement or exudate  NECK: non-tender, full range of motion, no mass, no focal lymphadenopathy  RESPIRATORY: Normal expansion. + rales, rhonchi but no stridor appreciated. NEUROLOGICAL:  unaware of orientation currently      RELEVANT LABS/STUDIES:   Additional data reviewed:    None    Procedures:    None    Surgical risk factors:  None     IMPRESSION AND RECOMMENDATIONS:   Sal Ruiz is a 32 y.o. 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 Plan:  Would consider electively taking the patient back to the OR in 2-3 weeks once she and her pulmonary status has recovered from [facility] for a DLB in the OR to check her subglottic, glottic, tracheal airway for any development of stenosis given potential for airway injury from large ETT used for prior intubation.      ENT peripherally following.        --------------------------------------------------  Trish Altamirano MD  Pediatric Otolaryngology-Head and Neck [address] [facility]    Office  ph# [phone]    Also available in [facility]

## 2022-12-26 NOTE — PROGRESS NOTES
Speech Language Pathology  [facility]  Speech Language Pathology    Date: 12/26/2022  Patient Name: Estefania More  YOB: 1995   AGE: 32 y.o. MRN: [id number]        Patient Not Available for Speech Therapy     Due to:  [] Testing  [] Hemodialysis  [] Cancelled by RN  [] Surgery   [] Intubation/Sedation/Pain Medication  [] Medical instability  [x] Other: Spoke with RN, pt requiring suction for secretions and is lethargic at this time. Not appropriate for evaluation, also awaiting clarification re: Order for bedside swallow vs. Cognitive evaluation.     Next scheduled treatment: 12/27/22 as appropriate  Completed by: Destinee Mcpherson, SLP, JULIAN Castellon

## 2022-12-26 NOTE — PROGRESS NOTES
Occupational [address]  Occupational Therapy Not Seen Note    DATE: 2022    NAME: Jean Yanes  MRN: [id number]   : 1995      Patient not seen this date for Occupational Therapy due to:    Per RN, Pt not appropriate d/t: Tachycardia (currently 129-130 supine while resting). OT will re-attempt to see Pt at later date, as appropriate - plan to recheck .         Electronically signed by CLAIRE Doan OTR/L on 2022 at 3:45 PM

## 2022-12-26 NOTE — PLAN OF CARE
Problem: Discharge Planning  Goal: Discharge to home or other facility with appropriate resources  Outcome: Progressing     Problem: Skin/Tissue Integrity  Goal: Absence of new skin breakdown  Description: 1. Monitor for areas of redness and/or skin breakdown  2. Assess vascular access sites hourly  3. Every 4-6 hours minimum:  Change oxygen saturation probe site  4. Every 4-6 hours:  If on nasal continuous positive airway pressure, respiratory therapy assess nares and determine need for appliance change or resting period. Outcome: Progressing     Problem: ABCDS Injury Assessment  Goal: Absence of physical injury  Outcome: Progressing     Problem: Safety - Adult  Goal: Free from fall injury  Outcome: Progressing     Problem: Pain  Goal: Verbalizes/displays adequate comfort level or baseline comfort level  Outcome: Progressing     Problem: Confusion  Goal: Confusion, delirium, dementia, or psychosis is improved or at baseline  Description: INTERVENTIONS:  1. Assess for possible contributors to thought disturbance, including medications, impaired vision or hearing, underlying metabolic abnormalities, dehydration, psychiatric diagnoses, and notify attending LIP  2. [place] high risk fall precautions, as indicated  3. Provide frequent short contacts to provide reality reorientation, refocusing and direction  4. Decrease environmental stimuli, including noise as appropriate  5. Monitor and intervene to maintain adequate nutrition, hydration, elimination, sleep and activity  6. If unable to ensure safety without constant attention obtain sitter and review sitter guidelines with assigned personnel  7.  Initiate Psychosocial CNS and Spiritual Care consult, as indicated  Outcome: Progressing     Problem: Respiratory - Adult  Goal: Achieves optimal ventilation and oxygenation  Outcome: Progressing     Problem: Nutrition Deficit:  Goal: Optimize nutritional status  Outcome: Progressing     Problem: Risk for Elopement  Goal: Patient will not exit the unit/facility without proper excort  Outcome: Progressing  Flowsheets (Taken 12/25/2022 2000)  Nursing Interventions for Elopement Risk: Reduce environmental triggers

## 2022-12-26 NOTE — PROGRESS NOTES
INTENSIVE CARE UNIT  Resident Physician Progress Note    Patient - Jean Yanes  Date of Admission -  12/15/2022 12:08 PM  Date of Evaluation -  2022  Room and Bed Number -  9486/5060-63   Hospital Day - 11    HPI:     32year old who became unresponsive while with her boyfriend, EMS was called. Patient got Narcan with minimal response. Intubated due to GCS of 3 and airway protection. Initially placed on propofol. In ED LFTs were elevated, CT cervical spine unremarkable, CT head negative, CXR unremarkable. Alcohol level 266, UDS negative. Found to be covid positive. : Tolerating CPAP trials. Following command both on and off sedation. Off propofol. : Weaning versed as tolerated. Librium increased. : Young removed. : Off versed. Precedex decreased. Extubated. : NG placed overnight for failed bedside swallow, patient pulled out her NG.  : On 4L NC. TF started. SUBJECTIVE:     OVERNIGHT EVENTS:        On 4L NC  Started tube feeding  for failed bedside swallow  SLP evaluation today  Abdominal Xray yesterday with SBO vs ileus. Patient having bowel movement x1 overnight, less likely SBO.  Abdomen soft and nontender  U out 700 + 1 unmeasured    AWAKE & FOLLOWING COMMANDS:  [] No   [x] Yes    SECRETIONS Amount:  [x] Small [] Moderate  [] Large  [] None  Color:     [x] White [] Colored  [] Bloody    SEDATION:  RAAS Score:  [] Propofol gtt  [] Versed gtt  [] Ativan gtt   [x] No Sedation    PARALYZED:  [x] No    [] Yes    VASOPRESSORS:  [x] No    [] Yes  [] Levophed [] Dopamine [] Vasopressin  [] Dobutamine [] Phenylephrine [] Epinephrine      OBJECTIVE:     VITAL SIGNS:  BP (!) 131/99   Pulse 93   Temp 98.7 °F (37.1 °C) (Axillary)   Resp 23   Ht 5' 5\" (1.651 m)   Wt 106 lb 4.2 oz (48.2 kg)   SpO2 100%   BMI 17.68 kg/m²   Tmax over 24 hours:  Temp (24hrs), Av.2 °F (37.3 °C), Min:97.9 °F (36.6 °C), Max:100.4 °F (38 °C)      Patient Vitals for the past 8 hrs: BP Temp Temp src Pulse Resp SpO2 Weight   12/26/22 0600 (!) 131/99 98.7 °F (37.1 °C) Axillary 93 23 100 % 106 lb 4.2 oz (48.2 kg)   12/26/22 0500 (!) 117/95 -- -- 91 26 100 % --   12/26/22 0400 121/86 98.6 °F (37 °C) Axillary 96 17 100 % --   12/26/22 0300 (!) 129/93 -- -- 87 24 100 % --   12/26/22 0200 112/76 98.4 °F (36.9 °C) Axillary 90 21 100 % --   12/26/22 0100 (!) 125/93 -- -- 88 25 100 % --   12/26/22 0000 (!) 120/90 98.5 °F (36.9 °C) Axillary 76 27 97 % --         Intake/Output Summary (Last 24 hours) at 12/26/2022 0741  Last data filed at 12/26/2022 0600  Gross per 24 hour   Intake 3398.13 ml   Output 700 ml   Net 2698.13 ml     Date 12/26/22 0000 - 12/26/22 2359   Shift 2751-0704 8989-6172 1961-9386 24 Hour Total   INTAKE   I.V.(mL/kg) 899. 6(18.7)   899. 6(18.7)   NG/GT(mL/kg) 480(10)   480(10)   Shift Total(mL/kg) 1379. 6(28.6)   1379. 6(28.6)   OUTPUT   Urine(mL/kg/hr) 400   400   Shift Total(mL/kg) 400(8.3)   400(8.3)   Weight (kg) 48.2 48.2 48.2 48.2     Wt Readings from Last 3 Encounters:   12/26/22 106 lb 4.2 oz (48.2 kg)   03/09/22 106 lb (48.1 kg)     Body mass index is 17.68 kg/m². PHYSICAL EXAM:  GEN:                  Awake, alert. Answering questions appropriately. Tremulous. EYES:                pupils equal, round, and reactive to light  HEENT:            Normocephalic, without obvious abnormality  LUNGS:            good air exchange  CV:                     regular rate and rhythm and normal S1 and S2  ABDOMEN:     soft, non-distended, and non-tender  MSK:                  there is no redness, warmth, or swelling of the joints  NEURO[de-identified]           Not oriented.    SKIN:                 Normal skin color, texture, turgor  EXTREMITIES:  No pedal or leg edema, no calf tenderness/swelling, no erythema, distal pulses intact       MEDICATIONS:  Scheduled Meds:   sodium chloride flush  5-40 mL IntraVENous 2 times per day    chlordiazePOXIDE  100 mg Oral TID    multivitamin  1 tablet Per NG tube Daily    famotidine  20 mg Per NG tube BID    thiamine  100 mg Per NG tube Daily    folic acid  1 mg Per NG tube Daily    enoxaparin  30 mg SubCUTAneous Daily     Continuous Infusions:   dextrose 5% and 0.45% NaCl with KCl 20 mEq 125 mL/hr at 12/26/22 0420    sodium chloride      sodium chloride 10 mL/hr at 12/24/22 1802     PRN Meds:   sodium chloride flush, 5-40 mL, PRN  sodium chloride, , PRN  LORazepam, 1 mg, Q1H PRN   Or  LORazepam, 1 mg, Q1H PRN   Or  LORazepam, 2 mg, Q1H PRN   Or  LORazepam, 2 mg, Q1H PRN   Or  LORazepam, 3 mg, Q1H PRN   Or  LORazepam, 3 mg, Q1H PRN   Or  LORazepam, 4 mg, Q1H PRN   Or  LORazepam, 4 mg, Q1H PRN  midodrine, 5 mg, TID PRN  sodium phosphate IVPB, 0.16 mmol/kg, PRN   Or  sodium phosphate IVPB, 0.32 mmol/kg, PRN  sodium chloride flush, 5-40 mL, PRN  sodium chloride, , PRN  ondansetron, 4 mg, Q8H PRN   Or  ondansetron, 4 mg, Q6H PRN  polyethylene glycol, 17 g, Daily PRN  acetaminophen, 650 mg, Q6H PRN   Or  acetaminophen, 650 mg, Q6H PRN        SUPPORT DEVICES: [] Ventilator [] BIPAP  [x] Nasal Cannula [] Room Air    DATA:  Complete Blood Count:   Recent Labs     12/23/22  1147 12/24/22  0652 12/25/22  0321   WBC 6.1 10.5 10.1   RBC 2.27* 2.18* 2.49*   HGB 8.7* 8.4* 9.5*   HCT 26.5* 24.9* 30.1*   .7* 114.2* 120.9*   MCH 38.3* 38.5* 38.2*   MCHC 32.8 33.7 31.6   RDW 15.5* 15.7* 15.9*    169 160   MPV 11.3 11.0 11.4        Last 3 Blood Glucose:   Recent Labs     12/23/22  1147 12/24/22  0652 12/25/22  0321   GLUCOSE 115* 109* 94        PT/INR:    Lab Results   Component Value Date/Time    PROTIME 12.4 12/15/2022 12:28 PM    INR 1.2 12/15/2022 12:28 PM     PTT:  No results found for: APTT, PTT    Comprehensive Metabolic Profile:   Recent Labs     12/23/22  1147 12/24/22  0652 12/24/22  1818 12/25/22  0321    141 138 133*   K 3.2* 2.4* 2.5* 3.8    105 104 104   CO2 24 25 23 22   BUN 7 4*  --  <2*   CREATININE 0.28* 0.41*  --  0.35*   GLUCOSE 115* 109*  --  94 CALCIUM 8.3* 8.4*  --  8.1*      Magnesium:   Lab Results   Component Value Date/Time    MG 1.9 12/24/2022 06:52 AM    MG 1.8 12/22/2022 05:35 AM    MG 1.9 12/21/2022 04:09 AM     Phosphorus:   Lab Results   Component Value Date/Time    PHOS 3.5 12/24/2022 06:52 AM    PHOS 4.2 12/22/2022 05:35 AM    PHOS 5.3 12/21/2022 04:09 AM     Ionized Calcium:   Lab Results   Component Value Date/Time    CAION 1.17 12/18/2022 05:54 AM    CAION 0.83 12/17/2022 04:24 AM        Urinalysis:   Lab Results   Component Value Date/Time    NITRU NEGATIVE 12/15/2022 12:49 PM    COLORU Dark Yellow 12/15/2022 12:49 PM    PHUR 6.0 12/15/2022 12:49 PM    WBCUA 0 TO 2 12/15/2022 12:49 PM    RBCUA 2 TO 5 12/15/2022 12:49 PM    MUCUS 3+ 03/09/2022 12:21 PM    TRICHOMONAS NOT REPORTED 09/20/2014 06:19 AM    YEAST NOT REPORTED 09/20/2014 06:19 AM    BACTERIA FEW 09/20/2014 06:19 AM    CLARITYU slightly cloudy 07/22/2014 11:58 AM    SPECGRAV 1.020 12/15/2022 12:49 PM    LEUKOCYTESUR NEGATIVE 12/15/2022 12:49 PM    UROBILINOGEN Normal 12/15/2022 12:49 PM    BILIRUBINUR NEGATIVE  Verified by ictotest. 12/15/2022 12:49 PM    BILIRUBINUR negative 07/22/2014 11:58 AM    BLOODU negative 07/22/2014 11:58 AM    GLUCOSEU NEGATIVE 12/15/2022 12:49 PM    KETUA SMALL 12/15/2022 12:49 PM    AMORPHOUS NOT REPORTED 09/20/2014 06:19 AM       HgBA1c:  No results found for: LABA1C  TSH:  No results found for: TSH  Lactic Acid: No results found for: LACTA   Troponin: No results for input(s): TROPONINI in the last 72 hours.         ASSESSMENT:     Patient Active Problem List    Diagnosis Date Noted    COVID-19 12/18/2022    Acute respiratory insufficiency 83/17/3057    Alcoholic intoxication with complication ([facility] [address] 52/45/1398    Altered mental status 12/18/2022    Drug overdose of undetermined intent, initial encounter 12/15/2022    Portal hypertension ([facility] [address] 03/10/2022    Hyperbilirubinemia 03/09/2022    Fatty liver 03/09/2022    Ascites due to alcoholic hepatitis 03/09/2022    Alcohol abuse 03/09/2022    S/P cholecystectomy 03/09/2022    Abnormal LFTs 03/09/2022    Hypokalemia 03/09/2022    Hypoalbuminemia 03/09/2022    Bipolar I disorder, most recent episode depressed ([facility] [place] [address] 09/10/2014          PLAN:     Neuro  Off versed, off precedex  Librium 100 mg TID  CIWA protocol  Hx EtOH abuse, daily thiamine and folate  Neuro checks per protocol  Hx bipolar disorder, home meds abilify and effexor     Cardiology  Hemodynamically stable, no pressor support  Midodrine 5 mg TID PRN - has not required in several days  HR stable overnight     Pulmonary  4L NC  Covid positive, ID consulted  Extubated 12/23  Solucortef 100 q8     Renal  I/O: 700 + 1 unmeasured  External catheter  Cr 0.35     GI  Diet NPO  ADULT TUBE FEEDING; Nasogastric; Standard without Fiber; Continuous; 20; Yes; 10; Q 4 hours; 55; 30; Q 4 hours  Hx EtOH use, +EtOH 266 on arrival  Hx transaminitis, improving  Monitor for EtOH withdrawal, seizure precautions  Daily thiamine and folate, multivitamin  Glycolax PRN  Ulcer Ppx: Pepcid BID     ID  WBC 10.1  Covid positive - 12/15  ID consulted    ICU PROPHYLAXIS:  Stress ulcer:  [] PPI Agent  [x] B2Ipygf [] Sucralfate  [] Other:  VTE:   [x] Enoxaparin  [] Unfract. Heparin Subcut  [] EPC Cuffs    NUTRITION:  [] NPO [x] Tube Feeding (Specify: )  Diet NPO  ADULT TUBE FEEDING; Nasogastric; Standard without Fiber; Continuous; 20; Yes; 10; Q 4 hours; 55; 30; Q 4 hours   [] TPN  [] PO    HOME MEDS RECONCILED: [] No  [x] Yes    CONSULTATION NEEDED:  [] No  [x] Yes    TRANSFER OUT OF ICU:   [] No  [x] Yes       Ave Merritt M.D.   Emergency Medicine Resident, PGY-2  12/26/2022 7:41 AM

## 2022-12-27 LAB
ABSOLUTE EOS #: 0.14 K/UL (ref 0–0.44)
ABSOLUTE IMMATURE GRANULOCYTE: 0.14 K/UL (ref 0–0.3)
ABSOLUTE LYMPH #: 1.35 K/UL (ref 1.1–3.7)
ABSOLUTE MONO #: 0.95 K/UL (ref 0.1–1.2)
ANION GAP SERPL CALCULATED.3IONS-SCNC: 9 MMOL/L (ref 9–17)
BASOPHILS # BLD: 0 % (ref 0–2)
BASOPHILS ABSOLUTE: 0 K/UL (ref 0–0.2)
BUN BLDV-MCNC: 4 MG/DL (ref 6–20)
CALCIUM SERPL-MCNC: 9.1 MG/DL (ref 8.6–10.4)
CHLORIDE BLD-SCNC: 99 MMOL/L (ref 98–107)
CO2: 26 MMOL/L (ref 20–31)
CREAT SERPL-MCNC: 0.26 MG/DL (ref 0.5–0.9)
EOSINOPHILS RELATIVE PERCENT: 1 % (ref 1–4)
GFR SERPL CREATININE-BSD FRML MDRD: >60 ML/MIN/1.73M2
GLUCOSE BLD-MCNC: 93 MG/DL (ref 70–99)
HCT VFR BLD CALC: 28.6 % (ref 36.3–47.1)
HEMOGLOBIN: 9.4 G/DL (ref 11.9–15.1)
IMMATURE GRANULOCYTES: 1 %
LYMPHOCYTES # BLD: 10 % (ref 24–43)
MCH RBC QN AUTO: 38.1 PG (ref 25.2–33.5)
MCHC RBC AUTO-ENTMCNC: 32.9 G/DL (ref 28.4–34.8)
MCV RBC AUTO: 115.8 FL (ref 82.6–102.9)
MONOCYTES # BLD: 7 % (ref 3–12)
MORPHOLOGY: ABNORMAL
MORPHOLOGY: ABNORMAL
NRBC AUTOMATED: 0 PER 100 WBC
PDW BLD-RTO: 15.1 % (ref 11.8–14.4)
PLATELET # BLD: 223 K/UL (ref 138–453)
PMV BLD AUTO: 10.9 FL (ref 8.1–13.5)
POTASSIUM SERPL-SCNC: 3.7 MMOL/L (ref 3.7–5.3)
RBC # BLD: 2.47 M/UL (ref 3.95–5.11)
SEG NEUTROPHILS: 81 % (ref 36–65)
SEGMENTED NEUTROPHILS ABSOLUTE COUNT: 10.92 K/UL (ref 1.5–8.1)
SODIUM BLD-SCNC: 134 MMOL/L (ref 135–144)
WBC # BLD: 13.5 K/UL (ref 3.5–11.3)

## 2022-12-27 PROCEDURE — 97530 THERAPEUTIC ACTIVITIES: CPT

## 2022-12-27 PROCEDURE — 97167 OT EVAL HIGH COMPLEX 60 MIN: CPT

## 2022-12-27 PROCEDURE — 6370000000 HC RX 637 (ALT 250 FOR IP): Performed by: HEALTH CARE PROVIDER

## 2022-12-27 PROCEDURE — 97163 PT EVAL HIGH COMPLEX 45 MIN: CPT

## 2022-12-27 PROCEDURE — 6370000000 HC RX 637 (ALT 250 FOR IP): Performed by: STUDENT IN AN ORGANIZED HEALTH CARE EDUCATION/TRAINING PROGRAM

## 2022-12-27 PROCEDURE — 6360000002 HC RX W HCPCS: Performed by: HEALTH CARE PROVIDER

## 2022-12-27 PROCEDURE — 97535 SELF CARE MNGMENT TRAINING: CPT

## 2022-12-27 PROCEDURE — 85025 COMPLETE CBC W/AUTO DIFF WBC: CPT

## 2022-12-27 PROCEDURE — 1200000000 HC SEMI PRIVATE

## 2022-12-27 PROCEDURE — 6370000000 HC RX 637 (ALT 250 FOR IP): Performed by: INTERNAL MEDICINE

## 2022-12-27 PROCEDURE — 99291 CRITICAL CARE FIRST HOUR: CPT | Performed by: INTERNAL MEDICINE

## 2022-12-27 PROCEDURE — 2580000003 HC RX 258: Performed by: STUDENT IN AN ORGANIZED HEALTH CARE EDUCATION/TRAINING PROGRAM

## 2022-12-27 PROCEDURE — 80048 BASIC METABOLIC PNL TOTAL CA: CPT

## 2022-12-27 PROCEDURE — 36415 COLL VENOUS BLD VENIPUNCTURE: CPT

## 2022-12-27 PROCEDURE — 92610 EVALUATE SWALLOWING FUNCTION: CPT

## 2022-12-27 RX ORDER — CHLORDIAZEPOXIDE HYDROCHLORIDE 25 MG/1
25 CAPSULE, GELATIN COATED ORAL 2 TIMES DAILY
Status: DISCONTINUED | OUTPATIENT
Start: 2022-12-27 | End: 2022-12-29

## 2022-12-27 RX ADMIN — LORAZEPAM 2 MG: 2 TABLET ORAL at 13:12

## 2022-12-27 RX ADMIN — SODIUM CHLORIDE, PRESERVATIVE FREE 10 ML: 5 INJECTION INTRAVENOUS at 10:42

## 2022-12-27 RX ADMIN — VENLAFAXINE 37.5 MG: 37.5 TABLET ORAL at 09:36

## 2022-12-27 RX ADMIN — LORAZEPAM 2 MG: 2 TABLET ORAL at 01:56

## 2022-12-27 RX ADMIN — LORAZEPAM 2 MG: 2 TABLET ORAL at 00:24

## 2022-12-27 RX ADMIN — SODIUM CHLORIDE, PRESERVATIVE FREE 10 ML: 5 INJECTION INTRAVENOUS at 21:34

## 2022-12-27 RX ADMIN — ACETAMINOPHEN 650 MG: 325 TABLET ORAL at 00:24

## 2022-12-27 RX ADMIN — FAMOTIDINE 20 MG: 20 TABLET, FILM COATED ORAL at 09:36

## 2022-12-27 RX ADMIN — CHLORDIAZEPOXIDE HYDROCHLORIDE 25 MG: 25 CAPSULE ORAL at 21:34

## 2022-12-27 RX ADMIN — FAMOTIDINE 20 MG: 20 TABLET, FILM COATED ORAL at 21:34

## 2022-12-27 RX ADMIN — ARIPIPRAZOLE 10 MG: 10 TABLET ORAL at 09:36

## 2022-12-27 RX ADMIN — FOLIC ACID 1 MG: 1 TABLET ORAL at 09:36

## 2022-12-27 RX ADMIN — ALCOHOL 1 TABLET: 70.47 GEL TOPICAL at 09:36

## 2022-12-27 RX ADMIN — ENOXAPARIN SODIUM 30 MG: 100 INJECTION SUBCUTANEOUS at 09:36

## 2022-12-27 RX ADMIN — Medication 100 MG: at 09:36

## 2022-12-27 ASSESSMENT — PAIN SCALES - GENERAL: PAINLEVEL_OUTOF10: 0

## 2022-12-27 NOTE — PROGRESS NOTES
Occupational Therapy  Facility/Department: [facility] CAR 3- MICU  Occupational Therapy Initial Assessment    Name: Corwin Aguirre  : 1995  MRN: [id number]  Date of Service: 2022    Discharge Recommendations:   Further therapy recommended at discharge. Patient Diagnosis(es): The encounter diagnosis was Drug overdose of undetermined intent, initial encounter. Past Medical History:  has a past medical history of ADHD (attention deficit hyperactivity disorder), Anxiety, Bipolar 1 disorder ([address] and Depression. Past Surgical History:  has no past surgical history on file. Assessment   Performance deficits / Impairments: Decreased functional mobility ; Decreased ADL status; Decreased high-level IADLs;Decreased endurance;Decreased strength;Decreased ROM; Decreased fine motor control;Decreased cognition;Decreased balance;Decreased coordination  Assessment: pt would be unsafe to return to prior living arrangement d/t required heavy physical assistance for all movement and ADLs at this time. pt would benefit from further therapy at discharge in order to increase safety and independence. Prognosis: Fair  Decision Making: High Complexity  REQUIRES OT FOLLOW-UP: Yes  Activity Tolerance  Activity Tolerance: Treatment limited secondary to decreased cognition;Patient limited by fatigue        Plan   Occupational Therapy Plan  Times Per Week: 3-4x/wk     Restrictions  Restrictions/Precautions  Restrictions/Precautions: Fall Risk  Required Braces or Orthoses?: No  Position Activity Restriction  Other position/activity restrictions: up with assist, extubated , NG tube    Subjective   General  Patient assessed for rehabilitation services?: Yes  Family / Caregiver Present: No  Diagnosis: drug overdose  General Comment  Comments: RN ok'd for therapy this morning. pt agreeable to participate in session but minimally engaged throughout.  pt denied pain     Social/Functional History  Social/Functional History  Home Layout: One level  Home Access: Stairs to enter with rails  Entrance Stairs - Number of Steps: 4  Entrance Stairs - Rails: Both  ADL Assistance: Independent  Homemaking Assistance: Independent  Ambulation Assistance: Independent  Transfer Assistance: Independent  Active : No  Occupation: Unemployed  Additional Comments: above information obtained from chart, d/t pt did not answer questions       Objective              Safety Devices  Type of Devices: Call light within reach; Left in bed;Bed alarm in place;Gait belt;Nurse notified; Patient at risk for falls  Restraints  Restraints Initially in Place: No    Bed Mobility Training  Bed Mobility Training: Yes  Overall Level of Assistance: Maximum assistance  Rolling: Maximum assistance  Supine to Sit: Maximum assistance  Sit to Supine: Maximum assistance  Scooting: Maximum assistance  Balance  Sitting: With support (~10 minutes on eOB with min A d/t L lateral lean)  Standing: With support (~2 minutes. pt completed static standing at bedside for sheet management with max A and RW)  Transfer Training  Transfer Training: Yes  Overall Level of Assistance: Maximum assistance (with RW. 2nd person for safety)  Sit to Stand: Maximum assistance  Stand to Sit: Maximum assistance       AROM: Grossly decreased, non-functional  PROM: Generally decreased, functional  Strength: Grossly decreased, non-functional (B shoulders able to hold against gravity briefly.  L  2+/5, R  3-/5)  Coordination: Grossly decreased, non-functional (pt exhibited decreased accuracy and speed to B hands impacting coorindation)  Tone: Normal  Sensation: Intact    ADL  Feeding: Maximum assistance  Grooming: Maximum assistance  UE Bathing: Dependent/Total  LE Bathing: Dependent/Total  UE Dressing: Dependent/Total  LE Dressing: Dependent/Total  Toileting: Dependent/Total  Additional Comments: Pt dependent for donning B socks d/t decreased balance and alertness Vision  Vision: Within Functional Limits  Hearing  Hearing: Within functional limits    Cognition  Overall Cognitive Status: Exceptions  Arousal/Alertness: Delayed responses to stimuli;Inconsistent responses to stimuli  Following Commands: Follows one step commands with repetition  Attention Span: Difficulty attending to directions; Attends with cues to redirect  Initiation: Requires cues for all  Sequencing: Requires cues for all  Cognition Comment: pt minimally engaged throughout session, did not verbally communicate at all. pt did shake head no twice during session.  pt required mod verbal and tactile cues for initiation and maintaining attention to task  Orientation  Overall Orientation Status:  (pt did not answer)                  Education Given To: Patient  Education Provided: Role of Therapy;Plan of Care;Transfer Training  Education Provided Comments: importance of continued movement  Education Method: Verbal  Barriers to Learning: None  Education Outcome: Continued education needed    LUE PROM (degrees)  LUE PROM: WFL  LUE AROM (degrees)  LUE AROM : Exceptions  L Shoulder Flexion 0-180: 0-30  L Elbow Flexion 0-145: 0-130  Left Hand AROM (degrees)  Left Hand AROM: WFL  RUE PROM (degrees)  RUE PROM: WFL  RUE AROM (degrees)  RUE AROM : Exceptions  R Shoulder Flexion 0-180: 0-35  R Elbow Flexion 0-145: 0-135  Right Hand AROM (degrees)  Right Hand AROM: WFL                                                                   AM-PAC Score        AM-PAC Inpatient Daily Activity Raw Score: 8 (12/27/22 1320)  AM-PAC Inpatient ADL T-Scale Score : 22.86 (12/27/22 1320)  ADL Inpatient CMS 0-100% Score: 85.69 (12/27/22 1320)  ADL Inpatient CMS G-Code Modifier : CM (12/27/22 1320)           Goals  Short Term Goals  Time Frame for Short Term Goals: pt will, by discharge  Short Term Goal 1: complete simple feeding/grooming tasks with mod A and set up  Short Term Goal 2: participate in meaningful task for ~8 minutes in order to increase endurance  Short Term Goal 3: dem mod A during bed mobility  Short Term Goal 4: dem ~15 minutes static/dynamic sitting tolerance on eOB with CGA  Short Term Goal 5: dem mod A during functional transfers with LRD, as needed       Therapy Time   Individual Concurrent Group Co-treatment   Time In 0932         Time Out 0957         Minutes 25      Co-eval with PT    Timed Code Treatment Minutes: Bekah 1765, OTR/L

## 2022-12-27 NOTE — PROGRESS NOTES
Speech Language Pathology  Facility/Department: [facility]- MICU   CLINICAL BEDSIDE SWALLOW EVALUATION    NAME: Tanja Ayers  : 1995  MRN: [id number]    ADMISSION DATE: 12/15/2022  ADMITTING DIAGNOSIS: has Bipolar I disorder, most recent episode depressed ([facility] [address] Hyperbilirubinemia; Fatty liver; Ascites due to alcoholic hepatitis; Alcohol abuse; S/P cholecystectomy; Abnormal LFTs; Hypokalemia; Hypoalbuminemia; Portal hypertension ([address] Drug overdose of undetermined intent, initial encounter; COVID-19; Acute respiratory insufficiency; Alcoholic intoxication with complication ([facility] [address] Altered mental status; and Acute respiratory failure with hypoxia (HCC) on their problem list.    Date of Eval: 2022  Evaluating Therapist: ZEHRA Medina    Current Diet level:  Current Diet : NPO      Primary Complaint    32year old who became unresponsive while with her boyfriend, EMS was called. Patient got Narcan with minimal response. Intubated due to GCS of 3 and airway protection. Initially placed on propofol. In ED LFTs were elevated, CT cervical spine unremarkable, CT head negative, CXR unremarkable. Alcohol level 266, UDS negative. Found to be covid positive. : Tolerating CPAP trials. Following command both on and off sedation. Off propofol. : Weaning versed as tolerated. Librium increased. : Young removed. : Off versed. Precedex decreased. Extubated. : NG placed overnight for failed bedside swallow, patient pulled out her NG.  : On 4L NC. TF started.   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; recommend possible OR in 2-3 weeks once pulmonary status is recovered       Pain:  Pain Assessment  Pain Assessment: None - Denies Pain  Pain Level: 0  Patient's Stated Pain Goal: 0 - No pain  Pain Location: Head  Pain Orientation: Mid  Pain Descriptors: Aching  Pain Type: Acute pain  Pain Frequency: Continuous  Pain Onset: On-going  Non-Pharmaceutical Pain Intervention(s): Repositioned, Rest, Emotional support  Side Effects: No reported side effects  Faces, Legs, Activity, Cry, and Consolability (FLACC)  Face (F): no particular expression or smile  Legs (L): normal position or relaxed  Activity (A): lying quietly, normal position, moves easily  Cry (C): no cry (awake or asleep)  Consolability (C): content, relaxed  FLACC Score : 0    Reason for Referral  Robson Ellison was referred for a bedside swallow evaluation to assess the efficiency of her swallow function, identify signs and symptoms of aspiration and make recommendations regarding safe dietary consistencies, effective compensatory strategies, and safe eating environment. Impression  Pt with +overt s/s of aspiration at bedside, +delayed, wet continued coughing following nectar thick liquid by tsp. Weak bolus manipulation, prolonged A-P bolus transit. Unable to assess vocal quality as pt is aphonic. No coughing noted prior to oral trials, however continued wet coughing following oral trials. Recommend pt remain NPO with alternative means of nutrition. ST to reasses    Dysphagia Diagnosis: Suspected needs further assessment  Dysphagia Outcome Severity Scale: Level 1: Severe dysphagia- NPO. Unable to tolerate any PO safely     Treatment Plan  Requires SLP Intervention: Yes   Frequency: 2-3x/week  D/C Recommendations: Ongoing speech therapy is recommended at next level of care; Ongoing speech therapy is recommended during this hospitalization       Recommended Diet and Intervention   Solids: NPO  Liquids: NPO     Recommended Form of Meds: Via alternative means of nutrition    Treatment/Goals  Dysphagia Goals: The patient will tolerate instrumental swallowing procedure    General  Chart Reviewed: Yes  Behavior/Cognition: Alert; Requires cueing  Dentition: Adequate  Patient Positioning: Upright in bed  Baseline Vocal Quality: Aphonic  Consistencies Administered: Pureed;Mildly Thick- teaspoon        Oral Motor Deficits   Lingual/labial weakness    Oral Phase Dysfunction  Oral Phase  Oral Phase: Exceptions  Oral Phase  Oral Phase - Comment: Weak bolus manipulation, pt accepts small trials puree x2. Delayed A-P bolus transit     Indicators of Pharyngeal Phase Dysfunction   Pharyngeal Phase   Pharyngeal Phase: No immediate overt s/s of aspiration with small trials of puree x2. +delayed wet, continued coughing following nectar spoon x1 and ice chip.     Prognosis  Individuals consulted  Consulted and agree with results and recommendations: Patient;RN    Education  Patient Education: yes  Patient Education Response: Verbalizes understanding             Therapy Time  [id number]            MALOU OgSUnruly [id number] [place] [address]  12/27/2022 10:19 AM

## 2022-12-27 NOTE — PROGRESS NOTES
Critical care team - Resident sign-out to medicine service      Date and time: 12/27/2022 2:02 PM  Patient's name:  Rishabh Martinez Record Number: [id number]  Patient's account/billing number: [de-identified]  Patient's YOB: 1995  Age: 32 y.o. Date of Admission: 12/15/2022 12:08 PM  Length of stay during current admission: 12    Primary Care Physician: Manas Galicia    Code Status: Full Code    Mode of physician to physician communication:        [x] Via telephone   [] In person     Date and time of sign-out: 12/27/2022 2:02 PM      Accepting Medicine team: IM Team intermed    Accepting team's attending: Dr. Sherry Mojica    Patient's current ICU Bed:  0112     Patient's assigned bed on floor:  329        [x] Med-Surg Monitored [] Step-down       [] Psychiatry ICU       [] Psych floor     Reason for ICU admission:     Overdose    ICU course summary:     Patient presented to the ICU 12 days ago after being found unresponsive due to suspected drug overdose. GCS of 3 on arrival.  Required intubation. History of alcohol use. ED work-up was negative except for alcohol level of 266. Also recently found to be incidentally COVID-positive. Patient was extubated on 12/23. Extubation was delayed due to absence of cuff leak. This was discovered to be due to large ET tube. 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 Recommended possible LR follow-up once pulmonary status has recovered. She failed her bedside swallow assessment and is currently being fed on tube feeds. Decreasing CIWA requirements continues on librium.     Procedures during patient's ICU stay:     Intubation 12/15  Extubation 12/23    Current Vitals:     /77   Pulse (!) 108   Temp 97.6 °F (36.4 °C) (Axillary)   Resp 17   Ht 5' 5\" (1.651 m)   Wt 106 lb 4.2 oz (48.2 kg)   SpO2 98%   BMI 17.68 kg/m²       Cultures:     Blood cultures:                 [x] None drawn      [] Negative             []  Positive (Details:  )  Urine Culture:                   [x] None drawn      [] Negative             []  Positive (Details:  )  Sputum Culture:               [x] None drawn       [] Negative             []  Positive (Details:  )   Endotracheal aspirate:     [x] None drawn       [] Negative             []  Positive (Details:  )       Consults:     1. ID re COVID  2. ENT re airway injury    Assessment:     Patient Active Problem List    Diagnosis Date Noted    Acute respiratory failure with hypoxia ([facility] [address] 12/26/2022    COVID-19 12/18/2022    Acute respiratory insufficiency 12/65/9778    Alcoholic intoxication with complication ([address] 36/21/3915    Altered mental status 12/18/2022    Drug overdose of undetermined intent, initial encounter 12/15/2022    Portal hypertension ([address] 03/10/2022    Hyperbilirubinemia 03/09/2022    Fatty liver 03/09/2022    Ascites due to alcoholic hepatitis 88/23/0829    Alcohol abuse 03/09/2022    S/P cholecystectomy 03/09/2022    Abnormal LFTs 03/09/2022    Hypokalemia 03/09/2022    Hypoalbuminemia 03/09/2022    Bipolar I disorder, most recent episode depressed ([address] 09/10/2014         Recommended Follow-up:     SLP eval for diet orders  ENT f/u  SW resources, for suspected drug overdose        Above mentioned assessment and plan was discussed by me with the admitting medicine resident. The medicine team assigned to the patient by medicine admitting resident will be following up the patient from now onwards on the floor. Dandre De La Cruz DO, M.D.   Internal Medicine PGY3  12/27/2022, 2:02 PM

## 2022-12-27 NOTE — PROGRESS NOTES
Physical Therapy  Facility/Department: [facility] [place] CAR 3- MICU  Physical Therapy Initial Assessment    Name: Faye Medina  : 1995  MRN: [id number]  Date of Service: 2022  Chief Complaint   Patient presents with    Drug Overdose       Discharge Recommendations:  Patient would benefit from continued therapy after discharge   PT Equipment Recommendations  Equipment Needed: No      Patient Diagnosis(es): The encounter diagnosis was Drug overdose of undetermined intent, initial encounter. Past Medical History:  has a past medical history of ADHD (attention deficit hyperactivity disorder), Anxiety, Bipolar 1 disorder ([address] and Depression. Past Surgical History:  has no past surgical history on file. Assessment   Body Structures, Functions, Activity Limitations Requiring Skilled Therapeutic Intervention: Decreased ROM; Decreased strength;Decreased endurance; Increased pain;Decreased posture;Decreased balance  Assessment: Pt required mod-maxA to perform bed mobilty and functional transfers, pt unable to safely attempt ambulation despite max assistance. Pt is a high fall risk and would be unsafe to perform functional mobility unassisted. Recommending continued skilled PT to address these deficits and maximize independence upon discharge. Therapy Prognosis: Fair  Decision Making: Medium Complexity  Barriers to Learning: pt cognition  Requires PT Follow-Up: Yes  Activity Tolerance  Activity Tolerance: Treatment limited secondary to decreased cognition;Patient limited by endurance; Patient limited by fatigue     Plan   Physcial Therapy Plan  General Plan:  (5-6x/week)  Current Treatment Recommendations: Strengthening, ROM, Balance training, Functional mobility training, Transfer training, Gait training, Stair training, Endurance training, Home exercise program, Safety education & training, Equipment evaluation, education, & procurement, Pain management  Safety Devices  Type of Devices: Call light within reach, Left in bed, Bed alarm in place, Gait belt, Nurse notified, Patient at risk for falls  Restraints  Restraints Initially in Place: No     Restrictions  Restrictions/Precautions  Restrictions/Precautions: Fall Risk  Required Braces or Orthoses?: No  Position Activity Restriction  Other position/activity restrictions: up with assist, extubated 12/23     Subjective   General  Patient assessed for rehabilitation services?: Yes  Response To Previous Treatment: Not applicable  Family / Caregiver Present: No  Follows Commands: Within Functional Limits  Subjective  Subjective: RN and pt in agreement for PT eval. Pt with significant flat affect, however, cooperative throughout session. Social/Functional History  Social/Functional History  Home Layout: One level  Home Access: Stairs to enter with rails  Entrance Stairs - Number of Steps: 4  Entrance Stairs - Rails: Both  ADL Assistance: Independent  Homemaking Assistance: Independent  Ambulation Assistance: Independent  Transfer Assistance: Independent  Active : No  Occupation: Unemployed  Additional Comments: above information obtained from chart, pt does not verbalize throughout session  Vision/Hearing       Cognition   Orientation  Overall Orientation Status:  (pt did not answer)  Cognition  Overall Cognitive Status: Exceptions  Arousal/Alertness: Delayed responses to stimuli;Inconsistent responses to stimuli  Following Commands: Follows one step commands with repetition  Attention Span: Difficulty attending to directions; Attends with cues to redirect  Initiation: Requires cues for all  Sequencing: Requires cues for all  Cognition Comment: pt minimally engaged throughout session, did not verbalize throughout session. pt required mod verbal and tactile cues for initiation and maintaining attention to task     Objective   Gross Assessment  Sensation: Intact     Joint Mobility  ROM RLE: Significant genu valgus noted of bilateral LE in seated position; PROM BLE WFL  ROM LLE: Significant genu valgus noted of bilateral LE in seated position; PROM BLE WFL  ROM RUE: See OT assessment- PT/ OT coeval  ROM LUE: See OT assessment- PT/ OT coeval  Strength RLE  Strength RLE: Exception  R Hip Flexion: 2+/5  R Knee Flexion: 3-/5  R Knee Extension: 2+/5  R Ankle Dorsiflexion: 2/5  R Ankle Plantar flexion: 2/5  Strength LLE  Strength LLE: Exception  L Hip Flexion: 2+/5  L Knee Flexion: 3-/5  L Knee Extension: 2/5  L Ankle Dorsiflexion: 2/5  L Ankle Plantar Flexion: 2/5    Bed mobility  Supine to Sit: Maximum assistance  Sit to Supine: Maximum assistance  Bed Mobility Comments: increased time required to complete; pt require mod-maxA to maintain seated balance while EOB  Transfers  Sit to Stand: Maximum Assistance  Stand to Sit: Maximum Assistance  Comment: Pt unable to obtain full standing position with bilateral UE on RW despite maxA from writer  Ambulation  Comments: unable to safely attempt due to poor standing tolerance  More Ambulation?: No  Stairs/Curb  Stairs?: No     Balance  Posture: Fair  Sitting - Static: +;Poor  Sitting - Dynamic: Poor;-  Standing - Static: Poor;-  Standing - Dynamic: Poor;-  Comments: standing balance assessed w/ RW; pt able to sit EOB mod-maxA    AM-PAC Score  AM-PAC Inpatient Mobility Raw Score : 7 (12/27/22 1426)  AM-PAC Inpatient T-Scale Score : 26.42 (12/27/22 1426)  Mobility Inpatient CMS 0-100% Score: 92.36 (12/27/22 1426)  Mobility Inpatient CMS G-Code Modifier : CM (12/27/22 1426)    Goals  Short Term Goals  Time Frame for Short Term Goals: 14  Short Term Goal 1: Pt to perform bed mobility Perla  Short Term Goal 2: Pt to demonstrate functional transfers [place]  Short Term Goal 3: Ambulate 50ft w/ RW modA  Short Term Goal 4: Pt to demonstrate standing dynamic balance of fair + to decrease fall risk  Patient Goals   Patient Goals :  To go home       Education  Patient Education  Education Given To: Patient  Education Provided: Role of Therapy;Plan of Care  Education Method: Demonstration  Barriers to Learning: None  Education Outcome: Verbalized understanding;Demonstrated understanding      Therapy Time   Individual Concurrent Group Co-treatment   Time In 0933         Time Out 0958         Minutes 25         Timed Code Treatment Minutes: 8 Minutes       Bree Jon, PT

## 2022-12-27 NOTE — PLAN OF CARE
Problem: Discharge Planning  Goal: Discharge to home or other facility with appropriate resources  Outcome: Progressing     Problem: Skin/Tissue Integrity  Goal: Absence of new skin breakdown  Description: 1. Monitor for areas of redness and/or skin breakdown  2. Assess vascular access sites hourly  3. Every 4-6 hours minimum:  Change oxygen saturation probe site  4. Every 4-6 hours:  If on nasal continuous positive airway pressure, respiratory therapy assess nares and determine need for appliance change or resting period. Outcome: Progressing     Problem: ABCDS Injury Assessment  Goal: Absence of physical injury  Outcome: Progressing     Problem: Safety - Adult  Goal: Free from fall injury  Outcome: Progressing     Problem: Pain  Goal: Verbalizes/displays adequate comfort level or baseline comfort level  Outcome: Progressing     Problem: Confusion  Goal: Confusion, delirium, dementia, or psychosis is improved or at baseline  Description: INTERVENTIONS:  1. Assess for possible contributors to thought disturbance, including medications, impaired vision or hearing, underlying metabolic abnormalities, dehydration, psychiatric diagnoses, and notify attending LIP  2. [place] high risk fall precautions, as indicated  3. Provide frequent short contacts to provide reality reorientation, refocusing and direction  4. Decrease environmental stimuli, including noise as appropriate  5. Monitor and intervene to maintain adequate nutrition, hydration, elimination, sleep and activity  6. If unable to ensure safety without constant attention obtain sitter and review sitter guidelines with assigned personnel  7.  Initiate Psychosocial CNS and Spiritual Care consult, as indicated  Outcome: Progressing     Problem: Respiratory - Adult  Goal: Achieves optimal ventilation and oxygenation  Outcome: Progressing     Problem: Nutrition Deficit:  Goal: Optimize nutritional status  Outcome: Progressing     Problem: Risk for Elopement  Goal: Patient will not exit the unit/facility without proper excort  Outcome: Progressing

## 2022-12-27 NOTE — PROGRESS NOTES
INTENSIVE CARE UNIT  Resident Physician Progress Note    Patient - Adeel Me  Date of Admission -  12/15/2022 12:08 PM  Date of Evaluation -  12/27/2022  Room and Bed Number -  5151/9426-57   Hospital Day - 12    HPI:     32year old who became unresponsive while with her boyfriend, EMS was called. Patient got Narcan with minimal response. Intubated due to GCS of 3 and airway protection. Initially placed on propofol. In ED LFTs were elevated, CT cervical spine unremarkable, CT head negative, CXR unremarkable. Alcohol level 266, UDS negative. Found to be covid positive. 12/19: Tolerating CPAP trials. Following command both on and off sedation. Off propofol. 12/20: Weaning versed as tolerated. Librium increased. 12/21: Young removed. 12/23: Off versed. Precedex decreased. Extubated. 12/24: NG placed overnight for failed bedside swallow, patient pulled out her NG.  12/25: On 4L NC. TF started. 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  Total of 14mg of ativan given last 24hr plus 75mg librium TID; speech would not evaluate due to patient being too lethargic yesterday      AWAKE & FOLLOWING COMMANDS:  [] No   [x] Yes    SECRETIONS Amount:  [x] Small [] Moderate  [] Large  [] None  Color:     [x] White [] Colored  [] Bloody    SEDATION:  RAAS Score:  [] Propofol gtt  [] Versed gtt  [] Ativan gtt   [x] No Sedation    PARALYZED:  [x] No    [] Yes    VASOPRESSORS:  [x] No    [] Yes  [] Levophed [] Dopamine [] Vasopressin  [] Dobutamine [] Phenylephrine [] Epinephrine      OBJECTIVE:     VITAL SIGNS:  /80   Pulse 90   Temp 98.3 °F (36.8 °C) (Oral)   Resp 20   Ht 5' 5\" (1.651 m)   Wt 106 lb 4.2 oz (48.2 kg)   SpO2 100%   BMI 17.68 kg/m²   Tmax over 24 hours:  Temp (24hrs), Av °F (37.2 °C), Min:98.3 °F (36.8 °C), Max:99.6 °F (37.6 °C)      Patient Vitals for the past 8 hrs:   BP Temp Temp src Pulse Resp SpO2   22 0700 112/80 -- -- 90 20 100 %   22 0600 111/71 -- -- 90 16 100 %   22 0500 106/75 -- -- 91 18 100 %   22 0400 116/71 98.3 °F (36.8 °C) Oral (!) 106 20 100 %   22 0300 127/78 -- -- (!) 109 19 --   22 0200 125/84 -- -- 98 20 100 %   22 0100 (!) 134/93 -- -- 98 24 100 %   22 0000 108/75 98.8 °F (37.1 °C) Oral (!) 106 21 98 %           Intake/Output Summary (Last 24 hours) at 2022 0718  Last data filed at 2022 0400  Gross per 24 hour   Intake 2567.13 ml   Output --   Net 2567.13 ml       Date 22 0000 - 22 2359   Shift 6606-5500 7469-6754 0698-3742 24 Hour Total   INTAKE   NG/GT(mL/kg) 415(8.6)   415(8.6)   Shift Total(mL/kg) 415(8.6)   415(8.6)   OUTPUT   Shift Total(mL/kg)       Weight (kg) 48.2 48.2 48.2 48.2       Wt Readings from Last 3 Encounters:   22 106 lb 4.2 oz (48.2 kg)   22 106 lb (48.1 kg)     Body mass index is 17.68 kg/m². PHYSICAL EXAM:  GEN:                  Awake, alert. Answering questions appropriately. Resting quietly. EYES:                pupils equal, round, and reactive to light  HEENT:            Normocephalic, without obvious abnormality  LUNGS:            good air exchange  CV:                     regular rate and rhythm and normal S1 and S2  ABDOMEN:     soft, non-distended, and non-tender  MSK:                  there is no redness, warmth, or swelling of the joints  NEURO[de-identified]           Not oriented.    SKIN:                 Normal skin color, texture, turgor  EXTREMITIES:  No pedal or leg edema, no calf tenderness/swelling, no erythema, distal pulses intact       MEDICATIONS:  Scheduled Meds:   ARIPiprazole  10 mg Per NG tube Daily    venlafaxine  37.5 mg Per NG tube Daily    sodium chloride  500 mL IntraVENous Once    chlordiazePOXIDE  75 mg Oral TID    sodium chloride flush  5-40 mL IntraVENous 2 times per day    multivitamin  1 tablet Per NG tube Daily    famotidine  20 mg Per NG tube BID    thiamine  100 mg Per NG tube Daily    folic acid  1 mg Per NG tube Daily    enoxaparin  30 mg SubCUTAneous Daily     Continuous Infusions:   sodium chloride      sodium chloride 10 mL/hr at 12/24/22 1802     PRN Meds:   sodium chloride flush, 5-40 mL, PRN  sodium chloride, , PRN  LORazepam, 1 mg, Q1H PRN   Or  LORazepam, 1 mg, Q1H PRN   Or  LORazepam, 2 mg, Q1H PRN   Or  LORazepam, 2 mg, Q1H PRN   Or  LORazepam, 3 mg, Q1H PRN   Or  LORazepam, 3 mg, Q1H PRN   Or  LORazepam, 4 mg, Q1H PRN   Or  LORazepam, 4 mg, Q1H PRN  midodrine, 5 mg, TID PRN  sodium phosphate IVPB, 0.16 mmol/kg, PRN   Or  sodium phosphate IVPB, 0.32 mmol/kg, PRN  sodium chloride flush, 5-40 mL, PRN  sodium chloride, , PRN  ondansetron, 4 mg, Q8H PRN   Or  ondansetron, 4 mg, Q6H PRN  polyethylene glycol, 17 g, Daily PRN  acetaminophen, 650 mg, Q6H PRN   Or  acetaminophen, 650 mg, Q6H PRN      SUPPORT DEVICES: [] Ventilator [] BIPAP  [x] Nasal Cannula [] Room Air    DATA:  Complete Blood Count:   Recent Labs     12/25/22  0321 12/26/22  0843 12/27/22  0500   WBC 10.1 11.9* 13.5*   RBC 2.49* 2.92* 2.47*   HGB 9.5* 11.0* 9.4*   HCT 30.1* 34.0* 28.6*   .9* 116.4* 115.8*   MCH 38.2* 37.7* 38.1*   MCHC 31.6 32.4 32.9   RDW 15.9* 15.3* 15.1*    205 223   MPV 11.4 11.2 10.9          Last 3 Blood Glucose:   Recent Labs     12/25/22  0321 12/26/22  0843 12/27/22  0500   GLUCOSE 94 111* 93          PT/INR:    Lab Results   Component Value Date/Time    PROTIME 12.4 12/15/2022 12:28 PM    INR 1.2 12/15/2022 12:28 PM     PTT:  No results found for: APTT, PTT    Comprehensive Metabolic Profile:   Recent Labs     12/25/22  0321 12/26/22  0843 12/27/22  0500   * 134* 134*   K 3.8 3.7 3.7    101 99   CO2 22 22 26   BUN <2* <2* 4*   CREATININE 0.35* 0.35* 0.26*   GLUCOSE 94 111* 93   CALCIUM 8.1* 8.9 9.1        Magnesium:   Lab Results   Component Value Date/Time    MG 1.9 12/24/2022 06:52 AM    MG 1.8 12/22/2022 05:35 AM    MG 1.9 12/21/2022 04:09 AM     Phosphorus:   Lab Results   Component Value Date/Time    PHOS 3.5 12/24/2022 06:52 AM    PHOS 4.2 12/22/2022 05:35 AM    PHOS 5.3 12/21/2022 04:09 AM     Ionized Calcium:   Lab Results   Component Value Date/Time    CAION 1.17 12/18/2022 05:54 AM    CAION 0.83 12/17/2022 04:24 AM        Urinalysis:   Lab Results   Component Value Date/Time    NITRU NEGATIVE 12/15/2022 12:49 PM    COLORU Dark Yellow 12/15/2022 12:49 PM    PHUR 6.0 12/15/2022 12:49 PM    WBCUA 0 TO 2 12/15/2022 12:49 PM    RBCUA 2 TO 5 12/15/2022 12:49 PM    MUCUS 3+ 03/09/2022 12:21 PM    TRICHOMONAS NOT REPORTED 09/20/2014 06:19 AM    YEAST NOT REPORTED 09/20/2014 06:19 AM    BACTERIA FEW 09/20/2014 06:19 AM    CLARITYU slightly cloudy 07/22/2014 11:58 AM    SPECGRAV 1.020 12/15/2022 12:49 PM    LEUKOCYTESUR NEGATIVE 12/15/2022 12:49 PM    UROBILINOGEN Normal 12/15/2022 12:49 PM    BILIRUBINUR NEGATIVE  Verified by ictotest. 12/15/2022 12:49 PM    BILIRUBINUR negative 07/22/2014 11:58 AM    BLOODU negative 07/22/2014 11:58 AM    GLUCOSEU NEGATIVE 12/15/2022 12:49 PM    KETUA SMALL 12/15/2022 12:49 PM    AMORPHOUS NOT REPORTED 09/20/2014 06:19 AM       HgBA1c:  No results found for: LABA1C  TSH:  No results found for: TSH  Lactic Acid: No results found for: LACTA   Troponin: No results for input(s): TROPONINI in the last 72 hours.         ASSESSMENT:     Patient Active Problem List    Diagnosis Date Noted    Acute respiratory failure with hypoxia ([facility] [address] 12/26/2022    COVID-19 12/18/2022    Acute respiratory insufficiency 51/60/1142    Alcoholic intoxication with complication ([facility] 80/18/5911    Altered mental status 12/18/2022    Drug overdose of undetermined intent, initial encounter 12/15/2022    Portal hypertension ([facility] 03/10/2022    Hyperbilirubinemia 03/09/2022    Fatty liver 03/09/2022    Ascites due to alcoholic hepatitis 15/12/3517    Alcohol abuse 03/09/2022    S/P cholecystectomy 03/09/2022    Abnormal LFTs 03/09/2022    Hypokalemia 03/09/2022    Hypoalbuminemia 03/09/2022    Bipolar I disorder, most recent episode depressed ([facility] 09/10/2014          PLAN:     Neuro  Off versed, off precedex  Librium 75 mg TID  CIWA protocol  14mg ativan last 24hr, requirement was in the 30's the  day before  Hx EtOH abuse, daily thiamine and folate  Neuro checks per protocol  Hx bipolar disorder, home meds abilify and effexor     Cardiology  Hemodynamically stable, no pressor support  Midodrine 5 mg TID PRN - has not required in several days  HR stable overnight     Pulmonary  2L NC  Covid positive, ID consulted  Extubated 12/23  Seen by ENT for concern for airway injury from large ETT. F/u in 2-3 weeks to assess for OR if needed, no stridor     Renal  I/O: multiple unmeasured voids  External catheter  BUN/Cr: 4/0.26     GI  Diet NPO  ADULT TUBE FEEDING; Nasogastric; Standard without Fiber; Continuous; 20; Yes; 10; Q 4 hours; 55; 30; Q 4 hours  Hx EtOH use, +EtOH 266 on arrival  Hx transaminitis, improving  Monitor for EtOH withdrawal, seizure precautions  Daily thiamine and folate, multivitamin  Glycolax PRN  Ulcer Ppx: Pepcid BID  Failed swallow, awaiting bedside SLP eval     ID  WBC 13.5 (11.9)  Covid positive - 12/15  ID consulted    ICU PROPHYLAXIS:  Stress ulcer:  [] PPI Agent  [x] [id number] [] Sucralfate  [] Other:  VTE:   [x] Enoxaparin  [] Unfract.  Heparin Subcut  [] EPC Cuffs    NUTRITION:  [] NPO [x] Tube Feeding (Specify: )  Diet NPO  ADULT TUBE FEEDING; Nasogastric; Standard without Fiber; Continuous; 20; Yes; 10; Q 4 hours; 55; 30; Q 4 hours   [] TPN  [] PO    HOME MEDS RECONCILED: [] No  [x] Yes    CONSULTATION NEEDED:  [] No  [x] Yes    TRANSFER OUT OF ICU:   [] No  [x] Yes       ---  Reino Cranker, DO, MS  Emergency Medicine Resident  [address] [facility]  12/27/22 7:38 AM

## 2022-12-28 ENCOUNTER — APPOINTMENT (OUTPATIENT)
Dept: CT IMAGING | Age: 27
DRG: 812 | End: 2022-12-28
Payer: MEDICARE

## 2022-12-28 ENCOUNTER — APPOINTMENT (OUTPATIENT)
Dept: GENERAL RADIOLOGY | Age: 27
DRG: 812 | End: 2022-12-28
Payer: MEDICARE

## 2022-12-28 LAB
ABSOLUTE EOS #: 0.17 K/UL (ref 0–0.4)
ABSOLUTE IMMATURE GRANULOCYTE: 0 K/UL (ref 0–0.3)
ABSOLUTE LYMPH #: 2.04 K/UL (ref 1–4.8)
ABSOLUTE MONO #: 1.53 K/UL (ref 0.1–0.8)
ALLEN TEST: POSITIVE
ANION GAP SERPL CALCULATED.3IONS-SCNC: 14 MMOL/L (ref 9–17)
BACTERIA: ABNORMAL
BASOPHILS # BLD: 0 % (ref 0–2)
BASOPHILS ABSOLUTE: 0 K/UL (ref 0–0.2)
BILIRUBIN URINE: NEGATIVE
BUN BLDV-MCNC: 4 MG/DL (ref 6–20)
C-REACTIVE PROTEIN: 40.1 MG/L (ref 0–5)
CALCIUM SERPL-MCNC: 9.2 MG/DL (ref 8.6–10.4)
CASTS UA: ABNORMAL /LPF (ref 0–8)
CHLORIDE BLD-SCNC: 94 MMOL/L (ref 98–107)
CO2: 22 MMOL/L (ref 20–31)
COLOR: YELLOW
CREAT SERPL-MCNC: 0.27 MG/DL (ref 0.5–0.9)
EKG ATRIAL RATE: 116 BPM
EKG P AXIS: 58 DEGREES
EKG P-R INTERVAL: 132 MS
EKG Q-T INTERVAL: 328 MS
EKG QRS DURATION: 70 MS
EKG QTC CALCULATION (BAZETT): 455 MS
EKG R AXIS: 24 DEGREES
EKG T AXIS: 69 DEGREES
EKG VENTRICULAR RATE: 116 BPM
EOSINOPHILS RELATIVE PERCENT: 1 % (ref 1–4)
EPITHELIAL CELLS UA: ABNORMAL /HPF (ref 0–5)
FIO2: 28
GFR SERPL CREATININE-BSD FRML MDRD: >60 ML/MIN/1.73M2
GLUCOSE BLD-MCNC: 107 MG/DL (ref 65–105)
GLUCOSE BLD-MCNC: 108 MG/DL (ref 70–99)
GLUCOSE BLD-MCNC: 99 MG/DL (ref 74–100)
GLUCOSE URINE: NEGATIVE
HCT VFR BLD CALC: 29.2 % (ref 36.3–47.1)
HEMOGLOBIN: 9.9 G/DL (ref 11.9–15.1)
IMMATURE GRANULOCYTES: 0 %
KETONES, URINE: NEGATIVE
LACTIC ACID, WHOLE BLOOD: 0.8 MMOL/L (ref 0.7–2.1)
LEUKOCYTE ESTERASE, URINE: NEGATIVE
LYMPHOCYTES # BLD: 12 % (ref 24–44)
MAGNESIUM: 1.6 MG/DL (ref 1.6–2.6)
MCH RBC QN AUTO: 39.4 PG (ref 25.2–33.5)
MCHC RBC AUTO-ENTMCNC: 33.9 G/DL (ref 28.4–34.8)
MCV RBC AUTO: 116.3 FL (ref 82.6–102.9)
MONOCYTES # BLD: 9 % (ref 1–7)
MORPHOLOGY: ABNORMAL
MORPHOLOGY: ABNORMAL
NITRITE, URINE: NEGATIVE
NRBC AUTOMATED: 0 PER 100 WBC
O2 DEVICE/FLOW/%: NORMAL
PDW BLD-RTO: 15 % (ref 11.8–14.4)
PH UA: 7.5 (ref 5–8)
PHOSPHORUS: 4.5 MG/DL (ref 2.6–4.5)
PLATELET # BLD: 274 K/UL (ref 138–453)
PMV BLD AUTO: 10.7 FL (ref 8.1–13.5)
POC HCO3: 27.6 MMOL/L (ref 21–28)
POC LACTIC ACID: 0.62 MMOL/L (ref 0.56–1.39)
POC O2 SATURATION: 97 % (ref 94–98)
POC PCO2: 41.7 MM HG (ref 35–48)
POC PH: 7.43 (ref 7.35–7.45)
POC PO2: 91.5 MM HG (ref 83–108)
POSITIVE BASE EXCESS, ART: 3 (ref 0–3)
POTASSIUM SERPL-SCNC: 3.7 MMOL/L (ref 3.7–5.3)
PROCALCITONIN: 0.6 NG/ML
PROTEIN UA: NEGATIVE
RBC # BLD: 2.51 M/UL (ref 3.95–5.11)
RBC UA: ABNORMAL /HPF (ref 0–4)
REASON FOR REJECTION: NORMAL
SAMPLE SITE: NORMAL
SEG NEUTROPHILS: 78 % (ref 36–66)
SEGMENTED NEUTROPHILS ABSOLUTE COUNT: 13.26 K/UL (ref 1.8–7.7)
SODIUM BLD-SCNC: 130 MMOL/L (ref 135–144)
SPECIFIC GRAVITY UA: 1.01 (ref 1–1.03)
TROPONIN, HIGH SENSITIVITY: <6 NG/L (ref 0–14)
TSH SERPL DL<=0.05 MIU/L-ACNC: 1.96 UIU/ML (ref 0.3–5)
TURBIDITY: ABNORMAL
URINE HGB: NEGATIVE
UROBILINOGEN, URINE: NORMAL
WBC # BLD: 17 K/UL (ref 3.5–11.3)
WBC UA: ABNORMAL /HPF (ref 0–5)
ZZ NTE CLEAN UP: ORDERED TEST: NORMAL
ZZ NTE WITH NAME CLEAN UP: SPECIMEN SOURCE: NORMAL

## 2022-12-28 PROCEDURE — 2580000003 HC RX 258: Performed by: STUDENT IN AN ORGANIZED HEALTH CARE EDUCATION/TRAINING PROGRAM

## 2022-12-28 PROCEDURE — 82947 ASSAY GLUCOSE BLOOD QUANT: CPT

## 2022-12-28 PROCEDURE — 74176 CT ABD & PELVIS W/O CONTRAST: CPT

## 2022-12-28 PROCEDURE — 82803 BLOOD GASES ANY COMBINATION: CPT

## 2022-12-28 PROCEDURE — 2700000000 HC OXYGEN THERAPY PER DAY

## 2022-12-28 PROCEDURE — 74230 X-RAY XM SWLNG FUNCJ C+: CPT

## 2022-12-28 PROCEDURE — 36600 WITHDRAWAL OF ARTERIAL BLOOD: CPT

## 2022-12-28 PROCEDURE — 86140 C-REACTIVE PROTEIN: CPT

## 2022-12-28 PROCEDURE — 6360000002 HC RX W HCPCS: Performed by: INTERNAL MEDICINE

## 2022-12-28 PROCEDURE — 6370000000 HC RX 637 (ALT 250 FOR IP): Performed by: STUDENT IN AN ORGANIZED HEALTH CARE EDUCATION/TRAINING PROGRAM

## 2022-12-28 PROCEDURE — 93005 ELECTROCARDIOGRAM TRACING: CPT | Performed by: NURSE PRACTITIONER

## 2022-12-28 PROCEDURE — 2580000003 HC RX 258: Performed by: INTERNAL MEDICINE

## 2022-12-28 PROCEDURE — 87040 BLOOD CULTURE FOR BACTERIA: CPT

## 2022-12-28 PROCEDURE — 92611 MOTION FLUOROSCOPY/SWALLOW: CPT

## 2022-12-28 PROCEDURE — 85025 COMPLETE CBC W/AUTO DIFF WBC: CPT

## 2022-12-28 PROCEDURE — 83605 ASSAY OF LACTIC ACID: CPT

## 2022-12-28 PROCEDURE — 94640 AIRWAY INHALATION TREATMENT: CPT

## 2022-12-28 PROCEDURE — 84443 ASSAY THYROID STIM HORMONE: CPT

## 2022-12-28 PROCEDURE — 84484 ASSAY OF TROPONIN QUANT: CPT

## 2022-12-28 PROCEDURE — 2060000000 HC ICU INTERMEDIATE R&B

## 2022-12-28 PROCEDURE — 84100 ASSAY OF PHOSPHORUS: CPT

## 2022-12-28 PROCEDURE — 94761 N-INVAS EAR/PLS OXIMETRY MLT: CPT

## 2022-12-28 PROCEDURE — 6370000000 HC RX 637 (ALT 250 FOR IP): Performed by: INTERNAL MEDICINE

## 2022-12-28 PROCEDURE — 74018 RADEX ABDOMEN 1 VIEW: CPT

## 2022-12-28 PROCEDURE — 83735 ASSAY OF MAGNESIUM: CPT

## 2022-12-28 PROCEDURE — 6360000002 HC RX W HCPCS: Performed by: HEALTH CARE PROVIDER

## 2022-12-28 PROCEDURE — 71045 X-RAY EXAM CHEST 1 VIEW: CPT

## 2022-12-28 PROCEDURE — 80048 BASIC METABOLIC PNL TOTAL CA: CPT

## 2022-12-28 PROCEDURE — 81001 URINALYSIS AUTO W/SCOPE: CPT

## 2022-12-28 PROCEDURE — 2580000003 HC RX 258: Performed by: NURSE PRACTITIONER

## 2022-12-28 PROCEDURE — 6360000002 HC RX W HCPCS: Performed by: STUDENT IN AN ORGANIZED HEALTH CARE EDUCATION/TRAINING PROGRAM

## 2022-12-28 PROCEDURE — 36415 COLL VENOUS BLD VENIPUNCTURE: CPT

## 2022-12-28 PROCEDURE — 84145 PROCALCITONIN (PCT): CPT

## 2022-12-28 RX ORDER — ALBUTEROL SULFATE 2.5 MG/3ML
2.5 SOLUTION RESPIRATORY (INHALATION) EVERY 4 HOURS PRN
Status: ACTIVE | OUTPATIENT
Start: 2022-12-28 | End: 2023-01-02

## 2022-12-28 RX ORDER — BACLOFEN 5 MG/1
5 TABLET ORAL 3 TIMES DAILY
Status: DISCONTINUED | OUTPATIENT
Start: 2022-12-28 | End: 2022-12-29

## 2022-12-28 RX ORDER — BACLOFEN 10 MG/1
10 TABLET ORAL 3 TIMES DAILY
Status: DISCONTINUED | OUTPATIENT
Start: 2022-12-28 | End: 2022-12-28

## 2022-12-28 RX ORDER — 0.9 % SODIUM CHLORIDE 0.9 %
250 INTRAVENOUS SOLUTION INTRAVENOUS ONCE
Status: COMPLETED | OUTPATIENT
Start: 2022-12-28 | End: 2022-12-28

## 2022-12-28 RX ORDER — GUAIFENESIN 100 MG/5ML
200 SYRUP ORAL EVERY 4 HOURS PRN
Status: DISCONTINUED | OUTPATIENT
Start: 2022-12-28 | End: 2023-01-09 | Stop reason: HOSPADM

## 2022-12-28 RX ADMIN — IPRATROPIUM BROMIDE 0.5 MG: 0.5 SOLUTION RESPIRATORY (INHALATION) at 15:59

## 2022-12-28 RX ADMIN — VANCOMYCIN HYDROCHLORIDE 750 MG: 10 INJECTION, POWDER, LYOPHILIZED, FOR SOLUTION INTRAVENOUS at 19:04

## 2022-12-28 RX ADMIN — FAMOTIDINE 20 MG: 20 TABLET, FILM COATED ORAL at 21:01

## 2022-12-28 RX ADMIN — ARIPIPRAZOLE 10 MG: 10 TABLET ORAL at 15:52

## 2022-12-28 RX ADMIN — IPRATROPIUM BROMIDE 0.5 MG: 0.5 SOLUTION RESPIRATORY (INHALATION) at 20:38

## 2022-12-28 RX ADMIN — FAMOTIDINE 20 MG: 20 TABLET, FILM COATED ORAL at 15:52

## 2022-12-28 RX ADMIN — ENOXAPARIN SODIUM 30 MG: 100 INJECTION SUBCUTANEOUS at 15:54

## 2022-12-28 RX ADMIN — CEFEPIME 2000 MG: 2 INJECTION, POWDER, FOR SOLUTION INTRAVENOUS at 09:00

## 2022-12-28 RX ADMIN — BACLOFEN 5 MG: 5 TABLET ORAL at 15:53

## 2022-12-28 RX ADMIN — ALCOHOL 1 TABLET: 70.47 GEL TOPICAL at 15:52

## 2022-12-28 RX ADMIN — SODIUM CHLORIDE 250 ML: 9 INJECTION, SOLUTION INTRAVENOUS at 03:15

## 2022-12-28 RX ADMIN — LORAZEPAM 1 MG: 2 INJECTION INTRAMUSCULAR; INTRAVENOUS at 03:21

## 2022-12-28 RX ADMIN — CHLORDIAZEPOXIDE HYDROCHLORIDE 25 MG: 25 CAPSULE ORAL at 15:58

## 2022-12-28 RX ADMIN — Medication 100 MG: at 15:52

## 2022-12-28 RX ADMIN — SODIUM CHLORIDE, PRESERVATIVE FREE 10 ML: 5 INJECTION INTRAVENOUS at 15:56

## 2022-12-28 RX ADMIN — VENLAFAXINE 37.5 MG: 37.5 TABLET ORAL at 15:54

## 2022-12-28 RX ADMIN — BACLOFEN 5 MG: 5 TABLET ORAL at 21:01

## 2022-12-28 RX ADMIN — SODIUM CHLORIDE 3000 MG: 900 INJECTION INTRAVENOUS at 21:38

## 2022-12-28 RX ADMIN — SODIUM CHLORIDE 3000 MG: 900 INJECTION INTRAVENOUS at 16:28

## 2022-12-28 RX ADMIN — CHLORDIAZEPOXIDE HYDROCHLORIDE 25 MG: 25 CAPSULE ORAL at 21:01

## 2022-12-28 RX ADMIN — SODIUM CHLORIDE, PRESERVATIVE FREE 10 ML: 5 INJECTION INTRAVENOUS at 21:04

## 2022-12-28 RX ADMIN — FOLIC ACID 1 MG: 1 TABLET ORAL at 15:52

## 2022-12-28 RX ADMIN — VANCOMYCIN HYDROCHLORIDE 750 MG: 10 INJECTION, POWDER, LYOPHILIZED, FOR SOLUTION INTRAVENOUS at 09:58

## 2022-12-28 RX ADMIN — SODIUM CHLORIDE 20 ML/HR: 9 INJECTION, SOLUTION INTRAVENOUS at 09:57

## 2022-12-28 NOTE — PROCEDURES
INSTRUMENTAL SWALLOW REPORT  MODIFIED BARIUM SWALLOW    NAME: Lucille Burris   : 1995  MRN: [id number]       Date of Eval: 2022              Referring Diagnosis(es):      Past Medical History:  has a past medical history of ADHD (attention deficit hyperactivity disorder), Anxiety, Bipolar 1 disorder ([facility] [address] and Depression. Past Surgical History:  has no past surgical history on file. Type of Study: Initial MBS      Patient Complaints/Reason for Referral:  Lucille Burris was referred for a MBS to assess the efficiency of her swallow function, assess for aspiration, and to make recommendations regarding safe dietary consistencies, effective compensatory strategies, and safe eating environment. Behavior/Cognition/Vision/Hearing:  Behavior/Cognition: Alert; Cooperative  Vision: Within Functional Limits  Hearing: Within functional limits    Impressions:  Pt. EXTREMELY lethargic and required max verbal cues to remain awake and alert to complete testing. Pt. Given puree trial x 1. Initially, did not take PO. Eventually took puree trial x 1. Decreased A-P transit. Mod-max vallecula and pyriform residual noted with eventual penetration and gross aspiration with very latent weak cough. No additional PO given. Pt. Not appropriate for PO at this time. ST to recommend NPO and repeat MBSS when pt. More alert and awake. Results and recommendations reported to RN. Consistencies Administered: Pureed    Recommended Diet:  Solid consistency: NPO  Liquid consistency: NPO  Medication administration: Via NG/PEG    Recommendations/Treatment  Requires SLP Intervention: Yes  D/C Recommendations: Ongoing speech therapy is recommended during this hospitalization  Frequency: 3-5 x week     Recommended Exercises:    Therapeutic Interventions: Oral motor exercises; Patient/Family education    Education: Images and recommendations were reviewed with pt.  And RN following this exam.   Patient Education: yes  Patient Education Response: Verbalizes understanding    Goals:    Long Term: To Maximize safety with intake, optimize nutrition/hydration and minimize risk for aspiration. Short Term:     Goal 1: Pt. will complete OMEX for dysphagia 10-20 x per session. Dysphagia Goals: The patient will tolerate instrumental swallowing procedure      Oral Preparation / Oral Phase/Pharyngeal Phase  Pt. EXTREMELY lethargic and required max verbal cues to remain awake and alert to complete testing. Pt. Given puree trial x 1. Initially, did not take PO. Eventually took puree trial x 1. Decreased A-P transit. Mod-max vallecula and pyriform residual noted with eventual penetration and gross aspiration with very latent weak cough. No additional PO given. Esophageal Phase  Esophageal Screen: WFL    Pain      Pain Level:  (grimacing)  Pain Type: Acute pain  Pain Location: Head      Therapy Time:   Individual Concurrent Group Co-treatment   Time In  1403         Time Out  1413         Minutes  Esteban Landeros M.A. CCC-SLP, 12/28/2022, 4:00 PM

## 2022-12-28 NOTE — PLAN OF CARE
Problem: Respiratory - Adult  Goal: Achieves optimal ventilation and oxygenation  12/28/2022 1807 by Golden Maynard RCP  Outcome: Progressing  Flowsheets (Taken 12/28/2022 1807)  Achieves optimal ventilation and oxygenation:   Assess for changes in respiratory status   Assess the need for suctioning and aspirate as needed   Respiratory therapy support as indicated   Assess for changes in mentation and behavior   Oxygen supplementation based on oxygen saturation or arterial blood gases   Encourage broncho-pulmonary hygiene including cough, deep breathe, incentive spirometry   Assess and instruct to report shortness of breath or any respiratory difficulty

## 2022-12-28 NOTE — PLAN OF CARE
Problem: Discharge Planning  Goal: Discharge to home or other facility with appropriate resources  Outcome: Progressing     Problem: Skin/Tissue Integrity  Goal: Absence of new skin breakdown  Description: 1. Monitor for areas of redness and/or skin breakdown  2. Assess vascular access sites hourly  3. Every 4-6 hours minimum:  Change oxygen saturation probe site  4. Every 4-6 hours:  If on nasal continuous positive airway pressure, respiratory therapy assess nares and determine need for appliance change or resting period. Outcome: Progressing     Problem: ABCDS Injury Assessment  Goal: Absence of physical injury  Outcome: Progressing     Problem: Safety - Adult  Goal: Free from fall injury  Outcome: Progressing     Problem: Pain  Goal: Verbalizes/displays adequate comfort level or baseline comfort level  Outcome: Progressing     Problem: Confusion  Goal: Confusion, delirium, dementia, or psychosis is improved or at baseline  Description: INTERVENTIONS:  1. Assess for possible contributors to thought disturbance, including medications, impaired vision or hearing, underlying metabolic abnormalities, dehydration, psychiatric diagnoses, and notify attending LIP  2. [place] high risk fall precautions, as indicated  3. Provide frequent short contacts to provide reality reorientation, refocusing and direction  4. Decrease environmental stimuli, including noise as appropriate  5. Monitor and intervene to maintain adequate nutrition, hydration, elimination, sleep and activity  6. If unable to ensure safety without constant attention obtain sitter and review sitter guidelines with assigned personnel  7.  Initiate Psychosocial CNS and Spiritual Care consult, as indicated  Outcome: Progressing     Problem: Respiratory - Adult  Goal: Achieves optimal ventilation and oxygenation  Outcome: Progressing     Problem: Nutrition Deficit:  Goal: Optimize nutritional status  Outcome: Progressing     Problem: Risk for Elopement  Goal: Patient will not exit the unit/facility without proper excort  Outcome: Progressing

## 2022-12-28 NOTE — PROGRESS NOTES
Report called to [facility] on Car 2 with good understanding. All questions answered. Patient transferred to Car 2 Room 2015.

## 2022-12-28 NOTE — PROGRESS NOTES
[address] [facility] Pharmacokinetic Monitoring Service - Vancomycin     Virgil Brown is a 32 y.o. female starting on vancomycin therapy for Aspiration Pneumonia. Pharmacy consulted by Dr Luis Padgett for monitoring and adjustment. Target Concentration: Goal AUC/ELINA 400-600 mg*hr/L    Additional Antimicrobials: Cefepime     Pertinent Laboratory Values: Wt Readings from Last 1 Encounters:   12/26/22 106 lb 4.2 oz (48.2 kg)     Temp Readings from Last 1 Encounters:   12/28/22 99.2 °F (37.3 °C) (Oral)     Estimated Creatinine Clearance: 234 mL/min (A) (based on SCr of 0.27 mg/dL (L)).   Recent Labs     12/27/22  0500 12/28/22  0309   CREATININE 0.26* 0.27*   WBC 13.5* 17.0*     Procalcitonin:     Pertinent Cultures:  Culture Date Source Results        MRSA Nasal Swab: was negative on 12/16, ordered for respiratory indication, pharmacy to contact provider about discontinuing vancomycin    Plan:  Dosing recommendations based on Bayesian software  Start vancomycin 750 mg q 8 hrs   Anticipated AUC of 417 and trough concentration of 11.6 at steady state  Renal labs as indicated   Vancomycin concentration not ordered    Pharmacy will continue to monitor patient and adjust therapy as indicated    Thank you for the consult,  Bobo Maria, [address] [facility]  12/28/2022 1:16 PM

## 2022-12-28 NOTE — PLAN OF CARE
Problem: Skin/Tissue Integrity  Goal: Absence of new skin breakdown  Description: 1. Monitor for areas of redness and/or skin breakdown  2. Assess vascular access sites hourly  3. Every 4-6 hours minimum:  Change oxygen saturation probe site  4. Every 4-6 hours:  If on nasal continuous positive airway pressure, respiratory therapy assess nares and determine need for appliance change or resting period.   12/28/2022 0428 by Rebecca Merrill RN  Outcome: Progressing

## 2022-12-28 NOTE — PLAN OF CARE
Problem: Discharge Planning  Goal: Discharge to home or other facility with appropriate resources  Outcome: Progressing     Problem: Skin/Tissue Integrity  Goal: Absence of new skin breakdown  Description: 1. Monitor for areas of redness and/or skin breakdown  2. Assess vascular access sites hourly  3. Every 4-6 hours minimum:  Change oxygen saturation probe site  4. Every 4-6 hours:  If on nasal continuous positive airway pressure, respiratory therapy assess nares and determine need for appliance change or resting period. 12/28/2022 1719 by Kanu Javed RN  Outcome: Progressing  12/28/2022 0428 by Gutierrez Kay RN  Outcome: Progressing     Problem: ABCDS Injury Assessment  Goal: Absence of physical injury  Outcome: Progressing     Problem: Safety - Adult  Goal: Free from fall injury  Outcome: Progressing     Problem: Pain  Goal: Verbalizes/displays adequate comfort level or baseline comfort level  Outcome: Progressing     Problem: Confusion  Goal: Confusion, delirium, dementia, or psychosis is improved or at baseline  Description: INTERVENTIONS:  1. Assess for possible contributors to thought disturbance, including medications, impaired vision or hearing, underlying metabolic abnormalities, dehydration, psychiatric diagnoses, and notify attending LIP  2. [place] high risk fall precautions, as indicated  3. Provide frequent short contacts to provide reality reorientation, refocusing and direction  4. Decrease environmental stimuli, including noise as appropriate  5. Monitor and intervene to maintain adequate nutrition, hydration, elimination, sleep and activity  6. If unable to ensure safety without constant attention obtain sitter and review sitter guidelines with assigned personnel  7.  Initiate Psychosocial CNS and Spiritual Care consult, as indicated  Outcome: Progressing     Problem: Respiratory - Adult  Goal: Achieves optimal ventilation and oxygenation  Outcome: Progressing     Problem: Nutrition Deficit:  Goal: Optimize nutritional status  Outcome: Progressing     Problem: Risk for Elopement  Goal: Patient will not exit the unit/facility without proper excort  Outcome: Progressing

## 2022-12-28 NOTE — PROGRESS NOTES
Comprehensive Nutrition Assessment    Type and Reason for Visit:  Reassess    Nutrition Recommendations/Plan:   Continue TF as ordered. Monitor weight, labs and TF tolerance. Malnutrition Assessment:  Malnutrition Status:  Insufficient data (12/25/22 1123)    Context:  Acute Illness     Findings of the 6 clinical characteristics of malnutrition:  Energy Intake:  Mild decrease in energy intake (Comment)  Weight Loss:  Unable to assess (Weight fluctuations since admission noted.)     Body Fat Loss:  Mild body fat loss Orbital   Muscle Mass Loss:  Unable to assess (unable to fully assess as covered by blankets and under droplet precautions)    Fluid Accumulation:  No significant fluid accumulation     Strength:  Not Performed    Nutrition Assessment:    Patient seen for follow up. patient tolerating at goal rate of 55 ml/hr. Last BM 12/26 Na 130. Medication includes folvite, multivitamin, thiamine. Nutrition Related Findings:    Labs/meds reviewed. Last BM 12/26. NG in place. Wound Type: None       Current Nutrition Intake & Therapies:    Average Meal Intake: NPO  Average Supplements Intake: NPO  Current Tube Feeding (TF) Orders:  Feeding Route: Nasogastric  Formula: Standard without Fiber  Schedule: Continuous  Feeding Regimen: 55 ml/hr  Additives/Modulars: None  Water Flushes: 30 mL every 4 hrs  Current TF & Flush Orders Provides: Standard without fiber (Osmolite 1.2) at 55 ml/hr providing 1584 kcal, 73 gm protein/day    Anthropometric Measures:  Height: 5' 5\" (165.1 cm)  Ideal Body Weight (IBW): 125 lbs (57 kg)    Admission Body Weight: 102 lb 15.3 oz (46.7 kg)  Current Body Weight: 106 lb 4.2 oz (48.2 kg), 85 % IBW.  Weight Source: Bed Scale  Current BMI (kg/m2): 17.7                          BMI Categories: Underweight (BMI less than 18.5)    Estimated Daily Nutrient Needs:  Energy Requirements Based On: Kcal/kg  Weight Used for Energy Requirements: Current  Energy (kcal/day): 1500 kcals/day  Weight Used for Protein Requirements: Admission  Protein (g/day): 60-75 gm pro/day  Method Used for Fluid Requirements: Other (Comment)  Fluid (ml/day): per MD    Nutrition Diagnosis:   Inadequate oral intake related to swallowing difficulty as evidenced by swallow study results, nutrition support - enteral nutrition    Nutrition Interventions:   Food and/or Nutrient Delivery: Continue Current Tube Feeding  Nutrition Education/Counseling: No recommendation at this time  Coordination of Nutrition Care: Continue to monitor while inpatient  Plan of Care discussed with: RN    Goals:  Previous Goal Met: Goal(s) Achieved  Goals: Meet at least 75% of estimated needs, prior to discharge       Nutrition Monitoring and Evaluation:   Behavioral-Environmental Outcomes: None Identified  Food/Nutrient Intake Outcomes: Enteral Nutrition Intake/Tolerance  Physical Signs/Symptoms Outcomes: Biochemical Data, GI Status, Fluid Status or Edema, Nutrition Focused Physical Findings, Skin, Weight    Discharge Planning:     Too soon to determine     Adiel Alexandra, [address] [place] [address]  Contact: [phone]

## 2022-12-29 ENCOUNTER — APPOINTMENT (OUTPATIENT)
Dept: GENERAL RADIOLOGY | Age: 27
DRG: 812 | End: 2022-12-29
Payer: MEDICARE

## 2022-12-29 LAB
ABSOLUTE EOS #: 0.16 K/UL (ref 0–0.4)
ABSOLUTE IMMATURE GRANULOCYTE: 0 K/UL (ref 0–0.3)
ABSOLUTE LYMPH #: 1.4 K/UL (ref 1–4.8)
ABSOLUTE MONO #: 0.94 K/UL (ref 0.1–0.8)
ALBUMIN SERPL-MCNC: 2.8 G/DL (ref 3.5–5.2)
ALBUMIN/GLOBULIN RATIO: 0.6 (ref 1–2.5)
ALLEN TEST: POSITIVE
ALP BLD-CCNC: 187 U/L (ref 35–104)
ALT SERPL-CCNC: 109 U/L (ref 5–33)
ANION GAP SERPL CALCULATED.3IONS-SCNC: 11 MMOL/L (ref 9–17)
ANION GAP SERPL CALCULATED.3IONS-SCNC: 9 MMOL/L (ref 9–17)
AST SERPL-CCNC: 274 U/L
BASOPHILS # BLD: 0 % (ref 0–2)
BASOPHILS ABSOLUTE: 0 K/UL (ref 0–0.2)
BILIRUB SERPL-MCNC: 0.7 MG/DL (ref 0.3–1.2)
BUN BLDV-MCNC: 4 MG/DL (ref 6–20)
BUN BLDV-MCNC: 4 MG/DL (ref 6–20)
C-REACTIVE PROTEIN: 46.5 MG/L (ref 0–5)
CALCIUM SERPL-MCNC: 8.9 MG/DL (ref 8.6–10.4)
CALCIUM SERPL-MCNC: 9.1 MG/DL (ref 8.6–10.4)
CHLORIDE BLD-SCNC: 101 MMOL/L (ref 98–107)
CHLORIDE BLD-SCNC: 95 MMOL/L (ref 98–107)
CO2: 25 MMOL/L (ref 20–31)
CO2: 27 MMOL/L (ref 20–31)
CREAT SERPL-MCNC: 0.25 MG/DL (ref 0.5–0.9)
CREAT SERPL-MCNC: 0.27 MG/DL (ref 0.5–0.9)
D-DIMER QUANTITATIVE: 0.2 MG/L FEU
EOSINOPHILS RELATIVE PERCENT: 1 % (ref 1–4)
FOLATE: 18.9 NG/ML
GFR SERPL CREATININE-BSD FRML MDRD: >60 ML/MIN/1.73M2
GFR SERPL CREATININE-BSD FRML MDRD: >60 ML/MIN/1.73M2
GLUCOSE BLD-MCNC: 106 MG/DL (ref 70–99)
GLUCOSE BLD-MCNC: 109 MG/DL (ref 65–105)
GLUCOSE BLD-MCNC: 110 MG/DL (ref 70–99)
GLUCOSE BLD-MCNC: 112 MG/DL (ref 70–99)
GLUCOSE BLD-MCNC: 125 MG/DL (ref 74–100)
HCT VFR BLD CALC: 27.4 % (ref 36.3–47.1)
HEMOGLOBIN: 9.3 G/DL (ref 11.9–15.1)
IMMATURE GRANULOCYTES: 0 %
LIPASE: 111 U/L (ref 13–60)
LYMPHOCYTES # BLD: 9 % (ref 24–44)
MAGNESIUM: 1.9 MG/DL (ref 1.6–2.6)
MCH RBC QN AUTO: 41.5 PG (ref 25.2–33.5)
MCHC RBC AUTO-ENTMCNC: 33.9 G/DL (ref 28.4–34.8)
MCV RBC AUTO: 122.3 FL (ref 82.6–102.9)
MONOCYTES # BLD: 6 % (ref 1–7)
MORPHOLOGY: ABNORMAL
MORPHOLOGY: ABNORMAL
NRBC AUTOMATED: 0 PER 100 WBC
O2 DEVICE/FLOW/%: ABNORMAL
PDW BLD-RTO: 15.5 % (ref 11.8–14.4)
PHOSPHORUS: 3.6 MG/DL (ref 2.6–4.5)
PLATELET # BLD: 296 K/UL (ref 138–453)
PMV BLD AUTO: 10.1 FL (ref 8.1–13.5)
POC HCO3: 29.5 MMOL/L (ref 21–28)
POC O2 SATURATION: 99 % (ref 94–98)
POC PCO2: 46 MM HG (ref 35–48)
POC PH: 7.42 (ref 7.35–7.45)
POC PO2: 124.2 MM HG (ref 83–108)
POSITIVE BASE EXCESS, ART: 4 (ref 0–3)
POTASSIUM SERPL-SCNC: 3.9 MMOL/L (ref 3.7–5.3)
POTASSIUM SERPL-SCNC: 4 MMOL/L (ref 3.7–5.3)
PRO-BNP: 145 PG/ML
PROCALCITONIN: 0.39 NG/ML
RBC # BLD: 2.24 M/UL (ref 3.95–5.11)
SAMPLE SITE: ABNORMAL
SEG NEUTROPHILS: 84 % (ref 36–66)
SEGMENTED NEUTROPHILS ABSOLUTE COUNT: 13.1 K/UL (ref 1.8–7.7)
SODIUM BLD-SCNC: 131 MMOL/L (ref 135–144)
SODIUM BLD-SCNC: 137 MMOL/L (ref 135–144)
TOTAL PROTEIN: 7.6 G/DL (ref 6.4–8.3)
VITAMIN B-12: 1454 PG/ML (ref 232–1245)
WBC # BLD: 15.6 K/UL (ref 3.5–11.3)

## 2022-12-29 PROCEDURE — 6370000000 HC RX 637 (ALT 250 FOR IP): Performed by: INTERNAL MEDICINE

## 2022-12-29 PROCEDURE — 83690 ASSAY OF LIPASE: CPT

## 2022-12-29 PROCEDURE — 84145 PROCALCITONIN (PCT): CPT

## 2022-12-29 PROCEDURE — 92526 ORAL FUNCTION THERAPY: CPT

## 2022-12-29 PROCEDURE — 99232 SBSQ HOSP IP/OBS MODERATE 35: CPT | Performed by: INTERNAL MEDICINE

## 2022-12-29 PROCEDURE — 94640 AIRWAY INHALATION TREATMENT: CPT

## 2022-12-29 PROCEDURE — 82607 VITAMIN B-12: CPT

## 2022-12-29 PROCEDURE — 85025 COMPLETE CBC W/AUTO DIFF WBC: CPT

## 2022-12-29 PROCEDURE — 71045 X-RAY EXAM CHEST 1 VIEW: CPT

## 2022-12-29 PROCEDURE — 82746 ASSAY OF FOLIC ACID SERUM: CPT

## 2022-12-29 PROCEDURE — 83735 ASSAY OF MAGNESIUM: CPT

## 2022-12-29 PROCEDURE — 80048 BASIC METABOLIC PNL TOTAL CA: CPT

## 2022-12-29 PROCEDURE — 6370000000 HC RX 637 (ALT 250 FOR IP): Performed by: STUDENT IN AN ORGANIZED HEALTH CARE EDUCATION/TRAINING PROGRAM

## 2022-12-29 PROCEDURE — 82803 BLOOD GASES ANY COMBINATION: CPT

## 2022-12-29 PROCEDURE — 6360000002 HC RX W HCPCS: Performed by: INTERNAL MEDICINE

## 2022-12-29 PROCEDURE — 6360000002 HC RX W HCPCS: Performed by: HEALTH CARE PROVIDER

## 2022-12-29 PROCEDURE — 2060000000 HC ICU INTERMEDIATE R&B

## 2022-12-29 PROCEDURE — 94761 N-INVAS EAR/PLS OXIMETRY MLT: CPT

## 2022-12-29 PROCEDURE — 2580000003 HC RX 258: Performed by: STUDENT IN AN ORGANIZED HEALTH CARE EDUCATION/TRAINING PROGRAM

## 2022-12-29 PROCEDURE — 36600 WITHDRAWAL OF ARTERIAL BLOOD: CPT

## 2022-12-29 PROCEDURE — 82947 ASSAY GLUCOSE BLOOD QUANT: CPT

## 2022-12-29 PROCEDURE — 83880 ASSAY OF NATRIURETIC PEPTIDE: CPT

## 2022-12-29 PROCEDURE — 80053 COMPREHEN METABOLIC PANEL: CPT

## 2022-12-29 PROCEDURE — 85379 FIBRIN DEGRADATION QUANT: CPT

## 2022-12-29 PROCEDURE — 86140 C-REACTIVE PROTEIN: CPT

## 2022-12-29 PROCEDURE — 2700000000 HC OXYGEN THERAPY PER DAY

## 2022-12-29 PROCEDURE — 36415 COLL VENOUS BLD VENIPUNCTURE: CPT

## 2022-12-29 PROCEDURE — 84100 ASSAY OF PHOSPHORUS: CPT

## 2022-12-29 PROCEDURE — 2580000003 HC RX 258: Performed by: INTERNAL MEDICINE

## 2022-12-29 RX ORDER — CHLORDIAZEPOXIDE HYDROCHLORIDE 25 MG/1
25 CAPSULE, GELATIN COATED ORAL DAILY
Status: DISCONTINUED | OUTPATIENT
Start: 2022-12-30 | End: 2022-12-29

## 2022-12-29 RX ORDER — ALBUTEROL SULFATE 2.5 MG/3ML
2.5 SOLUTION RESPIRATORY (INHALATION) EVERY 4 HOURS PRN
Status: ACTIVE | OUTPATIENT
Start: 2022-12-29 | End: 2023-01-03

## 2022-12-29 RX ADMIN — BACLOFEN 5 MG: 5 TABLET ORAL at 13:07

## 2022-12-29 RX ADMIN — IPRATROPIUM BROMIDE 0.5 MG: 0.5 SOLUTION RESPIRATORY (INHALATION) at 19:41

## 2022-12-29 RX ADMIN — SODIUM CHLORIDE 3000 MG: 900 INJECTION INTRAVENOUS at 01:51

## 2022-12-29 RX ADMIN — SODIUM CHLORIDE 3000 MG: 900 INJECTION INTRAVENOUS at 08:54

## 2022-12-29 RX ADMIN — SODIUM CHLORIDE 3000 MG: 900 INJECTION INTRAVENOUS at 20:32

## 2022-12-29 RX ADMIN — VANCOMYCIN HYDROCHLORIDE 750 MG: 10 INJECTION, POWDER, LYOPHILIZED, FOR SOLUTION INTRAVENOUS at 03:25

## 2022-12-29 RX ADMIN — ALBUTEROL SULFATE 2.5 MG: 2.5 SOLUTION RESPIRATORY (INHALATION) at 07:49

## 2022-12-29 RX ADMIN — ARIPIPRAZOLE 10 MG: 10 TABLET ORAL at 08:45

## 2022-12-29 RX ADMIN — FAMOTIDINE 20 MG: 20 TABLET, FILM COATED ORAL at 08:45

## 2022-12-29 RX ADMIN — ENOXAPARIN SODIUM 30 MG: 100 INJECTION SUBCUTANEOUS at 08:46

## 2022-12-29 RX ADMIN — CHLORDIAZEPOXIDE HYDROCHLORIDE 25 MG: 25 CAPSULE ORAL at 08:45

## 2022-12-29 RX ADMIN — BACLOFEN 5 MG: 5 TABLET ORAL at 08:46

## 2022-12-29 RX ADMIN — VENLAFAXINE 37.5 MG: 37.5 TABLET ORAL at 08:46

## 2022-12-29 RX ADMIN — SODIUM CHLORIDE, PRESERVATIVE FREE 10 ML: 5 INJECTION INTRAVENOUS at 20:19

## 2022-12-29 RX ADMIN — FOLIC ACID 1 MG: 1 TABLET ORAL at 08:46

## 2022-12-29 RX ADMIN — FAMOTIDINE 20 MG: 20 TABLET, FILM COATED ORAL at 20:19

## 2022-12-29 RX ADMIN — IPRATROPIUM BROMIDE 0.5 MG: 0.5 SOLUTION RESPIRATORY (INHALATION) at 16:35

## 2022-12-29 RX ADMIN — SODIUM CHLORIDE, PRESERVATIVE FREE 10 ML: 5 INJECTION INTRAVENOUS at 08:47

## 2022-12-29 RX ADMIN — ALCOHOL 1 TABLET: 70.47 GEL TOPICAL at 08:46

## 2022-12-29 RX ADMIN — SODIUM CHLORIDE 3000 MG: 900 INJECTION INTRAVENOUS at 13:11

## 2022-12-29 RX ADMIN — IPRATROPIUM BROMIDE 0.5 MG: 0.5 SOLUTION RESPIRATORY (INHALATION) at 11:21

## 2022-12-29 RX ADMIN — Medication 100 MG: at 08:44

## 2022-12-29 RX ADMIN — IPRATROPIUM BROMIDE 0.5 MG: 0.5 SOLUTION RESPIRATORY (INHALATION) at 07:51

## 2022-12-29 NOTE — PLAN OF CARE
Problem: Discharge Planning  Goal: Discharge to home or other facility with appropriate resources  12/29/2022 1802 by Obi Strickland RN  Outcome: Progressing  12/29/2022 1626 by Obi Strickland RN  Outcome: Progressing     Problem: Skin/Tissue Integrity  Goal: Absence of new skin breakdown  Description: 1. Monitor for areas of redness and/or skin breakdown  2. Assess vascular access sites hourly  3. Every 4-6 hours minimum:  Change oxygen saturation probe site  4. Every 4-6 hours:  If on nasal continuous positive airway pressure, respiratory therapy assess nares and determine need for appliance change or resting period. 12/29/2022 1802 by Obi Strickland RN  Outcome: Progressing  12/29/2022 1626 by Obi Strickland RN  Outcome: Progressing     Problem: ABCDS Injury Assessment  Goal: Absence of physical injury  12/29/2022 1802 by Obi Strickland RN  Outcome: Progressing  12/29/2022 1626 by Obi Strickland RN  Outcome: Progressing     Problem: Safety - Adult  Goal: Free from fall injury  12/29/2022 1802 by Obi Strickland RN  Outcome: Progressing  12/29/2022 1626 by Obi Strickland RN  Outcome: Progressing     Problem: Pain  Goal: Verbalizes/displays adequate comfort level or baseline comfort level  12/29/2022 1802 by Obi Strickland RN  Outcome: Progressing  12/29/2022 1626 by Obi Strickland RN  Outcome: Progressing     Problem: Confusion  Goal: Confusion, delirium, dementia, or psychosis is improved or at baseline  Description: INTERVENTIONS:  1. Assess for possible contributors to thought disturbance, including medications, impaired vision or hearing, underlying metabolic abnormalities, dehydration, psychiatric diagnoses, and notify attending LIP  2. [place] high risk fall precautions, as indicated  3. Provide frequent short contacts to provide reality reorientation, refocusing and direction  4. Decrease environmental stimuli, including noise as appropriate  5.  Monitor and intervene to maintain adequate nutrition, hydration, elimination, sleep and activity  6. If unable to ensure safety without constant attention obtain sitter and review sitter guidelines with assigned personnel  7. Initiate Psychosocial CNS and Spiritual Care consult, as indicated  12/29/2022 1802 by Ofelia Cardozo RN  Outcome: Progressing  12/29/2022 1626 by Ofelia Cardozo RN  Outcome: Progressing     Problem: Respiratory - Adult  Goal: Achieves optimal ventilation and oxygenation  12/29/2022 1802 by Ofelia Cardozo RN  Outcome: Progressing  12/29/2022 1626 by Ofelia Cardozo RN  Outcome: Progressing  12/29/2022 0754 by Tomi Lilly RCP  Outcome: Progressing     Problem: Nutrition Deficit:  Goal: Optimize nutritional status  12/29/2022 1802 by Ofelia Cardozo RN  Outcome: Progressing  12/29/2022 1626 by Ofelia Cardozo RN  Outcome: Progressing     Problem: Risk for Elopement  Goal: Patient will not exit the unit/facility without proper excort  12/29/2022 1802 by Ofelia Cardozo RN  Outcome: Progressing  12/29/2022 1626 by Ofelia Cardozo RN  Outcome: Progressing     Problem: Safety - Medical Restraint  Goal: Remains free of injury from restraints (Restraint for Interference with Medical Device)  Description: INTERVENTIONS:  1. Determine that other, less restrictive measures have been tried or would not be effective before applying the restraint  2. Evaluate the patient's condition at the time of restraint application  3. Inform patient/family regarding the reason for restraint  4.  Q2H: Monitor safety, psychosocial status, comfort, nutrition and hydration  Outcome: Progressing

## 2022-12-29 NOTE — PROGRESS NOTES
Physician Progress Note    Patient - Estefnaia More  Date of Admission -  12/15/2022 12:08 PM  Date of Evaluation -  2022  Room and Bed Number -     Hospital Day - 14    HPI:     32year old who became unresponsive while with her boyfriend, EMS was called. Patient got Narcan with minimal response. Intubated due to GCS of 3 and airway protection. Initially placed on propofol. In ED LFTs were elevated, CT cervical spine unremarkable, CT head negative, CXR unremarkable. Alcohol level 266, UDS negative. Found to be covid positive. : Tolerating CPAP trials. Following command both on and off sedation. Off propofol. : Weaning versed as tolerated. Librium increased. : Young removed. : Off versed. Precedex decreased. Extubated. : NG placed overnight for failed bedside swallow, patient pulled out her NG.  : On 4L NC. TF started.   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; recommend possible OR in 2-3 weeks once pulmonary status is recovered    SUBJECTIVE:     OVERNIGHT EVENTS:        Excessive secretions   Somnolent  OBJECTIVE:     VITAL SIGNS:  /73   Pulse (!) 120   Temp 100 °F (37.8 °C) (Axillary)   Resp 21   Ht 5' 5\" (1.651 m)   Wt 102 lb 11.8 oz (46.6 kg)   SpO2 100%   BMI 17.10 kg/m²   Tmax over 24 hours:  Temp (24hrs), Av.9 °F (37.2 °C), Min:98.4 °F (36.9 °C), Max:100 °F (37.8 °C)      Patient Vitals for the past 8 hrs:   BP Temp Temp src Pulse Resp SpO2   22 1741 -- -- -- (!) 120 21 100 %   22 1715 -- -- -- (!) 114 15 100 %   22 1700 -- -- -- (!) 107 18 (!) 89 %   22 1635 -- -- -- (!) 123 17 95 %   22 1601 -- -- -- (!) 123 20 92 %   22 1509 124/73 100 °F (37.8 °C) Axillary (!) 114 19 100 %   22 1414 -- -- -- (!) 118 20 --   22 1121 -- -- -- (!) 119 22 96 %   22 1100 114/71 98.6 °F (37 °C) Axillary (!) 117 19 94 %         Intake/Output Summary (Last 24 hours) at 2022 [address] [facility] filed at 12/29/2022 0211  Gross per 24 hour   Intake 1224.51 ml   Output --   Net 1224.51 ml     Date 12/29/22 0000 - 12/29/22 2359   Shift 7695-4506 7343-4749 8738-0729 24 Hour Total   INTAKE   I.V.(mL/kg) 4.2(0.1)   4.2(0.1)   NG/GT(mL/kg) 212(4.5)   212(4.5)   IV Piggyback(mL/kg) 968.3(20.8)   968.3(20.8)   Shift Total(mL/kg) 1184. 5(25.4)   1184. 5(25.4)   OUTPUT   Shift Total(mL/kg)       Weight (kg) 46.6 46.6 46.6 46.6     Wt Readings from Last 3 Encounters:   12/29/22 102 lb 11.8 oz (46.6 kg)   03/09/22 106 lb (48.1 kg)     Body mass index is 17.1 kg/m².         PHYSICAL EXAM:  GEN:                  Sleeping   EYES:                pupils equal, round, and reactive to light  HEENT:            Normocephalic, without obvious abnormality  LUNGS:            Decreased breath sounds bilaterally posterior  CV:                     regular rate and rhythm and normal S1 and S2  ABDOMEN:     soft, non-distended, and non-tender  MSK:                  there is no redness, warmth, or swelling of the joints  SKIN:                 Normal skin color, texture, turgor  EXTREMITIES:  No pedal or leg edema, no calf tenderness/swelling, no erythema, distal pulses intact       MEDICATIONS:  Scheduled Meds:   ampicillin-sulbactam  3,000 mg IntraVENous Q6H    ipratropium  0.5 mg Nebulization 4x daily    venlafaxine  37.5 mg Per NG tube Daily    sodium chloride  500 mL IntraVENous Once    sodium chloride flush  5-40 mL IntraVENous 2 times per day    multivitamin  1 tablet Per NG tube Daily    famotidine  20 mg Per NG tube BID    thiamine  100 mg Per NG tube Daily    folic acid  1 mg Per NG tube Daily    enoxaparin  30 mg SubCUTAneous Daily     Continuous Infusions:   [START ON 12/30/2022] fat emulsion      Followed by    Yuliya Maria ON 12/31/2022] fat emulsion      sodium chloride Stopped (12/28/22 1810)     PRN Meds:   albuterol, 2.5 mg, Q4H PRN  guaiFENesin, 200 mg, Q4H PRN  albuterol, 2.5 mg, Q4H PRN  sodium chloride flush, 5-40 mL, PRN  sodium chloride, , PRN  midodrine, 5 mg, TID PRN  sodium phosphate IVPB, 0.16 mmol/kg, PRN   Or  sodium phosphate IVPB, 0.32 mmol/kg, PRN  sodium chloride flush, 5-40 mL, PRN  ondansetron, 4 mg, Q8H PRN   Or  ondansetron, 4 mg, Q6H PRN  polyethylene glycol, 17 g, Daily PRN  acetaminophen, 650 mg, Q6H PRN   Or  acetaminophen, 650 mg, Q6H PRN      SUPPORT DEVICES: [] Ventilator [] BIPAP  [x] Nasal Cannula [] Room Air    DATA:  Complete Blood Count:   Recent Labs     12/27/22  0500 12/28/22 0309 12/29/22 0620   WBC 13.5* 17.0* 15.6*   RBC 2.47* 2.51* 2.24*   HGB 9.4* 9.9* 9.3*   HCT 28.6* 29.2* 27.4*   .8* 116.3* 122.3*   MCH 38.1* 39.4* 41.5*   MCHC 32.9 33.9 33.9   RDW 15.1* 15.0* 15.5*    274 296   MPV 10.9 10.7 10.1        Last 3 Blood Glucose:   Recent Labs     12/27/22  0500 12/28/22  0309 12/29/22 0620 12/29/22  1640   GLUCOSE 93 108* 106* 110*        PT/INR:    Lab Results   Component Value Date/Time    PROTIME 12.4 12/15/2022 12:28 PM    INR 1.2 12/15/2022 12:28 PM     PTT:  No results found for: APTT, PTT    Comprehensive Metabolic Profile:   Recent Labs     12/27/22  0500 12/28/22  0309 12/29/22 0620 12/29/22  1640   * 130* 131*  --    K 3.7 3.7 3.9  --    CL 99 94* 95*  --    CO2 26 22 25  --    BUN 4* 4* 4*  --    CREATININE 0.26* 0.27* 0.25*  --    GLUCOSE 93 108* 106* 110*   CALCIUM 9.1 9.2 9.1  --       Magnesium:   Lab Results   Component Value Date/Time    MG 1.9 12/29/2022 06:20 AM    MG 1.6 12/28/2022 03:09 AM    MG 1.9 12/24/2022 06:52 AM     Phosphorus:   Lab Results   Component Value Date/Time    PHOS 3.6 12/29/2022 06:20 AM    PHOS 4.5 12/28/2022 03:09 AM    PHOS 3.5 12/24/2022 06:52 AM     Ionized Calcium:   Lab Results   Component Value Date/Time    CAION 1.17 12/18/2022 05:54 AM    CAION 0.83 12/17/2022 04:24 AM        Urinalysis:   Lab Results   Component Value Date/Time    NITRU NEGATIVE 12/28/2022 11:10 AM    COLORU Yellow 12/28/2022 11:10 AM    PHUR 7.5 12/28/2022 11:10 AM    WBCUA 2 TO 5 12/28/2022 11:10 AM    RBCUA 2 TO 5 12/28/2022 11:10 AM    MUCUS 3+ 03/09/2022 12:21 PM    TRICHOMONAS NOT REPORTED 09/20/2014 06:19 AM    YEAST NOT REPORTED 09/20/2014 06:19 AM    BACTERIA MANY 12/28/2022 11:10 AM    CLARITYU slightly cloudy 07/22/2014 11:58 AM    SPECGRAV 1.012 12/28/2022 11:10 AM    LEUKOCYTESUR NEGATIVE 12/28/2022 11:10 AM    UROBILINOGEN Normal 12/28/2022 11:10 AM    BILIRUBINUR NEGATIVE 12/28/2022 11:10 AM    BILIRUBINUR negative 07/22/2014 11:58 AM    BLOODU negative 07/22/2014 11:58 AM    GLUCOSEU NEGATIVE 12/28/2022 11:10 AM    KETUA NEGATIVE 12/28/2022 11:10 AM    AMORPHOUS NOT REPORTED 09/20/2014 06:19 AM       HgBA1c:  No results found for: LABA1C  TSH:    Lab Results   Component Value Date/Time    TSH 1.96 12/28/2022 03:09 AM     Lactic Acid: No results found for: LACTA   Troponin: No results for input(s): TROPONINI in the last 72 hours. ASSESSMENT:     Principal Problem:    Drug overdose of undetermined intent, initial encounter  Active Problems:    COVID-19    Acute respiratory insufficiency    Alcoholic intoxication with complication (HCC)    Altered mental status    Acute respiratory failure with hypoxia (HCC) resolved   Resolved Problems:    * No resolved hospital problems. *   Aspiration pneumonia    PLAN:     DC Librium  Continue Unasyn  aspiration precautions.   Secretion management    Electronically signed by Red Jeffers MD on 12/29/2022 at 6:35 PM

## 2022-12-29 NOTE — PROGRESS NOTES
Speech Language Pathology  Speech Language Pathology  [facility]    Dysphagia Treatment Note    Date: 12/29/2022  Patients Name: Mary Cook  MRN: [id number]  Diagnosis: dysphagia  Patient Active Problem List   Diagnosis Code    Bipolar I disorder, most recent episode depressed ([facility] [address] F31.30    Hyperbilirubinemia E80.6    Fatty liver K76.0    Ascites due to alcoholic hepatitis V55.92    Alcohol abuse F10.10    S/P cholecystectomy Z90.49    Abnormal LFTs R79.89    Hypokalemia E87.6    Hypoalbuminemia E88.09    Portal hypertension ([facility] [address] K76.6    Drug overdose of undetermined intent, initial encounter T50.904A    COVID-19 U07.1    Acute respiratory insufficiency J12.03    Alcoholic intoxication with complication ([facility] [address] V26.487    Altered mental status R41.82    Ac[facility] failure [facility] J96.01       Pain: 0/10    Dysphagia Treatment  Treatment time:912-925    Subjective: [] Alert [x] Cooperative     [] Confused     [] Agitated    [x] Lethargic    Objective/Assessment:  Pt. Completed modified barium swallow study on 12/28. NPO was recommended. Pt. Seen for O/M treatment program for dysphagia. Pt. Completed O/M exercises X 5 X 1 set with max verbal and visual  cues. Pt. Did not Complete edy maneuver at this time. Pt. Demonstrated difficulty completing exercises. Presents with decreased lingual ROM, decreased vocal intensity, wet/gurgly vocal quality. Coughing noted, required suctioning. Education provided and exercises left at bedside. ST will continue to follow. Plan:  [x] Continue ST services    [] Discharge from ST:        Discharge recommendations: []  Further therapy recommended at discharge. The patient should be able to tolerate at least 3 hours of therapy per day over 5 days or 15 hours over 7 days. [x] Further therapy recommended at discharge. [] No therapy recommended at discharge. Treatment completed by: Yany Holguin, SLP, M.A. CCC-SLP

## 2022-12-29 NOTE — PROGRESS NOTES
Writer attempted to call pt mom, Euna Siemens x2 to inform her of the newly applied soft wrist restraints. No reply.

## 2022-12-29 NOTE — PROGRESS NOTES
[facility]  Office: [address], DO, Jesus Tirado, DO, Marisabel Payton, DO, [place] Doni Blood, DO, Velton Habermann, MD, Marnie Andre MD, Rena Thompson MD, Francoise Forbes MD,  Imani Navarrete MD, Jazz Hernandez MD, Maria Zee, DO, Stephan Jay MD,  Thea Crowley MD, Teetee Pedraza MD, Gianna Cooper DO, Quintin Lion MD, Jackie Benson MD, Eulalia Mattson DO, Kam Wilson MD, Pedro Ramos MD, Rodri Ac MD, Thomas Angeles MD, Fausto Lau, DO, Ginny Puente MD, Jh Carbajal MD, Samm Echavarria, CNP,  Tereza Ramirez, CNP, Suzy Melgar, CNP, Yuliya Watson, CNP,  Dylan Cheney, [facility], Orquidea Vieira, CNP, Enmanuel Muir, CNP, Romeo Licea, CNP, Riri Light, CNP, Amie Jaramillo, CNP, Nito Moseley, PA-C, Jo-Ann Dixon, CNS, Toni Drew, CNP, Amber Hay, CNP         [place]    Progress Note    12/28/2022    7:26 PM    Name:   Maicol Maya  MRN:     [id number]     [place]:      [de-identified]   Room:   2015/2015-01  IP Day:  13  Admit Date:  12/15/2022 12:08 PM    PCP:   Godfrey Saul  Code Status:  Full Code    Subjective:     C/C:   Chief Complaint   Patient presents with    Drug Overdose     Interval History Status: not changed. Brief History: This is a 20-year-old lady who was admitted with drug overdose and was transferred from ICU at 4 AM post extubation. History of COVID-19 on 12/12/2022. Noted to have persistent tachycardia. We will do D-dimer and BNP. If elevated further work-up to be pursued. Multilobar pneumonia currently on Unasyn. Excessive amount of secretions. NG tube rechecked in stomach. Failed to swallow test and barium swallow. On 4 L nasal cannula oxygen. Patient was admitted to [place] [address] unit and had to be transferred to stepdown due to unstable vital signs and persistent secretions and tachycardia.       Review of Systems:     Constitutional: negative for chills, fevers, sweats  Respiratory:   Positive for cough, dyspnea on exertion, shortness of breath, wheezing  Cardiovascular:  negative for chest pain, chest pressure/discomfort, lower extremity edema, positive for palpitations  Gastrointestinal:  negative for abdominal pain, constipation, diarrhea, nausea, vomiting  Neurological:  negative for dizziness, headache    Medications: Allergies:  No Known Allergies    Current Meds:   Scheduled Meds:    baclofen  5 mg Oral TID    vancomycin  15 mg/kg IntraVENous Q8H    ampicillin-sulbactam  3,000 mg IntraVENous Q6H    ipratropium  0.5 mg Nebulization 4x daily    chlordiazePOXIDE  25 mg Oral BID    ARIPiprazole  10 mg Per NG tube Daily    venlafaxine  37.5 mg Per NG tube Daily    sodium chloride  500 mL IntraVENous Once    sodium chloride flush  5-40 mL IntraVENous 2 times per day    multivitamin  1 tablet Per NG tube Daily    famotidine  20 mg Per NG tube BID    thiamine  100 mg Per NG tube Daily    folic acid  1 mg Per NG tube Daily    enoxaparin  30 mg SubCUTAneous Daily     Continuous Infusions:    sodium chloride 20 mL/hr (12/28/22 0957)     PRN Meds: guaiFENesin, albuterol, sodium chloride flush, sodium chloride, LORazepam **OR** LORazepam **OR** LORazepam **OR** LORazepam **OR** LORazepam **OR** LORazepam **OR** LORazepam **OR** LORazepam, midodrine, sodium phosphate IVPB **OR** sodium phosphate IVPB, sodium chloride flush, ondansetron **OR** ondansetron, polyethylene glycol, acetaminophen **OR** acetaminophen    Data:     Past Medical History:   has a past medical history of ADHD (attention deficit hyperactivity disorder), Anxiety, Bipolar 1 disorder ([facility] [address] and Depression. Social History:   reports that she has been smoking cigarettes. She has been smoking an average of .5 packs per day. She has never used smokeless tobacco. She reports current alcohol use. She reports current drug use. Drug: Marijuana [place]).      Family History:   Family History   Problem Relation Age of Onset    Hypertension Mother     Depression Mother     Bipolar Disorder Father     Alcohol Abuse Father     Cancer Other         Uncle ? Vitals:  /72   Pulse (!) 110   Temp 99.2 °F (37.3 °C) (Oral)   Resp 23   Ht 5' 5\" (1.651 m)   Wt 106 lb 4.2 oz (48.2 kg)   SpO2 97%   BMI 17.68 kg/m²   Temp (24hrs), Av.1 °F (37.3 °C), Min:98.5 °F (36.9 °C), Max:99.8 °F (37.7 °C)    Recent Labs     22  0811 22  1652   POCGLU 107* 99       I/O (24Hr):     Intake/Output Summary (Last 24 hours) at 2022  Last data filed at 2022 1640  Gross per 24 hour   Intake 817 ml   Output --   Net 817 ml       Labs:  Hematology:  Recent Labs     22  0843 22  0500 22  0309   WBC 11.9* 13.5* 17.0*   RBC 2.92* 2.47* 2.51*   HGB 11.0* 9.4* 9.9*   HCT 34.0* 28.6* 29.2*   .4* 115.8* 116.3*   MCH 37.7* 38.1* 39.4*   MCHC 32.4 32.9 33.9   RDW 15.3* 15.1* 15.0*    223 274   MPV 11.2 10.9 10.7   CRP  --   --  40.1*     Chemistry:  Recent Labs     22  0843 22  0500 22  0309 22  1152   * 134* 130*  --    K 3.7 3.7 3.7  --     99 94*  --    CO2   --    GLUCOSE 111* 93 108*  --    BUN <2* 4* 4*  --    CREATININE 0.35* 0.26* 0.27*  --    MG  --   --  1.6  --    ANIONGAP 11 9 14  --    LABGLOM >60 >60 >60  --    CALCIUM 8.9 9.1 9.2  --    PHOS  --   --  4.5  --    TROPHS  --   --  <6  --    LACTACIDWB  --   --   --  0.8     Recent Labs     22  0309 22  0811 22  1652   TSH 1.96  --   --    POCGLU  --  107* 99     ABG:  Lab Results   Component Value Date/Time    POCPH 7.429 2022 04:52 PM    POCPCO2 41.7 2022 04:52 PM    POCPO2 91.5 2022 04:52 PM    POCHCO3 27.6 2022 04:52 PM    NBEA 1 2022 04:29 AM    PBEA 3 2022 04:52 PM    LCRU7VPL 97 2022 04:52 PM    FIO2 28.0 2022 04:52 PM     Lab Results   Component Value Date/Time    SPECIAL L HAND 5ML 12/28/2022 10:01 AM     Lab Results   Component Value Date/Time    CULTURE NO GROWTH <24 HRS 12/28/2022 10:01 AM       Radiology:  XR CHEST PORTABLE    Result Date: 12/28/2022  Patchy ground-glass change in the lungs felt to represent multilobar pneumonia given clinical history. XR ABDOMEN FOR NG/OG/NE TUBE PLACEMENT    Result Date: 12/28/2022  Unremarkable limited KUB. NG tube tip projects at the left upper quadrant, overlying the expected location of the stomach. XR ABDOMEN FOR NG/OG/NE TUBE PLACEMENT    Result Date: 12/25/2022  1. Borderline dilated 3 cm small bowel loop in the left lower quadrant which can be seen with ileus or small-bowel obstruction. 2. NG tube distal tip likely in the body of the stomach. 3. Mild left basilar airspace disease. XR ABDOMEN FOR NG/OG/NE TUBE PLACEMENT    Result Date: 12/24/2022  Nasogastric tube tip is in the stomach     FL MODIFIED BARIUM SWALLOW W VIDEO    Result Date: 12/28/2022  Delayed oral phase of swallowing. Gross aspiration of puree with a delayed weak cough response. No other consistencies given. Please see separate speech pathology report for full discussion of findings and recommendations.        Physical Examination:        General appearance:  alert, cooperative and no distress  Mental Status:  oriented to person, place and time and normal affect  Lungs:  clear to auscultation bilaterally, normal effort  Heart:  regular rate and rhythm, no murmur  Abdomen:   nondistended, decreased bowel sounds, slightly distressed on palpation PEG tube in situ no masses, hepatomegaly, splenomegaly  Extremities:  no edema, redness, tenderness in the calves  Skin:  no gross lesions, rashes, induration    Assessment:        Hospital Problems             Last Modified POA    * (Principal) Drug overdose of undetermined intent, initial encounter 12/15/2022 Yes    COVID-19 12/18/2022 Yes    Acute respiratory insufficiency 97/75/9065 Yes    Alcoholic intoxication with complication ([facility] [address] 23/06/3990 Yes    Altered mental status 12/18/2022 Yes    Acute respiratory failure with hypoxia ([facility] [address] 12/26/2022 Yes   This is a 29-year-old lady who came in with drug overdose and was intubated in ICU and transferred today at 4 AM to the [facility] floor with persistent copious secretions and found to have multilobar pneumonia requiring 4 L nasal cannula oxygen to maintain saturation above 90% with 2 episodes of failed swallow and barium swallow with PEG tube in situ. History of chronic alcohol abuse and withdrawal.  Noted to have some rhonchi and persistent tachycardia. Will order D-dimer level and if elevated will perform CTA of chest to rule out pulmonary embolism. BNP level pending. CT of abdomen showed ileus versus small bowel obstruction. Principal diagnosis  Multilobar pneumonia secondary to aspiration from drug overdose and intubation.-Continue IV Unasyn. Elevated WBC CK17 0.0. COVID-19 positive on 12/12/2022. Active diagnosis  #Persistent hypotension blood pressure 95/57 mmHg. Heart rate 121/min. Hypoxic on supplemental oxygen. ABG reviewed. D-dimer pending. If elevated will order CTA of chest to rule out pulmonary embolism. With elevated Wells score. #History of chronic alcohol abuse with suspected withdrawal.  Unable to have benzodiazepine due to hypotension. Consider low-dose baclofen to reduce NMDA receptor overactivity. Serum lipase level. #Airway damage-full to swallow test and barium swallow. PEG tube in place. .  CT of abdomen showed ileus versus small bowel obstruction. Repeat CT scanning of abdomen due to persistent tachycardia and leukocytosis.     #Tachycardia-continue telemetry and reviewed differential diagnosis of causes    Plan:        Review above    Bud Meng MD  12/28/2022  7:26 PM

## 2022-12-29 NOTE — PLAN OF CARE
Problem: Discharge Planning  Goal: Discharge to home or other facility with appropriate resources  Outcome: Progressing     Problem: Skin/Tissue Integrity  Goal: Absence of new skin breakdown  Description: 1. Monitor for areas of redness and/or skin breakdown  2. Assess vascular access sites hourly  3. Every 4-6 hours minimum:  Change oxygen saturation probe site  4. Every 4-6 hours:  If on nasal continuous positive airway pressure, respiratory therapy assess nares and determine need for appliance change or resting period. Outcome: Progressing     Problem: ABCDS Injury Assessment  Goal: Absence of physical injury  Outcome: Progressing     Problem: Safety - Adult  Goal: Free from fall injury  Outcome: Progressing     Problem: Pain  Goal: Verbalizes/displays adequate comfort level or baseline comfort level  Outcome: Progressing     Problem: Confusion  Goal: Confusion, delirium, dementia, or psychosis is improved or at baseline  Description: INTERVENTIONS:  1. Assess for possible contributors to thought disturbance, including medications, impaired vision or hearing, underlying metabolic abnormalities, dehydration, psychiatric diagnoses, and notify attending LIP  2. [place] high risk fall precautions, as indicated  3. Provide frequent short contacts to provide reality reorientation, refocusing and direction  4. Decrease environmental stimuli, including noise as appropriate  5. Monitor and intervene to maintain adequate nutrition, hydration, elimination, sleep and activity  6. If unable to ensure safety without constant attention obtain sitter and review sitter guidelines with assigned personnel  7.  Initiate Psychosocial CNS and Spiritual Care consult, as indicated  Outcome: Progressing     Problem: Respiratory - Adult  Goal: Achieves optimal ventilation and oxygenation  12/29/2022 1626 by Devika Canas RN  Outcome: Progressing  12/29/2022 0754 by Naheed Merida RCP  Outcome: Progressing     Problem: Nutrition Deficit:  Goal: Optimize nutritional status  Outcome: Progressing     Problem: Risk for Elopement  Goal: Patient will not exit the unit/facility without proper excort  Outcome: Progressing

## 2022-12-29 NOTE — PLAN OF CARE
Problem: Respiratory - Adult  Goal: Achieves optimal ventilation and oxygenation  12/29/2022 0754 by Patricia Cortes RCP  Outcome: Progressing   BRONCHOSPASM/BRONCHOCONSTRICTION     [x]         IMPROVE AERATION/BREATH SOUNDS  [x]   ADMINISTER BRONCHODILATOR THERAPY AS APPROPRIATE  [x]   ASSESS BREATH SOUNDS  []   IMPLEMENT AEROSOL/MDI PROTOCOL  [x]   PATIENT EDUCATION AS NEEDED   PROVIDE ADEQUATE OXYGENATION WITH ACCEPTABLE SP02/ABG'S    [x]  IDENTIFY APPROPRIATE OXYGEN THERAPY  [x]   MONITOR SP02/ABG'S AS NEEDED   [x]   PATIENT EDUCATION AS NEEDED

## 2022-12-29 NOTE — PROGRESS NOTES
[facility]  Office: [address], DO, Jackson Lang, DO, Radha Lopez, DO, Lonnie Valdes Blood, DO, Margarette Gonsales MD, Aniceto Garcia MD, Jose Jasso MD, Nhi Rdz MD,  Toña Campo MD, Preet Ayon MD, Wilbert Billy, DO, Rashmi Jordan MD,  Ildefonso Burgess MD, Rufino Vásquez MD, Courtney Osullivan DO, David Wei MD, Chandler Lombard, MD, Josiah Michael DO, Caroline Rosen MD, Danelle Deutsch MD, Ann-Marie Segura MD, Phoenix Christie MD, Suleiman Fowler DO, Jessie Quan MD, Chuy Villa MD, Suzy Stern, CNP,  Marcio Majano, CNP, Husam Elizabeth, CNP, Rosalinda Uribe, CNP,  Janet Pruitt, [facility], Maya Floyd, CNP, Lizeth Espinoza CNP, Inocencio Muñoz, CNP, Luan Lazar, CNP, Eliecer Piña, CNP, Ramon Velasquez PANazarioC, [place]am [place], CNS, Sharita Martinez, CNP, Lynne Hitchcock, CNP         [place] [address]    Progress Note    12/29/2022    5:51 PM    Name:   Emi Miranda  MRN:     [id number]     [place]:      [de-identified]   Room:   2015/2015-01   Day:  14  Admit Date:  12/15/2022 12:08 PM    PCP:   Manas Galicia  Code Status:  Full Code    Subjective:     C/C:   Chief Complaint   Patient presents with    Drug Overdose     Interval History Status: significantly worsened. Brief History:   29-year-old lady with admission for drug overdose and chronic alcohol abuse with with suspected tear in her esophagus during her transient intubation admitted on 12/15/2022 and was COVID-positive. She failed 2 swallow studies and barium swallow. She has nasogastric in situ. She has bilateral pneumonia on Unasyn. She has been on Librium Abilify and baclofen for her chronic alcohol withdrawal.    Patient had severe lethargy and drowsiness with impending respiratory arrest and extreme hypoxemia with saturation in the low 80s. She was placed on high flow oxygen at 100% to maintain saturation above 90.   Patient was not able to expectorate sputum due to her severely depressed mentation. She was not a candidate for noninvasive BiPAP. Rapid response was called due to recurrent desaturation. ABG performed which showed PCO2 of 46. With PaO2 124.3 on 4 L nasal cannula. Prior to decompensation to her saturation of low 80s requiring 100% high flow oxygen to maintain her saturation above 90%. Patient has been on Librium, Abilify, baclofen which were all discontinued with rest for further respiratory depression. She was pulling on her NG tube and very and soft restraints were placed. Still pending chest x-ray result. .3      Review of Systems:   Patient is awake however nonverbal    Medications: Allergies:  No Known Allergies    Current Meds:   Scheduled Meds:    ampicillin-sulbactam  3,000 mg IntraVENous Q6H    ipratropium  0.5 mg Nebulization 4x daily    venlafaxine  37.5 mg Per NG tube Daily    sodium chloride  500 mL IntraVENous Once    sodium chloride flush  5-40 mL IntraVENous 2 times per day    multivitamin  1 tablet Per NG tube Daily    famotidine  20 mg Per NG tube BID    thiamine  100 mg Per NG tube Daily    folic acid  1 mg Per NG tube Daily    enoxaparin  30 mg SubCUTAneous Daily     Continuous Infusions:    sodium chloride Stopped (12/28/22 1810)     PRN Meds: albuterol, guaiFENesin, albuterol, sodium chloride flush, sodium chloride, midodrine, sodium phosphate IVPB **OR** sodium phosphate IVPB, sodium chloride flush, ondansetron **OR** ondansetron, polyethylene glycol, acetaminophen **OR** acetaminophen    Data:     Past Medical History:   has a past medical history of ADHD (attention deficit hyperactivity disorder), Anxiety, Bipolar 1 disorder ([address] and Depression. Social History:   reports that she has been smoking cigarettes. She has been smoking an average of .5 packs per day. She has never used smokeless tobacco. She reports current alcohol use. She reports current drug use. Drug: Marijuana Ishaan Flowers).     Family History:   Family History   Problem Relation Age of Onset    Hypertension Mother     Depression Mother     Bipolar Disorder Father     Alcohol Abuse Father     Cancer Other         Uncle ?       Vitals:  /73   Pulse (!) 120   Temp 100 °F (37.8 °C) (Axillary)   Resp 21   Ht 5' 5\" (1.651 m)   Wt 102 lb 11.8 oz (46.6 kg)   SpO2 100%   BMI 17.10 kg/m²   Temp (24hrs), Av.9 °F (37.2 °C), Min:98.4 °F (36.9 °C), Max:100 °F (37.8 °C)    Recent Labs     22  0811 22  1652 22  1542 22  1601   POCGLU 107* 99 109* 125*         I/O (24Hr):    Intake/Output Summary (Last 24 hours) at 2022 1751  Last data filed at 2022 0211  Gross per 24 hour   Intake 1224.51 ml   Output --   Net 1224.51 ml         Labs:  Hematology:  Recent Labs     22  0500 22  0309 22  0620   WBC 13.5* 17.0* 15.6*   RBC 2.47* 2.51* 2.24*   HGB 9.4* 9.9* 9.3*   HCT 28.6* 29.2* 27.4*   .8* 116.3* 122.3*   MCH 38.1* 39.4* 41.5*   MCHC 32.9 33.9 33.9   RDW 15.1* 15.0* 15.5*    274 296   MPV 10.9 10.7 10.1   CRP  --  40.1* 46.5*   DDIMER  --   --  0.20       Chemistry:  Recent Labs     22  0500 22  0309 22  1152 22  0620 22  1640   * 130*  --  131*  --    K 3.7 3.7  --  3.9  --    CL 99 94*  --  95*  --    CO2 26 22  --  25  --    GLUCOSE 93 108*  --  106* 110*   BUN 4* 4*  --  4*  --    CREATININE 0.26* 0.27*  --  0.25*  --    MG  --  1.6  --  1.9  --    ANIONGAP 9 14  --  11  --    LABGLOM >60 >60  --  >60  --    CALCIUM 9.1 9.2  --  9.1  --    PHOS  --  4.5  --  3.6  --    PROBNP  --   --   --  145  --    TROPHS  --  <6  --   --   --    LACTACIDWB  --   --  0.8  --   --        Recent Labs     22  0309 22  0811 22  1652 22  0620 22  1542 22  1601   TSH 1.96  --   --   --   --   --    LIPASE  --   --   --  111*  --   --    POCGLU  --  107* 99  --  109* 125*       ABG:  Lab Results   Component Value  Date/Time    POCPH 7.416 12/29/2022 04:01 PM    POCPCO2 46.0 12/29/2022 04:01 PM    POCPO2 124.2 12/29/2022 04:01 PM    POCHCO3 29.5 12/29/2022 04:01 PM    NBEA 1 12/20/2022 04:29 AM    PBEA 4 12/29/2022 04:01 PM    XQNY1TKN 99 12/29/2022 04:01 PM    FIO2 28.0 12/28/2022 04:52 PM     Lab Results   Component Value Date/Time    SPECIAL L HAND 5ML 12/28/2022 10:01 AM     Lab Results   Component Value Date/Time    CULTURE NO GROWTH 1 DAY 12/28/2022 10:01 AM       Radiology:  CT ABDOMEN PELVIS WO CONTRAST Additional Contrast? None    Result Date: 12/28/2022  1. Consolidation in both lung bases with bilateral pleural effusions, consistent with pneumonia. 2. No acute abdominal abnormality. 3. Hepatic steatosis. XR CHEST PORTABLE    Result Date: 12/28/2022  Patchy ground-glass change in the lungs felt to represent multilobar pneumonia given clinical history. XR ABDOMEN FOR NG/OG/NE TUBE PLACEMENT    Result Date: 12/28/2022  Unremarkable limited KUB. NG tube tip projects at the left upper quadrant, overlying the expected location of the stomach. XR ABDOMEN FOR NG/OG/NE TUBE PLACEMENT    Result Date: 12/25/2022  1. Borderline dilated 3 cm small bowel loop in the left lower quadrant which can be seen with ileus or small-bowel obstruction. 2. NG tube distal tip likely in the body of the stomach. 3. Mild left basilar airspace disease. XR ABDOMEN FOR NG/OG/NE TUBE PLACEMENT    Result Date: 12/24/2022  Nasogastric tube tip is in the stomach     FL MODIFIED BARIUM SWALLOW W VIDEO    Result Date: 12/28/2022  Delayed oral phase of swallowing. Gross aspiration of puree with a delayed weak cough response. No other consistencies given. Please see separate speech pathology report for full discussion of findings and recommendations.        Physical Examination:        General appearance: Initially very lethargic and drowsy but woke up with arrival of rapid response team with persistent respiratory depression with eventual expectoration of sputum current suctioning. NG tube in place. Initially was on high flow and switched to nasal cannula with weaning to maintain saturation 92 to 94%. Mental Status: Lethargic with eventual improved mentation with expectoration  Lungs: Bilateral air entry with coarse rhonchi and expiratory wheezing heard in all lung fields  Heart:  regular rate and rhythm, no murmur  Abdomen:  soft, nontender, nondistended, normal bowel sounds, no masses, hepatomegaly, splenomegaly  Extremities:  no edema, redness, tenderness in the calves. Soft restraints placed  Skin:  no gross lesions, rashes, induration    Assessment:        Hospital Problems             Last Modified POA    * (Principal) Drug overdose of undetermined intent, initial encounter 12/15/2022 Yes    COVID-19 12/18/2022 Yes    Acute respiratory insufficiency 02/21/9386 Yes    Alcoholic intoxication with complication ([facility] [address] 89/01/9245 Yes    Altered mental status 12/18/2022 Yes    Acute respiratory failure with hypoxia ([address] 12/26/2022 Yes   This is a 40-year-old lady who came in with drug overdose and was intubated in ICU and transferred today at 4 AM to the [facility] floor with persistent copious secretions and found to have multilobar pneumonia requiring 4 L nasal cannula oxygen to maintain saturation above 90% with 2 episodes of failed swallow and barium swallow with PEG tube in situ. History of chronic alcohol abuse and withdrawal.  Today on 12/29/2022 patient had near respiratory arrest with marked lethargy and depressed mentation with excessive secretions for which she was unable to initially expectorate requiring 100% supplemental oxygen maintain saturation above 90%. RRT called when patient became more awake and started expectorating secretions which were suctioned every 5 minutes with improvement of oxygenation and being weaned off to maintain saturation above 92%.   Principal diagnosis  #Multilobar pneumonia secondary to aspiration drug overdose and intubation on IV Unasyn with COVID-positive on 12/12/2022. Complicated by near respiratory arrest and hypoxemia due to severe mental depression, lethargy and inability to expectorate her thick sputum. She eventually woke up and started expectorating with improvement in her saturation. ICU transfer canceled. Continue respiratory inhalation therapy. Discontinuation of Librium, Abilify baclofen and benzos. Active diagnoses  #Chronic substance abuse-alcohol outpatient management. Patient is beyondthe window alcohol withdrawal.  Due to high risk of respiratory depression with her underlying bilateral pneumonia her medications were all immediately discontinued. #.5. Assess for B12 folate deficiency complicated by macrocytosis of alcoholism. #Failed swallow study. With suspected esophageal tear for evaluation by GI after healing in a few weeks. .  Continue NG feeds as appropriate TPN consult    Condition guarded to critical.  Full code resuscitation status.       Micah Gooden MD  12/29/2022  5:51 PM

## 2022-12-29 NOTE — PROGRESS NOTES
Physical Therapy        Physical Therapy Cancel Note      DATE: 2022    NAME: Aristides Gibbs  MRN: [id number]   : 1995      Patient not seen this date for Physical Therapy due to:    Patient is not appropriate for PT treatment at this time per RN. Plan to check back tomorrow 2022.       Electronically signed by Luis E Martin PTA on 2022 at 3:30 PM

## 2022-12-30 LAB
ABSOLUTE EOS #: 0.14 K/UL (ref 0–0.4)
ABSOLUTE IMMATURE GRANULOCYTE: 0 K/UL (ref 0–0.3)
ABSOLUTE LYMPH #: 1.77 K/UL (ref 1–4.8)
ABSOLUTE MONO #: 2.04 K/UL (ref 0.1–0.8)
ALBUMIN SERPL-MCNC: 2.9 G/DL (ref 3.5–5.2)
ALBUMIN/GLOBULIN RATIO: 0.6 (ref 1–2.5)
ALP BLD-CCNC: 173 U/L (ref 35–104)
ALT SERPL-CCNC: 103 U/L (ref 5–33)
ANION GAP SERPL CALCULATED.3IONS-SCNC: 10 MMOL/L (ref 9–17)
AST SERPL-CCNC: 261 U/L
BASOPHILS # BLD: 0 % (ref 0–2)
BASOPHILS ABSOLUTE: 0 K/UL (ref 0–0.2)
BILIRUB SERPL-MCNC: 0.7 MG/DL (ref 0.3–1.2)
BUN BLDV-MCNC: 4 MG/DL (ref 6–20)
C-REACTIVE PROTEIN: 37.1 MG/L (ref 0–5)
CALCIUM SERPL-MCNC: 9.1 MG/DL (ref 8.6–10.4)
CHLORIDE BLD-SCNC: 99 MMOL/L (ref 98–107)
CO2: 28 MMOL/L (ref 20–31)
CREAT SERPL-MCNC: 0.25 MG/DL (ref 0.5–0.9)
EOSINOPHILS RELATIVE PERCENT: 1 % (ref 1–4)
GFR SERPL CREATININE-BSD FRML MDRD: >60 ML/MIN/1.73M2
GLUCOSE BLD-MCNC: 106 MG/DL (ref 70–99)
HCT VFR BLD CALC: 26.5 % (ref 36.3–47.1)
HEMOGLOBIN: 9.5 G/DL (ref 11.9–15.1)
IMMATURE GRANULOCYTES: 0 %
LYMPHOCYTES # BLD: 13 % (ref 24–44)
MAGNESIUM: 1.8 MG/DL (ref 1.6–2.6)
MCH RBC QN AUTO: 43.2 PG (ref 25.2–33.5)
MCHC RBC AUTO-ENTMCNC: 35.8 G/DL (ref 28.4–34.8)
MCV RBC AUTO: 120.5 FL (ref 82.6–102.9)
MONOCYTES # BLD: 15 % (ref 1–7)
MORPHOLOGY: ABNORMAL
MORPHOLOGY: ABNORMAL
NRBC AUTOMATED: 0 PER 100 WBC
PDW BLD-RTO: 15.8 % (ref 11.8–14.4)
PHOSPHORUS: 3.6 MG/DL (ref 2.6–4.5)
PLATELET # BLD: 287 K/UL (ref 138–453)
PMV BLD AUTO: 10.1 FL (ref 8.1–13.5)
POTASSIUM SERPL-SCNC: 3.7 MMOL/L (ref 3.7–5.3)
RBC # BLD: 2.2 M/UL (ref 3.95–5.11)
SEG NEUTROPHILS: 71 % (ref 36–66)
SEGMENTED NEUTROPHILS ABSOLUTE COUNT: 9.65 K/UL (ref 1.8–7.7)
SODIUM BLD-SCNC: 137 MMOL/L (ref 135–144)
TOTAL PROTEIN: 7.7 G/DL (ref 6.4–8.3)
TRIGL SERPL-MCNC: 109 MG/DL
WBC # BLD: 13.6 K/UL (ref 3.5–11.3)

## 2022-12-30 PROCEDURE — 6370000000 HC RX 637 (ALT 250 FOR IP): Performed by: STUDENT IN AN ORGANIZED HEALTH CARE EDUCATION/TRAINING PROGRAM

## 2022-12-30 PROCEDURE — 99232 SBSQ HOSP IP/OBS MODERATE 35: CPT | Performed by: INTERNAL MEDICINE

## 2022-12-30 PROCEDURE — 6370000000 HC RX 637 (ALT 250 FOR IP): Performed by: INTERNAL MEDICINE

## 2022-12-30 PROCEDURE — 2580000003 HC RX 258: Performed by: STUDENT IN AN ORGANIZED HEALTH CARE EDUCATION/TRAINING PROGRAM

## 2022-12-30 PROCEDURE — 97530 THERAPEUTIC ACTIVITIES: CPT

## 2022-12-30 PROCEDURE — 84100 ASSAY OF PHOSPHORUS: CPT

## 2022-12-30 PROCEDURE — 2700000000 HC OXYGEN THERAPY PER DAY

## 2022-12-30 PROCEDURE — 99231 SBSQ HOSP IP/OBS SF/LOW 25: CPT | Performed by: OTOLARYNGOLOGY

## 2022-12-30 PROCEDURE — 94640 AIRWAY INHALATION TREATMENT: CPT

## 2022-12-30 PROCEDURE — 6360000002 HC RX W HCPCS: Performed by: HEALTH CARE PROVIDER

## 2022-12-30 PROCEDURE — 94761 N-INVAS EAR/PLS OXIMETRY MLT: CPT

## 2022-12-30 PROCEDURE — 2060000000 HC ICU INTERMEDIATE R&B

## 2022-12-30 PROCEDURE — 83735 ASSAY OF MAGNESIUM: CPT

## 2022-12-30 PROCEDURE — 84478 ASSAY OF TRIGLYCERIDES: CPT

## 2022-12-30 PROCEDURE — 2580000003 HC RX 258: Performed by: INTERNAL MEDICINE

## 2022-12-30 PROCEDURE — 85025 COMPLETE CBC W/AUTO DIFF WBC: CPT

## 2022-12-30 PROCEDURE — 97535 SELF CARE MNGMENT TRAINING: CPT

## 2022-12-30 PROCEDURE — 6360000002 HC RX W HCPCS: Performed by: INTERNAL MEDICINE

## 2022-12-30 PROCEDURE — 97116 GAIT TRAINING THERAPY: CPT

## 2022-12-30 PROCEDURE — 86140 C-REACTIVE PROTEIN: CPT

## 2022-12-30 PROCEDURE — 36415 COLL VENOUS BLD VENIPUNCTURE: CPT

## 2022-12-30 PROCEDURE — 80053 COMPREHEN METABOLIC PANEL: CPT

## 2022-12-30 RX ORDER — BACLOFEN 5 MG/1
5 TABLET ORAL 3 TIMES DAILY
Status: DISCONTINUED | OUTPATIENT
Start: 2022-12-30 | End: 2023-01-04

## 2022-12-30 RX ADMIN — IPRATROPIUM BROMIDE 0.5 MG: 0.5 SOLUTION RESPIRATORY (INHALATION) at 11:38

## 2022-12-30 RX ADMIN — IPRATROPIUM BROMIDE 0.5 MG: 0.5 SOLUTION RESPIRATORY (INHALATION) at 08:13

## 2022-12-30 RX ADMIN — BACLOFEN 5 MG: 5 TABLET ORAL at 22:00

## 2022-12-30 RX ADMIN — SODIUM CHLORIDE 3000 MG: 900 INJECTION INTRAVENOUS at 01:25

## 2022-12-30 RX ADMIN — SODIUM CHLORIDE 3000 MG: 900 INJECTION INTRAVENOUS at 15:06

## 2022-12-30 RX ADMIN — Medication 100 MG: at 08:54

## 2022-12-30 RX ADMIN — FAMOTIDINE 20 MG: 20 TABLET, FILM COATED ORAL at 08:54

## 2022-12-30 RX ADMIN — IPRATROPIUM BROMIDE 0.5 MG: 0.5 SOLUTION RESPIRATORY (INHALATION) at 15:54

## 2022-12-30 RX ADMIN — ENOXAPARIN SODIUM 30 MG: 100 INJECTION SUBCUTANEOUS at 08:54

## 2022-12-30 RX ADMIN — SODIUM CHLORIDE, PRESERVATIVE FREE 10 ML: 5 INJECTION INTRAVENOUS at 20:30

## 2022-12-30 RX ADMIN — FAMOTIDINE 20 MG: 20 TABLET, FILM COATED ORAL at 20:25

## 2022-12-30 RX ADMIN — VENLAFAXINE 37.5 MG: 37.5 TABLET ORAL at 08:54

## 2022-12-30 RX ADMIN — ALCOHOL 1 TABLET: 70.47 GEL TOPICAL at 08:54

## 2022-12-30 RX ADMIN — SODIUM CHLORIDE 3000 MG: 900 INJECTION INTRAVENOUS at 09:01

## 2022-12-30 RX ADMIN — IPRATROPIUM BROMIDE 0.5 MG: 0.5 SOLUTION RESPIRATORY (INHALATION) at 21:10

## 2022-12-30 RX ADMIN — SODIUM CHLORIDE 3000 MG: 900 INJECTION INTRAVENOUS at 20:30

## 2022-12-30 RX ADMIN — FOLIC ACID 1 MG: 1 TABLET ORAL at 08:54

## 2022-12-30 NOTE — PROGRESS NOTES
SPIRITUAL CARE DEPARTMENT - [facility] [address]  PROGRESS NOTE    Shift date: 12/29/22  Shift day: Thursday   Shift # 2    Room # 2015/2015-01   Name: Min Pardo                Faith:    Place of Gnosticism:     Referral: Rapid Response    Admit Date & Time: 12/15/2022 12:08 PM    Assessment:  Min Pardo is a 32 y.o. female       Intervention:   was a ministry of presence to patient and medical staff.  spoke with RN who stated further spiritual services were not needed. Outcome:  Patient appeared coping. RN will perfect serve Chaplains for further care if needed. Plan:  Chaplains will remain available to offer spiritual and emotional support as needed.       Electronically signed by Soto Carbajal on 12/29/2022 at 11:51 PM.  Toñito Lefty  [phone]

## 2022-12-30 NOTE — PLAN OF CARE
Problem: Respiratory - Adult  Goal: Achieves optimal ventilation and oxygenation  12/30/2022 0817 by Lakshmi Durbin RCP  Outcome: Progressing   BRONCHOSPASM/BRONCHOCONSTRICTION     [x]         IMPROVE AERATION/BREATH SOUNDS  [x]   ADMINISTER BRONCHODILATOR THERAPY AS APPROPRIATE  [x]   ASSESS BREATH SOUNDS  []   IMPLEMENT AEROSOL/MDI PROTOCOL  [x]   PATIENT EDUCATION AS NEEDED   PROVIDE ADEQUATE OXYGENATION WITH ACCEPTABLE SP02/ABG'S    [x]  IDENTIFY APPROPRIATE OXYGEN THERAPY  [x]   MONITOR SP02/ABG'S AS NEEDED   [x]   PATIENT EDUCATION AS NEEDED

## 2022-12-30 NOTE — PROGRESS NOTES
Physical Therapy  Facility/Department: [facility] CAR 2- STEPDOWN  Physical Therapy Daily treatment note    Name: Vinita Hameed  : 1995  MRN: [id number]  Date of Service: 2022    Discharge Recommendations:  Patient would benefit from continued therapy after discharge   PT Equipment Recommendations  Equipment Needed: No (CTA)      Patient Diagnosis(es): The encounter diagnosis was Drug overdose of undetermined intent, initial encounter. Past Medical History:  has a past medical history of ADHD (attention deficit hyperactivity disorder), Anxiety, Bipolar 1 disorder ([facility] [address] and Depression. Past Surgical History:  has no past surgical history on file. Assessment   Body Structures, Functions, Activity Limitations Requiring Skilled Therapeutic Intervention: Decreased ROM; Decreased strength;Decreased endurance; Increased pain;Decreased posture;Decreased balance  Assessment: Pt required mod-maxA to perform bed mobilty and functional transfers, pt  initiating ambulation, knees flexed, ,3-4 tiny steps, before therapists assisting pt to EOB, SPT to chair with MAX x2. Clarke Ritter Pt is a high fall risk and would be unsafe to perform functional mobility unassisted. Recommending continued skilled PT to address these deficits and maximize independence upon discharge.   Therapy Prognosis: Fair  Decision Making: Medium Complexity  Barriers to Learning: none  Requires PT Follow-Up: Yes  Activity Tolerance  Activity Tolerance: Patient limited by fatigue;Patient limited by endurance  Activity Tolerance Comments: Pt very fatigued afetr mobility     Plan   Physcial Therapy Plan  General Plan:  (5-6 x week)  Current Treatment Recommendations: Strengthening, ROM, Balance training, Functional mobility training, Transfer training, Gait training, Stair training, Endurance training, Home exercise program, Safety education & training, Equipment evaluation, education, & procurement, Pain management  Safety Devices  Type of Devices: Call light within reach, Gait belt, Nurse notified, Patient at risk for falls, Chair alarm in place, Left in chair  Restraints  Restraints Initially in Place: No     Restrictions  Restrictions/Precautions  Restrictions/Precautions: Fall Risk  Required Braces or Orthoses?: No  Position Activity Restriction  Other position/activity restrictions: up with assist, extubated 12/23     Subjective   General  Chart Reviewed: Yes  Patient assessed for rehabilitation services?: Yes  Response To Previous Treatment: Patient with no complaints from previous session. Family / Caregiver Present: No  Follows Commands: Within Functional Limits  General Comment  Comments: Pt retired to recliner chair, bed alarm set  Subjective  Subjective: RN and pt in agreement for PT eval. Pt supine in bed, cooperative and pleasant, denied pain       Cognition   Orientation  Overall Orientation Status: Impaired  Cognition  Overall Cognitive Status: Exceptions  Arousal/Alertness: Delayed responses to stimuli;Inconsistent responses to stimuli  Following Commands: Follows one step commands with repetition  Attention Span: Difficulty attending to directions; Attends with cues to redirect  Safety Judgement: Decreased awareness of need for safety;Decreased awareness of need for assistance  Problem Solving: Decreased awareness of errors  Initiation: Requires cues for some  Sequencing: Requires cues for some  Cognition Comment: Pt following commands, pt initiating tasks                        Bed mobility  Supine to Sit: Maximum assistance;2 Person assistance  Sit to Supine: Unable to assess  Bed Mobility Comments: increased time required to complete; pt require CGA/MIN to maintain seated balance while EOB  Transfers  Sit to Stand: Maximum Assistance;2 Person Assistance  Stand to Sit: Maximum Assistance;2 Person Assistance  Bed to Chair: Maximum assistance;2 Person Assistance  Comment: Pt unable to obtain full standing position with bilateral UE on RW despite maxA x2  Ambulation  Surface: Level tile  Device: Rolling Walker  Other Apparatus: O2  Assistance: 2 Person assistance;Maximum assistance  Quality of Gait: knees flexed, unsteady  Gait Deviations: Slow Elizabeth;Decreased step height  Comments: Pt initiating ambulation, able to take 3-4 tiny steps, very unsteady with B flexed knees  More Ambulation?: No  Stairs/Curb  Stairs?: No        Exercise Treatment: Therapist ranged lower extremity 3-4 x ABD and HS knee ext prior ro bed mobility, Pt very fatigued after tx to chair, exercises deferred  Static Sitting Balance Exercises: Pt sat EOB 12 min    AM-PAC Score  AM-PAC Inpatient Mobility Raw Score : 9 (12/30/22 1134)  AM-PAC Inpatient T-Scale Score : 30.55 (12/30/22 1134)  Mobility Inpatient CMS 0-100% Score: 81.38 (12/30/22 1134)  Mobility Inpatient CMS G-Code Modifier : CM (12/30/22 1134)     Goals  Short Term Goals  Time Frame for Short Term Goals: 14  Short Term Goal 1: Pt to perform bed mobility Perla  Short Term Goal 2: Pt to demonstrate functional transfers Perla  Short Term Goal 3: Ambulate 50ft w/ RW modA  Short Term Goal 4: Pt to demonstrate standing dynamic balance of fair + to decrease fall risk  Patient Goals   Patient Goals :  To go home       Education  Patient Education  Education Given To: Patient  Education Provided: Role of Therapy;Plan of Care  Education Method: Demonstration  Barriers to Learning: None  Education Outcome: Verbalized understanding;Demonstrated understanding      Therapy Time   Individual Concurrent Group Co-treatment   Time In 1000         Time Out 1035         Minutes 35         Timed Code Treatment Minutes: 23 Minutes ([address] with FRASER for pt safety and to progress goals)       CALLIE Finch

## 2022-12-30 NOTE — PROGRESS NOTES
Physician Progress Note    Patient - Michael Armendariz  Date of Admission -  12/15/2022 12:08 PM  Date of Evaluation -  2022  Room and Bed Number -     Hospital Day - 13  Chief Complaint   Patient presents with    Drug Overdose      HPI:     32year old who became unresponsive while with her boyfriend, EMS was called. Patient got Narcan with minimal response. Intubated due to GCS of 3 and airway protection. Initially placed on propofol. In ED LFTs were elevated, CT cervical spine unremarkable, CT head negative, CXR unremarkable. Alcohol level 266, UDS negative. Found to be covid positive. : Tolerating CPAP trials. Following command both on and off sedation. Off propofol. : Weaning versed as tolerated. Librium increased. : Young removed. : Off versed. Precedex decreased. Extubated. : NG placed overnight for failed bedside swallow, patient pulled out her NG.  : On 4L NC. TF started.   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; recommend possible OR in 2-3 weeks once pulmonary status is recovered    SUBJECTIVE:     OVERNIGHT EVENTS:      Awake   Prasad tf   No wob   On 1 l nc     OBJECTIVE:     VITAL SIGNS:  /84   Pulse (!) 102   Temp 98.8 °F (37.1 °C) (Oral)   Resp 15   Ht 5' 5\" (1.651 m)   Wt 102 lb 11.8 oz (46.6 kg)   SpO2 98%   BMI 17.10 kg/m²   Tmax over 24 hours:  Temp (24hrs), Av.6 °F (37 °C), Min:98.1 °F (36.7 °C), Max:99.1 °F (37.3 °C)      Patient Vitals for the past 8 hrs:   BP Temp Temp src Pulse Resp SpO2   22 1554 -- -- -- (!) 102 -- 98 %   22 1547 122/84 98.8 °F (37.1 °C) Oral (!) 101 15 99 %   22 1138 -- -- -- 98 16 99 %   22 1117 117/82 98.8 °F (37.1 °C) Oral (!) 105 18 97 %   22 0845 122/89 98.6 °F (37 °C) Oral (!) 112 21 --       No intake or output data in the 24 hours ending 22 1631        Wt Readings from Last 3 Encounters:   22 102 lb 11.8 oz (46.6 kg)   22 106 lb (48.1 kg)     Body mass index is 17.1 kg/m².         PHYSICAL EXAM:  GEN:                  awake alert   EYES:                pupils equal, round, and reactive to light  HEENT:            Normocephalic, without obvious abnormality , no stridor   LUNGS:            Decreased breath sounds bilaterally posterior  CV:                     regular rate and rhythm and normal S1 and S2  ABDOMEN:     soft, non-distended, and non-tender  MSK:                  there is no redness, warmth, or swelling of the joints  SKIN:                 Normal skin color, texture, turgor  EXTREMITIES:  No pedal or leg edema, no calf tenderness/swelling, no erythema, distal pulses intact       MEDICATIONS:  Scheduled Meds:   ampicillin-sulbactam  3,000 mg IntraVENous Q6H    ipratropium  0.5 mg Nebulization 4x daily    venlafaxine  37.5 mg Per NG tube Daily    sodium chloride  500 mL IntraVENous Once    sodium chloride flush  5-40 mL IntraVENous 2 times per day    multivitamin  1 tablet Per NG tube Daily    famotidine  20 mg Per NG tube BID    thiamine  100 mg Per NG tube Daily    folic acid  1 mg Per NG tube Daily    enoxaparin  30 mg SubCUTAneous Daily     Continuous Infusions:   sodium chloride Stopped (12/28/22 1810)     PRN Meds:   albuterol, 2.5 mg, Q4H PRN  guaiFENesin, 200 mg, Q4H PRN  albuterol, 2.5 mg, Q4H PRN  sodium chloride flush, 5-40 mL, PRN  sodium chloride, , PRN  sodium phosphate IVPB, 0.16 mmol/kg, PRN   Or  sodium phosphate IVPB, 0.32 mmol/kg, PRN  sodium chloride flush, 5-40 mL, PRN  ondansetron, 4 mg, Q8H PRN   Or  ondansetron, 4 mg, Q6H PRN  polyethylene glycol, 17 g, Daily PRN  acetaminophen, 650 mg, Q6H PRN   Or  acetaminophen, 650 mg, Q6H PRN      SUPPORT DEVICES: [] Ventilator [] BIPAP  [x] Nasal Cannula [] Room Air    DATA:  Complete Blood Count:   Recent Labs     12/28/22  0309 12/29/22  0620 12/30/22  0645   WBC 17.0* 15.6* 13.6*   RBC 2.51* 2.24* 2.20*   HGB 9.9* 9.3* 9.5*   HCT 29.2* 27.4* 26.5*   .3* 122.3* 120.5*   MCH 39.4* 41.5* 43.2*   MCHC 33.9 33.9 35.8*   RDW 15.0* 15.5* 15.8*    296 287   MPV 10.7 10.1 10.1        Last 3 Blood Glucose:   Recent Labs     12/28/22  0309 12/29/22  0620 12/29/22 1640 12/29/22  1754 12/30/22  0645   GLUCOSE 108* 106* 110* 112* 106*        PT/INR:    Lab Results   Component Value Date/Time    PROTIME 12.4 12/15/2022 12:28 PM    INR 1.2 12/15/2022 12:28 PM     PTT:  No results found for: APTT, PTT    Comprehensive Metabolic Profile:   Recent Labs     12/29/22 0620 12/29/22 1640 12/29/22 1754 12/30/22  0645   *  --  137 137   K 3.9  --  4.0 3.7   CL 95*  --  101 99   CO2 25  --  27 28   BUN 4*  --  4* 4*   CREATININE 0.25*  --  0.27* 0.25*   GLUCOSE 106* 110* 112* 106*   CALCIUM 9.1  --  8.9 9.1   PROT  --   --  7.6 7.7   LABALBU  --   --  2.8* 2.9*   BILITOT  --   --  0.7 0.7   ALKPHOS  --   --  187* 173*   AST  --   --  274* 261*   ALT  --   --  109* 103*      Magnesium:   Lab Results   Component Value Date/Time    MG 1.8 12/30/2022 06:45 AM    MG 1.9 12/29/2022 06:20 AM    MG 1.6 12/28/2022 03:09 AM     Phosphorus:   Lab Results   Component Value Date/Time    PHOS 3.6 12/30/2022 06:45 AM    PHOS 3.6 12/29/2022 06:20 AM    PHOS 4.5 12/28/2022 03:09 AM     Ionized Calcium:   Lab Results   Component Value Date/Time    CAION 1.17 12/18/2022 05:54 AM    CAION 0.83 12/17/2022 04:24 AM        Urinalysis:   Lab Results   Component Value Date/Time    NITRU NEGATIVE 12/28/2022 11:10 AM    COLORU Yellow 12/28/2022 11:10 AM    PHUR 7.5 12/28/2022 11:10 AM    WBCUA 2 TO 5 12/28/2022 11:10 AM    RBCUA 2 TO 5 12/28/2022 11:10 AM    MUCUS 3+ 03/09/2022 12:21 PM    TRICHOMONAS NOT REPORTED 09/20/2014 06:19 AM    YEAST NOT REPORTED 09/20/2014 06:19 AM    BACTERIA MANY 12/28/2022 11:10 AM    CLARITYU slightly cloudy 07/22/2014 11:58 AM    SPECGRAV 1.012 12/28/2022 11:10 AM    LEUKOCYTESUR NEGATIVE 12/28/2022 11:10 AM    UROBILINOGEN Normal 12/28/2022 11:10 AM    BILIRUBINUR NEGATIVE 12/28/2022 11:10 AM    BILIRUBINUR negative 07/22/2014 11:58 AM    BLOODU negative 07/22/2014 11:58 AM    GLUCOSEU NEGATIVE 12/28/2022 11:10 AM    KETUA NEGATIVE 12/28/2022 11:10 AM    AMORPHOUS NOT REPORTED 09/20/2014 06:19 AM       HgBA1c:  No results found for: LABA1C  TSH:    Lab Results   Component Value Date/Time    TSH 1.96 12/28/2022 03:09 AM     Lactic Acid: No results found for: LACTA   Troponin: No results for input(s): TROPONINI in the last 72 hours. ASSESSMENT:     Principal Problem:    Drug overdose of undetermined intent, initial encounter  Active Problems:    COVID-19    Acute respiratory insufficiency    Alcoholic intoxication with complication (HCC)    Altered mental status    Acute respiratory failure with hypoxia (HCC) resolved   Resolved Problems:    * No resolved hospital problems. *   Aspiration pneumonia    PLAN:       Continue Unasyn complete 7 days   aspiration precautions.   Secretion management  Will sign off     Electronically signed by Carmencita Alicea MD on 12/30/2022 at 4:31 PM

## 2022-12-30 NOTE — PROGRESS NOTES
ENT/OTOLARYNGOLOGY SUBSEQUENT CARE PROGRESS NOTE     REASON FOR CARE: f/u airway     HISTORY OF PRESENT ILLNESS:   August Li is a 32 y.o. who is being seen for follow-up of airway. Seen by me about 4 days ago. Has a sitter at bedside. She reports that the patient doesn't talk but only mumbles. No h/o wheezing/stridor noted per hear. Pertinent Examination:   GENERAL: well developed and well nourished and in no acute distress  HEAD: normocephalic and atraumatic  EYES: no eyelid swelling, no conjunctival injection or exudate, pupils equal round and reactive to light  EXTERNAL EARS: normal  EAR EXAM: deferred  NOSE: nares patent, normal mucosa  MOUTH/THROAT: mucous membranes moist, no focal lesions, no tonsillar enlargement or exudate  NECK: non-tender, full range of motion, no mass, no focal lymphadenopathy  RESPIRATORY: Normal expansion. Clear to auscultation. No rales, rhonchi, or wheezing. No stridor heard when patient was taking deep inspirations/expirations  NEUROLOGICAL:  cranial nerves II-XII are grossly intact     RELEVANT LABS/STUDIES:   Additional data reviewed:    None    Procedures:    None    Surgical risk factors:  None     IMPRESSION AND RECOMMENDATIONS:   August Li is a 32 y.o. female with airway concerns, s/p intubation with large ETT. Continues not to have any obvious stridor/wheezing. Plan:  Continued observation for any airway symptoms (stridor/wheezing) progressively developing as patient had a large ETT used for intubation and could have progressive airway injury/stenosis over the next 1-2 weeks. If any concerns for wheezing/stridor please contact ENT.   None heard today.        --------------------------------------------------  Blair Gomez MD  Pediatric Otolaryngology-Head and Neck Surgery  [address] [place] [address] [facility]    Office  ph# [phone]    Also available in [facility]

## 2022-12-30 NOTE — CONSULTS
Nutrition Note    Consulted due to poor intake/appetite x 5 or more days. Pt remains NPO with Speech Therapy continuing to follow. Pt with NG in place and continues to receive/tolerate Tube Feedings of Standard without Fiber formula at goal rate of 55 mL/hr. Will continue to monitor tolerance of Tube Feedings and modify as needed.     Electronically signed by Alvaro Paz RD, [place] on 12/30/22 at 12:53 PM EST    Contact: [phone]

## 2022-12-30 NOTE — CARE COORDINATION
Spoke to patient's mother via telephone. Discussed patient requiring therapy after discharge. She is agreeable with SNF. List sent to her email [email or web address]

## 2022-12-30 NOTE — PLAN OF CARE
Problem: Discharge Planning  Goal: Discharge to home or other facility with appropriate resources  Outcome: Progressing     Problem: Skin/Tissue Integrity  Goal: Absence of new skin breakdown  Description: 1. Monitor for areas of redness and/or skin breakdown  2. Assess vascular access sites hourly  3. Every 4-6 hours minimum:  Change oxygen saturation probe site  4. Every 4-6 hours:  If on nasal continuous positive airway pressure, respiratory therapy assess nares and determine need for appliance change or resting period. Outcome: Progressing     Problem: ABCDS Injury Assessment  Goal: Absence of physical injury  Outcome: Progressing     Problem: Safety - Adult  Goal: Free from fall injury  Outcome: Progressing     Problem: Pain  Goal: Verbalizes/displays adequate comfort level or baseline comfort level  Outcome: Progressing     Problem: Confusion  Goal: Confusion, delirium, dementia, or psychosis is improved or at baseline  Description: INTERVENTIONS:  1. Assess for possible contributors to thought disturbance, including medications, impaired vision or hearing, underlying metabolic abnormalities, dehydration, psychiatric diagnoses, and notify attending LIP  2. [place] high risk fall precautions, as indicated  3. Provide frequent short contacts to provide reality reorientation, refocusing and direction  4. Decrease environmental stimuli, including noise as appropriate  5. Monitor and intervene to maintain adequate nutrition, hydration, elimination, sleep and activity  6. If unable to ensure safety without constant attention obtain sitter and review sitter guidelines with assigned personnel  7.  Initiate Psychosocial CNS and Spiritual Care consult, as indicated  Outcome: Progressing     Problem: Respiratory - Adult  Goal: Achieves optimal ventilation and oxygenation  12/30/2022 1619 by Inocencio Cramer RN  Outcome: Progressing  12/30/2022 0817 by Carlos Stout RCP  Outcome: Progressing  12/30/2022 0649 by Melanie Dempsey [facility]  Outcome: Progressing  Flowsheets (Taken 12/30/2022 0891)  Achieves optimal ventilation and oxygenation:   Assess for changes in respiratory status   Assess the need for suctioning and aspirate as needed   Position to facilitate oxygenation and minimize respiratory effort   Oxygen supplementation based on oxygen saturation or arterial blood gases   Assess for changes in mentation and behavior   Initiate smoking cessation protocol as indicated   Encourage broncho-pulmonary hygiene including cough, deep breathe, incentive spirometry   Assess and instruct to report shortness of breath or any respiratory difficulty   Respiratory therapy support as indicated     Problem: Nutrition Deficit:  Goal: Optimize nutritional status  Outcome: Progressing     Problem: Risk for Elopement  Goal: Patient will not exit the unit/facility without proper excort  Outcome: Progressing     Problem: Safety - Medical Restraint  Goal: Remains free of injury from restraints (Restraint for Interference with Medical Device)  Description: INTERVENTIONS:  1. Determine that other, less restrictive measures have been tried or would not be effective before applying the restraint  2. Evaluate the patient's condition at the time of restraint application  3. Inform patient/family regarding the reason for restraint  4.  Q2H: Monitor safety, psychosocial status, comfort, nutrition and hydration  Outcome: Progressing

## 2022-12-30 NOTE — PROGRESS NOTES
Occupational Therapy  Facility/Department: [facility]- [facility]  Occupational Therapy Daily Treatment Note    Name: Magda Snyder  : 1995  MRN: [id number]  Date of Service: 2022    Discharge Recommendations:  Patient would benefit from continued therapy after discharge     Patient Diagnosis(es): The encounter diagnosis was Drug overdose of undetermined intent, initial encounter. Past Medical History:  has a past medical history of ADHD (attention deficit hyperactivity disorder), Anxiety, Bipolar 1 disorder ([address] and Depression. Past Surgical History:  has no past surgical history on file. Assessment   Performance deficits / Impairments: Decreased functional mobility ; Decreased ADL status; Decreased high-level IADLs;Decreased endurance;Decreased strength;Decreased ROM; Decreased fine motor control;Decreased cognition;Decreased balance;Decreased coordination  Assessment: pt would be unsafe to return to prior living arrangement d/t required heavy physical assistance for all movement and ADLs at this time. Pt would benefit from further therapy at discharge in order to increase safety and independence. Prognosis: Fair  Activity Tolerance  Activity Tolerance: Treatment limited secondary to decreased cognition;Patient limited by fatigue        Plan   Occupational Therapy Plan  Times Per Week: 3-4x/wk     Restrictions  Restrictions/Precautions  Restrictions/Precautions: Fall Risk, General Precautions  Required Braces or Orthoses?: No  Position Activity Restriction  Other position/activity restrictions: up with assist, extubated     Subjective   General  Patient assessed for rehabilitation services?: Yes  Response to previous treatment: Patient with no complaints from previous session  Family / Caregiver Present: No  Diagnosis: drug overdose  General Comment  Comments: RN ok'd patient for OT treatment thismorning.  Pt supine in bed upon FRASER arrival. Pt agreeable to participate in session and denied pain. Pt attempting to speak with whisper tone and difficult to understand at this time. Objective   Safety Devices  Type of Devices: Call light within reach;Gait belt;Nurse notified; Patient at risk for falls; Chair alarm in place; Left in chair  Restraints  Restraints Initially in Place: No  Balance  Sitting: With support (MIN A overall at EOB x10 minutes with verbal and tactile cues for postural control)  Standing: With support (Attempted 2 stands with RW unsuccessful. Stand-pivot from EOB to recliner MAX Ax2)  Transfer Training  Transfer Training: Yes  Overall Level of Assistance: Assist X2;Maximum assistance  Sit to Stand: Maximum assistance;Assist X2  Stand to Sit: Assist X2;Maximum assistance  Stand Pivot Transfers: Assist X2;Maximum assistance  Bed to Chair: Maximum assistance;Assist X2        ADL  Grooming: Maximum assistance  UE Dressing: Dependent/Total;Maximum assistance  UE Dressing Skilled Clinical Factors: Change of hospsital gown. Patient attempt to pull left arm out of sleeve unsuccessfully  LE Dressing: Dependent/Total  LE Dressing Skilled Clinical Factors: don slipper socks     Activity Tolerance  Activity Tolerance: Patient limited by fatigue;Patient limited by endurance  Activity Tolerance Comments: Pt very fatigued afetr mobility  Bed mobility  Supine to Sit: Maximum assistance;2 Person assistance  Sit to Supine: Unable to assess  Scooting: Maximal assistance  Bed Mobility Comments: HOB raised ~45 degrees with patient demonstrating attempts to assist throughout        Cognition  Overall Cognitive Status: Exceptions  Arousal/Alertness: Delayed responses to stimuli;Inconsistent responses to stimuli  Following Commands: Follows one step commands with repetition  Attention Span: Difficulty attending to directions; Attends with cues to redirect  Safety Judgement: Decreased awareness of need for safety;Decreased awareness of need for assistance  Problem Solving: Decreased awareness of errors  Initiation: Requires cues for some  Sequencing: Requires cues for some  Cognition Comment: Pt following commands, pt initiating tasks  Orientation  Overall Orientation Status: Impaired (Mostly non-verbal through except a few unintelligible whispers)         Education Given To: Patient  Education Provided: Role of Therapy;Transfer Training;ADL Adaptive Strategies;Orientation  Education Provided Comments: importance of continued movement, postural control,  Education Method: Demonstration;Verbal  Education Outcome: Continued education needed    AM-PAC Score  AM-[facility] Inpatient Daily Activity Raw Score: 6 (12/30/22 1404)  AM-PAC Inpatient ADL T-Scale Score : 17.07 (12/30/22 1404)  ADL Inpatient CMS 0-100% Score: 100 (12/30/22 1404)  ADL Inpatient CMS G-Code Modifier : CN (12/30/22 1404)      Goals  Short Term Goals  Time Frame for Short Term Goals: pt will, by discharge  Short Term Goal 1: complete simple feeding/grooming tasks with mod A and set up  Short Term Goal 2: participate in meaningful task for ~8 minutes in order to increase endurance  Short Term Goal 3: dem mod A during bed mobility  Short Term Goal 4: dem ~15 minutes static/dynamic sitting tolerance on eOB with CGA  Short Term Goal 5: dem mod A during functional transfers with LRD, as needed       Therapy Time   Individual Concurrent Group Co-treatment   Time In 1000         Time Out 1035         Minutes 35         Timed Code Treatment Minutes: 23 Minutes (Co-tx with PTA for safety and goal progression)       BRIA Ramos/NICHO

## 2022-12-30 NOTE — PLAN OF CARE
Problem: Respiratory - Adult  Goal: Achieves optimal ventilation and oxygenation  12/30/2022 0649 by Huma Son RCP  Outcome: Progressing  Flowsheets (Taken 12/30/2022 5778)  Achieves optimal ventilation and oxygenation:   Assess for changes in respiratory status   Assess the need for suctioning and aspirate as needed   Position to facilitate oxygenation and minimize respiratory effort   Oxygen supplementation based on oxygen saturation or arterial blood gases   Assess for changes in mentation and behavior   Initiate smoking cessation protocol as indicated   Encourage broncho-pulmonary hygiene including cough, deep breathe, incentive spirometry   Assess and instruct to report shortness of breath or any respiratory difficulty   Respiratory therapy support as indicated

## 2022-12-31 ENCOUNTER — APPOINTMENT (OUTPATIENT)
Dept: GENERAL RADIOLOGY | Age: 27
DRG: 812 | End: 2022-12-31
Payer: MEDICARE

## 2022-12-31 LAB
ABSOLUTE EOS #: 0.1 K/UL (ref 0–0.44)
ABSOLUTE IMMATURE GRANULOCYTE: 0.1 K/UL (ref 0–0.3)
ABSOLUTE LYMPH #: 1.65 K/UL (ref 1.1–3.7)
ABSOLUTE MONO #: 1.16 K/UL (ref 0.1–1.2)
ANION GAP SERPL CALCULATED.3IONS-SCNC: 12 MMOL/L (ref 9–17)
BASOPHILS # BLD: 0 % (ref 0–2)
BASOPHILS ABSOLUTE: 0 K/UL (ref 0–0.2)
BUN BLDV-MCNC: 4 MG/DL (ref 6–20)
CALCIUM SERPL-MCNC: 9.3 MG/DL (ref 8.6–10.4)
CHLORIDE BLD-SCNC: 99 MMOL/L (ref 98–107)
CO2: 25 MMOL/L (ref 20–31)
CREAT SERPL-MCNC: 0.36 MG/DL (ref 0.5–0.9)
EOSINOPHILS RELATIVE PERCENT: 1 % (ref 1–4)
GFR SERPL CREATININE-BSD FRML MDRD: >60 ML/MIN/1.73M2
GLUCOSE BLD-MCNC: 91 MG/DL (ref 70–99)
HCT VFR BLD CALC: 29.3 % (ref 36.3–47.1)
HEMOGLOBIN: 9.6 G/DL (ref 11.9–15.1)
IMMATURE GRANULOCYTES: 1 %
LYMPHOCYTES # BLD: 17 % (ref 24–43)
MAGNESIUM: 1.7 MG/DL (ref 1.6–2.6)
MCH RBC QN AUTO: 37.5 PG (ref 25.2–33.5)
MCHC RBC AUTO-ENTMCNC: 32.8 G/DL (ref 28.4–34.8)
MCV RBC AUTO: 114.5 FL (ref 82.6–102.9)
MONOCYTES # BLD: 12 % (ref 3–12)
MORPHOLOGY: ABNORMAL
MORPHOLOGY: ABNORMAL
NRBC AUTOMATED: 0 PER 100 WBC
PDW BLD-RTO: 15 % (ref 11.8–14.4)
PLATELET # BLD: 322 K/UL (ref 138–453)
PMV BLD AUTO: 10.6 FL (ref 8.1–13.5)
POTASSIUM SERPL-SCNC: 3.5 MMOL/L (ref 3.7–5.3)
RBC # BLD: 2.56 M/UL (ref 3.95–5.11)
SEG NEUTROPHILS: 69 % (ref 36–65)
SEGMENTED NEUTROPHILS ABSOLUTE COUNT: 6.69 K/UL (ref 1.5–8.1)
SODIUM BLD-SCNC: 136 MMOL/L (ref 135–144)
WBC # BLD: 9.7 K/UL (ref 3.5–11.3)

## 2022-12-31 PROCEDURE — 94640 AIRWAY INHALATION TREATMENT: CPT

## 2022-12-31 PROCEDURE — 36415 COLL VENOUS BLD VENIPUNCTURE: CPT

## 2022-12-31 PROCEDURE — 2060000000 HC ICU INTERMEDIATE R&B

## 2022-12-31 PROCEDURE — 2580000003 HC RX 258: Performed by: STUDENT IN AN ORGANIZED HEALTH CARE EDUCATION/TRAINING PROGRAM

## 2022-12-31 PROCEDURE — 6370000000 HC RX 637 (ALT 250 FOR IP): Performed by: INTERNAL MEDICINE

## 2022-12-31 PROCEDURE — 6360000002 HC RX W HCPCS: Performed by: INTERNAL MEDICINE

## 2022-12-31 PROCEDURE — 94761 N-INVAS EAR/PLS OXIMETRY MLT: CPT

## 2022-12-31 PROCEDURE — 80048 BASIC METABOLIC PNL TOTAL CA: CPT

## 2022-12-31 PROCEDURE — 99232 SBSQ HOSP IP/OBS MODERATE 35: CPT | Performed by: OTOLARYNGOLOGY

## 2022-12-31 PROCEDURE — 74018 RADEX ABDOMEN 1 VIEW: CPT

## 2022-12-31 PROCEDURE — 6360000002 HC RX W HCPCS: Performed by: HEALTH CARE PROVIDER

## 2022-12-31 PROCEDURE — 83735 ASSAY OF MAGNESIUM: CPT

## 2022-12-31 PROCEDURE — 2580000003 HC RX 258: Performed by: INTERNAL MEDICINE

## 2022-12-31 PROCEDURE — 6370000000 HC RX 637 (ALT 250 FOR IP): Performed by: STUDENT IN AN ORGANIZED HEALTH CARE EDUCATION/TRAINING PROGRAM

## 2022-12-31 PROCEDURE — 85025 COMPLETE CBC W/AUTO DIFF WBC: CPT

## 2022-12-31 RX ORDER — POTASSIUM CHLORIDE 20 MEQ/1
10 TABLET, EXTENDED RELEASE ORAL 2 TIMES DAILY
Status: DISCONTINUED | OUTPATIENT
Start: 2023-01-01 | End: 2023-01-01

## 2022-12-31 RX ORDER — SODIUM CHLORIDE 9 MG/ML
INJECTION, SOLUTION INTRAVENOUS CONTINUOUS
Status: ACTIVE | OUTPATIENT
Start: 2022-12-31 | End: 2023-01-01

## 2022-12-31 RX ORDER — POTASSIUM CHLORIDE 20 MEQ/1
10 TABLET, EXTENDED RELEASE ORAL DAILY
Status: DISCONTINUED | OUTPATIENT
Start: 2022-12-31 | End: 2022-12-31

## 2022-12-31 RX ADMIN — BACLOFEN 5 MG: 5 TABLET ORAL at 15:20

## 2022-12-31 RX ADMIN — SODIUM CHLORIDE 3000 MG: 900 INJECTION INTRAVENOUS at 20:19

## 2022-12-31 RX ADMIN — SODIUM CHLORIDE 3000 MG: 900 INJECTION INTRAVENOUS at 09:58

## 2022-12-31 RX ADMIN — SODIUM CHLORIDE: 9 INJECTION, SOLUTION INTRAVENOUS at 21:29

## 2022-12-31 RX ADMIN — IPRATROPIUM BROMIDE 0.5 MG: 0.5 SOLUTION RESPIRATORY (INHALATION) at 07:21

## 2022-12-31 RX ADMIN — BACLOFEN 5 MG: 5 TABLET ORAL at 20:17

## 2022-12-31 RX ADMIN — Medication 100 MG: at 10:00

## 2022-12-31 RX ADMIN — SODIUM CHLORIDE 3000 MG: 900 INJECTION INTRAVENOUS at 02:29

## 2022-12-31 RX ADMIN — SODIUM CHLORIDE, PRESERVATIVE FREE 10 ML: 5 INJECTION INTRAVENOUS at 10:16

## 2022-12-31 RX ADMIN — FAMOTIDINE 20 MG: 20 TABLET, FILM COATED ORAL at 20:17

## 2022-12-31 RX ADMIN — BACLOFEN 5 MG: 5 TABLET ORAL at 10:01

## 2022-12-31 RX ADMIN — ENOXAPARIN SODIUM 30 MG: 100 INJECTION SUBCUTANEOUS at 10:00

## 2022-12-31 RX ADMIN — FOLIC ACID 1 MG: 1 TABLET ORAL at 10:01

## 2022-12-31 RX ADMIN — FAMOTIDINE 20 MG: 20 TABLET, FILM COATED ORAL at 10:01

## 2022-12-31 RX ADMIN — VENLAFAXINE 37.5 MG: 37.5 TABLET ORAL at 10:00

## 2022-12-31 RX ADMIN — SODIUM CHLORIDE 3000 MG: 900 INJECTION INTRAVENOUS at 15:19

## 2022-12-31 RX ADMIN — POTASSIUM CHLORIDE 10 MEQ: 1500 TABLET, EXTENDED RELEASE ORAL at 15:20

## 2022-12-31 RX ADMIN — IPRATROPIUM BROMIDE 0.5 MG: 0.5 SOLUTION RESPIRATORY (INHALATION) at 20:33

## 2022-12-31 RX ADMIN — IPRATROPIUM BROMIDE 0.5 MG: 0.5 SOLUTION RESPIRATORY (INHALATION) at 11:07

## 2022-12-31 RX ADMIN — METOPROLOL TARTRATE 12.5 MG: 25 TABLET ORAL at 21:30

## 2022-12-31 RX ADMIN — IPRATROPIUM BROMIDE 0.5 MG: 0.5 SOLUTION RESPIRATORY (INHALATION) at 14:57

## 2022-12-31 RX ADMIN — ALCOHOL 1 TABLET: 70.47 GEL TOPICAL at 10:00

## 2022-12-31 NOTE — PROGRESS NOTES
Pt does not wish to have Duoneb treatment due to increased HR and jitters. Pt prefers and received Atrovent only treatment.

## 2022-12-31 NOTE — PLAN OF CARE
Problem: Discharge Planning  Goal: Discharge to home or other facility with appropriate resources  12/30/2022 2116 by Malathi Mckinney RN  Outcome: Progressing  12/30/2022 1619 by Cielo Moon RN  Outcome: Progressing     Problem: Skin/Tissue Integrity  Goal: Absence of new skin breakdown  Description: 1.  Monitor for areas of redness and/or skin breakdown  2.  Assess vascular access sites hourly  3.  Every 4-6 hours minimum:  Change oxygen saturation probe site  4.  Every 4-6 hours:  If on nasal continuous positive airway pressure, respiratory therapy assess nares and determine need for appliance change or resting period.  12/30/2022 2116 by Malathi Mckinney RN  Outcome: Progressing  12/30/2022 1619 by Cielo Moon RN  Outcome: Progressing     Problem: ABCDS Injury Assessment  Goal: Absence of physical injury  12/30/2022 2116 by Malathi Mckinney RN  Outcome: Progressing  Flowsheets (Taken 12/30/2022 2115)  Absence of Physical Injury: Implement safety measures based on patient assessment  12/30/2022 1619 by Cielo Moon RN  Outcome: Progressing     Problem: Safety - Adult  Goal: Free from fall injury  12/30/2022 2116 by Malathi Mckinney RN  Outcome: Progressing  12/30/2022 1619 by Cielo Moon RN  Outcome: Progressing     Problem: Pain  Goal: Verbalizes/displays adequate comfort level or baseline comfort level  12/30/2022 2116 by Malathi Mckinney RN  Outcome: Progressing  12/30/2022 1619 by Cielo Moon RN  Outcome: Progressing     Problem: Confusion  Goal: Confusion, delirium, dementia, or psychosis is improved or at baseline  Description: INTERVENTIONS:  1. Assess for possible contributors to thought disturbance, including medications, impaired vision or hearing, underlying metabolic abnormalities, dehydration, psychiatric diagnoses, and notify attending LIP  2. [place] high risk fall precautions, as indicated  3. Provide frequent short contacts to provide reality reorientation, refocusing and  direction  4. Decrease environmental stimuli, including noise as appropriate  5. Monitor and intervene to maintain adequate nutrition, hydration, elimination, sleep and activity  6. If unable to ensure safety without constant attention obtain sitter and review sitter guidelines with assigned personnel  7.  Initiate Psychosocial CNS and Spiritual Care consult, as indicated  12/30/2022 2116 by Leana Green RN  Outcome: Progressing  Flowsheets (Taken 12/30/2022 2000)  Effect of thought disturbance (confusion, delirium, dementia, or psychosis) are managed with adequate functional status: Assess for contributors to thought disturbance, including medications, impaired vision or hearing, underlying metabolic abnormalities, dehydration, psychiatric diagnoses, notify LIP  12/30/2022 1619 by Niurka Simpson RN  Outcome: Progressing     Problem: Respiratory - Adult  Goal: Achieves optimal ventilation and oxygenation  12/30/2022 2116 by Leana Green RN  Outcome: Progressing  Flowsheets (Taken 12/30/2022 2000)  Achieves optimal ventilation and oxygenation:   Assess for changes in respiratory status   Assess for changes in mentation and behavior   Position to facilitate oxygenation and minimize respiratory effort   Oxygen supplementation based on oxygen saturation or arterial blood gases  12/30/2022 1619 by Niurka Simpson RN  Outcome: Progressing  12/30/2022 0817 by Jatinder Santillan RCP  Outcome: Progressing     Problem: Nutrition Deficit:  Goal: Optimize nutritional status  12/30/2022 2116 by Leana Green RN  Outcome: Progressing  12/30/2022 1619 by Niurka Simpson RN  Outcome: Progressing     Problem: Risk for Elopement  Goal: Patient will not exit the unit/facility without proper excort  12/30/2022 2116 by Leana Green RN  Outcome: Progressing  Flowsheets (Taken 12/30/2022 2000)  Nursing Interventions for Elopement Risk:   Assist with personal care needs such as toileting, eating, dressing, as needed to reduce the risk of wandering   Collaborate with family members/caregivers to mitigate the elopement risk   Collaborate with treatment team for drug withdrawal symptoms treatment  12/30/2022 1619 by Andrew Segura, RN  Outcome: Progressing     Problem: Safety - Medical Restraint  Goal: Remains free of injury from restraints (Restraint for Interference with Medical Device)  Description: INTERVENTIONS:  1. Determine that other, less restrictive measures have been tried or would not be effective before applying the restraint  2. Evaluate the patient's condition at the time of restraint application  3. Inform patient/family regarding the reason for restraint  4.  Q2H: Monitor safety, psychosocial status, comfort, nutrition and hydration  12/30/2022 2116 by Ángel Cavanaugh RN  Outcome: Progressing  12/30/2022 1619 by Andrew Segura, RN  Outcome: Progressing

## 2022-12-31 NOTE — PROGRESS NOTES
[facility]  Office: [address], DO, Rosaura Sue, DO, Grey Dorethajacinta, DO, Elias Hussein Blood, DO, Nelda Clark MD, Flavia Granda MD, Jono Barrera MD, Gil Hernandez MD,  Emma Mchugh MD, Alondra Ace MD, Nancy Medrano, DO, Britney Spaulding MD,  Myles Jin MD, Drake Jurado MD, Marvin Herrera, DO, Madelyn Cornelius MD, Lera Mcardle, MD, Yoko Greenwood, DO, Tiana Rose MD, Jordan Wood MD, Keith Lombard, MD, Nely Fine MD, Ana Laura Durán, DO, Marilyn Moses MD, Lucas Moreno MD, Christy Ruano, CNP,  Yvette Burnham, CNP, Ana Gee, CNP, Ronak Levine, CNP,  Roseline Arita, [facility], Jarek Coello, CNP, Ann Thomas, CNP, Steve Wrad, CNP, Jason Broussard, CNP, Mariaelena Colunga, CNP, MATY GuamanC, Black Cordero, [facility], Solo Gram, CNP, Kierra Manuel, CNP         [place]    Progress Note    12/30/2022    8:34 PM    Name:   Bharat Muniz  MRN:     [id number]     [place]:      [de-identified]   Room:   2015/2015-01  IP Day:  15  Admit Date:  12/15/2022 12:08 PM    PCP:   Naveen Franklin  Code Status:  Full Code    Subjective:     C/C:   Chief Complaint   Patient presents with    Drug Overdose     Interval History Status: improved. Brief History:     Much imoroved, transferred from bed to chair, more awake. Review of Systems:     Constitutional:  negative for chills, fevers, sweats  Respiratory:  + cough, dyspnea on exertion, shortness of breath, *wheezing  Cardiovascular:  negative for chest pain, chest pressure/discomfort, lower extremity edema, palpitations  Gastrointestinal:  negative for abdominal pain, constipation, diarrhea, nausea, vomiting  Neurological:  negative for dizziness, headache    Medications:      Allergies:  No Known Allergies    Current Meds:   Scheduled Meds:    baclofen  5 mg Oral TID    ampicillin-sulbactam  3,000 mg IntraVENous Q6H    ipratropium 0.5 mg Nebulization 4x daily    venlafaxine  37.5 mg Per NG tube Daily    sodium chloride  500 mL IntraVENous Once    sodium chloride flush  5-40 mL IntraVENous 2 times per day    multivitamin  1 tablet Per NG tube Daily    famotidine  20 mg Per NG tube BID    thiamine  100 mg Per NG tube Daily    folic acid  1 mg Per NG tube Daily    enoxaparin  30 mg SubCUTAneous Daily     Continuous Infusions:    sodium chloride Stopped (22 1810)     PRN Meds: albuterol, guaiFENesin, albuterol, sodium chloride flush, sodium chloride, sodium phosphate IVPB **OR** sodium phosphate IVPB, sodium chloride flush, ondansetron **OR** ondansetron, polyethylene glycol, acetaminophen **OR** acetaminophen    Data:     Past Medical History:   has a past medical history of ADHD (attention deficit hyperactivity disorder), Anxiety, Bipolar 1 disorder ([address] and Depression. Social History:   reports that she has been smoking cigarettes. She has been smoking an average of .5 packs per day. She has never used smokeless tobacco. She reports current alcohol use. She reports current drug use. Drug: Marijuana Evangelina Pleas). Family History:   Family History   Problem Relation Age of Onset    Hypertension Mother     Depression Mother     Bipolar Disorder Father     Alcohol Abuse Father     Cancer Other         Uncle ? Vitals:  /84   Pulse (!) 102   Temp 98.8 °F (37.1 °C) (Oral)   Resp 15   Ht 5' 5\" (1.651 m)   Wt 102 lb 11.8 oz (46.6 kg)   SpO2 98%   BMI 17.10 kg/m²   Temp (24hrs), Av.7 °F (37.1 °C), Min:98.1 °F (36.7 °C), Max:99.1 °F (37.3 °C)    Recent Labs     22  0811 22  1652 22  1542 22  1601   POCGLU 107* 99 109* 125*       I/O (24Hr):   No intake or output data in the 24 hours ending 22    Labs:  Hematology:  Recent Labs     22  0309 22  0620 22  0645   WBC 17.0* 15.6* 13.6*   RBC 2.51* 2.24* 2.20*   HGB 9.9* 9.3* 9.5*   HCT 29.2* 27.4* 26.5*   .3* 122.3* 120.5*   MCH 39.4* 41.5* 43.2*   MCHC 33.9 33.9 35.8*   RDW 15.0* 15.5* 15.8*    296 287   MPV 10.7 10.1 10.1   CRP 40.1* 46.5* 37.1*   DDIMER  --  0.20  --      Chemistry:  Recent Labs     12/28/22  0309 12/28/22  1152 12/29/22  0620 12/29/22  1640 12/29/22  1754 12/30/22  0645   *  --  131*  --  137 137   K 3.7  --  3.9  --  4.0 3.7   CL 94*  --  95*  --  101 99   CO2 22  --  25  --  27 28   GLUCOSE 108*  --  106* 110* 112* 106*   BUN 4*  --  4*  --  4* 4*   CREATININE 0.27*  --  0.25*  --  0.27* 0.25*   MG 1.6  --  1.9  --   --  1.8   ANIONGAP 14  --  11  --  9 10   LABGLOM >60  --  >60  --  >60 >60   CALCIUM 9.2  --  9.1  --  8.9 9.1   PHOS 4.5  --  3.6  --   --  3.6   PROBNP  --   --  145  --   --   --    TROPHS <6  --   --   --   --   --    LACTACIDWB  --  0.8  --   --   --   --      Recent Labs     12/28/22  0309 12/28/22  0811 12/28/22  1652 12/29/22  0620 12/29/22  1542 12/29/22  1601 12/29/22  1754 12/30/22  0645   PROT  --   --   --   --   --   --  7.6 7.7   LABALBU  --   --   --   --   --   --  2.8* 2.9*   TSH 1.96  --   --   --   --   --   --   --    AST  --   --   --   --   --   --  274* 261*   ALT  --   --   --   --   --   --  109* 103*   ALKPHOS  --   --   --   --   --   --  187* 173*   BILITOT  --   --   --   --   --   --  0.7 0.7   LIPASE  --   --   --  111*  --   --   --   --    TRIG  --   --   --   --   --   --   --  109   POCGLU  --  107* 99  --  109* 125*  --   --      ABG:  Lab Results   Component Value Date/Time    POCPH 7.416 12/29/2022 04:01 PM    POCPCO2 46.0 12/29/2022 04:01 PM    POCPO2 124.2 12/29/2022 04:01 PM    POCHCO3 29.5 12/29/2022 04:01 PM    NBEA 1 12/20/2022 04:29 AM    PBEA 4 12/29/2022 04:01 PM    LVCU9OTW 99 12/29/2022 04:01 PM    FIO2 28.0 12/28/2022 04:52 PM     Lab Results   Component Value Date/Time    SPECIAL L HAND 5ML 12/28/2022 10:01 AM     Lab Results   Component Value Date/Time    CULTURE NO GROWTH 2 DAYS 12/28/2022 10:01 AM       Radiology:  CT ABDOMEN PELVIS WO CONTRAST Additional Contrast? None    Result Date: 12/28/2022  1. Consolidation in both lung bases with bilateral pleural effusions, consistent with pneumonia. 2. No acute abdominal abnormality. 3. Hepatic steatosis. XR CHEST PORTABLE    Result Date: 12/29/2022  Stable patchy bibasilar atelectasis or airspace disease. XR CHEST PORTABLE    Result Date: 12/28/2022  Patchy ground-glass change in the lungs felt to represent multilobar pneumonia given clinical history. XR ABDOMEN FOR NG/OG/NE TUBE PLACEMENT    Result Date: 12/28/2022  Unremarkable limited KUB. NG tube tip projects at the left upper quadrant, overlying the expected location of the stomach. XR ABDOMEN FOR NG/OG/NE TUBE PLACEMENT    Result Date: 12/25/2022  1. Borderline dilated 3 cm small bowel loop in the left lower quadrant which can be seen with ileus or small-bowel obstruction. 2. NG tube distal tip likely in the body of the stomach. 3. Mild left basilar airspace disease. XR ABDOMEN FOR NG/OG/NE TUBE PLACEMENT    Result Date: 12/24/2022  Nasogastric tube tip is in the stomach     FL MODIFIED BARIUM SWALLOW W VIDEO    Result Date: 12/28/2022  Delayed oral phase of swallowing. Gross aspiration of puree with a delayed weak cough response. No other consistencies given. Please see separate speech pathology report for full discussion of findings and recommendations. Physical Examination:      General appearance: Awake, mumbles   Mental Status: More alert   Lungs: Bilateral air entry with coarse rhonchi and expiratory wheezing heard in all lung fields  Heart:  regular rate and rhythm, no murmur  Abdomen:  soft, nontender, nondistended, normal bowel sounds, no masses, hepatomegaly, splenomegaly  Extremities:  no edema, redness, tenderness in the calves.   Soft restraints placed  Skin:  no gross lesions, rashes, induration       Assessment:        Hospital Problems             Last Modified POA    * (Principal) Drug overdose of undetermined intent, initial encounter 12/15/2022 Yes    COVID-19 12/18/2022 Yes    Acute respiratory insufficiency 66/06/3226 Yes    Alcoholic intoxication with complication ([facility] [address] 33/92/1776 Yes    Altered mental status 12/18/2022 Yes    Acute respiratory failure with hypoxia ([facility] [address] 12/26/2022 Yes   This is a 75-year-old lady who came in with drug overdose and was intubated in ICU and transferred today at 4 AM to the [facility] floor with persistent copious secretions and found to have multilobar pneumonia requiring 4 L nasal cannula oxygen to maintain saturation above 90% with 2 episodes of failed swallow and barium swallow with PEG tube in situ. History of chronic alcohol abuse and withdrawal.   on 12/29/2022 patient had near respiratory arrest with marked lethargy and depressed mentation with excessive secretions for which she was unable to initially expectorate requiring 100% supplemental oxygen maintain saturation above 90%. RRT called when patient became more awake and started expectorating secretions which were suctioned every 5 minutes with improvement of oxygenation and being weaned off to maintain saturation above 92%. On 12/30/22- more awake. improving  Principal diagnosis  #Multilobar pneumonia secondary to aspiration drug overdose and intubation on IV Unasyn with COVID-positive on 12/12/2022. Complicated by near respiratory arrest and hypoxemia due to severe mental depression, lethargy and inability to expectorate her thick sputum. She eventually woke up and started expectorating with improvement in her saturation. Continue respiratory inhalation therapy. Discontinuation of Librium, Abilify baclofen and benzos. initiate Baclofen, to reduce NMDA receptor overactivity. Active diagnoses  #Chronic substance abuse-alcohol outpatient management.   Patient is beyondthe window alcohol withdrawal.  Due to high risk of respiratory depression with her underlying bilateral pneumonia her medications were all immediately discontinued. #.5. Assess for B12 folate deficiency complicated by macrocytosis of alcoholism. #Failed swallow study. Seen by ENTWith suspected esophageal tear for evaluation by GI after healing in a few weeks. .  Continue NG feeds as appropriate TPN consult    Condition guarded to critical.  Full code resuscitation status. Plan:        Continue [facility] SNF.    Alexandria Samaniego MD  12/30/2022  8:34 PM

## 2022-12-31 NOTE — CARE COORDINATION
SW following for consult. Attempted to meet with patient, difficult to understand at this time. Patient did mention attempting to schedule an appointment with a provider for depression/anxiety. SW will continue to follow and assess at a later time.

## 2022-12-31 NOTE — PROGRESS NOTES
ENT/OTOLARYNGOLOGY SUBSEQUENT CARE PROGRESS NOTE     REASON FOR CARE: f/u airway     HISTORY OF PRESENT ILLNESS:   Iza Neal is a 32 y.o. who is being seen for follow-up of airway. Seen by Dr. Ridge Amaral yesterday. Has a sitter at bedside. She reports that the patient doesn't talk but only mumbles. No h/o wheezing/stridor noted per her and bedside nurse. Pertinent Examination:   GENERAL: well developed and well nourished and in no acute distress  HEAD: normocephalic and atraumatic  EYES: no eyelid swelling, no conjunctival injection or exudate, pupils equal round and reactive to light  EXTERNAL EARS: normal  EAR EXAM: deferred  NOSE: nares patent, normal mucosa, NG in place  MOUTH/THROAT: mucous membranes moist, no focal lesions, no tonsillar enlargement or exudate  NECK: non-tender, full range of motion, no mass, no focal lymphadenopathy  RESPIRATORY: Normal expansion. Clear to auscultation. No rales, rhonchi, or wheezing. No stridor heard when patient was taking deep inspirations/expirations. NEUROLOGICAL:  cranial nerves II-XII are grossly intact     RELEVANT LABS/STUDIES:   Additional data reviewed:    None    Procedures:    None    Surgical risk factors:  None     IMPRESSION AND RECOMMENDATIONS:   Iza Neal is a 32 y.o. female with airway concerns, s/p intubation with large ETT. Continues NOT to have any obvious stridor/wheezing. Plan:  Low suspicion for progressive airway stenosis given stability since extubation last week. ENT will sign off at this time. If any concerns for wheezing/stridor please contact ENT.   None heard today.      --------------------------------------------------  Ana Diaz MD  Pediatric Otolaryngology-Head and Neck Surgery  [address] [place] [address] Otolaryngology group    Office  ph# [phone]    Also available in [address] [place] [address]

## 2023-01-01 LAB
ABSOLUTE EOS #: 0.11 K/UL (ref 0–0.4)
ABSOLUTE IMMATURE GRANULOCYTE: 0 K/UL (ref 0–0.3)
ABSOLUTE LYMPH #: 2.51 K/UL (ref 1–4.8)
ABSOLUTE MONO #: 1.14 K/UL (ref 0.1–0.8)
ANION GAP SERPL CALCULATED.3IONS-SCNC: 13 MMOL/L (ref 9–17)
BASOPHILS # BLD: 0 % (ref 0–2)
BASOPHILS ABSOLUTE: 0 K/UL (ref 0–0.2)
BUN BLDV-MCNC: 4 MG/DL (ref 6–20)
CALCIUM SERPL-MCNC: 9.5 MG/DL (ref 8.6–10.4)
CHLORIDE BLD-SCNC: 102 MMOL/L (ref 98–107)
CO2: 24 MMOL/L (ref 20–31)
CREAT SERPL-MCNC: 0.35 MG/DL (ref 0.5–0.9)
EOSINOPHILS RELATIVE PERCENT: 1 % (ref 1–4)
GFR SERPL CREATININE-BSD FRML MDRD: >60 ML/MIN/1.73M2
GLUCOSE BLD-MCNC: 116 MG/DL (ref 70–99)
HCT VFR BLD CALC: 30 % (ref 36.3–47.1)
HEMOGLOBIN: 9.6 G/DL (ref 11.9–15.1)
IMMATURE GRANULOCYTES: 0 %
LYMPHOCYTES # BLD: 22 % (ref 24–44)
MAGNESIUM: 1.6 MG/DL (ref 1.6–2.6)
MCH RBC QN AUTO: 37.6 PG (ref 25.2–33.5)
MCHC RBC AUTO-ENTMCNC: 32 G/DL (ref 28.4–34.8)
MCV RBC AUTO: 117.6 FL (ref 82.6–102.9)
MONOCYTES # BLD: 10 % (ref 1–7)
MORPHOLOGY: ABNORMAL
NRBC AUTOMATED: 0 PER 100 WBC
PDW BLD-RTO: 15.2 % (ref 11.8–14.4)
PLATELET # BLD: 340 K/UL (ref 138–453)
PMV BLD AUTO: 10.3 FL (ref 8.1–13.5)
POTASSIUM SERPL-SCNC: 3.6 MMOL/L (ref 3.7–5.3)
RBC # BLD: 2.55 M/UL (ref 3.95–5.11)
SEG NEUTROPHILS: 67 % (ref 36–66)
SEGMENTED NEUTROPHILS ABSOLUTE COUNT: 7.64 K/UL (ref 1.8–7.7)
SODIUM BLD-SCNC: 139 MMOL/L (ref 135–144)
WBC # BLD: 11.4 K/UL (ref 3.5–11.3)

## 2023-01-01 PROCEDURE — 6370000000 HC RX 637 (ALT 250 FOR IP): Performed by: INTERNAL MEDICINE

## 2023-01-01 PROCEDURE — 6360000002 HC RX W HCPCS: Performed by: NURSE PRACTITIONER

## 2023-01-01 PROCEDURE — 36415 COLL VENOUS BLD VENIPUNCTURE: CPT

## 2023-01-01 PROCEDURE — 6360000002 HC RX W HCPCS: Performed by: INTERNAL MEDICINE

## 2023-01-01 PROCEDURE — 80048 BASIC METABOLIC PNL TOTAL CA: CPT

## 2023-01-01 PROCEDURE — 83735 ASSAY OF MAGNESIUM: CPT

## 2023-01-01 PROCEDURE — 85025 COMPLETE CBC W/AUTO DIFF WBC: CPT

## 2023-01-01 PROCEDURE — 2060000000 HC ICU INTERMEDIATE R&B

## 2023-01-01 PROCEDURE — 6360000002 HC RX W HCPCS: Performed by: HEALTH CARE PROVIDER

## 2023-01-01 PROCEDURE — 94640 AIRWAY INHALATION TREATMENT: CPT

## 2023-01-01 PROCEDURE — 6370000000 HC RX 637 (ALT 250 FOR IP): Performed by: STUDENT IN AN ORGANIZED HEALTH CARE EDUCATION/TRAINING PROGRAM

## 2023-01-01 PROCEDURE — 2500000003 HC RX 250 WO HCPCS: Performed by: NURSE PRACTITIONER

## 2023-01-01 RX ORDER — POTASSIUM CHLORIDE 7.45 MG/ML
10 INJECTION INTRAVENOUS ONCE
Status: COMPLETED | OUTPATIENT
Start: 2023-01-01 | End: 2023-01-01

## 2023-01-01 RX ORDER — LORAZEPAM 2 MG/ML
0.5 INJECTION INTRAMUSCULAR ONCE
Status: COMPLETED | OUTPATIENT
Start: 2023-01-01 | End: 2023-01-01

## 2023-01-01 RX ADMIN — GUAIFENESIN 200 MG: 200 SOLUTION ORAL at 09:08

## 2023-01-01 RX ADMIN — Medication 100 MG: at 09:08

## 2023-01-01 RX ADMIN — BACLOFEN 5 MG: 5 TABLET ORAL at 09:08

## 2023-01-01 RX ADMIN — BACLOFEN 5 MG: 5 TABLET ORAL at 21:31

## 2023-01-01 RX ADMIN — BACLOFEN 5 MG: 5 TABLET ORAL at 15:13

## 2023-01-01 RX ADMIN — LORAZEPAM 0.5 MG: 2 INJECTION INTRAMUSCULAR; INTRAVENOUS at 22:17

## 2023-01-01 RX ADMIN — IPRATROPIUM BROMIDE 0.5 MG: 0.5 SOLUTION RESPIRATORY (INHALATION) at 16:47

## 2023-01-01 RX ADMIN — ALCOHOL 1 TABLET: 70.47 GEL TOPICAL at 09:08

## 2023-01-01 RX ADMIN — ENOXAPARIN SODIUM 30 MG: 100 INJECTION SUBCUTANEOUS at 09:08

## 2023-01-01 RX ADMIN — METOPROLOL TARTRATE 25 MG: 25 TABLET ORAL at 21:31

## 2023-01-01 RX ADMIN — IPRATROPIUM BROMIDE 0.5 MG: 0.5 SOLUTION RESPIRATORY (INHALATION) at 08:38

## 2023-01-01 RX ADMIN — IPRATROPIUM BROMIDE 0.5 MG: 0.5 SOLUTION RESPIRATORY (INHALATION) at 20:38

## 2023-01-01 RX ADMIN — METOPROLOL TARTRATE 25 MG: 25 TABLET ORAL at 09:08

## 2023-01-01 RX ADMIN — VENLAFAXINE 37.5 MG: 37.5 TABLET ORAL at 09:08

## 2023-01-01 RX ADMIN — FOLIC ACID 1 MG: 1 TABLET ORAL at 09:08

## 2023-01-01 RX ADMIN — POTASSIUM CHLORIDE 10 MEQ: 7.46 INJECTION, SOLUTION INTRAVENOUS at 10:42

## 2023-01-01 RX ADMIN — IPRATROPIUM BROMIDE 0.5 MG: 0.5 SOLUTION RESPIRATORY (INHALATION) at 12:05

## 2023-01-01 ASSESSMENT — PAIN SCALES - GENERAL: PAINLEVEL_OUTOF10: 0

## 2023-01-01 NOTE — PLAN OF CARE
Problem: Discharge Planning  Goal: Discharge to home or other facility with appropriate resources  Outcome: Progressing     Problem: Skin/Tissue Integrity  Goal: Absence of new skin breakdown  Description: 1. Monitor for areas of redness and/or skin breakdown  2. Assess vascular access sites hourly  3. Every 4-6 hours minimum:  Change oxygen saturation probe site  4. Every 4-6 hours:  If on nasal continuous positive airway pressure, respiratory therapy assess nares and determine need for appliance change or resting period. Outcome: Progressing     Problem: ABCDS Injury Assessment  Goal: Absence of physical injury  Outcome: Progressing  Flowsheets (Taken 12/31/2022 2206)  Absence of Physical Injury: Implement safety measures based on patient assessment     Problem: Safety - Adult  Goal: Free from fall injury  Outcome: Progressing     Problem: Pain  Goal: Verbalizes/displays adequate comfort level or baseline comfort level  Outcome: Progressing     Problem: Confusion  Goal: Confusion, delirium, dementia, or psychosis is improved or at baseline  Description: INTERVENTIONS:  1. Assess for possible contributors to thought disturbance, including medications, impaired vision or hearing, underlying metabolic abnormalities, dehydration, psychiatric diagnoses, and notify attending LIP  2. [place] high risk fall precautions, as indicated  3. Provide frequent short contacts to provide reality reorientation, refocusing and direction  4. Decrease environmental stimuli, including noise as appropriate  5. Monitor and intervene to maintain adequate nutrition, hydration, elimination, sleep and activity  6. If unable to ensure safety without constant attention obtain sitter and review sitter guidelines with assigned personnel  7.  Initiate Psychosocial CNS and Spiritual Care consult, as indicated  Outcome: Progressing     Problem: Respiratory - Adult  Goal: Achieves optimal ventilation and oxygenation  Outcome: Progressing  Flowsheets (Taken 12/31/2022 2000)  Achieves optimal ventilation and oxygenation:   Assess for changes in respiratory status   Assess for changes in mentation and behavior   Position to facilitate oxygenation and minimize respiratory effort   Oxygen supplementation based on oxygen saturation or arterial blood gases     Problem: Nutrition Deficit:  Goal: Optimize nutritional status  Outcome: Progressing     Problem: Risk for Elopement  Goal: Patient will not exit the unit/facility without proper excort  Outcome: Progressing     Problem: Safety - Medical Restraint  Goal: Remains free of injury from restraints (Restraint for Interference with Medical Device)  Description: INTERVENTIONS:  1. Determine that other, less restrictive measures have been tried or would not be effective before applying the restraint  2. Evaluate the patient's condition at the time of restraint application  3. Inform patient/family regarding the reason for restraint  4.  Q2H: Monitor safety, psychosocial status, comfort, nutrition and hydration  Outcome: Progressing

## 2023-01-01 NOTE — PROGRESS NOTES
[address]  Speech Language Pathology    Date: 1/1/2023  Patient Name: Adeel Dai  YOB: 1995   AGE: 32 y.o. MRN: [id number]        Patient Not Available for Speech Therapy     Due to:  [] Testing  [] Hemodialysis  [] Cancelled by RN  [] Surgery   [] Intubation/Sedation/Pain Medication  [] Medical instability  [x] Other: Order received for swallow reassessment, given pt's hx pt will need repeat Modified Barium Swallow Study to further assess swallow function. Recommend in 1-2 days as pt is very lethargic this date and continues with poor management of secretions per RN.      Next scheduled treatment: 1/2/22 as appropriate  Completed by: Cheikh Pompa, SLP, M.S. Andrea Signs

## 2023-01-01 NOTE — PLAN OF CARE
Problem: Respiratory - Adult  Goal: Achieves optimal ventilation and oxygenation  Outcome: Progressing  Flowsheets (Taken 1/1/2023 1211)  Achieves optimal ventilation and oxygenation:   Assess for changes in respiratory status   Oxygen supplementation based on oxygen saturation or arterial blood gases   Encourage broncho-pulmonary hygiene including cough, deep breathe, incentive spirometry   Assess the need for suctioning and aspirate as needed

## 2023-01-01 NOTE — PLAN OF CARE
Problem: Safety - Medical Restraint  Goal: Remains free of injury from restraints (Restraint for Interference with Medical Device)  Description: INTERVENTIONS:  1. Determine that other, less restrictive measures have been tried or would not be effective before applying the restraint  2. Evaluate the patient's condition at the time of restraint application  3. Inform patient/family regarding the reason for restraint  4. Q2H: Monitor safety, psychosocial status, comfort, nutrition and hydration  Outcome: Progressing     Problem: Discharge Planning  Goal: Discharge to home or other facility with appropriate resources  Outcome: Progressing     Problem: Skin/Tissue Integrity  Goal: Absence of new skin breakdown  Description: 1. Monitor for areas of redness and/or skin breakdown  2. Assess vascular access sites hourly  3. Every 4-6 hours minimum:  Change oxygen saturation probe site  4. Every 4-6 hours:  If on nasal continuous positive airway pressure, respiratory therapy assess nares and determine need for appliance change or resting period. Outcome: Progressing     Problem: ABCDS Injury Assessment  Goal: Absence of physical injury  Outcome: Progressing     Problem: Safety - Adult  Goal: Free from fall injury  Outcome: Progressing     Problem: Pain  Goal: Verbalizes/displays adequate comfort level or baseline comfort level  Outcome: Progressing     Problem: Confusion  Goal: Confusion, delirium, dementia, or psychosis is improved or at baseline  Description: INTERVENTIONS:  1. Assess for possible contributors to thought disturbance, including medications, impaired vision or hearing, underlying metabolic abnormalities, dehydration, psychiatric diagnoses, and notify attending LIP  2. [place] high risk fall precautions, as indicated  3. Provide frequent short contacts to provide reality reorientation, refocusing and direction  4. Decrease environmental stimuli, including noise as appropriate  5.  Monitor and intervene to maintain adequate nutrition, hydration, elimination, sleep and activity  6. If unable to ensure safety without constant attention obtain sitter and review sitter guidelines with assigned personnel  7.  Initiate Psychosocial CNS and Spiritual Care consult, as indicated  Outcome: Progressing     Problem: Respiratory - Adult  Goal: Achieves optimal ventilation and oxygenation  1/1/2023 1810 by Atul Bowers RN  Outcome: Progressing  1/1/2023 1211 by Terra Moreno RCP  Outcome: Progressing  Flowsheets (Taken 1/1/2023 1211)  Achieves optimal ventilation and oxygenation:   Assess for changes in respiratory status   Oxygen supplementation based on oxygen saturation or arterial blood gases   Encourage broncho-pulmonary hygiene including cough, deep breathe, incentive spirometry   Assess the need for suctioning and aspirate as needed     Problem: Nutrition Deficit:  Goal: Optimize nutritional status  Outcome: Progressing     Problem: Risk for Elopement  Goal: Patient will not exit the unit/facility without proper excort  Outcome: Progressing

## 2023-01-01 NOTE — PROGRESS NOTES
[facility]  Office: [address], DO, Burt Kumar, DO, Amber Martin, DO, [facility], DO, Orion Lopez MD, Raghavendra Crowley MD, Vignesh Rice MD, Renold Galeazzi, MD,  Minh Calles MD, Aly Saenz MD, Jamie Burgos, DO, Cely Zamora MD,  Huang Huston MD, Freada Osgood, MD, Tulio Desai, DO, Oskar Ugalde MD, Gail To MD, Jorge Cutler, DO, Avis Yañez MD, Hillary Pacheco MD, Kaleb Felix MD, Suman Desuoza MD, Ortega Saleem, DO, Kristina Chua MD, Brooks Mohamud MD, Vin Dinero, CNP,  Jorge Shelton, CNP, Awa Chavez, CNP, Sofy Petersen, CNP,  Nancy Lay, [facility], Beverly Underwood, CNP, Kanika Rai, CNP, Inez Torre, CNP, Nupur Gould, CNP, Lora Herrera, CNP, MATY RinaldiC, Sekou Almendarez, [facility], Lindsay Mesa, [facility], Purcell Bloch, [address] [place] [address]    Progress Note    12/31/2022    8:31 PM    Name:   Earl Jorge  MRN:     [id number]     Kendra:      [de-identified]   Room:   2015/2015-01   Day:  12  Admit Date:  12/15/2022 12:08 PM    PCP:   Nay Goldsmith  Code Status:  Full Code    Subjective:     C/C: transferred From bed to chair today. Chief Complaint   Patient presents with    Drug Overdose     Interval History Status: improved. Brief History:     More awake today. Still tachycardic to add metoprolol 12.5 mg twice daily. Blood pressure improved PT/OT. Baclofen 5 mg 3 times daily due to chronic alcohol abuse and to reduce NMDA receptor activity  By ENT today. No stridor. Referred to speech to reevaluate patient  Review of Systems:     Unable to answer  Medications:      Allergies:  No Known Allergies    Current Meds:   Scheduled Meds:    metoprolol tartrate  12.5 mg Oral BID    [START ON 1/1/2023] potassium chloride  10 mEq Oral BID    baclofen  5 mg Oral TID    ampicillin-sulbactam  3,000 mg IntraVENous Q6H    ipratropium  0.5 mg Nebulization 4x daily    venlafaxine  37.5 mg Per NG tube Daily    sodium chloride  500 mL IntraVENous Once    sodium chloride flush  5-40 mL IntraVENous 2 times per day    multivitamin  1 tablet Per NG tube Daily    famotidine  20 mg Per NG tube BID    thiamine  100 mg Per NG tube Daily    folic acid  1 mg Per NG tube Daily    enoxaparin  30 mg SubCUTAneous Daily     Continuous Infusions:    sodium chloride Stopped (22 1810)     PRN Meds: albuterol, guaiFENesin, albuterol, sodium chloride flush, sodium chloride, sodium phosphate IVPB **OR** sodium phosphate IVPB, sodium chloride flush, ondansetron **OR** ondansetron, polyethylene glycol, acetaminophen **OR** acetaminophen    Data:     Past Medical History:   has a past medical history of ADHD (attention deficit hyperactivity disorder), Anxiety, Bipolar 1 disorder ([address] and Depression. Social History:   reports that she has been smoking cigarettes. She has been smoking an average of .5 packs per day. She has never used smokeless tobacco. She reports current alcohol use. She reports current drug use. Drug: Marijuana [employer]). Family History:   Family History   Problem Relation Age of Onset    Hypertension Mother     Depression Mother     Bipolar Disorder Father     Alcohol Abuse Father     Cancer Other         Uncle ? Vitals:  /86   Pulse (!) 116   Temp 99.1 °F (37.3 °C) (Oral)   Resp 23   Ht 5' 5\" (1.651 m)   Wt 102 lb 11.8 oz (46.6 kg)   SpO2 98%   BMI 17.10 kg/m²   Temp (24hrs), Av.8 °F (37.1 °C), Min:98.2 °F (36.8 °C), Max:99.3 °F (37.4 °C)    Recent Labs     22  1542 22  1601   POCGLU 109* 125*       I/O (24Hr):   No intake or output data in the 24 hours ending 22    Labs:  Hematology:  Recent Labs     22  0620 22  0645 22  0309   WBC 15.6* 13.6* 9.7   RBC 2.24* 2.20* 2.56*   HGB 9.3* 9.5* 9.6*   HCT 27.4* 26.5* 29.3*   .3* 120.5* 114.5*   MCH 41.5* 43.2* 37.5*   MCHC 33.9 35.8* 32.8   RDW 15.5* 15.8* 15.0*    287 322   MPV 10.1 10.1 10.6   CRP 46.5* 37.1*  --    DDIMER 0.20  --   --      Chemistry:  Recent Labs     12/29/22  0620 12/29/22  1640 12/29/22  1754 12/30/22  0645 12/31/22  0309   *  --  137 137 136   K 3.9  --  4.0 3.7 3.5*   CL 95*  --  101 99 99   CO2 25  --  27 28 25   GLUCOSE 106*   < > 112* 106* 91   BUN 4*  --  4* 4* 4*   CREATININE 0.25*  --  0.27* 0.25* 0.36*   MG 1.9  --   --  1.8 1.7   ANIONGAP 11  --  9 10 12   LABGLOM >60  --  >60 >60 >60   CALCIUM 9.1  --  8.9 9.1 9.3   PHOS 3.6  --   --  3.6  --    PROBNP 145  --   --   --   --     < > = values in this interval not displayed. Recent Labs     12/29/22  0620 12/29/22  1542 12/29/22  1601 12/29/22  1754 12/30/22  0645   PROT  --   --   --  7.6 7.7   LABALBU  --   --   --  2.8* 2.9*   AST  --   --   --  274* 261*   ALT  --   --   --  109* 103*   ALKPHOS  --   --   --  187* 173*   BILITOT  --   --   --  0.7 0.7   LIPASE 111*  --   --   --   --    TRIG  --   --   --   --  109   POCGLU  --  109* 125*  --   --      ABG:  Lab Results   Component Value Date/Time    POCPH 7.416 12/29/2022 04:01 PM    POCPCO2 46.0 12/29/2022 04:01 PM    POCPO2 124.2 12/29/2022 04:01 PM    POCHCO3 29.5 12/29/2022 04:01 PM    NBEA 1 12/20/2022 04:29 AM    PBEA 4 12/29/2022 04:01 PM    QZGT6ZHI 99 12/29/2022 04:01 PM    FIO2 28.0 12/28/2022 04:52 PM     Lab Results   Component Value Date/Time    SPECIAL L HAND 5ML 12/28/2022 10:01 AM     Lab Results   Component Value Date/Time    CULTURE NO GROWTH 3 DAYS 12/28/2022 10:01 AM       Radiology:  CT ABDOMEN PELVIS WO CONTRAST Additional Contrast? None    Result Date: 12/28/2022  1. Consolidation in both lung bases with bilateral pleural effusions, consistent with pneumonia. 2. No acute abdominal abnormality. 3. Hepatic steatosis. XR CHEST PORTABLE    Result Date: 12/29/2022  Stable patchy bibasilar atelectasis or airspace disease.      XR CHEST PORTABLE    Result Date: 12/28/2022  Patchy ground-glass change in the lungs felt to represent multilobar pneumonia given clinical history. XR ABDOMEN FOR NG/OG/NE TUBE PLACEMENT    Result Date: 12/31/2022  Enteric tube in the stomach as above. No evidence of bowel obstruction. XR ABDOMEN FOR NG/OG/NE TUBE PLACEMENT    Result Date: 12/28/2022  Unremarkable limited KUB. NG tube tip projects at the left upper quadrant, overlying the expected location of the stomach. XR ABDOMEN FOR NG/OG/NE TUBE PLACEMENT    Result Date: 12/25/2022  1. Borderline dilated 3 cm small bowel loop in the left lower quadrant which can be seen with ileus or small-bowel obstruction. 2. NG tube distal tip likely in the body of the stomach. 3. Mild left basilar airspace disease. FL MODIFIED BARIUM SWALLOW W VIDEO    Result Date: 12/28/2022  Delayed oral phase of swallowing. Gross aspiration of puree with a delayed weak cough response. No other consistencies given. Please see separate speech pathology report for full discussion of findings and recommendations. Physical Examination:        General appearance: Awake, mumbles   Mental Status: More alert   Lungs: Bilateral air entry with coarse rhonchi and expiratory wheezing heard in all lung fields  Heart:  regular rate and rhythm, no murmur  Abdomen:  soft, nontender, nondistended, normal bowel sounds, no masses, hepatomegaly, splenomegaly  Extremities:  no edema, redness, tenderness in the calves.   Soft restraints placed  Skin:  no gross lesions, rashes, induration     Assessment:        Hospital Problems             Last Modified POA    * (Principal) Drug overdose of undetermined intent, initial encounter 12/15/2022 Yes    COVID-19 12/18/2022 Yes    Acute respiratory insufficiency 07/90/4489 Yes    Alcoholic intoxication with complication ([address] 10/66/7719 Yes    Altered mental status 12/18/2022 Yes    Acute respiratory failure with hypoxia ([address] 12/26/2022 Yes   This is a 18-year-old lady who came in with drug overdose and was intubated in ICU and transferred today at 4 AM to the [facility] floor with persistent copious secretions and found to have multilobar pneumonia requiring 4 L nasal cannula oxygen to maintain saturation above 90% with 2 episodes of failed swallow and barium swallow with PEG tube in situ. History of chronic alcohol abuse and withdrawal.   on 12/29/2022 patient had near respiratory arrest with marked lethargy and depressed mentation with excessive secretions for which she was unable to initially expectorate requiring 100% supplemental oxygen maintain saturation above 90%. RRT called when patient became more awake and started expectorating secretions which were suctioned every 5 minutes with improvement of oxygenation and being weaned off to maintain saturation above 92%. On 12/30/22- more awake. improving  Principal diagnosis  #Multilobar pneumonia secondary to aspiration drug overdose and intubation on IV Unasyn with COVID-positive on 12/12/2022. Complicated by near respiratory arrest and hypoxemia due to severe mental depression, lethargy and inability to expectorate her thick sputum. She eventually woke up and started expectorating with improvement in her saturation. Continue respiratory inhalation therapy. Discontinuation of Librium, Abilify baclofen and benzos. initiate Baclofen, to reduce NMDA receptor overactivity. Active diagnoses  #Chronic substance abuse-alcohol outpatient management. Patient is beyondthe window alcohol withdrawal.  Due to high risk of respiratory depression with her underlying bilateral pneumonia her medications were all immediately discontinued. #.5. Assess for B12 folate deficiency complicated by macrocytosis of alcoholism. #Tachycardia-low-dose beta-blockade blood pressure improved possible anxiety  #Failed swallow study. Seen by ENTWith suspected esophageal tear for evaluation by GI after healing in a few weeks. .  Continue NG feeds as appropriate TPN consult  Seen by ENT today. Reevaluate by speech once again.     Full code resuscitation     Plan:        Referral PT/OT, referral speech eval to reassess swallow monitor pulse and blood pressure IV fluids at 75 cc/h over    Surinder Mazariegos MD  12/31/2022  8:31 PM

## 2023-01-02 LAB
ABSOLUTE EOS #: 0.08 K/UL (ref 0–0.4)
ABSOLUTE IMMATURE GRANULOCYTE: 0.08 K/UL (ref 0–0.3)
ABSOLUTE LYMPH #: 1.85 K/UL (ref 1–4.8)
ABSOLUTE MONO #: 0.84 K/UL (ref 0.1–0.8)
ANION GAP SERPL CALCULATED.3IONS-SCNC: 12 MMOL/L (ref 9–17)
BASOPHILS # BLD: 0 % (ref 0–2)
BASOPHILS ABSOLUTE: 0 K/UL (ref 0–0.2)
BUN BLDV-MCNC: 6 MG/DL (ref 6–20)
CALCIUM SERPL-MCNC: 9.8 MG/DL (ref 8.6–10.4)
CHLORIDE BLD-SCNC: 95 MMOL/L (ref 98–107)
CO2: 24 MMOL/L (ref 20–31)
CREAT SERPL-MCNC: 0.32 MG/DL (ref 0.5–0.9)
CULTURE: NORMAL
CULTURE: NORMAL
EOSINOPHILS RELATIVE PERCENT: 1 % (ref 1–4)
GFR SERPL CREATININE-BSD FRML MDRD: >60 ML/MIN/1.73M2
GLUCOSE BLD-MCNC: 106 MG/DL (ref 70–99)
GLUCOSE BLD-MCNC: 107 MG/DL (ref 65–105)
GLUCOSE BLD-MCNC: 117 MG/DL (ref 65–105)
GLUCOSE BLD-MCNC: 95 MG/DL (ref 65–105)
GLUCOSE BLD-MCNC: 96 MG/DL (ref 65–105)
HCT VFR BLD CALC: 28.5 % (ref 36.3–47.1)
HEMOGLOBIN: 10.1 G/DL (ref 11.9–15.1)
IMMATURE GRANULOCYTES: 1 %
LYMPHOCYTES # BLD: 22 % (ref 24–44)
Lab: NORMAL
Lab: NORMAL
MAGNESIUM: 1.6 MG/DL (ref 1.6–2.6)
MCH RBC QN AUTO: 42.1 PG (ref 25.2–33.5)
MCHC RBC AUTO-ENTMCNC: 35.4 G/DL (ref 28.4–34.8)
MCV RBC AUTO: 118.8 FL (ref 82.6–102.9)
MONOCYTES # BLD: 10 % (ref 1–7)
MORPHOLOGY: ABNORMAL
NRBC AUTOMATED: 0 PER 100 WBC
PDW BLD-RTO: 15 % (ref 11.8–14.4)
PHOSPHORUS: 4.9 MG/DL (ref 2.6–4.5)
PLATELET # BLD: 391 K/UL (ref 138–453)
PMV BLD AUTO: 9.6 FL (ref 8.1–13.5)
POTASSIUM SERPL-SCNC: 3.7 MMOL/L (ref 3.7–5.3)
RBC # BLD: 2.4 M/UL (ref 3.95–5.11)
SEG NEUTROPHILS: 66 % (ref 36–66)
SEGMENTED NEUTROPHILS ABSOLUTE COUNT: 5.55 K/UL (ref 1.8–7.7)
SODIUM BLD-SCNC: 131 MMOL/L (ref 135–144)
SPECIMEN DESCRIPTION: NORMAL
SPECIMEN DESCRIPTION: NORMAL
WBC # BLD: 8.4 K/UL (ref 3.5–11.3)

## 2023-01-02 PROCEDURE — 82947 ASSAY GLUCOSE BLOOD QUANT: CPT

## 2023-01-02 PROCEDURE — 6360000002 HC RX W HCPCS: Performed by: HEALTH CARE PROVIDER

## 2023-01-02 PROCEDURE — 2060000000 HC ICU INTERMEDIATE R&B

## 2023-01-02 PROCEDURE — 84100 ASSAY OF PHOSPHORUS: CPT

## 2023-01-02 PROCEDURE — 94640 AIRWAY INHALATION TREATMENT: CPT

## 2023-01-02 PROCEDURE — 97535 SELF CARE MNGMENT TRAINING: CPT

## 2023-01-02 PROCEDURE — 6370000000 HC RX 637 (ALT 250 FOR IP): Performed by: STUDENT IN AN ORGANIZED HEALTH CARE EDUCATION/TRAINING PROGRAM

## 2023-01-02 PROCEDURE — 97110 THERAPEUTIC EXERCISES: CPT

## 2023-01-02 PROCEDURE — 83735 ASSAY OF MAGNESIUM: CPT

## 2023-01-02 PROCEDURE — 6370000000 HC RX 637 (ALT 250 FOR IP): Performed by: INTERNAL MEDICINE

## 2023-01-02 PROCEDURE — 6360000002 HC RX W HCPCS: Performed by: INTERNAL MEDICINE

## 2023-01-02 PROCEDURE — 80048 BASIC METABOLIC PNL TOTAL CA: CPT

## 2023-01-02 PROCEDURE — 97116 GAIT TRAINING THERAPY: CPT

## 2023-01-02 PROCEDURE — 85025 COMPLETE CBC W/AUTO DIFF WBC: CPT

## 2023-01-02 PROCEDURE — 94761 N-INVAS EAR/PLS OXIMETRY MLT: CPT

## 2023-01-02 PROCEDURE — 97530 THERAPEUTIC ACTIVITIES: CPT

## 2023-01-02 PROCEDURE — 36415 COLL VENOUS BLD VENIPUNCTURE: CPT

## 2023-01-02 RX ORDER — ALPRAZOLAM 0.25 MG/1
0.5 TABLET ORAL
Status: DISCONTINUED | OUTPATIENT
Start: 2023-01-02 | End: 2023-01-02

## 2023-01-02 RX ORDER — LORAZEPAM 2 MG/ML
0.5 INJECTION INTRAMUSCULAR
Status: COMPLETED | OUTPATIENT
Start: 2023-01-02 | End: 2023-01-02

## 2023-01-02 RX ADMIN — ENOXAPARIN SODIUM 30 MG: 100 INJECTION SUBCUTANEOUS at 08:22

## 2023-01-02 RX ADMIN — BACLOFEN 5 MG: 5 TABLET ORAL at 13:58

## 2023-01-02 RX ADMIN — FAMOTIDINE 20 MG: 20 TABLET, FILM COATED ORAL at 08:23

## 2023-01-02 RX ADMIN — BACLOFEN 5 MG: 5 TABLET ORAL at 22:25

## 2023-01-02 RX ADMIN — IPRATROPIUM BROMIDE 0.5 MG: 0.5 SOLUTION RESPIRATORY (INHALATION) at 08:12

## 2023-01-02 RX ADMIN — FAMOTIDINE 20 MG: 20 TABLET, FILM COATED ORAL at 22:25

## 2023-01-02 RX ADMIN — ALCOHOL 1 TABLET: 70.47 GEL TOPICAL at 08:23

## 2023-01-02 RX ADMIN — METOPROLOL TARTRATE 25 MG: 25 TABLET ORAL at 08:23

## 2023-01-02 RX ADMIN — BACLOFEN 5 MG: 5 TABLET ORAL at 08:23

## 2023-01-02 RX ADMIN — Medication 100 MG: at 08:23

## 2023-01-02 RX ADMIN — VENLAFAXINE 37.5 MG: 37.5 TABLET ORAL at 08:23

## 2023-01-02 RX ADMIN — FOLIC ACID 1 MG: 1 TABLET ORAL at 08:23

## 2023-01-02 RX ADMIN — METOPROLOL TARTRATE 25 MG: 25 TABLET ORAL at 22:25

## 2023-01-02 RX ADMIN — LORAZEPAM 0.5 MG: 2 INJECTION INTRAMUSCULAR at 22:20

## 2023-01-02 ASSESSMENT — PAIN SCALES - GENERAL
PAINLEVEL_OUTOF10: 6
PAINLEVEL_OUTOF10: 0
PAINLEVEL_OUTOF10: 5
PAINLEVEL_OUTOF10: 0
PAINLEVEL_OUTOF10: 0

## 2023-01-02 NOTE — PROGRESS NOTES
Physical Therapy  Facility/Department: [facility] CAR 2- STEPDOWN  Physical Therapy Daily Treatment Note    Name: Carlos Jasso  : 1995  MRN: [id number]  Date of Service: 2023    Discharge Recommendations:  Patient would benefit from continued therapy after discharge   PT Equipment Recommendations  Equipment Needed: No      Patient Diagnosis(es): The encounter diagnosis was Drug overdose of undetermined intent, initial encounter. Past Medical History:  has a past medical history of ADHD (attention deficit hyperactivity disorder), Anxiety, Bipolar 1 disorder ([facility] [address] and Depression. Past Surgical History:  has no past surgical history on file. Assessment   Body Structures, Functions, Activity Limitations Requiring Skilled Therapeutic Intervention: Decreased ROM; Decreased strength;Decreased endurance; Increased pain;Decreased posture;Decreased balance  Assessment: Pt required Perla to perform bed mobility and was able to sit EOB with supervision. Pt stood and ambulated ~15ft with RW and Perla x2 for safety, most limited by decreased strength and endurance. Recommending continued PT to address deficits and maximize safety and independence with mobility. Therapy Prognosis: Fair  Requires PT Follow-Up: Yes  Activity Tolerance  Activity Tolerance: Patient limited by endurance; Patient tolerated treatment well     Plan   Physcial Therapy Plan  General Plan:  (5-6x/week)  Current Treatment Recommendations: Strengthening, ROM, Balance training, Functional mobility training, Transfer training, Gait training, Stair training, Endurance training, Home exercise program, Safety education & training, Equipment evaluation, education, & procurement, Pain management  Safety Devices  Type of Devices: Call light within reach, Gait belt, Nurse notified, Patient at risk for falls, Chair alarm in place, Left in chair  Restraints  Restraints Initially in Place: No     Restrictions  Restrictions/Precautions  Restrictions/Precautions: Fall Risk, General Precautions  Required Braces or Orthoses?: No  Position Activity Restriction  Other position/activity restrictions: up with assist, extubated 12/23, NG tube     Subjective   General  Chart Reviewed: Yes  Patient assessed for rehabilitation services?: Yes  Response To Previous Treatment: Patient with no complaints from previous session. Family / Caregiver Present: No  Follows Commands: Within Functional Limits  General Comment  Comments: Pt retired to chair at end of session with call light in reach and sitter present in room. Subjective  Subjective: Pt and RN agreeable to therapy this morning. Pt supine in bed upon arrival with no c/o pain. Pt pleasant and cooperative throughout session, motivated to improve. Cognition   Orientation  Overall Orientation Status: Within Functional Limits  Cognition  Overall Cognitive Status: Exceptions  Arousal/Alertness: Delayed responses to stimuli  Following Commands: Follows one step commands with repetition  Attention Span: Difficulty attending to directions; Attends with cues to redirect  Safety Judgement: Decreased awareness of need for safety;Decreased awareness of need for assistance  Initiation: Requires cues for some  Sequencing: Requires cues for some     Objective   Bed mobility  Supine to Sit: Minimal assistance  Sit to Supine: Unable to assess (Pt retired to chair at end of session.)  Scooting: Contact guard assistance  Bed Mobility Comments: HOB elevated, required assistance with trunk progression, able to progress LEs to EOB  Transfers  Sit to Stand: Minimal Assistance;2 Person Assistance  Stand to Sit: Minimal Assistance;2 Person Assistance  Bed to Chair: Minimal assistance;2 Person Assistance  Comment: RW used for transfers, cueing for hand placement with good return demo. Pt required Perla x2 to perform STS transfers from bed.   Ambulation  Surface: Level tile  Device: Rolling Walker  Assistance: Minimal assistance;2 Person assistance  Quality of Gait: B knees flexed, mildly unsteady. Gait Deviations: Slow Elizabeth;Decreased step height;Decreased step length  Distance: 15ft within room  Comments: Pt ambulated within room ~15ft with RW and Perla x2, cueing for safe RW management and also to attempt to lock out knees to prevent buckling. Pt ambulates very slow taking very small steps, mild instability noted with no true LOB or buckling noted. More Ambulation?: No  Stairs/Curb  Stairs?: No     Balance  Posture: Good  Sitting - Static: Good  Sitting - Dynamic: Fair  Standing - Static: Fair  Standing - Dynamic: Fair;-  Comments: RW used to assess standing balance, pt able to sit EOB with supervision. Exercise Treatment:  Seated LE exercise program: Long Arc Quads, hip abduction/adduction, heel/toe raises, and marches. Reps: x10 BLE  Comments: Pt performed while seated in chair. Static Sitting Balance Exercises: Pt sat EOB ~15 minutes with supervision. Dynamic Standing Balance Exercises: standing marches: x10 reps with use of RW for UE support. AM-PAC Score  -PAC Inpatient Mobility Raw Score : 16 (01/02/23 1322)  AM-PAC Inpatient T-Scale Score : 40.78 (01/02/23 1322)  Mobility Inpatient CMS 0-100% Score: 54.16 (01/02/23 1322)  Mobility Inpatient CMS G-Code Modifier : CK (01/02/23 1322)      Goals  Short Term Goals  Time Frame for Short Term Goals: 14  Short Term Goal 1: Pt to perform bed mobility Perla  Short Term Goal 2: Pt to demonstrate functional transfers Perla  Short Term Goal 3: Ambulate 50ft w/ RW modA  Short Term Goal 4: Pt to demonstrate standing dynamic balance of fair + to decrease fall risk  Patient Goals   Patient Goals :  To go home       Education  Patient Education  Education Given To: Patient  Education Provided: Role of Therapy;Plan of Care;Home Exercise Program;Transfer Training  Education Method: Demonstration  Barriers to Learning: None  Education Outcome: Verbalized understanding;Demonstrated understanding      Therapy Time   Individual Concurrent Group Co-treatment   Time In 0923         Time Out 1001         Minutes 38         Timed Code Treatment Minutes: 23 Minutes (co-treat with OT)       Scott Sánchez, PTA

## 2023-01-02 NOTE — CARE COORDINATION
Met with patient to discuss transitional planning. She does not want to go to a nursing facility. She also declines home care.  She relates that her boyfriend will be able to help care for her at home and she will not be left alone

## 2023-01-02 NOTE — PROGRESS NOTES
Occupational Therapy  Facility/Department: [facility] CAR 2- STEPDOWN  Occupational Daily Treatment Note    Name: Lauren Shaikh  : 1995  MRN: [id number]  Date of Service: 2023    Discharge Recommendations:  Patient would benefit from continued therapy after discharge  Patient Diagnosis(es): The encounter diagnosis was Drug overdose of undetermined intent, initial encounter. Past Medical History:  has a past medical history of ADHD (attention deficit hyperactivity disorder), Anxiety, Bipolar 1 disorder ([address] and Depression. Past Surgical History:  has no past surgical history on file. Assessment   Performance deficits / Impairments: Decreased functional mobility ; Decreased ADL status; Decreased high-level IADLs;Decreased endurance;Decreased strength;Decreased cognition;Decreased balance;Decreased coordination  Prognosis: Good  Activity Tolerance  Activity Tolerance: Patient Tolerated treatment well;Patient limited by fatigue        Plan   Occupational Therapy Plan  Times Per Week: 3-4x/wk     Restrictions  Restrictions/Precautions  Restrictions/Precautions: Fall Risk, General Precautions  Required Braces or Orthoses?: No  Position Activity Restriction  Other position/activity restrictions: up with assist, extubated , NG tube  Pain assessment: Pt denies pain this day. Subjective   General  Patient assessed for rehabilitation services?: Yes  Response to previous treatment: Patient with no complaints from previous session  Family / Caregiver Present: No  Diagnosis: drug overdose     Safety Devices  Type of Devices: All fall risk precautions in place;Call light within reach; Chair alarm in place;Gait belt;Nurse notified; Left in chair  Restraints  Restraints Initially in Place: No  Balance  Sitting:  (CGA seated EOB x 15 minutes.)  Standing: With support (Min A x2 for safety using RW static standing EOB, functional mobility household distance using RW. Total time 5 minutes.  Pt B knees in mild flexion, verbal cues to place in extension with poor return. No LOB noted.)  Gait  Overall Level of Assistance: Minimum assistance;Assist X2;Additional time (Slow shuffling steps.)  Assistive Device: Walker, rolling     ADL  Feeding: NPO (NG tube)  Grooming: Setup;Verbal cueing; Increased time to complete;Contact guard assistance (Brush hair seated EOB.)  UE Bathing: Setup;Verbal cueing; Increased time to complete;Contact guard assistance (Wash face seated EOB.)  Additional Comments: Pt completed supine/sit transfer to seated EOB. Simple grooming completed seated EOB, increased time needed to complete. pt speaks in low tone, hard to hear pt. Pt able to reach up with BUE multiple times to brush hair and ponytail in. Sit/stand transfer using RW for static standing EOB. Functional mobility household distance using RW. Pt retired to seated in chair at end of session. Activity Tolerance  Activity Tolerance: Patient limited by endurance; Patient tolerated treatment well  Bed mobility  Supine to Sit: Minimal assistance (Trunk support.)  Scooting: Contact guard assistance  Bed Mobility Comments: HOB elevated. Transfers  Sit to stand: Minimal assistance;2 Person assistance  Stand to sit: Minimal assistance;2 Person assistance  Transfer Comments: Verbal cues for hand placement during transfer with poor return. Cognition  Overall Cognitive Status: Exceptions  Arousal/Alertness: Delayed responses to stimuli  Following Commands: Follows multistep commands with repitition; Follows multistep commands with increased time  Attention Span: Attends with cues to redirect  Safety Judgement: Decreased awareness of need for assistance;Decreased awareness of need for safety  Problem Solving: Assistance required to identify errors made;Assistance required to correct errors made  Insights: Decreased awareness of deficits  Initiation: Requires cues for some  Sequencing: Requires cues for some  Orientation  Overall Orientation Status: Within Functional Limits     Education Given To: Patient  Education Provided Comments: OT POC, transfer/walker safety, importance of participation in therapy with good return. AM-PAC Score     AM-PAC Inpatient Daily Activity Raw Score: 16 (01/02/23 1349)  AM-PAC Inpatient ADL T-Scale Score : 35.96 (01/02/23 1349)  ADL Inpatient CMS 0-100% Score: 53.32 (01/02/23 1349)  ADL Inpatient CMS G-Code Modifier : CK (01/02/23 1349)      Goals  Short Term Goals  Time Frame for Short Term Goals: pt will, by discharge  Short Term Goal 1: complete simple feeding/grooming tasks with mod A and set up  Short Term Goal 2: participate in meaningful task for ~8 minutes in order to increase endurance  Short Term Goal 3: dem mod A during bed mobility  Short Term Goal 4: dem ~15 minutes static/dynamic sitting tolerance on eOB with CGA  Short Term Goal 5: dem mod A during functional transfers with LRD, as needed       Therapy Time   Individual Concurrent Group Co-treatment   Time In 0920         Time Out 0953         Minutes 33         Timed Code Treatment Minutes: 23 Minutes    Pt supine in bed upon therapist arrival. Pleasant and agreeable to therapy. See above for LOF for all tasks. Pt retired to seated in chair at end of session with chair alarm activated and call light within reach.       RISHI Grimes

## 2023-01-02 NOTE — PROGRESS NOTES
Comprehensive Nutrition Assessment    Type and Reason for Visit:  Reassess    Nutrition Recommendations/Plan:   Recommend bowel regimen, no BM since 12/26  Continue TF as ordered  Monitor weight, labs and TF tolerance. Malnutrition Assessment:  Malnutrition Status:  Insufficient data (12/25/22 1123)    Context:  Acute Illness     Findings of the 6 clinical characteristics of malnutrition:  Energy Intake:  Mild decrease in energy intake (Comment)  Weight Loss:  Unable to assess (Weight fluctuations since admission noted.)     Body Fat Loss:  Mild body fat loss Orbital   Muscle Mass Loss:  Unable to assess (unable to fully assess as covered by blankets and under droplet precautions)    Fluid Accumulation:  No significant fluid accumulation     Strength:  Not Performed    Nutrition Assessment:    Patient determined not to be appropriate for MBSS 1/1. Last BM 12/26, recommend bowel regimen. Tolerating TF at goal rate of 55 ml/hr. Nutrition Related Findings:    Labs/meds reviewed. Last BM 12/26. NG in place. Wound Type: None       Current Nutrition Intake & Therapies:    Average Meal Intake: NPO  Average Supplements Intake: NPO  Current Tube Feeding (TF) Orders:  Feeding Route: Nasogastric  Formula: Standard without Fiber  Schedule: Continuous  Feeding Regimen: 55 ml/hr  Additives/Modulars: None  Water Flushes: 30 mL every 4 hrs  Current TF & Flush Orders Provides: Standard without fiber (Osmolite 1.2) at 55 ml/hr providing 1584 kcal, 73 gm protein/day  Goal TF & Flush Orders Provides: Standard without Fiber (Osmolite 1.2) goal 55 mL/hr = 1584 kcals, 73 gm pro/day. Anthropometric Measures:  Height: 5' 5\" (165.1 cm)  Ideal Body Weight (IBW): 125 lbs (57 kg)    Admission Body Weight: 102 lb 15.3 oz (46.7 kg)  Current Body Weight: 104 lb 8 oz (47.4 kg), 83.6 % IBW.  Weight Source: Bed Scale  Current BMI (kg/m2): 17.4                          BMI Categories: Underweight (BMI less than 18.5)    Estimated Daily Nutrient Needs:  Energy Requirements Based On: Kcal/kg  Weight Used for Energy Requirements: Current  Energy (kcal/day): 1500 kcals/day  Weight Used for Protein Requirements: Admission  Protein (g/day): 60-75 gm pro/day  Method Used for Fluid Requirements: Other (Comment)  Fluid (ml/day): per MD    Nutrition Diagnosis:   Inadequate oral intake related to swallowing difficulty as evidenced by swallow study results, nutrition support - enteral nutrition    Nutrition Interventions:   Food and/or Nutrient Delivery: Continue Current Tube Feeding  Nutrition Education/Counseling: No recommendation at this time  Coordination of Nutrition Care: Continue to monitor while inpatient  Plan of Care discussed with: RN    Goals:  Previous Goal Met: Goal(s) Achieved  Goals: Meet at least 75% of estimated needs, prior to discharge       Nutrition Monitoring and Evaluation:   Behavioral-Environmental Outcomes: None Identified  Food/Nutrient Intake Outcomes: Enteral Nutrition Intake/Tolerance  Physical Signs/Symptoms Outcomes: Biochemical Data, GI Status, Fluid Status or Edema, Nutrition Focused Physical Findings, Skin, Weight    Discharge Planning:     Too soon to determine     Vidhi Castillo, [address]  Contact: [phone]

## 2023-01-02 NOTE — PLAN OF CARE
Problem: Safety - Medical Restraint  Goal: Remains free of injury from restraints (Restraint for Interference with Medical Device)  Description: INTERVENTIONS:  1. Determine that other, less restrictive measures have been tried or would not be effective before applying the restraint  2. Evaluate the patient's condition at the time of restraint application  3. Inform patient/family regarding the reason for restraint  4. Q2H: Monitor safety, psychosocial status, comfort, nutrition and hydration  Outcome: Progressing     Problem: Discharge Planning  Goal: Discharge to home or other facility with appropriate resources  Outcome: Progressing     Problem: Skin/Tissue Integrity  Goal: Absence of new skin breakdown  Description: 1. Monitor for areas of redness and/or skin breakdown  2. Assess vascular access sites hourly  3. Every 4-6 hours minimum:  Change oxygen saturation probe site  4. Every 4-6 hours:  If on nasal continuous positive airway pressure, respiratory therapy assess nares and determine need for appliance change or resting period. Outcome: Progressing     Problem: ABCDS Injury Assessment  Goal: Absence of physical injury  Outcome: Progressing     Problem: Safety - Adult  Goal: Free from fall injury  Outcome: Progressing     Problem: Pain  Goal: Verbalizes/displays adequate comfort level or baseline comfort level  Outcome: Progressing     Problem: Confusion  Goal: Confusion, delirium, dementia, or psychosis is improved or at baseline  Description: INTERVENTIONS:  1. Assess for possible contributors to thought disturbance, including medications, impaired vision or hearing, underlying metabolic abnormalities, dehydration, psychiatric diagnoses, and notify attending LIP  2. [place] high risk fall precautions, as indicated  3. Provide frequent short contacts to provide reality reorientation, refocusing and direction  4. Decrease environmental stimuli, including noise as appropriate  5.  Monitor and intervene to maintain adequate nutrition, hydration, elimination, sleep and activity  6. If unable to ensure safety without constant attention obtain sitter and review sitter guidelines with assigned personnel  7.  Initiate Psychosocial CNS and Spiritual Care consult, as indicated  Outcome: Progressing     Problem: Respiratory - Adult  Goal: Achieves optimal ventilation and oxygenation  Outcome: Progressing     Problem: Nutrition Deficit:  Goal: Optimize nutritional status  Outcome: Progressing     Problem: Risk for Elopement  Goal: Patient will not exit the unit/facility without proper excort  Outcome: Progressing  Flowsheets  Taken 1/2/2023 0400 by Juliane Hines RN  Nursing Interventions for Elopement Risk:   Assist with personal care needs such as toileting, eating, dressing, as needed to reduce the risk of wandering   Collaborate with family members/caregivers to mitigate the elopement risk   Collaborate with treatment team for drug withdrawal symptoms treatment  Taken 1/2/2023 0000 by Juliane Hines RN  Nursing Interventions for Elopement Risk:   Assist with personal care needs such as toileting, eating, dressing, as needed to reduce the risk of wandering   Collaborate with family members/caregivers to mitigate the elopement risk   Collaborate with treatment team for drug withdrawal symptoms treatment

## 2023-01-02 NOTE — PROGRESS NOTES
[facility]  Office: [address], DO, Surinder Vides, DO, Shannon Hammonds, DO, Paula Richardson, DO, Prosper Harmon MD, Cameron Montes MD, Princess Ross MD, Johana Olsen MD,  Dacia Bentley MD, Cynthia Laws MD, Ladell Scheuermann, DO, Romario Oh MD,  Holli Onofre, DO, Konstantin Haro MD, [place] MD, Bin Watt, DO, Isabel Forbes MD, Truong Núñez MD, Enedelia Pang, DO, Sofiya German MD, Jessi Robbins MD, Debi Knight MD, Nella Saha MD, William Powell DO, Azael James MD, Debra Rasmussen MD, Emy Gleason, CNP,  Jen Guardado, CNP, Adrien Pham, CNP, Acacia Hernandez, CNP,  Radha Wells, [facility], Janina Zelaya, CNP, Ling Walker, CNP, Jair Green, CNP, Arleen Alpers, CNP, Radha Shine, CNP, Jerene Oppenheim, PA-C, Geovanna Benitez, CNS, Jose E Gonzalez, CNP, Giana Mon, [address] [place] [address]    Progress Note    1/1/2023    7:34 PM    Name:   Iza Neal  MRN:     [id number]     [place]:      [de-identified]   Room:   2015/2015-01   Day:  16  Admit Date:  12/15/2022 12:08 PM    PCP:   Keven Hawkins  Code Status:  Full Code    Subjective:     C/C: Status post esophageal tear from an intubation from drug overdose with difficulty swallowing with repeated failed swallow study with underlying history of chronic alcohol abuse. History of tachycardia which has improved since admission with beta-blocker patient's condition was explained to sister in person and mother via phone and has been advised for PEG tube placement if swallow does not improve after 14 days. Patient failed speech 1 day ago. Due to excessive secretion percussion plus and nebulizer treatment to reduce secretions ordered. Chief Complaint   Patient presents with    Drug Overdose     Interval History Status: improved. Brief History:   More awake today. Still tachycardic to add metoprolol 25 mg twice daily.   Blood pressure improved PT/OT. Baclofen 5 mg 3 times daily due to chronic alcohol abuse and to reduce NMDA receptor activity  Seen by ENT today. No stridor. Referred to speech to reevaluate patient      Review of Systems:     Patient unable to answer  Medications: Allergies:  No Known Allergies    Current Meds:   Scheduled Meds:    metoprolol tartrate  25 mg Oral BID    baclofen  5 mg Oral TID    ipratropium  0.5 mg Nebulization 4x daily    venlafaxine  37.5 mg Per NG tube Daily    multivitamin  1 tablet Per NG tube Daily    famotidine  20 mg Per NG tube BID    thiamine  100 mg Per NG tube Daily    folic acid  1 mg Per NG tube Daily    enoxaparin  30 mg SubCUTAneous Daily     Continuous Infusions:   PRN Meds: albuterol, albuterol, guaiFENesin, albuterol, ondansetron **OR** ondansetron, polyethylene glycol, acetaminophen **OR** acetaminophen    Data:     Past Medical History:   has a past medical history of ADHD (attention deficit hyperactivity disorder), Anxiety, Bipolar 1 disorder ([address] and Depression. Social History:   reports that she has been smoking cigarettes. She has been smoking an average of .5 packs per day. She has never used smokeless tobacco. She reports current alcohol use. She reports current drug use. Drug: Marijuana [place]). Family History:   Family History   Problem Relation Age of Onset    Hypertension Mother     Depression Mother     Bipolar Disorder Father     Alcohol Abuse Father     Cancer Other         Uncle ? Vitals:  /88   Pulse 99   Temp (P) 98.3 °F (36.8 °C)   Resp 18   Ht 5' 5\" (1.651 m)   Wt 102 lb 11.8 oz (46.6 kg)   SpO2 94%   BMI 17.10 kg/m²   Temp (24hrs), Av.2 °F (36.8 °C), Min:97.7 °F (36.5 °C), Max:98.8 °F (37.1 °C)    No results for input(s): POCGLU in the last 72 hours. I/O (24Hr):     Intake/Output Summary (Last 24 hours) at 2023 1934  Last data filed at 2023 1825  Gross per 24 hour   Intake 518 ml   Output --   Net 518 ml Labs:  Hematology:  Recent Labs     12/30/22  0645 12/31/22  0309 01/01/23  0632   WBC 13.6* 9.7 11.4*   RBC 2.20* 2.56* 2.55*   HGB 9.5* 9.6* 9.6*   HCT 26.5* 29.3* 30.0*   .5* 114.5* 117.6*   MCH 43.2* 37.5* 37.6*   MCHC 35.8* 32.8 32.0   RDW 15.8* 15.0* 15.2*    322 340   MPV 10.1 10.6 10.3   CRP 37.1*  --   --      Chemistry:  Recent Labs     12/30/22  0645 12/31/22  0309 01/01/23  0632    136 139   K 3.7 3.5* 3.6*   CL 99 99 102   CO2 28 25 24   GLUCOSE 106* 91 116*   BUN 4* 4* 4*   CREATININE 0.25* 0.36* 0.35*   MG 1.8 1.7 1.6   ANIONGAP 10 12 13   LABGLOM >60 >60 >60   CALCIUM 9.1 9.3 9.5   PHOS 3.6  --   --      Recent Labs     12/30/22  0645   PROT 7.7   LABALBU 2.9*   *   *   ALKPHOS 173*   BILITOT 0.7   TRIG 109     ABG:  Lab Results   Component Value Date/Time    POCPH 7.416 12/29/2022 04:01 PM    POCPCO2 46.0 12/29/2022 04:01 PM    POCPO2 124.2 12/29/2022 04:01 PM    POCHCO3 29.5 12/29/2022 04:01 PM    NBEA 1 12/20/2022 04:29 AM    PBEA 4 12/29/2022 04:01 PM    CAQD8BRF 99 12/29/2022 04:01 PM    FIO2 28.0 12/28/2022 04:52 PM     Lab Results   Component Value Date/Time    SPECIAL L HAND 5ML 12/28/2022 10:01 AM     Lab Results   Component Value Date/Time    CULTURE NO GROWTH 4 DAYS 12/28/2022 10:01 AM       Radiology:  CT ABDOMEN PELVIS WO CONTRAST Additional Contrast? None    Result Date: 12/28/2022  1. Consolidation in both lung bases with bilateral pleural effusions, consistent with pneumonia. 2. No acute abdominal abnormality. 3. Hepatic steatosis. XR CHEST PORTABLE    Result Date: 12/29/2022  Stable patchy bibasilar atelectasis or airspace disease. XR CHEST PORTABLE    Result Date: 12/28/2022  Patchy ground-glass change in the lungs felt to represent multilobar pneumonia given clinical history. XR ABDOMEN FOR NG/OG/NE TUBE PLACEMENT    Result Date: 12/31/2022  Enteric tube in the stomach as above. No evidence of bowel obstruction.      XR ABDOMEN FOR NG/OG/NE TUBE PLACEMENT    Result Date: 12/28/2022  Unremarkable limited KUB. NG tube tip projects at the left upper quadrant, overlying the expected location of the stomach. FL MODIFIED BARIUM SWALLOW W VIDEO    Result Date: 12/28/2022  Delayed oral phase of swallowing. Gross aspiration of puree with a delayed weak cough response. No other consistencies given. Please see separate speech pathology report for full discussion of findings and recommendations. Physical Examination:      General appearance: Awake, mumbles, able to transfer from bed to chair and sat in chair at today  Mental Status: More alert   Lungs: Bilateral air entry with coarse rhonchi and expiratory wheezing heard in all lung fields  Heart:  regular rate and rhythm, no murmur  Abdomen:  soft, nontender, nondistended, normal bowel sounds, no masses, hepatomegaly, splenomegaly  Extremities:  no edema, redness, tenderness in the calves. Soft restraints placed  Skin:  no gross lesions, rashes, induration  Assessment:        Hospital Problems             Last Modified POA    * (Principal) Drug overdose of undetermined intent, initial encounter 12/15/2022 Yes    COVID-19 12/18/2022 Yes    Acute respiratory insufficiency 86/44/0433 Yes    Alcoholic intoxication with complication ([address] 69/66/4289 Yes    Altered mental status 12/18/2022 Yes    Acute respiratory failure with hypoxia ([address] 12/26/2022 Yes   This is a 77-year-old lady who came in with drug overdose and was intubated in ICU and [facility]ious [facility] and found to have multilobar pneumonia requiring 4 L nasal cannula oxygen to maintain saturation above 90% with 2 episodes of failed swallow and barium swallow with PEG tube in situ.   History of chronic alcohol abuse and withdrawal.   on 12/29/2022 patient had near respiratory arrest with marked lethargy and depressed mentation with excessive secretions for which she was unable to initially expectorate requiring 100% supplemental oxygen maintain saturation above 90%. RRT called when patient became more awake and started expectorating secretions which were suctioned every 5 minutes with improvement of oxygenation and being weaned off to maintain saturation above 92%. On 12/30/22- more awake. improving  Principal diagnosis  #Multilobar pneumonia secondary to aspiration drug overdose and intubation on IV Unasyn with COVID-positive on 12/12/2022. Complicated by near respiratory arrest and hypoxemia due to severe mental depression, lethargy and inability to expectorate her thick sputum. She eventually woke up and started expectorating with improvement in her saturation. Copious amount of secretions with nebulizer treatment to dry it up. Percussion vest ordered. Continue respiratory inhalation therapy. Discontinuation of Librium, Abilify baclofen and benzos. initiate Baclofen, to reduce NMDA receptor overactivity. Active diagnoses  #Chronic substance abuse-alcohol outpatient management. Patient is beyondthe window alcohol withdrawal.  Due to high risk of respiratory depression with her underlying bilateral pneumonia her medications were all immediately discontinued. #.5. Assess for B12 folate deficiency complicated by macrocytosis of alcoholism. #Tachycardia-low-dose beta-blockade blood pressure improved possible anxiety  #Failed swallow study. Seen by ENTWith suspected esophageal tear for evaluation by GI after healing in a few weeks. .  Continue NG feeds as appropriate TPN consult  Seen by ENT today. Reevaluate by speech once again. Full code resuscitation   Case discussed with both sister in person and mother via phone about the possibility of PEG tube placement that may be required if patient does not pass a swallow study in about 14 days. Plan: For PT/OT and reevaluation by speech with modified barium swallow on 1/2/2023.   Metoprolol dose increased from 12.5 to 25 mg due to persistent tachycardia pulse 108/min blood pressure improved at 128/84 mmHg.   Lovenox for DVT prophylaxis,  Full code resuscitation status,  Condition guarded,  Disposition to be determined  Nabeel Carmona MD  1/1/2023  7:34 PM

## 2023-01-03 ENCOUNTER — APPOINTMENT (OUTPATIENT)
Dept: GENERAL RADIOLOGY | Age: 28
DRG: 812 | End: 2023-01-03
Payer: MEDICARE

## 2023-01-03 LAB
ABSOLUTE EOS #: 0.17 K/UL (ref 0–0.44)
ABSOLUTE IMMATURE GRANULOCYTE: 0 K/UL (ref 0–0.3)
ABSOLUTE LYMPH #: 2.35 K/UL (ref 1.1–3.7)
ABSOLUTE MONO #: 1.04 K/UL (ref 0.1–1.2)
BASOPHILS # BLD: 1 % (ref 0–2)
BASOPHILS ABSOLUTE: 0.09 K/UL (ref 0–0.2)
EOSINOPHILS RELATIVE PERCENT: 2 % (ref 1–4)
GLUCOSE BLD-MCNC: 102 MG/DL (ref 65–105)
GLUCOSE BLD-MCNC: 114 MG/DL (ref 65–105)
HCT VFR BLD CALC: 30.8 % (ref 36.3–47.1)
HEMOGLOBIN: 10.1 G/DL (ref 11.9–15.1)
IMMATURE GRANULOCYTES: 0 %
LYMPHOCYTES # BLD: 27 % (ref 24–43)
MAGNESIUM: 1.6 MG/DL (ref 1.6–2.6)
MCH RBC QN AUTO: 36.3 PG (ref 25.2–33.5)
MCHC RBC AUTO-ENTMCNC: 32.8 G/DL (ref 28.4–34.8)
MCV RBC AUTO: 110.8 FL (ref 82.6–102.9)
MONOCYTES # BLD: 12 % (ref 3–12)
MORPHOLOGY: ABNORMAL
MORPHOLOGY: ABNORMAL
NRBC AUTOMATED: 0 PER 100 WBC
PDW BLD-RTO: 14.7 % (ref 11.8–14.4)
PLATELET # BLD: 357 K/UL (ref 138–453)
PMV BLD AUTO: 10.4 FL (ref 8.1–13.5)
RBC # BLD: 2.78 M/UL (ref 3.95–5.11)
SEG NEUTROPHILS: 58 % (ref 36–65)
SEGMENTED NEUTROPHILS ABSOLUTE COUNT: 5.05 K/UL (ref 1.5–8.1)
WBC # BLD: 8.7 K/UL (ref 3.5–11.3)

## 2023-01-03 PROCEDURE — 82947 ASSAY GLUCOSE BLOOD QUANT: CPT

## 2023-01-03 PROCEDURE — 36415 COLL VENOUS BLD VENIPUNCTURE: CPT

## 2023-01-03 PROCEDURE — 6360000002 HC RX W HCPCS: Performed by: HEALTH CARE PROVIDER

## 2023-01-03 PROCEDURE — 6370000000 HC RX 637 (ALT 250 FOR IP): Performed by: INTERNAL MEDICINE

## 2023-01-03 PROCEDURE — 99232 SBSQ HOSP IP/OBS MODERATE 35: CPT | Performed by: STUDENT IN AN ORGANIZED HEALTH CARE EDUCATION/TRAINING PROGRAM

## 2023-01-03 PROCEDURE — 85025 COMPLETE CBC W/AUTO DIFF WBC: CPT

## 2023-01-03 PROCEDURE — 6360000002 HC RX W HCPCS: Performed by: NURSE PRACTITIONER

## 2023-01-03 PROCEDURE — 6370000000 HC RX 637 (ALT 250 FOR IP): Performed by: STUDENT IN AN ORGANIZED HEALTH CARE EDUCATION/TRAINING PROGRAM

## 2023-01-03 PROCEDURE — 92611 MOTION FLUOROSCOPY/SWALLOW: CPT

## 2023-01-03 PROCEDURE — 2060000000 HC ICU INTERMEDIATE R&B

## 2023-01-03 PROCEDURE — 83735 ASSAY OF MAGNESIUM: CPT

## 2023-01-03 PROCEDURE — 74230 X-RAY XM SWLNG FUNCJ C+: CPT

## 2023-01-03 RX ORDER — LORAZEPAM 2 MG/ML
0.5 INJECTION INTRAMUSCULAR ONCE
Status: COMPLETED | OUTPATIENT
Start: 2023-01-03 | End: 2023-01-03

## 2023-01-03 RX ADMIN — METOPROLOL TARTRATE 25 MG: 25 TABLET ORAL at 07:55

## 2023-01-03 RX ADMIN — ALCOHOL 1 TABLET: 70.47 GEL TOPICAL at 07:55

## 2023-01-03 RX ADMIN — ENOXAPARIN SODIUM 30 MG: 100 INJECTION SUBCUTANEOUS at 07:56

## 2023-01-03 RX ADMIN — Medication 100 MG: at 07:55

## 2023-01-03 RX ADMIN — BACLOFEN 5 MG: 5 TABLET ORAL at 07:55

## 2023-01-03 RX ADMIN — VENLAFAXINE 37.5 MG: 37.5 TABLET ORAL at 07:55

## 2023-01-03 RX ADMIN — BACLOFEN 5 MG: 5 TABLET ORAL at 12:37

## 2023-01-03 RX ADMIN — METOPROLOL TARTRATE 25 MG: 25 TABLET ORAL at 20:31

## 2023-01-03 RX ADMIN — BACLOFEN 5 MG: 5 TABLET ORAL at 20:31

## 2023-01-03 RX ADMIN — LORAZEPAM 0.5 MG: 2 INJECTION INTRAMUSCULAR; INTRAVENOUS at 20:30

## 2023-01-03 RX ADMIN — FOLIC ACID 1 MG: 1 TABLET ORAL at 07:55

## 2023-01-03 RX ADMIN — FAMOTIDINE 20 MG: 20 TABLET, FILM COATED ORAL at 20:31

## 2023-01-03 RX ADMIN — FAMOTIDINE 20 MG: 20 TABLET, FILM COATED ORAL at 07:55

## 2023-01-03 ASSESSMENT — PAIN SCALES - GENERAL
PAINLEVEL_OUTOF10: 0
PAINLEVEL_OUTOF10: 5
PAINLEVEL_OUTOF10: 4

## 2023-01-03 NOTE — PLAN OF CARE
Problem: Discharge Planning  Goal: Discharge to home or other facility with appropriate resources  Outcome: Progressing     Problem: Skin/Tissue Integrity  Goal: Absence of new skin breakdown  Description: 1. Monitor for areas of redness and/or skin breakdown  2. Assess vascular access sites hourly  3. Every 4-6 hours minimum:  Change oxygen saturation probe site  4. Every 4-6 hours:  If on nasal continuous positive airway pressure, respiratory therapy assess nares and determine need for appliance change or resting period. Outcome: Progressing     Problem: ABCDS Injury Assessment  Goal: Absence of physical injury  Outcome: Progressing     Problem: Safety - Adult  Goal: Free from fall injury  Outcome: Progressing     Problem: Pain  Goal: Verbalizes/displays adequate comfort level or baseline comfort level  Outcome: Progressing     Problem: Confusion  Goal: Confusion, delirium, dementia, or psychosis is improved or at baseline  Description: INTERVENTIONS:  1. Assess for possible contributors to thought disturbance, including medications, impaired vision or hearing, underlying metabolic abnormalities, dehydration, psychiatric diagnoses, and notify attending LIP  2. [place] high risk fall precautions, as indicated  3. Provide frequent short contacts to provide reality reorientation, refocusing and direction  4. Decrease environmental stimuli, including noise as appropriate  5. Monitor and intervene to maintain adequate nutrition, hydration, elimination, sleep and activity  6. If unable to ensure safety without constant attention obtain sitter and review sitter guidelines with assigned personnel  7.  Initiate Psychosocial CNS and Spiritual Care consult, as indicated  Outcome: Progressing     Problem: Respiratory - Adult  Goal: Achieves optimal ventilation and oxygenation  Outcome: Progressing     Problem: Nutrition Deficit:  Goal: Optimize nutritional status  Outcome: Progressing     Problem: Risk for Elopement  Goal: Patient will not exit the unit/facility without proper excort  Outcome: Progressing     Problem: Safety - Medical Restraint  Goal: Remains free of injury from restraints (Restraint for Interference with Medical Device)  Description: INTERVENTIONS:  1. Determine that other, less restrictive measures have been tried or would not be effective before applying the restraint  2. Evaluate the patient's condition at the time of restraint application  3. Inform patient/family regarding the reason for restraint  4.  Q2H: Monitor safety, psychosocial status, comfort, nutrition and hydration  Outcome: Progressing

## 2023-01-03 NOTE — PROGRESS NOTES
[facility]  Office: [address], DO, Lauren Males, DO, Won Loop, DO, Marcia Schmitt Blood, DO, Tru Maldonado MD, Linda Osborn MD, Wellington Diaz MD, Susannah Botello MD,  Jennyfer Chacon MD, Jayesh Peguero MD, Virgen Ryder, DO, Jose Schmitt MD,  Teena Casas MD, Yamila Qureshi MD, Claudean Budds, DO, Marcos Ch MD, Beltran Cueva MD, Ferny Bird, DO, Asmita Erazo MD, Ezequiel Hobbs MD, Jessica Wu MD, Holly Morgan MD, Isabelle Madrid, DO, Nathaniel Sanders MD, Lyric Santana MD, Mariella Martin, CNP,  Eliza Bazan, CNP, Kiya Becker, CNP, Adriana Bueno, CNP,  Rosendo Marti, [facility], Tatyana Herman, CNP, Fernand Cockayne, CNP, Cheryl Hendricks, CNP, Robin Unger, CNP, Satish Lopez, CNP, MATY GtzC, Henna Cabrera, CNS, Catrachito Quiroz, CNP, Francine Choudhary, [facility]    Progress Note    1/3/2023    3:52 PM    Name:   Lauren Shaikh  MRN:     [id number]     [place]:      [de-identified]   Room:   2015/2015-01   Day:  23  Admit Date:  12/15/2022 12:08 PM    PCP:   Fiordaliza Esteban  Code Status:  Full Code    Subjective:     C/C:   Chief Complaint   Patient presents with    Drug Overdose     Interval History Status: improved. Patient is awake and alert  Weak and tired, slow to respond but able to answer questions  Patient had swallow study done today and qualified for soft diet. Vitals are stable and  Patient on room air. Hemoglobin 10.1, magnesium 1.6. Brief History:     78-year-old female with past medical history of drug overdose, chronic alcohol use presented to the hospital after she was found unresponsive due to suspected drug overdose. GCS was 3 on arrival.  Patient was intubated for airway protection. ED work-up showed elevated alcohol level. Patient was also noted to have COVID-positive test.  Patient was admitted on 12/15 and was kept in ICU.   She was extubated on 12/23. Extubation was delayed due to absence of cuff leak. ENT was consulted, the cuff leak absence was due to presence of large ET tube. 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 Patient failed bedside swallow study and NG tube was placed for swallow study. Patient gradually improved and passed swallow study on 1/3. Review of Systems:     Constitutional:  negative for chills, fevers, sweats  Respiratory:  negative for cough, dyspnea on exertion, shortness of breath, wheezing  Cardiovascular:  negative for chest pain, chest pressure/discomfort, lower extremity edema, palpitations  Gastrointestinal:  negative for abdominal pain, constipation, diarrhea, nausea, vomiting  Neurological:  negative for dizziness, headache    Medications: Allergies:  No Known Allergies    Current Meds:   Scheduled Meds:    metoprolol tartrate  25 mg Oral BID    baclofen  5 mg Oral TID    venlafaxine  37.5 mg Per NG tube Daily    multivitamin  1 tablet Per NG tube Daily    famotidine  20 mg Per NG tube BID    thiamine  100 mg Per NG tube Daily    folic acid  1 mg Per NG tube Daily    enoxaparin  30 mg SubCUTAneous Daily     Continuous Infusions:   PRN Meds: albuterol, guaiFENesin, ondansetron **OR** ondansetron, polyethylene glycol, acetaminophen **OR** acetaminophen    Data:     Past Medical History:   has a past medical history of ADHD (attention deficit hyperactivity disorder), Anxiety, Bipolar 1 disorder ([address] and Depression. Social History:   reports that she has been smoking cigarettes. She has been smoking an average of .5 packs per day. She has never used smokeless tobacco. She reports current alcohol use. She reports current drug use. Drug: Marijuana [place]). Family History:   Family History   Problem Relation Age of Onset    Hypertension Mother     Depression Mother     Bipolar Disorder Father     Alcohol Abuse Father     Cancer Other         Uncle ? Vitals:  /87   Pulse 100   Temp 97 °F (36.1 °C) (Oral)   Resp 15   Ht 5' 5\" (1.651 m)   Wt 103 lb 9.9 oz (47 kg)   SpO2 99%   BMI 17.24 kg/m²   Temp (24hrs), Av.9 °F (37.2 °C), Min:97 °F (36.1 °C), Max:100 °F (37.8 °C)    Recent Labs     23  0009 23  0556   POCGLU 117* 107* 102 114*       I/O (24Hr): Intake/Output Summary (Last 24 hours) at 1/3/2023 1552  Last data filed at 1/3/2023 0800  Gross per 24 hour   Intake 1430 ml   Output --   Net 1430 ml       Labs:  Hematology:  Recent Labs     23  0632 23  0623  0557   WBC 11.4* 8.4 8.7   RBC 2.55* 2.40* 2.78*   HGB 9.6* 10.1* 10.1*   HCT 30.0* 28.5* 30.8*   .6* 118.8* 110.8*   MCH 37.6* 42.1* 36.3*   MCHC 32.0 35.4* 32.8   RDW 15.2* 15.0* 14.7*    391 357   MPV 10.3 9.6 10.4     Chemistry:  Recent Labs     23  0623  0654 23  0557    131*  --    K 3.6* 3.7  --     95*  --    CO2 24 24  --    GLUCOSE 116* 106*  --    BUN 4* 6  --    CREATININE 0.35* 0.32*  --    MG 1.6 1.6 1.6   ANIONGAP 13 12  --    LABGLOM >60 >60  --    CALCIUM 9.5 9.8  --    PHOS  --  4.9*  --      Recent Labs     23  0013 23  0643 23  12323  00023  0556   POCGLU 95 96 117* 107* 102 114*     ABG:  Lab Results   Component Value Date/Time    POCPH 7.416 2022 04:01 PM    POCPCO2 46.0 2022 04:01 PM    POCPO2 124.2 2022 04:01 PM    POCHCO3 29.5 2022 04:01 PM    NBEA 1 2022 04:29 AM    PBEA 4 2022 04:01 PM    EAKX3PLV 99 2022 04:01 PM    FIO2 28.0 2022 04:52 PM     Lab Results   Component Value Date/Time    SPECIAL L HAND 5ML 2022 10:01 AM     Lab Results   Component Value Date/Time    CULTURE NO GROWTH 5 DAYS 2022 10:01 AM       Radiology:  CT ABDOMEN PELVIS WO CONTRAST Additional Contrast? None    Result Date: 2022  1.  Consolidation in both lung bases with bilateral pleural effusions, consistent with pneumonia. 2. No acute abdominal abnormality. 3. Hepatic steatosis. XR CHEST PORTABLE    Result Date: 12/29/2022  Stable patchy bibasilar atelectasis or airspace disease. XR CHEST PORTABLE    Result Date: 12/28/2022  Patchy ground-glass change in the lungs felt to represent multilobar pneumonia given clinical history. XR ABDOMEN FOR NG/OG/NE TUBE PLACEMENT    Result Date: 12/31/2022  Enteric tube in the stomach as above. No evidence of bowel obstruction. XR ABDOMEN FOR NG/OG/NE TUBE PLACEMENT    Result Date: 12/28/2022  Unremarkable limited KUB. NG tube tip projects at the left upper quadrant, overlying the expected location of the stomach. FL MODIFIED BARIUM SWALLOW W VIDEO    Result Date: 1/3/2023  Deep laryngeal penetration of thin liquid. Trace laryngeal penetration of soft solid. Puree, cookie and nectar thick liquid well-tolerated. Please see separate speech pathology report for full discussion of findings and recommendations. FL MODIFIED BARIUM SWALLOW W VIDEO    Result Date: 12/28/2022  Delayed oral phase of swallowing. Gross aspiration of puree with a delayed weak cough response. No other consistencies given. Please see separate speech pathology report for full discussion of findings and recommendations.        Physical Examination:        General appearance:  alert, cooperative and no distress, very weak, malnourished  Mental Status:  oriented to person, place and time and normal affect  Lungs:  clear to auscultation bilaterally, normal effort  Heart:  regular rate and rhythm, no murmur  Abdomen:  soft, ng in place with tube feeds, non tender, nondistended, normal bowel sounds, no masses, hepatomegaly, splenomegaly  Extremities:  no edema, redness, tenderness in the calves  Skin:  no gross lesions, rashes, induration    Assessment:        Hospital Problems             Last Modified POA    * (Principal) Drug overdose of undetermined intent, initial encounter 12/15/2022 Yes    COVID-19 12/18/2022 Yes    Acute respiratory insufficiency 18/90/8428 Yes    Alcoholic intoxication with complication ([address] 38/83/1773 Yes    Altered mental status 12/18/2022 Yes    Acute respiratory failure with hypoxia ([address] 12/26/2022 Yes       Plan:        Patient passed swallow study  Resume diet per swallow study recommendations  Thiamine and folic acid  Patient counseled regarding alcohol cessation  Will need therapy evaluation  Macrocytosis likely secondary to alcohol use, vitamin V51 folic acid within normal limits  Tachycardia improved with Lopressor  Patient adamantly refuses to go to nursing home but is very weak  Incidental COVID, no treatment given, patient out of isolation  Discussed case with     Planning for discharge tomorrow if patient does well with diet.     Gwendolyn Salmon MD  1/3/2023  3:52 PM

## 2023-01-03 NOTE — PROGRESS NOTES
[facility]  Office: [address], DO, Becki Hernandes, DO, Foreign Moseley, DO, Mylescyrus Temple Blood, DO, Sarah Morgan MD, Chaitanya Campbell MD, Huang Zelaya MD, Jennifer Harrison MD,  Destiny Wiley MD, Julianna Interiano MD, Nola Truong, DO, Mamie Thompson MD,  Lucero Knapp MD, Devin Sheriff MD, Nola Luis, DO, Mala Pepper MD, Patricia Pozo MD, Ramon Amador, DO, Jimmy Burrows MD, Chana Torrez MD, Kerry Chandler MD, Gloria Quispe MD, Brandy Peralta DO, Rashawn King MD, Celestino Perry MD, Yisel Cardenas, CNP,  Juana Laws, CNP, Florencia Kamara, CNP, Eugene Brandt, CNP,  Oliver Vazquez, [facility], Catracho Lopez, CNP, Janeth Julio, CNP, Rashmi Lange, CNP, Zoltan Gómez, CNP, Antony Libman, CNP, Jen Forman, PA-C, Kimberli Duran, CNS, Alycia Shaw, CNP, Catherine Law, CNP         [address]    Progress Note    1/2/2023    9:15 PM    Name:   Jean Yanes  MRN:     [id number]     [place]:      [de-identified]   Room:   2015/2015-01   Day:  18  Admit Date:  12/15/2022 12:08 PM    PCP:   Aris Mendoza  Code Status:  Full Code    Subjective:     C/C:   Chief Complaint   Patient presents with    Drug Overdose     Interval History Status: improved. Brief History:   Patient is much more responsive and aware of her surroundings.       Review of Systems:   unAble to answer    Allergies:  No Known Allergies    Current Meds:   Scheduled Meds:    metoprolol tartrate  25 mg Oral BID    baclofen  5 mg Oral TID    venlafaxine  37.5 mg Per NG tube Daily    multivitamin  1 tablet Per NG tube Daily    famotidine  20 mg Per NG tube BID    thiamine  100 mg Per NG tube Daily    folic acid  1 mg Per NG tube Daily    enoxaparin  30 mg SubCUTAneous Daily     Continuous Infusions:   PRN Meds: albuterol, albuterol, guaiFENesin, ondansetron **OR** ondansetron, polyethylene glycol, acetaminophen **OR** acetaminophen    Data:     Past Medical History:   has a past medical history of ADHD (attention deficit hyperactivity disorder), Anxiety, Bipolar 1 disorder ([address] and Depression. Social History:   reports that she has been smoking cigarettes. She has been smoking an average of .5 packs per day. She has never used smokeless tobacco. She reports current alcohol use. She reports current drug use. Drug: Marijuana [place]). Family History:   Family History   Problem Relation Age of Onset    Hypertension Mother     Depression Mother     Bipolar Disorder Father     Alcohol Abuse Father     Cancer Other         Uncle ? Vitals:  /78   Pulse (!) 101   Temp 98.2 °F (36.8 °C) (Axillary)   Resp 21   Ht 5' 5\" (1.651 m)   Wt 104 lb 8 oz (47.4 kg)   SpO2 97%   BMI 17.39 kg/m²   Temp (24hrs), Av.8 °F (36.6 °C), Min:97.2 °F (36.2 °C), Max:98.2 °F (36.8 °C)    Recent Labs     23  0013 23  0643 23  1230 23  1748   POCGLU 95 96 117* 107*       I/O (24Hr):     Intake/Output Summary (Last 24 hours) at 20235  Last data filed at 2023 1600  Gross per 24 hour   Intake 780 ml   Output --   Net 780 ml       Labs:  Hematology:  Recent Labs     22  0309 23  0632 23  0654   WBC 9.7 11.4* 8.4   RBC 2.56* 2.55* 2.40*   HGB 9.6* 9.6* 10.1*   HCT 29.3* 30.0* 28.5*   .5* 117.6* 118.8*   MCH 37.5* 37.6* 42.1*   MCHC 32.8 32.0 35.4*   RDW 15.0* 15.2* 15.0*    340 391   MPV 10.6 10.3 9.6     Chemistry:  Recent Labs     22  0309 23  0632 23  0654    139 131*   K 3.5* 3.6* 3.7   CL 99 102 95*   CO2 25 24 24   GLUCOSE 91 116* 106*   BUN 4* 4* 6   CREATININE 0.36* 0.35* 0.32*   MG 1.7 1.6 1.6   ANIONGAP 12 13 12   LABGLOM >60 >60 >60   CALCIUM 9.3 9.5 9.8   PHOS  --   --  4.9*     Recent Labs     23  0013 23  0643 23  1230 23  1748   POCGLU 95 96 117* 107*     ABG:  Lab Results   Component Value Date/Time    POCPH 7.416 12/29/2022 04:01 PM    POCPCO2 46.0 12/29/2022 04:01 PM    POCPO2 124.2 12/29/2022 04:01 PM    POCHCO3 29.5 12/29/2022 04:01 PM    NBEA 1 12/20/2022 04:29 AM    PBEA 4 12/29/2022 04:01 PM    ZLWQ1FOM 99 12/29/2022 04:01 PM    FIO2 28.0 12/28/2022 04:52 PM     Lab Results   Component Value Date/Time    SPECIAL L HAND 5ML 12/28/2022 10:01 AM     Lab Results   Component Value Date/Time    CULTURE NO GROWTH 5 DAYS 12/28/2022 10:01 AM       Radiology:  CT ABDOMEN PELVIS WO CONTRAST Additional Contrast? None    Result Date: 12/28/2022  1. Consolidation in both lung bases with bilateral pleural effusions, consistent with pneumonia. 2. No acute abdominal abnormality. 3. Hepatic steatosis. XR CHEST PORTABLE    Result Date: 12/29/2022  Stable patchy bibasilar atelectasis or airspace disease. XR CHEST PORTABLE    Result Date: 12/28/2022  Patchy ground-glass change in the lungs felt to represent multilobar pneumonia given clinical history. XR ABDOMEN FOR NG/OG/NE TUBE PLACEMENT    Result Date: 12/31/2022  Enteric tube in the stomach as above. No evidence of bowel obstruction. XR ABDOMEN FOR NG/OG/NE TUBE PLACEMENT    Result Date: 12/28/2022  Unremarkable limited KUB. NG tube tip projects at the left upper quadrant, overlying the expected location of the stomach. FL MODIFIED BARIUM SWALLOW W VIDEO    Result Date: 12/28/2022  Delayed oral phase of swallowing. Gross aspiration of puree with a delayed weak cough response. No other consistencies given. Please see separate speech pathology report for full discussion of findings and recommendations.        Physical Examination:      General appearance: Awake, mumbles, able to transfer from bed to chair and sat in chair at today  Mental Status: More alert   Lungs: Bilateral air entry with coarse rhonchi and expiratory wheezing heard in all lung fields  Heart:  regular rate and rhythm, no murmur  Abdomen:  soft, nontender, nondistended, normal bowel sounds, no masses, hepatomegaly, splenomegaly  Extremities:  no edema, redness, tenderness in the calves. Soft restraints placed  Skin:  no gross lesions, rashes, induration      Assessment:        Hospital Problems             Last Modified POA    * (Principal) Drug overdose of undetermined intent, initial encounter 12/15/2022 Yes    COVID-19 12/18/2022 Yes    Acute respiratory insufficiency 67/48/6832 Yes    Alcoholic intoxication with complication ([address] 77/19/8230 Yes    Altered mental status 12/18/2022 Yes    Acute respiratory failure with hypoxia [address] [address] 12/26/2022 Yes   This is a 57-year-old lady who came in with drug overdose and was intubated in ICU and transferred today at 4 AM to the [facility] floor with persistent copious secretions and found to have multilobar pneumonia requiring 4 L nasal cannula oxygen to maintain saturation above 90% with 2 episodes of failed swallow and barium swallow with PEG tube in situ. History of chronic alcohol abuse and withdrawal.   on 12/29/2022 patient had near respiratory arrest with marked lethargy and depressed mentation with excessive secretions for which she was unable to initially expectorate requiring 100% supplemental oxygen maintain saturation above 90%. RRT called when patient became more awake and started expectorating secretions which were suctioned every 5 minutes with improvement of oxygenation and being weaned off to maintain saturation above 92%. On 12/30/22- more awake. improving  Principal diagnosis  #Multilobar pneumonia secondary to aspiration drug overdose and intubation on IV Unasyn with COVID-positive on 12/12/2022. Complicated by near respiratory arrest and hypoxemia due to severe mental depression, lethargy and inability to expectorate her thick sputum. She eventually woke up and started expectorating with improvement in her saturation. Copious amount of secretions with nebulizer treatment to dry it up. Percussion vest ordered.    Continue respiratory inhalation therapy. Discontinuation of Librium, Abilify baclofen and benzos. initiate Baclofen, to reduce NMDA receptor overactivity. Active diagnoses  #Chronic substance abuse-alcohol outpatient management. Patient is beyondthe window alcohol withdrawal.  Due to high risk of respiratory depression with her underlying bilateral pneumonia her medications were all immediately discontinued. #.5. Assess for B12 folate deficiency complicated by macrocytosis of alcoholism. #Tachycardia-low-dose beta-blockade blood pressure improved possible anxiety  #Failed swallow study. Seen by ENTWith suspected esophageal tear for evaluation by GI after healing in a few weeks. .  Continue NG feeds as appropriate TPN consult  Seen by ENT today. Reevaluate by speech once again. Full code resuscitation   Case discussed with both sister in person and mother via phone about the possibility of PEG tube placement that may be required if patient does not pass a swallow study in about 14 days    Plan: For PT/OT and reevaluation by speech with modified barium swallow on 1/2/2023. Metoprolol dose increased from 12.5 to 25 mg due to persistent tachycardia pulse 108/min blood pressure improved at 128/84 mmHg.   Lovenox for DVT prophylaxis,  Full code resuscitation status,  Condition guarded,  Disposition to be determined    Verner Scheuermann, MD  1/2/2023  9:15 PM

## 2023-01-03 NOTE — PLAN OF CARE
Problem: Safety - Medical Restraint  Goal: Remains free of injury from restraints (Restraint for Interference with Medical Device)  Description: INTERVENTIONS:  1. Determine that other, less restrictive measures have been tried or would not be effective before applying the restraint  2. Evaluate the patient's condition at the time of restraint application  3. Inform patient/family regarding the reason for restraint  4. Q2H: Monitor safety, psychosocial status, comfort, nutrition and hydration  1/3/2023 0914 by Ruben Julien RN  Outcome: Progressing  1/3/2023 0104 by Ethel Vazquez RN  Outcome: Progressing     Problem: Discharge Planning  Goal: Discharge to home or other facility with appropriate resources  1/3/2023 0914 by Ruben Julien RN  Outcome: Progressing  Flowsheets (Taken 1/3/2023 0800)  Discharge to home or other facility with appropriate resources: Identify barriers to discharge with patient and caregiver  1/3/2023 0104 by Ethel Vazquez RN  Outcome: Progressing     Problem: Skin/Tissue Integrity  Goal: Absence of new skin breakdown  Description: 1. Monitor for areas of redness and/or skin breakdown  2. Assess vascular access sites hourly  3. Every 4-6 hours minimum:  Change oxygen saturation probe site  4. Every 4-6 hours:  If on nasal continuous positive airway pressure, respiratory therapy assess nares and determine need for appliance change or resting period.   1/3/2023 0914 by Ruben Julien RN  Outcome: Progressing  1/3/2023 0104 by Ethel Vazquez RN  Outcome: Progressing     Problem: ABCDS Injury Assessment  Goal: Absence of physical injury  1/3/2023 0914 by Ruben Julien RN  Outcome: Progressing  1/3/2023 0104 by Ethel Vazquez RN  Outcome: Progressing     Problem: Safety - Adult  Goal: Free from fall injury  1/3/2023 0914 by Ruben Julien RN  Outcome: Progressing  1/3/2023 0104 by Ethel Vazquez RN  Outcome: Progressing     Problem: Pain  Goal: Verbalizes/displays adequate comfort level or baseline comfort level  1/3/2023 0914 by Barbara Penaloza RN  Outcome: Progressing  1/3/2023 0104 by Rosanne Mcburney, RN  Outcome: Progressing     Problem: Confusion  Goal: Confusion, delirium, dementia, or psychosis is improved or at baseline  Description: INTERVENTIONS:  1. Assess for possible contributors to thought disturbance, including medications, impaired vision or hearing, underlying metabolic abnormalities, dehydration, psychiatric diagnoses, and notify attending LIP  2. [place] high risk fall precautions, as indicated  3. Provide frequent short contacts to provide reality reorientation, refocusing and direction  4. Decrease environmental stimuli, including noise as appropriate  5. Monitor and intervene to maintain adequate nutrition, hydration, elimination, sleep and activity  6. If unable to ensure safety without constant attention obtain sitter and review sitter guidelines with assigned personnel  7.  Initiate Psychosocial CNS and Spiritual Care consult, as indicated  1/3/2023 0914 by Barbara Penaloza RN  Outcome: Progressing  Flowsheets (Taken 1/3/2023 0800)  Effect of thought disturbance (confusion, delirium, dementia, or psychosis) are managed with adequate functional status: Assess for contributors to thought disturbance, including medications, impaired vision or hearing, underlying metabolic abnormalities, dehydration, psychiatric diagnoses, notify LIP  1/3/2023 0104 by Rosanne Mcburney, RN  Outcome: Progressing     Problem: Respiratory - Adult  Goal: Achieves optimal ventilation and oxygenation  1/3/2023 0914 by Barbara Penaloza RN  Outcome: Progressing  Flowsheets (Taken 1/3/2023 0800)  Achieves optimal ventilation and oxygenation: Assess for changes in respiratory status  1/3/2023 0104 by Rosanne Mcburney, RN  Outcome: Progressing     Problem: Risk for Elopement  Goal: Patient will not exit the unit/facility without proper excort  1/3/2023 0914 by Barbara Penaloza RN  Outcome: Progressing  Flowsheets (Taken 1/3/2023 0800)  Nursing Interventions for Elopement Risk:   Assist with personal care needs such as toileting, eating, dressing, as needed to reduce the risk of wandering   Collaborate with family members/caregivers to mitigate the elopement risk   Collaborate with treatment team for drug withdrawal symptoms treatment  1/3/2023 0104 by Tomas Rivera RN  Outcome: Progressing     Problem: Nutrition Deficit:  Goal: Optimize nutritional status  1/3/2023 0914 by Michelle Neri RN  Outcome: Progressing  1/3/2023 0104 by Tomas Rivera RN  Outcome: Progressing

## 2023-01-03 NOTE — PROCEDURES
INSTRUMENTAL SWALLOW REPORT  MODIFIED BARIUM SWALLOW    NAME: Nury Ruggiero   : 1995  MRN: [id number]       Date of Eval: 1/3/2023              Referring Diagnosis(es):      Past Medical History:  has a past medical history of ADHD (attention deficit hyperactivity disorder), Anxiety, Bipolar 1 disorder ([address] and Depression. Past Surgical History:  has no past surgical history on file. Type of Study: Initial MBS      Patient Complaints/Reason for Referral:  Nury Ruggeiro was referred for a MBS to assess the efficiency of his/her swallow function, assess for aspiration, and to make recommendations regarding safe dietary consistencies, effective compensatory strategies, and safe eating environment. Onset of problem:      Nury Ruggiero is a 32 y.o. female w/ hx of recetn COVID infection, Alcoholic liver disease who presented to ER via EMS for unresponsiveness. Per report, her boyfriend was speaking with her when the patient \"suddenly became unresponsive. \"   Given 6mg of Narcan by EMS w/o improvement in mental status, however respirations were noted to have improved. Behavior/Cognition/Vision/Hearing:  Behavior/Cognition: Alert; Cooperative    Impressions:  Patient presents with  safe swallow for Easy to Chew (NO MIXED CONSISTENCIES) diet with Mildly Thick (Nectar)  liquids as evidenced by no observed aspiration noted with consistencies tested. Recommend small sips and bites, only feed when alert and awake and upright at 90 degrees for all PO intake. Recommend close monitoring for overt/clinical s/s of aspiration and D/C PO intake and complete Modified Barium Swallow Study should they occur. Patient Position: Lateral       Consistencies Administered: Regular; Soft & Bite Sized;Pureed; Thin straw;Mildly Thick teaspoon;Mildly Thick straw      Dysphagia Outcome Severity Scale: Level 4: Mild moderate dysphagia- Intermittent supervision/cueing.  One - two diet consistencies restricted  Penetration-Aspiration Scale (PAS): 3 - Material enters the airway, remains above the vocal folds, and is not ejected from airway    Recommended Diet:  Solid consistency: Easy to Chew (No MIXED consistencies tested)  Liquid consistency: Mildly Thick  Liquid administration via: Cup;Straw    Medication administration: Meds in puree    Safe Swallow Protocol:  Supervision: Close  Compensatory Swallowing Strategies : Alternate solids and liquids;Small bites/sips;Effortful swallow;Upright as possible for all oral intake;Eat/Feed slowly      Recommendations/Treatment  Requires SLP Intervention: Yes   3-5X/week   Further therapy recommended at discharge. D/C Recommendations: Ongoing speech therapy is recommended at next level of care; Ongoing speech therapy is recommended during this hospitalization  Postural Changes and/or Swallow Maneuvers: Upright 90 degrees      Recommended Exercises:    Therapeutic Interventions: Diet tolerance monitoring; Laryngeal exercises; Pharyngeal exercises         Education: Images and recommendations were reviewed with pt following this exam.   Patient Education: yes  Patient Education Response: Verbalizes understanding      Goals:    Long Term:    To Maximize safety with intake, optimize nutrition/hydration and minimize risk for aspiration.        Short Term:     Goal 1: O/M treatment program for dysphagia   Goal 2: The patient will tolerate recommended diet without observed clinical signs of aspiration      Oral Preparation / Oral Phase: Impaired     Decreased bolus formation with spillover BOT to pyriform with liquids, slow yet functional mastication with mild swallow delay noted      Pharyngeal Phase: Impaired     Puree:No penetration no aspiration mild stasis  Soft Solids: trac penetration no aspiration mild stasis  Cracker: No penetration no aspiration mild vallec/pyriform stasis  Thin straw: + deep penetration with + weak nonproductive cough  Thick nectar straw: No penetration no aspiration mil stasis    Esophageal Phase  Esophageal Screen: WFL        Pain      Pain Level: 5  Pain Type: Acute pain  Pain Location: Head      Therapy Time:   Individual Concurrent Group Co-treatment   Time In  1030         Time Out  1045         Minutes  [address], SLP, 1/3/2023, 12:57 PM

## 2023-01-03 NOTE — CARE COORDINATION
Consult received d/t substance abuse. Sitter at bedside. Advised pt had been awake all night and is now sleeping. Will f/u with pt once she is more alert/oriented.

## 2023-01-04 LAB
ANION GAP SERPL CALCULATED.3IONS-SCNC: 7 MMOL/L (ref 9–17)
BUN BLDV-MCNC: 6 MG/DL (ref 6–20)
CALCIUM SERPL-MCNC: 9.7 MG/DL (ref 8.6–10.4)
CHLORIDE BLD-SCNC: 98 MMOL/L (ref 98–107)
CO2: 25 MMOL/L (ref 20–31)
CREAT SERPL-MCNC: 0.34 MG/DL (ref 0.5–0.9)
GFR SERPL CREATININE-BSD FRML MDRD: >60 ML/MIN/1.73M2
GLUCOSE BLD-MCNC: 86 MG/DL (ref 65–105)
GLUCOSE BLD-MCNC: 86 MG/DL (ref 70–99)
GLUCOSE BLD-MCNC: 87 MG/DL (ref 65–105)
GLUCOSE BLD-MCNC: 89 MG/DL (ref 65–105)
PHOSPHORUS: 4.7 MG/DL (ref 2.6–4.5)
POTASSIUM SERPL-SCNC: 3.7 MMOL/L (ref 3.7–5.3)
SODIUM BLD-SCNC: 130 MMOL/L (ref 135–144)

## 2023-01-04 PROCEDURE — 92526 ORAL FUNCTION THERAPY: CPT

## 2023-01-04 PROCEDURE — 6370000000 HC RX 637 (ALT 250 FOR IP): Performed by: INTERNAL MEDICINE

## 2023-01-04 PROCEDURE — 6370000000 HC RX 637 (ALT 250 FOR IP): Performed by: NURSE PRACTITIONER

## 2023-01-04 PROCEDURE — 99232 SBSQ HOSP IP/OBS MODERATE 35: CPT | Performed by: STUDENT IN AN ORGANIZED HEALTH CARE EDUCATION/TRAINING PROGRAM

## 2023-01-04 PROCEDURE — 36415 COLL VENOUS BLD VENIPUNCTURE: CPT

## 2023-01-04 PROCEDURE — 82947 ASSAY GLUCOSE BLOOD QUANT: CPT

## 2023-01-04 PROCEDURE — 6360000002 HC RX W HCPCS: Performed by: HEALTH CARE PROVIDER

## 2023-01-04 PROCEDURE — 97110 THERAPEUTIC EXERCISES: CPT

## 2023-01-04 PROCEDURE — 92523 SPEECH SOUND LANG COMPREHEN: CPT

## 2023-01-04 PROCEDURE — 84100 ASSAY OF PHOSPHORUS: CPT

## 2023-01-04 PROCEDURE — 80048 BASIC METABOLIC PNL TOTAL CA: CPT

## 2023-01-04 PROCEDURE — 97116 GAIT TRAINING THERAPY: CPT

## 2023-01-04 PROCEDURE — 6370000000 HC RX 637 (ALT 250 FOR IP): Performed by: STUDENT IN AN ORGANIZED HEALTH CARE EDUCATION/TRAINING PROGRAM

## 2023-01-04 PROCEDURE — 2060000000 HC ICU INTERMEDIATE R&B

## 2023-01-04 PROCEDURE — 99254 IP/OBS CNSLTJ NEW/EST MOD 60: CPT | Performed by: PHYSICAL MEDICINE & REHABILITATION

## 2023-01-04 RX ORDER — HYDROXYZINE HYDROCHLORIDE 25 MG/1
25 TABLET, FILM COATED ORAL 3 TIMES DAILY PRN
Status: DISCONTINUED | OUTPATIENT
Start: 2023-01-04 | End: 2023-01-09 | Stop reason: HOSPADM

## 2023-01-04 RX ADMIN — HYDROXYZINE HYDROCHLORIDE 25 MG: 25 TABLET ORAL at 02:09

## 2023-01-04 RX ADMIN — ENOXAPARIN SODIUM 30 MG: 100 INJECTION SUBCUTANEOUS at 09:15

## 2023-01-04 RX ADMIN — METOPROLOL TARTRATE 25 MG: 25 TABLET ORAL at 09:13

## 2023-01-04 RX ADMIN — HYDROXYZINE HYDROCHLORIDE 25 MG: 25 TABLET ORAL at 17:34

## 2023-01-04 RX ADMIN — BACLOFEN 5 MG: 5 TABLET ORAL at 09:13

## 2023-01-04 RX ADMIN — ALCOHOL 1 TABLET: 70.47 GEL TOPICAL at 09:14

## 2023-01-04 RX ADMIN — HYDROXYZINE HYDROCHLORIDE 25 MG: 25 TABLET ORAL at 09:12

## 2023-01-04 RX ADMIN — Medication 100 MG: at 09:12

## 2023-01-04 RX ADMIN — FAMOTIDINE 20 MG: 20 TABLET, FILM COATED ORAL at 19:50

## 2023-01-04 RX ADMIN — FOLIC ACID 1 MG: 1 TABLET ORAL at 09:14

## 2023-01-04 RX ADMIN — FAMOTIDINE 20 MG: 20 TABLET, FILM COATED ORAL at 09:14

## 2023-01-04 RX ADMIN — VENLAFAXINE 37.5 MG: 37.5 TABLET ORAL at 09:15

## 2023-01-04 ASSESSMENT — PAIN SCALES - GENERAL: PAINLEVEL_OUTOF10: 5

## 2023-01-04 ASSESSMENT — PAIN DESCRIPTION - LOCATION: LOCATION: HEAD

## 2023-01-04 NOTE — CARE COORDINATION
Consult received for substance abuse. Pt has been seen by SW in the past for her drinking. Pt sitting up in chair. Pt is alert/cooperative/somewhat slow to respond. Pt stated she drinks alcohol daily, pint of wine. Stated she has been drinking since age 13 and it is \"normal to me\". Pt denies all drug use. Pt stated she did try going to tx ~3m ago (IP) but left after one day. She could not recall where she went. She reports no prior AA involvement. Pt stated she would like to quit. Stated they longest she has gone without drinking is 33 days (after a 30 day hospital stay at [facility] and then 3 days when she got home). Pt stated she does not want any IP tx. Stated she may consider OP tx but did not want SW to arrange anything, stating she would take care of it. Did provide her with a list of area tx programs and walk in assessment clinics. Stated she is not linked with any [facility] currently but has been in the past. Pt was agreeable to SW following up with her again.

## 2023-01-04 NOTE — PROGRESS NOTES
Physical Therapy  Facility/Department: [facility]- [facility]  Physical Therapy daily treatment note    Name: Myke Aguirre  : 1995  MRN: [id number]  Date of Service: 2023    Discharge Recommendations:  Patient would benefit from continued therapy after discharge   PT Equipment Recommendations  Equipment Needed: Yes  Mobility Devices: Hermelindo Akiko: Rolling      Patient Diagnosis(es): The encounter diagnosis was Drug overdose of undetermined intent, initial encounter. Past Medical History:  has a past medical history of ADHD (attention deficit hyperactivity disorder), Anxiety, Bipolar 1 disorder ([facility] [address] and Depression. Past Surgical History:  has no past surgical history on file. Assessment   Body Structures, Functions, Activity Limitations Requiring Skilled Therapeutic Intervention: Decreased ROM; Decreased strength;Decreased endurance; Increased pain;Decreased posture;Decreased balance  Assessment: Pt amb 80 ft with RW and MIN A. LImited by decreased balance, endurance, and strength. Recommending continued PT to address deficits and maximize safety and independnce with mobility  Therapy Prognosis: Fair  Activity Tolerance  Activity Tolerance: Patient limited by endurance; Patient tolerated treatment well     Plan   Physcial Therapy Plan  General Plan:  (5-6x)  Current Treatment Recommendations: Strengthening, ROM, Balance training, Functional mobility training, Transfer training, Gait training, Stair training, Endurance training, Home exercise program, Safety education & training, Equipment evaluation, education, & procurement, Pain management  Safety Devices  Type of Devices:  All fall risk precautions in place, Call light within reach, Chair alarm in place, Gait belt, Nurse notified, Left in chair  Restraints  Restraints Initially in Place: No     Restrictions  Restrictions/Precautions  Restrictions/Precautions: Fall Risk, General Precautions  Required Braces or Orthoses?: No  Position Activity Restriction  Other position/activity restrictions: up with assist, extubated 12/23     Subjective   General  Patient assessed for rehabilitation services?: Yes  Response To Previous Treatment: Patient with no complaints from previous session. Family / Caregiver Present: No  Follows Commands: Within Functional Limits  Subjective  Subjective: Pt resting in bed upon arrival, agreeable to PT, C/o generalized pain \"all over\" up to 8/10. Pt very soft  spoken and difficult to understand throughout        Cognition   Orientation  Overall Orientation Status: Within Functional Limits     Objective      Bed mobility  Rolling to Left: Stand by assistance  Supine to Sit: Stand by assistance  Scooting: Stand by assistance  Bed Mobility Comments: HOB elevated. Transfers  Sit to Stand: Minimal Assistance  Stand to Sit: Minimal Assistance  Bed to Chair: Minimal assistance  Ambulation  Surface: Level tile  Device: Rolling Walker  Assistance: Minimal assistance  Quality of Gait: B knees flexed, narrow CHONG with occasional scissoring, L lateral path deviation throughout with cues to correct  Distance: 80 ft  More Ambulation?: No     Balance  Posture: Good  Sitting - Static: Good  Sitting - Dynamic: Fair  Standing - Static: Fair  Standing - Dynamic: Fair;-  Comments: RW used to assess standing balance, pt able to sit EOB with supervision. Exercise   Seated LE exercise program: Long Arc Quads, hip abduction/adduction, heel/toe raises, and marches.  Reps: 10x  AM-PAC Score  AM-PAC Inpatient Mobility Raw Score : 16 (01/04/23 1554)  AM-PAC Inpatient T-Scale Score : 40.78 (01/04/23 1554)  Mobility Inpatient CMS 0-100% Score: 54.16 (01/04/23 1554)  Mobility Inpatient CMS G-Code Modifier : CK (01/04/23 1554)     Goals  Short Term Goals  Time Frame for Short Term Goals: 14  Short Term Goal 1: Pt to perform bed mobility Perla  Short Term Goal 2: Pt to demonstrate functional transfers Perla  Short Term Goal 3: Ambulate 50ft w/ RW modA  Short Term Goal 4: Pt to demonstrate standing dynamic balance of fair + to decrease fall risk  Patient Goals   Patient Goals :  To go home       Therapy Time   Individual Concurrent Group Co-treatment   Time In 5947         Time Out 1427         Minutes 32         Timed Code Treatment Minutes: [facility] , PTA

## 2023-01-04 NOTE — PLAN OF CARE
Problem: Discharge Planning  Goal: Discharge to home or other facility with appropriate resources  Outcome: Progressing     Problem: Skin/Tissue Integrity  Goal: Absence of new skin breakdown  Description: 1. Monitor for areas of redness and/or skin breakdown  2. Assess vascular access sites hourly  3. Every 4-6 hours minimum:  Change oxygen saturation probe site  4. Every 4-6 hours:  If on nasal continuous positive airway pressure, respiratory therapy assess nares and determine need for appliance change or resting period. Outcome: Progressing     Problem: ABCDS Injury Assessment  Goal: Absence of physical injury  Outcome: Progressing     Problem: Safety - Adult  Goal: Free from fall injury  Outcome: Progressing     Problem: Pain  Goal: Verbalizes/displays adequate comfort level or baseline comfort level  Outcome: Progressing     Problem: Confusion  Goal: Confusion, delirium, dementia, or psychosis is improved or at baseline  Description: INTERVENTIONS:  1. Assess for possible contributors to thought disturbance, including medications, impaired vision or hearing, underlying metabolic abnormalities, dehydration, psychiatric diagnoses, and notify attending LIP  2. [place] high risk fall precautions, as indicated  3. Provide frequent short contacts to provide reality reorientation, refocusing and direction  4. Decrease environmental stimuli, including noise as appropriate  5. Monitor and intervene to maintain adequate nutrition, hydration, elimination, sleep and activity  6. If unable to ensure safety without constant attention obtain sitter and review sitter guidelines with assigned personnel  7.  Initiate Psychosocial CNS and Spiritual Care consult, as indicated  Outcome: Progressing     Problem: Respiratory - Adult  Goal: Achieves optimal ventilation and oxygenation  Outcome: Progressing     Problem: Nutrition Deficit:  Goal: Optimize nutritional status  Outcome: Progressing     Problem: Risk for Elopement  Goal: Patient will not exit the unit/facility without proper excort  Outcome: Progressing     Problem: Safety - Medical Restraint  Goal: Remains free of injury from restraints (Restraint for Interference with Medical Device)  Description: INTERVENTIONS:  1. Determine that other, less restrictive measures have been tried or would not be effective before applying the restraint  2. Evaluate the patient's condition at the time of restraint application  3. Inform patient/family regarding the reason for restraint  4.  Q2H: Monitor safety, psychosocial status, comfort, nutrition and hydration  Outcome: Progressing

## 2023-01-04 NOTE — CARE COORDINATION
Met with Dr Latisha Lugo. He thinks patient would be a good candidate for ARU but would need to verify her discharge disposition once she's ready to discharge from the facility. Met with patient. She relates that she will go to a shelter. Asked her if she can stay with someone and she said no. Discussed SNF vs ARU and explained the differences. Notified her that I would meet with her later today to follow up    1815 met with patient. She wants to talk to her mother to discuss plan but does not have her number. Phone number provided to patient. Offered to call and she said no.  ARU and SNF lists provided to patient

## 2023-01-04 NOTE — CONSULTS
Physical Medicine & Rehabilitation  Consult Note      Admitting Physician: Ike Puente MD    Primary Care Provider: Martin Rea     Reason for Consult:  Acute Inpatient Rehabilitation    Chief Complaint: Drug overdose    History of Present Illness:  Referring Provider is requesting an evaluation for appropriate placement upon discharge from acute care. Ms. Leatha Landau is a 32 y.o.  female who was admitted to [facility] on 12/15/2022 with Drug Overdose    57-year-old female with recent history of COVID infection alcoholic liver disease presented with chest-given 6 mg of Narcan. Mental status GCS 3. Patient intubated    Critical care 12/26-given Librium off Precedex started thiamine and folate, Abilify and Effexor disorder, COVID 12/15-positive ID, extubated 12/NG tube    Internal medicine 1/3-passed swallow study, thiamine and folate, counseled regarding alcohol cessation, vitamin B12, incidental COVID no treatment patient out of isolation    ENT-airway concerns s/p intubation-low suspicion for progressive airway stenosis given stability since extubation signed off notify if increased wheezing or stridor    Radiology:  CT ABDOMEN PELVIS WO CONTRAST Additional Contrast? None    Result Date: 12/28/2022  1. Consolidation in both lung bases with bilateral pleural effusions, consistent with pneumonia. 2. No acute abdominal abnormality. 3. Hepatic steatosis. XR CHEST PORTABLE    Result Date: 12/29/2022  Stable patchy bibasilar atelectasis or airspace disease. XR ABDOMEN FOR NG/OG/NE TUBE PLACEMENT    Result Date: 12/31/2022  Enteric tube in the stomach as above. No evidence of bowel obstruction. FL MODIFIED BARIUM SWALLOW W VIDEO    Result Date: 1/3/2023  Deep laryngeal penetration of thin liquid. Trace laryngeal penetration of soft solid. Puree, cookie and nectar thick liquid well-tolerated. Please see separate speech pathology report for full discussion of findings and recommendations.      FL MODIFIED BARIUM SWALLOW W VIDEO    Result Date: 12/28/2022  Delayed oral phase of swallowing. Gross aspiration of puree with a delayed weak cough response. No other consistencies given. Please see separate speech pathology report for full discussion of findings and recommendations. Review of Systems:  Constitutional: negative for anorexia, chills, fatigue, fevers, sweats and weight loss  Eyes: negative for redness and visual disturbance  Ears, nose, mouth, throat, and face: negative for earaches, sore throat and tinnitus  Respiratory: negative for cough and shortness of breath  Cardiovascular: negative for chest pain, dyspnea and palpitations  Gastrointestinal: negative for abdominal pain, change in bowel habits, constipation, nausea and vomiting  Genitourinary:negative for dysuria, frequency, hesitancy and urinary incontinence  Integument/breast: negative for pruritus and rash  Musculoskeletal:negative for muscle weakness and stiff joints  Neurological: negative for dizziness, headaches and weakness  Behavioral/Psych: negative for decreased appetite, depression and fatigue    Functional History:  PTA: Independent with all activities.     Current:  PT:    Bed Mobility Training  Bed Mobility Training: Yes  Overall Level of Assistance: Maximum assistance  Rolling: Maximum assistance  Supine to Sit: Maximum assistance  Sit to Supine: Maximum assistance  Scooting: Maximum assistance  Restrictions/Precautions  Restrictions/Precautions: Fall Risk, General Precautions  Required Braces or Orthoses?: No  Position Activity Restriction  Other position/activity restrictions: up with assist, extubated 12/23, NG tube    Transfer Training  Transfer Training: Yes  Overall Level of Assistance: Assist X2, Maximum assistance  Sit to Stand: Maximum assistance, Assist X2  Stand to Sit: Assist X2, Maximum assistance  Stand Pivot Transfers: Assist X2, Maximum assistance  Bed to Chair: Maximum assistance, Assist X2  Bed mobility  Supine to Sit: Minimal assistance (Trunk support.)  Sit to Supine: Unable to assess (Pt retired to chair at end of session.)  Scooting: Contact guard assistance  Bed Mobility Comments: HOB elevated. Gait  Overall Level of Assistance: Minimum assistance, Assist X2, Additional time (Slow shuffling steps.)  Assistive Device: Walker, rolling  Transfers  Sit to Stand: Minimal Assistance, 2 Person Assistance  Stand to Sit: Minimal Assistance, 2 Person Assistance  Bed to Chair: Minimal assistance, 2 Person Assistance  Comment: RW used for transfers, cueing for hand placement with good return demo. Pt required Perla x2 to perform STS transfers from bed. Transfers  Sit to Stand: Minimal Assistance;2 Person Assistance  Stand to Sit: Minimal Assistance;2 Person Assistance  Bed to Chair: Minimal assistance;2 Person Assistance  Comment: RW used for transfers, cueing for hand placement with good return demo. Pt required Perla x2 to perform STS transfers from bed. Ambulation  Surface: Level tile  Device: Rolling Walker  Assistance: Minimal assistance;2 Person assistance  Quality of Gait: B knees flexed, mildly unsteady. Gait Deviations: Slow Elizabeth;Decreased step height;Decreased step length  Distance: 15ft within room  Comments: Pt ambulated within room ~15ft with RW and Perla x2, cueing for safe RW management and also to attempt to lock out knees to prevent buckling. Pt ambulates very slow taking very small steps, mild instability noted with no true LOB or buckling noted. More Ambulation?: No    Orientation  Overall Orientation Status: Within Functional Limits  Cognition  Overall Cognitive Status: Exceptions  Arousal/Alertness: Delayed responses to stimuli  Following Commands: Follows one step commands with repetition  Attention Span: Difficulty attending to directions; Attends with cues to redirect  Safety Judgement: Decreased awareness of need for safety;Decreased awareness of need for assistance  Initiation: Requires cues for some  Sequencing: Requires cues for some       OT:   1/2  ADL  Feeding: NPO (NG tube)  Grooming: Setup;Verbal cueing; Increased time to complete;Contact guard assistance (Brush hair seated EOB.)  UE Bathing: Setup;Verbal cueing; Increased time to complete;Contact guard assistance (Wash face seated EOB.)  Additional Comments: Pt completed supine/sit transfer to seated EOB. Simple grooming completed seated EOB, increased time needed to complete. pt speaks in low tone, hard to hear pt. Pt able to reach up with BUE multiple times to brush hair and ponytail in. Sit/stand transfer using RW for static standing EOB. Functional mobility household distance using RW. Pt retired to seated in chair at end of session. Activity Tolerance  Activity Tolerance: Patient limited by endurance; Patient tolerated treatment well  Bed mobility       ST:    Solid consistency: Easy to Chew (No MIXED consistencies tested)  Liquid consistency: Mildly Thick  Liquid administration via: Cup;Straw    Past Medical History:        Diagnosis Date    ADHD (attention deficit hyperactivity disorder)     Anxiety     Bipolar 1 disorder (HCC)     Depression        Past Surgical History:    No past surgical history on file.     Allergies:    No Known Allergies     Current Medications:   Current Facility-Administered Medications: hydrOXYzine HCl (ATARAX) tablet 25 mg, 25 mg, Oral, TID PRN  albuterol (PROVENTIL) nebulizer solution 2.5 mg, 2.5 mg, Nebulization, Q6H PRN  guaiFENesin (ROBITUSSIN) 100 MG/5ML liquid 200 mg, 200 mg, Oral, Q4H PRN  venlafaxine (EFFEXOR) tablet 37.5 mg, 37.5 mg, Per NG tube, Daily  multivitamin 1 tablet, 1 tablet, Per NG tube, Daily  famotidine (PEPCID) tablet 20 mg, 20 mg, Per NG tube, BID  thiamine tablet 100 mg, 100 mg, Per NG tube, Daily  folic acid (FOLVITE) tablet 1 mg, 1 mg, Per NG tube, Daily  enoxaparin Sodium (LOVENOX) injection 30 mg, 30 mg, SubCUTAneous, Daily  ondansetron (ZOFRAN-ODT) disintegrating tablet 4 mg, 4 mg, Oral, Q8H PRN **OR** ondansetron (ZOFRAN) injection 4 mg, 4 mg, IntraVENous, Q6H PRN  polyethylene glycol (GLYCOLAX) packet 17 g, 17 g, Oral, Daily PRN  acetaminophen (TYLENOL) tablet 650 mg, 650 mg, Oral, Q6H PRN **OR** acetaminophen (TYLENOL) suppository 650 mg, 650 mg, Rectal, Q6H PRN    Social History:  Social History     Socioeconomic History    Marital status: Single     Spouse name: Not on file    Number of children: Not on file    Years of education: Not on file    Highest education level: Not on file   Occupational History    Not on file   Tobacco Use    Smoking status: Every Day     Packs/day: 0.50     Types: Cigarettes    Smokeless tobacco: Never   Substance and Sexual Activity    Alcohol use: Yes     Comment: pt states approx 1/2 pint of wine per day since she was 11yo    Drug use: Yes     Types: Marijuana Evonne Alex)     Comment: Homegrown pot    Sexual activity: Yes     Partners: Male     Comment: Patient is trying to get on disability   Other Topics Concern    Not on file   Social History Narrative    Not on file     Social Determinants of Health     Financial Resource Strain: Not on file   Food Insecurity: Not on file   Transportation Needs: Not on file   Physical Activity: Not on file   Stress: Not on file   Social Connections: Not on file   Intimate Partner Violence: Not on file   Housing Stability: Not on file     Social/Functional History  Home Layout: One level  Home Access: Stairs to enter with rails  Entrance Stairs - Number of Steps: 4  Entrance Stairs - Rails: Both  ADL Assistance: Independent  Homemaking Assistance: Independent  Ambulation Assistance: Independent  Transfer Assistance: Independent  Active : No  Occupation: Unemployed  Additional Comments: above information obtained from chart, pt does not verbalize throughout session    Family History:       Problem Relation Age of Onset    Hypertension Mother     Depression Mother     Bipolar Disorder Father     Alcohol Abuse Father     Cancer Other         Uncle ? Physical Exam:    /77   Pulse 95   Temp 98.2 °F (36.8 °C) (Oral)   Resp 14   Ht 5' 5\" (1.651 m)   Wt 103 lb 9.9 oz (47 kg)   SpO2 99%   BMI 17.24 kg/m²     General appearance: alert, appears stated age, cooperative, and no distress  HEENT: Normocephalic, without obvious abnormality, atraumatic               Eyes: conjunctivae clear. Throat: tongue normal.               Neck:  symmetrical, trachea midline. Pulm: clear to auscultation bilaterally. Cardiac: regular rate and rhythm, no murmur. Abdomen: soft, non-tender; bowel sounds normal.  MSK: extremities normal, atraumatic, no edema, normal tone. ROM:  Mental status/Psych: Alert, question orientation-very soft hoarse voice did follow commands name objects, thought content appropriate. Skin:     Neuro:  Sensory: Intact in BUE and BLE to soft and pin sensation. Motor: Muscle tone and bulk are normal bilaterally. No pronator drift.   Appears of at least antigravity upper and lower extremity-full manual motor testing limited          Diagnostics:  CBC   Lab Results   Component Value Date/Time    WBC 8.7 01/03/2023 05:57 AM    RBC 2.78 01/03/2023 05:57 AM    HGB 10.1 01/03/2023 05:57 AM    HCT 30.8 01/03/2023 05:57 AM    .8 01/03/2023 05:57 AM    RDW 14.7 01/03/2023 05:57 AM     01/03/2023 05:57 AM     BMP    Lab Results   Component Value Date/Time     01/04/2023 06:13 AM    K 3.7 01/04/2023 06:13 AM    CL 98 01/04/2023 06:13 AM    CO2 25 01/04/2023 06:13 AM    BUN 6 01/04/2023 06:13 AM     Uric Acid  No components found for: URIC  VITAMIN B12 No components found for: B12  PT/INR  No results found for: PTINR      Impression: Ms. Leatha Landau is a 32 y.o. female with a history of Drug overdose of undetermined intent, initial encounter    Deconditioning due to drug overdose-however discussed with nursing and -drug screen negative  Recent COVID infection droplet isolation until 56/18/8135 per ID  Alcoholic liver disease-folic acid, multivitamin, thiamine  Status post respiratory failure-extubated   Anxiety/depression /bipolar/ADD-Atarax, Effexor  GERD-Pepcid  Huponatremia sodium 130  Anemia hemoglobin 10.1      Recommendations:  1. Diagnosis: Deconditioning due to drug overdose  2. Therapy: Has PT and OT need, speech seen for dysphagia-await evaluation for cognition  3. Medical  Necessity: As above  4. Support: Clarify, per  broke up with boyfriend- has been talking with mother-need to clarify disposition/support on eventual discharge  5. Rehab recommendation: Would benefit acute inpatient rehabilitation when medically ready -however will need to clarify disposition/support on eventual discharge  6. DVT proph: Lovenox    It was my pleasure to evaluate Michael Armendariz today. Please call with questions. Arturo Guevaar. Dillon Fuller MD          This note is created with the assistance of a speech recognition program.  While intending to generate a document that actually reflects the content of the visit, the document can still have some errors including those of syntax and sound a like substitutions which may escape proof reading.   In such instances, actual meaning can be extrapolated by contextual diversion

## 2023-01-04 NOTE — PLAN OF CARE
Problem: Discharge Planning  Goal: Discharge to home or other facility with appropriate resources  Outcome: Progressing     Problem: Skin/Tissue Integrity  Goal: Absence of new skin breakdown  Description: 1. Monitor for areas of redness and/or skin breakdown  2. Assess vascular access sites hourly  3. Every 4-6 hours minimum:  Change oxygen saturation probe site  4. Every 4-6 hours:  If on nasal continuous positive airway pressure, respiratory therapy assess nares and determine need for appliance change or resting period. Outcome: Progressing     Problem: ABCDS Injury Assessment  Goal: Absence of physical injury  Outcome: Progressing     Problem: Safety - Adult  Goal: Free from fall injury  Outcome: Progressing     Problem: Pain  Goal: Verbalizes/displays adequate comfort level or baseline comfort level  Outcome: Progressing     Problem: Confusion  Goal: Confusion, delirium, dementia, or psychosis is improved or at baseline  Description: INTERVENTIONS:  1. Assess for possible contributors to thought disturbance, including medications, impaired vision or hearing, underlying metabolic abnormalities, dehydration, psychiatric diagnoses, and notify attending LIP  2. [place] high risk fall precautions, as indicated  3. Provide frequent short contacts to provide reality reorientation, refocusing and direction  4. Decrease environmental stimuli, including noise as appropriate  5. Monitor and intervene to maintain adequate nutrition, hydration, elimination, sleep and activity  6. If unable to ensure safety without constant attention obtain sitter and review sitter guidelines with assigned personnel  7.  Initiate Psychosocial CNS and Spiritual Care consult, as indicated  Outcome: Progressing     Problem: Respiratory - Adult  Goal: Achieves optimal ventilation and oxygenation  Outcome: Progressing     Problem: Nutrition Deficit:  Goal: Optimize nutritional status  Outcome: Progressing     Problem: Risk for Elopement  Goal: Patient will not exit the unit/facility without proper excort  Outcome: Progressing     Problem: Safety - Medical Restraint  Goal: Remains free of injury from restraints (Restraint for Interference with Medical Device)  Description: INTERVENTIONS:  1. Determine that other, less restrictive measures have been tried or would not be effective before applying the restraint  2. Evaluate the patient's condition at the time of restraint application  3. Inform patient/family regarding the reason for restraint  4.  Q2H: Monitor safety, psychosocial status, comfort, nutrition and hydration  Outcome: Completed

## 2023-01-04 NOTE — PROGRESS NOTES
2019: Patient requesting something for anxiety, new one time order noted. 3769: Patient requesting something for anxiety, new orders noted.

## 2023-01-04 NOTE — PROGRESS NOTES
Speech Language Pathology  Facility/Department: [facility]  Initial Speech/Language/Cognitive Assessment    NAME: Adeel Dai  : 1995   MRN: [id number]  ADMISSION DATE: 12/15/2022  ADMITTING DIAGNOSIS: has Bipolar I disorder, most recent episode depressed ([facility] [address] Hyperbilirubinemia; Fatty liver; Ascites due to alcoholic hepatitis; Alcohol abuse; S/P cholecystectomy; Abnormal LFTs; Hypokalemia; Hypoalbuminemia; Portal hypertension ([address] Drug overdose of undetermined intent, initial encounter; COVID-19; Acute respiratory insufficiency; Alcoholic intoxication with complication ([address] Altered mental status; and Acute respiratory failure with hypoxia ([facility] [address] on their problem list.    Date of Eval: 2023   Evaluating Therapist: ZEHRA Andino    Primary Complaint: Adeel Dai is a 32 y.o. female w/ hx of recetn COVID infection, Alcoholic liver disease who presented to ER via EMS for unresponsiveness. Per report, her boyfriend was speaking with her when the patient \"suddenly became unresponsive. \"   Given 6mg of Narcan by EMS w/o improvement in mental status, however respirations were noted to have improved. Patient was GCS 3 on arrival to ER. Noted to have pinpoint, nonreactive pupils. Was intubated for airway protection. CTH/CT cervical spine negative.    Notable labs on workup include:  LA 3.4  Ammonia 61  EtOH 266  //    Pain:  Pain Assessment  Pain Assessment: None - Denies Pain  Pain Level: 5  Patient's Stated Pain Goal: 0 - No pain  Pain Location: Head  Pain Orientation: Mid  Pain Descriptors: Aching  Pain Type: Acute pain  Pain Frequency: Continuous  Pain Onset: On-going  Non-Pharmaceutical Pain Intervention(s): Repositioned, Rest, Emotional support  Side Effects: No reported side effects  Faces, Legs, Activity, Cry, and Consolability (FLACC)  Face (F): no particular expression or smile  Legs (L): normal position or relaxed  Activity (A): lying quietly, normal position, moves easily  Cry (C): no cry (awake or asleep)  Consolability (C): content, relaxed  FLACC Score : 0    Vision/ Hearing  Vision  Vision: Within Functional Limits  Hearing  Hearing: Within functional limits    Assessment:   Pt presents with mild-moderate cognitive deficits characterized by difficulties with immediate and delayed recall, verbal reasoning, generative naming. Note delayed response time throughout. Pt with weak/breathy voice. Pt. Presents with no dysarthria, general O/M weakness at this time. ST to follow up and provide treatment to address noted deficits. Education provided. ST recommends continued therapy at this time. Recommendations:  Recommendations  Requires SLP Intervention: Yes  Patient Education Response: Verbalizes understanding   frequencY: 3-5x/week          Plan:   Speech Therapy Prognosis  Prognosis: Good  Individuals consulted  Consulted and agree with results and recommendations: Patient    Goals:  Short Term Goals  Goal 1: Pt will recall 3-5 units with and without distraction wtih 90% accuracy. Goal 2: Pt will implement compensatory strategies for recall. Goal 3: Pt pt will complete verbal and deductive reasoning tasks with 90% accuracy. Goal 4: Pt will generate 8-10 units for a given category.    Patient/family involved in developing goals and treatment plan: yes    Subjective:   Previous level of function and limitations:      Social/Functional History  Lives With: Significant other  Type of Home: House  Active : No  Occupation: Unemployed  Vision  Vision: Within Functional Limits  Hearing  Hearing: Within functional limits           Objective:       Oral Motor   Labial: No impairment  Lingual: No impairment    Motor Speech  Intelligibility: No impairment              Expression  Primary Mode of Expression: Verbal                        Cognition:      Orientation  Overall Orientation Status: Impaired  Orientation Level: Oriented to person;Disoriented to time  Attention  Attention: Within Functional Limits  Memory  Memory: Exceptions to [facility]  Short-term Memory:  Moderate (2/3 increased to 3/3, 1/3)  Immediate recall: 3/3, 4/5, 3/3  Problem Solving  Problem Solving: Exceptions to [facility]  Verbal Reasoning Skills: Mild (word assoc: 2/3, word deductions: 1/3)  Sequencing: [facility]  Abstract Reasoning  Abstract Reasoning: Exceptions to [facility]  Divergent Thinking: Mild (+5 concrete)  Safety/Judgment  Safety/Judgment: Exceptions to [facility]  Insight: Moderate   (1/3)  Flexibility of Thought: Mild (2/3)           Prognosis:  Speech Therapy Prognosis  Prognosis: Good  Individuals consulted  Consulted and agree with results and recommendations: Patient    Education:  Patient Education Response: Verbalizes understanding          Therapy Time:   Individual Concurrent Group Co-treatment   Time In  9162         Time Out  1451         Minutes  9                 Electronically signed by ZEHRA Og on 1/4/2023 at 3:55 PM

## 2023-01-04 NOTE — PROGRESS NOTES
[facility]  Office: [address], DO, Cindy Sep, DO, Yadira Heart, DO, Nima Salazar Blood, DO, Lurdes Chris MD, Andree Martinez MD, Michael Amanda MD, Mahi Palm MD,  Hernandez Griffith MD, Damaso Maria MD, Tanya Cullen, DO, Lemuel Gilbert MD,  Roseann Artis MD, Jaun Flores MD, Bing Boxer, DO, Rahul Simons MD, Nikolay Nelson MD, Santana Sheppard, DO, Moo Benton MD, Juan R Chilel MD, Guille Tian MD, Cindy Salehr, MD, Tarun Broussard DO, Koko Brandt MD, Brandan Vega MD, Leila Gamez, CNP,  Arsen Be, CNP, Jeannine Hernandez, CNP, Latoya Hooper, CNP,  Dangelo Alicea, [facility], Cyndy Broussard, CNP, Kerry Alfaro, CNP, Sloane White, CNP, Cameron Leo, CNP, Alex Cota, CNP, Haley Foleyr, [facility], Thomas Pineda, [facility], Kerri Rodríguez, [facility], Troy Cases, [address] [place] [address]    Progress Note    1/4/2023    6:16 PM    Name:   August Li  MRN:     [id number]     [place]:      [de-identified]   Room:   2015/2015-01   Day:  20  Admit Date:  12/15/2022 12:08 PM    PCP:   Sukhi Levin  Code Status:  Full Code    Subjective:     C/C:   Chief Complaint   Patient presents with    Drug Overdose     Interval History Status: improved. Patient is awake and alert  Patient is very sad as she broke up with her boyfriend  Patient did start eating without any issues. Brief History:     24-year-old female with past medical history of drug overdose, chronic alcohol use presented to the hospital after she was found unresponsive due to suspected drug overdose. GCS was 3 on arrival.  Patient was intubated for airway protection. ED work-up showed elevated alcohol level. Patient was also noted to have COVID-positive test.  Patient was admitted on 12/15 and was kept in ICU. She was extubated on 12/23. Extubation was delayed due to absence of cuff leak.   ENT was consulted, the cuff leak absence was due to presence of large ET tube. 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 Patient failed bedside swallow study and NG tube was placed for swallow study. Patient gradually improved and passed swallow study on 1/3. Review of Systems:     Constitutional:  negative for chills, fevers, sweats  Respiratory:  negative for cough, dyspnea on exertion, shortness of breath, wheezing  Cardiovascular:  negative for chest pain, chest pressure/discomfort, lower extremity edema, palpitations  Gastrointestinal:  negative for abdominal pain, constipation, diarrhea, nausea, vomiting  Neurological:  negative for dizziness, headache    Medications: Allergies:  No Known Allergies    Current Meds:   Scheduled Meds:    venlafaxine  37.5 mg Per NG tube Daily    multivitamin  1 tablet Per NG tube Daily    famotidine  20 mg Per NG tube BID    thiamine  100 mg Per NG tube Daily    folic acid  1 mg Per NG tube Daily    enoxaparin  30 mg SubCUTAneous Daily     Continuous Infusions:   PRN Meds: hydrOXYzine HCl, albuterol, guaiFENesin, ondansetron **OR** ondansetron, polyethylene glycol, acetaminophen **OR** acetaminophen    Data:     Past Medical History:   has a past medical history of ADHD (attention deficit hyperactivity disorder), Anxiety, Bipolar 1 disorder ([address] and Depression. Social History:   reports that she has been smoking cigarettes. She has been smoking an average of .5 packs per day. She has never used smokeless tobacco. She reports current alcohol use. She reports current drug use. Drug: Marijuana Juryaz Nicholson). Family History:   Family History   Problem Relation Age of Onset    Hypertension Mother     Depression Mother     Bipolar Disorder Father     Alcohol Abuse Father     Cancer Other         Uncle ?        Vitals:  /74   Pulse 81   Temp 98.1 °F (36.7 °C) (Oral)   Resp 16   Ht 5' 5\" (1.651 m)   Wt 103 lb 9.9 oz (47 kg)   SpO2 99%   BMI 17.24 kg/m² Temp (24hrs), Av.2 °F (36.8 °C), Min:98 °F (36.7 °C), Max:98.5 °F (36.9 °C)    Recent Labs     23  0009 23  0556 23  0749 23  1326   POCGLU 102 114* 87 89         I/O (24Hr): No intake or output data in the 24 hours ending 23 1816      Labs:  Hematology:  Recent Labs     23  0654 23  0557   WBC 8.4 8.7   RBC 2.40* 2.78*   HGB 10.1* 10.1*   HCT 28.5* 30.8*   .8* 110.8*   MCH 42.1* 36.3*   MCHC 35.4* 32.8   RDW 15.0* 14.7*    357   MPV 9.6 10.4       Chemistry:  Recent Labs     23  0654 23  0557 23  0613   *  --  130*   K 3.7  --  3.7   CL 95*  --  98   CO2 24  --  25   GLUCOSE 106*  --  86   BUN 6  --  6   CREATININE 0.32*  --  0.34*   MG 1.6 1.6  --    ANIONGAP 12  --  7*   LABGLOM >60  --  >60   CALCIUM 9.8  --  9.7   PHOS 4.9*  --  4.7*       Recent Labs     23  1230 23  1748 23  0009 23  0556 23  0749 23  1326   POCGLU 117* 107* 102 114* 87 89       ABG:  Lab Results   Component Value Date/Time    POCPH 7.416 2022 04:01 PM    POCPCO2 46.0 2022 04:01 PM    POCPO2 124.2 2022 04:01 PM    POCHCO3 29.5 2022 04:01 PM    NBEA 1 2022 04:29 AM    PBEA 4 2022 04:01 PM    RFXB3ESD 99 2022 04:01 PM    FIO2 28.0 2022 04:52 PM     Lab Results   Component Value Date/Time    SPECIAL L HAND 5ML 2022 10:01 AM     Lab Results   Component Value Date/Time    CULTURE NO GROWTH 5 DAYS 2022 10:01 AM       Radiology:  CT ABDOMEN PELVIS WO CONTRAST Additional Contrast? None    Result Date: 2022  1. Consolidation in both lung bases with bilateral pleural effusions, consistent with pneumonia. 2. No acute abdominal abnormality. 3. Hepatic steatosis. XR CHEST PORTABLE    Result Date: 2022  Stable patchy bibasilar atelectasis or airspace disease.      XR CHEST PORTABLE    Result Date: 2022  Patchy ground-glass change in the lungs felt to represent multilobar pneumonia given clinical history. XR ABDOMEN FOR NG/OG/NE TUBE PLACEMENT    Result Date: 12/31/2022  Enteric tube in the stomach as above. No evidence of bowel obstruction. XR ABDOMEN FOR NG/OG/NE TUBE PLACEMENT    Result Date: 12/28/2022  Unremarkable limited KUB. NG tube tip projects at the left upper quadrant, overlying the expected location of the stomach. FL MODIFIED BARIUM SWALLOW W VIDEO    Result Date: 1/3/2023  Deep laryngeal penetration of thin liquid. Trace laryngeal penetration of soft solid. Puree, cookie and nectar thick liquid well-tolerated. Please see separate speech pathology report for full discussion of findings and recommendations. FL MODIFIED BARIUM SWALLOW W VIDEO    Result Date: 12/28/2022  Delayed oral phase of swallowing. Gross aspiration of puree with a delayed weak cough response. No other consistencies given. Please see separate speech pathology report for full discussion of findings and recommendations.        Physical Examination:        General appearance:  alert, cooperative and no distress, very weak, malnourished  Mental Status:  oriented to person, place and time and normal affect  Lungs:  clear to auscultation bilaterally, normal effort  Heart:  regular rate and rhythm, no murmur  Abdomen:  soft, ng in place with tube feeds, non tender, nondistended, normal bowel sounds, no masses, hepatomegaly, splenomegaly  Extremities:  no edema, redness, tenderness in the calves  Skin:  no gross lesions, rashes, induration    Assessment:        Hospital Problems             Last Modified POA    * (Principal) Drug overdose of undetermined intent, initial encounter 12/15/2022 Yes    COVID-19 12/18/2022 Yes    Acute respiratory insufficiency 66/21/0600 Yes    Alcoholic intoxication with complication ([address] 57/48/2406 Yes    Altered mental status 12/18/2022 Yes    Acute respiratory failure with hypoxia ([address] 12/26/2022 Yes     Plan:        Patient passed swallow study  Resume diet per swallow study recommendations  Thiamine and folic acid  Tolerating diet  Patient counseled regarding alcohol cessation  PM&R consulted for consideration of acute rehab  Patient would benefit from inpatient rehab if she agrees  Macrocytosis likely secondary to alcohol use, vitamin A57 folic acid within normal limits  Stop Lopressor  Patient adamantly refuses to go to nursing home but is very weak  Incidental COVID, no treatment given, patient out of isolation  Discussed case with     Planning for discharge once placement established.     Libertad Uriarte MD  1/4/2023  6:16 PM

## 2023-01-04 NOTE — PROGRESS NOTES
Speech Language Pathology  Speech Language Pathology  [facility]    Dysphagia Treatment Note    Date: 1/4/2023  Patients Name: Virgil Pearce  MRN: [id number]  Diagnosis: dysphagia  Patient Active Problem List   Diagnosis Code    Bipolar I disorder, most recent episode depressed ([facility] F31.30    Hyperbilirubinemia E80.6    Fatty liver K76.0    Ascites due to alcoholic hepatitis U64.79    Alcohol abuse F10.10    S/P cholecystectomy Z90.49    Abnormal LFTs R79.89    Hypokalemia E87.6    Hypoalbuminemia E88.09    Portal hypertension ([facility] K76.6    Drug overdose of undetermined intent, initial encounter T50.904A    COVID-19 U07.1    Acute respiratory insufficiency B31.60    Alcoholic intoxication with complication ([facility] E71.253    Altered mental status R41.82    Acute respiratory failure with hypoxia ([facility] J96.01       Pain: pt denies    Dysphagia Treatment  Treatment time: 9198-8931    Subjective: [x] Alert [x] Cooperative     [] Confused     [] Agitated    [] Lethargic    Objective/Assessment:    Pt. Seen for O/M treatment program for dysphagia. Pt. Completed O/M exercises X 5 X 1 sets with mod cues. Pt. Completed edy maneuver X 2 reps with max cues. Education provided and exercises left at bedside. Pt tolerated x3 trials nectar thick by straw with no overt s/s of aspiration. Plan:  [x] Continue ST services    [] Discharge from ST:        Discharge recommendations: []  Further therapy recommended at discharge. The patient should be able to tolerate at least 3 hours of therapy per day over 5 days or 15 hours over 7 days. [x] Further therapy recommended at discharge. [] No therapy recommended at discharge.          Treatment completed by: Katarina Sam, SLP, M.S. Ann Brooks

## 2023-01-05 LAB
GLUCOSE BLD-MCNC: 128 MG/DL (ref 65–105)
GLUCOSE BLD-MCNC: 83 MG/DL (ref 65–105)
GLUCOSE BLD-MCNC: 86 MG/DL (ref 65–105)
GLUCOSE BLD-MCNC: 87 MG/DL (ref 65–105)

## 2023-01-05 PROCEDURE — 6360000002 HC RX W HCPCS: Performed by: HEALTH CARE PROVIDER

## 2023-01-05 PROCEDURE — 6370000000 HC RX 637 (ALT 250 FOR IP): Performed by: STUDENT IN AN ORGANIZED HEALTH CARE EDUCATION/TRAINING PROGRAM

## 2023-01-05 PROCEDURE — 97116 GAIT TRAINING THERAPY: CPT

## 2023-01-05 PROCEDURE — 99231 SBSQ HOSP IP/OBS SF/LOW 25: CPT | Performed by: STUDENT IN AN ORGANIZED HEALTH CARE EDUCATION/TRAINING PROGRAM

## 2023-01-05 PROCEDURE — 97110 THERAPEUTIC EXERCISES: CPT

## 2023-01-05 PROCEDURE — 6370000000 HC RX 637 (ALT 250 FOR IP): Performed by: NURSE PRACTITIONER

## 2023-01-05 PROCEDURE — 2060000000 HC ICU INTERMEDIATE R&B

## 2023-01-05 PROCEDURE — 99231 SBSQ HOSP IP/OBS SF/LOW 25: CPT | Performed by: PHYSICAL MEDICINE & REHABILITATION

## 2023-01-05 PROCEDURE — 82947 ASSAY GLUCOSE BLOOD QUANT: CPT

## 2023-01-05 PROCEDURE — 6370000000 HC RX 637 (ALT 250 FOR IP): Performed by: INTERNAL MEDICINE

## 2023-01-05 RX ORDER — VENLAFAXINE HYDROCHLORIDE 150 MG/1
150 CAPSULE, EXTENDED RELEASE ORAL DAILY
Status: DISCONTINUED | OUTPATIENT
Start: 2023-01-06 | End: 2023-01-09 | Stop reason: HOSPADM

## 2023-01-05 RX ORDER — TRAMADOL HYDROCHLORIDE 50 MG/1
50 TABLET ORAL ONCE
Status: COMPLETED | OUTPATIENT
Start: 2023-01-05 | End: 2023-01-05

## 2023-01-05 RX ORDER — NICOTINE 21 MG/24HR
1 PATCH, TRANSDERMAL 24 HOURS TRANSDERMAL DAILY
Status: DISCONTINUED | OUTPATIENT
Start: 2023-01-05 | End: 2023-01-09 | Stop reason: HOSPADM

## 2023-01-05 RX ORDER — FAMOTIDINE 20 MG/1
20 TABLET, FILM COATED ORAL 2 TIMES DAILY
Status: DISCONTINUED | OUTPATIENT
Start: 2023-01-06 | End: 2023-01-09 | Stop reason: HOSPADM

## 2023-01-05 RX ADMIN — FOLIC ACID 1 MG: 1 TABLET ORAL at 09:04

## 2023-01-05 RX ADMIN — FAMOTIDINE 20 MG: 20 TABLET, FILM COATED ORAL at 09:04

## 2023-01-05 RX ADMIN — ALCOHOL 1 TABLET: 70.47 GEL TOPICAL at 09:04

## 2023-01-05 RX ADMIN — HYDROXYZINE HYDROCHLORIDE 25 MG: 25 TABLET ORAL at 02:10

## 2023-01-05 RX ADMIN — TRAMADOL HYDROCHLORIDE 50 MG: 50 TABLET, COATED ORAL at 19:57

## 2023-01-05 RX ADMIN — FAMOTIDINE 20 MG: 20 TABLET, FILM COATED ORAL at 19:28

## 2023-01-05 RX ADMIN — Medication 100 MG: at 09:04

## 2023-01-05 RX ADMIN — ENOXAPARIN SODIUM 30 MG: 100 INJECTION SUBCUTANEOUS at 09:04

## 2023-01-05 RX ADMIN — VENLAFAXINE 37.5 MG: 37.5 TABLET ORAL at 13:10

## 2023-01-05 ASSESSMENT — PAIN DESCRIPTION - LOCATION: LOCATION: NECK

## 2023-01-05 ASSESSMENT — PAIN SCALES - GENERAL: PAINLEVEL_OUTOF10: 8

## 2023-01-05 ASSESSMENT — PAIN DESCRIPTION - DESCRIPTORS: DESCRIPTORS: OTHER (COMMENT);SORE

## 2023-01-05 NOTE — PROGRESS NOTES
[facility]  Office: [address], DO, Abundio Patella, DO, Flori Donaldson, DO, Gladys Chang Blood, DO, Ramandeep Roy MD, Christy Wang MD, Breanna Dale MD, Gela Carney MD,  Ketty Carrasco MD, Tati Small MD, Teresa Gibson, DO, Antonieta Beauchamp MD,  Ashtyn Jenkins MD, Winsome Wolff MD, Rinku Lozada, DO, Mariah Eckert MD, Marcos Tsang MD, Libra Hammonds DO, Anum Torres MD, Mayra Olson MD, Ozzy Trevizo MD, Dania Peng MD, Marlys Hanna DO, Jannet Leach MD, Sarina Busby MD, Jacqueline Hughes, CNP,  Florentin Noe, CNP, Juanito Cottrell, CNP, Merwyn Cooks, CNP,  Leanne Smith, [facility], Sayda Perez, CNP, Erasto Inman, CNP, Kedar Cyr, CNP, Jf Penaloza, CNP, Dax Thrasher, CNP, Dhruv Tena PA-C, Ria Nguyen, CNS, Jenny Yanes, CNP, Harvinder Garduno, CNP         [address]    Progress Note    1/5/2023    4:15 PM    Name:   Nichol Bales  MRN:     [id number]     [place]:      [de-identified]   Room:   2015/2015-01   Day:  21  Admit Date:  12/15/2022 12:08 PM    PCP:   Shine Grimes  Code Status:  Full Code    Subjective:     C/C:   Chief Complaint   Patient presents with    Drug Overdose     Interval History Status: improved. Patient is awake and alert  Patient appears to be very weak. Awaiting placement. Patient states that she does not want to go back with her mom. She does not know where she will go after discharge. Patient is waiting for placement to rehab. Patient appears depressed, I gave her option to see a psychiatrist but she refused and she will think about it. Brief History:     64-year-old female with past medical history of drug overdose, chronic alcohol use presented to the hospital after she was found unresponsive due to suspected drug overdose. GCS was 3 on arrival.  Patient was intubated for airway protection.   ED work-up showed elevated alcohol level. Patient was also noted to have COVID-positive test.  Patient was admitted on 12/15 and was kept in ICU. She was extubated on 12/23. Extubation was delayed due to absence of cuff leak. ENT was consulted, the cuff leak absence was due to presence of large ET tube. 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 Patient failed bedside swallow study and NG tube was placed for swallow study. Patient gradually improved and passed swallow study on 1/3. Review of Systems:     Constitutional:  negative for chills, fevers, sweats  Respiratory:  negative for cough, dyspnea on exertion, shortness of breath, wheezing  Cardiovascular:  negative for chest pain, chest pressure/discomfort, lower extremity edema, palpitations  Gastrointestinal:  negative for abdominal pain, constipation, diarrhea, nausea, vomiting  Neurological:  negative for dizziness, headache    Medications: Allergies:  No Known Allergies    Current Meds:   Scheduled Meds:    nicotine  1 patch TransDERmal Daily    [START ON 1/6/2023] venlafaxine  150 mg Oral Daily    multivitamin  1 tablet Per NG tube Daily    famotidine  20 mg Per NG tube BID    thiamine  100 mg Per NG tube Daily    folic acid  1 mg Per NG tube Daily    enoxaparin  30 mg SubCUTAneous Daily     Continuous Infusions:   PRN Meds: hydrOXYzine HCl, albuterol, guaiFENesin, ondansetron **OR** ondansetron, polyethylene glycol, acetaminophen **OR** acetaminophen    Data:     Past Medical History:   has a past medical history of ADHD (attention deficit hyperactivity disorder), Anxiety, Bipolar 1 disorder ([address] and Depression. Social History:   reports that she has been smoking cigarettes. She has been smoking an average of .5 packs per day. She has never used smokeless tobacco. She reports current alcohol use. She reports current drug use. Drug: Marijuana Ishaan Flowers).      Family History:   Family History   Problem Relation Age of Onset    Hypertension Mother     Depression Mother     Bipolar Disorder Father     Alcohol Abuse Father     Cancer Other         Uncle ? Vitals:  BP (!) 121/91   Pulse 95   Temp 98.3 °F (36.8 °C) (Oral)   Resp 18   Ht 5' 5\" (1.651 m)   Wt 103 lb 9.9 oz (47 kg)   SpO2 100%   BMI 17.24 kg/m²   Temp (24hrs), Av.2 °F (36.8 °C), Min:98 °F (36.7 °C), Max:98.3 °F (36.8 °C)    Recent Labs     23  1326 23  0717 23  1119   POCGLU 89 86 83 87         I/O (24Hr): No intake or output data in the 24 hours ending 23 1615      Labs:  Hematology:  Recent Labs     23  0557   WBC 8.7   RBC 2.78*   HGB 10.1*   HCT 30.8*   .8*   MCH 36.3*   MCHC 32.8   RDW 14.7*      MPV 10.4       Chemistry:  Recent Labs     23  0557 23  0613   NA  --  130*   K  --  3.7   CL  --  98   CO2  --  25   GLUCOSE  --  86   BUN  --  6   CREATININE  --  0.34*   MG 1.6  --    ANIONGAP  --  7*   LABGLOM  --  >60   CALCIUM  --  9.7   PHOS  --  4.7*       Recent Labs     23  0556 23  0749 23  1326 23  0717 23  1119   POCGLU 114* 87 89 86 83 87       ABG:  Lab Results   Component Value Date/Time    POCPH 7.416 2022 04:01 PM    POCPCO2 46.0 2022 04:01 PM    POCPO2 124.2 2022 04:01 PM    POCHCO3 29.5 2022 04:01 PM    NBEA 1 2022 04:29 AM    PBEA 4 2022 04:01 PM    HCQA6EWC 99 2022 04:01 PM    FIO2 28.0 2022 04:52 PM     Lab Results   Component Value Date/Time    SPECIAL L HAND 5ML 2022 10:01 AM     Lab Results   Component Value Date/Time    CULTURE NO GROWTH 5 DAYS 2022 10:01 AM       Radiology:  CT ABDOMEN PELVIS WO CONTRAST Additional Contrast? None    Result Date: 2022  1. Consolidation in both lung bases with bilateral pleural effusions, consistent with pneumonia. 2. No acute abdominal abnormality. 3. Hepatic steatosis.      XR CHEST PORTABLE    Result Date: 2022  Stable patchy bibasilar atelectasis or airspace disease. XR CHEST PORTABLE    Result Date: 12/28/2022  Patchy ground-glass change in the lungs felt to represent multilobar pneumonia given clinical history. XR ABDOMEN FOR NG/OG/NE TUBE PLACEMENT    Result Date: 12/31/2022  Enteric tube in the stomach as above. No evidence of bowel obstruction. XR ABDOMEN FOR NG/OG/NE TUBE PLACEMENT    Result Date: 12/28/2022  Unremarkable limited KUB. NG tube tip projects at the left upper quadrant, overlying the expected location of the stomach. FL MODIFIED BARIUM SWALLOW W VIDEO    Result Date: 1/3/2023  Deep laryngeal penetration of thin liquid. Trace laryngeal penetration of soft solid. Puree, cookie and nectar thick liquid well-tolerated. Please see separate speech pathology report for full discussion of findings and recommendations. FL MODIFIED BARIUM SWALLOW W VIDEO    Result Date: 12/28/2022  Delayed oral phase of swallowing. Gross aspiration of puree with a delayed weak cough response. No other consistencies given. Please see separate speech pathology report for full discussion of findings and recommendations.        Physical Examination:        General appearance:  alert, cooperative and no distress, very weak, malnourished  Mental Status:  oriented to person, place and time and normal affect  Lungs:  clear to auscultation bilaterally, normal effort  Heart:  regular rate and rhythm, no murmur  Abdomen:  soft, ng in place with tube feeds, non tender, nondistended, normal bowel sounds, no masses, hepatomegaly, splenomegaly  Extremities:  no edema, redness, tenderness in the calves  Skin:  no gross lesions, rashes, induration    Assessment:        Hospital Problems             Last Modified POA    * (Principal) Drug overdose of undetermined intent, initial encounter 12/15/2022 Yes    COVID-19 12/18/2022 Yes    Acute respiratory insufficiency 16/27/8374 Yes    Alcoholic intoxication with complication ([facility] 03/85/9047 Yes Altered mental status 12/18/2022 Yes    Acute respiratory failure with hypoxia ([address] 12/26/2022 Yes     Plan:        Patient passed swallow study  Increase venlafaxine to home dose of 150 mg  Patient will think about seeing psychiatry for anxiety and depression  Resume diet per swallow study recommendations  Thiamine and folic acid  Tolerating diet  Patient counseled regarding alcohol cessation  PM&R consulted for consideration of acute rehab  Patient would benefit from inpatient rehab but will need eventual discharge plan. Macrocytosis likely secondary to alcohol use, vitamin C31 folic acid within normal limits    Discussed case with     Planning for discharge once placement established.     Darryl Ford MD  1/5/2023  4:15 PM

## 2023-01-05 NOTE — PROGRESS NOTES
Comprehensive Nutrition Assessment    Type and Reason for Visit:  Reassess    Nutrition Recommendations/Plan:   Continue current diet per SLP recommendations. Encourage/monitor PO intakes as tolerated. Will provide Boost pudding oral supplements x 2 per day. Monitor labs, weights, and plan of care. Malnutrition Assessment:  Malnutrition Status: Moderate malnutrition (01/05/23 1253)    Context:  Acute Illness     Findings of the 6 clinical characteristics of malnutrition:  Energy Intake:  Mild decrease in energy intake - Improving with previous nutrition support and recent start of oral diet. Weight Loss:  Unable to assess - Weight fluctuations since admission noted. Body Fat Loss:  Mild body fat loss Orbital   Muscle Mass Loss:  Mild muscle mass loss Clavicles (pectoralis & deltoids)  Fluid Accumulation:  Mild Generalized   Strength:  Not Performed    Nutrition Assessment:    Pt passed a MBSS on 1/3 for an Easy to [employer] with Mildly (Nectar) Thick liquids. Pt reports eating at least 50% of meals. Will monitor need for oral supplements. Last BM 1/4. Labs/Meds reviewed. Nutrition Related Findings:    Labs/Meds reviewed. Last BM 1/4. Wound Type: None       Current Nutrition Intake & Therapies:    Average Meal Intake: 51-75%  Average Supplements Intake: None Ordered  ADULT DIET; Easy to Chew; Mildly Thick (Nectar)    Anthropometric Measures:  Height: 5' 5\" (165.1 cm)  Ideal Body Weight (IBW): 125 lbs (57 kg)    Admission Body Weight: 102 lb 15.3 oz (46.7 kg)  Current Body Weight: 103 lb 9.9 oz (47 kg), 82.9 % IBW.  Weight Source: Bed Scale  Current BMI (kg/m2): 17.2                          BMI Categories: Underweight (BMI less than 18.5)    Estimated Daily Nutrient Needs:  Energy Requirements Based On: Kcal/kg  Weight Used for Energy Requirements: Current  Energy (kcal/day): 8337-9288 kcals/day  Weight Used for Protein Requirements: Admission  Protein (g/day): 60-75 gm pro/day  Method Used for Fluid Requirements: Other (Comment)  Fluid (ml/day): per MD    Nutrition Diagnosis:   Inadequate oral intake related to swallowing difficulty as evidenced by swallow study results, intake 51-75% (need for modified diet and thickened liquids; need for ONS)    Nutrition Interventions:   Food and/or Nutrient Delivery: Continue Current Diet, Start Oral Nutrition Supplement  Nutrition Education/Counseling: No recommendation at this time  Coordination of Nutrition Care: Continue to monitor while inpatient  Plan of Care discussed with: Patient    Goals:  Previous Goal Met: Progressing toward Goal(s)  Goals: Meet at least 75% of estimated needs, prior to discharge       Nutrition Monitoring and Evaluation:   Behavioral-Environmental Outcomes: None Identified  Food/Nutrient Intake Outcomes: Food and Nutrient Intake, Supplement Intake  Physical Signs/Symptoms Outcomes: Biochemical Data, Chewing or Swallowing, GI Status, Fluid Status or Edema, Nutrition Focused Physical Findings, Skin, Weight    Discharge Planning:     Too soon to determine     Corwith DAMASO Melchor, LD  Contact: [phone]

## 2023-01-05 NOTE — CARE COORDINATION
Met with patient to discuss transitional planning. She would like to go to SNF. [place] of choice provided. Referrals sent to [facility], [facility], [facility]    [address] received call from Rashmi Osman from [facility]. They do not have a bed available. Spoke to Phil from [facility]. They do not have a bed available. Spoke to Sheryle Pinon from [facility]. They do not have a bed available. Met with patient to provide update. Received more choices.  Referrals sent to [facility] and [facility]

## 2023-01-05 NOTE — PLAN OF CARE
Problem: Discharge Planning  Goal: Discharge to home or other facility with appropriate resources  1/5/2023 0534 by Stacy Pang RN  Outcome: Progressing  1/4/2023 1849 by Lety Pina RN  Outcome: Progressing     Problem: Skin/Tissue Integrity  Goal: Absence of new skin breakdown  Description: 1. Monitor for areas of redness and/or skin breakdown  2. Assess vascular access sites hourly  3. Every 4-6 hours minimum:  Change oxygen saturation probe site  4. Every 4-6 hours:  If on nasal continuous positive airway pressure, respiratory therapy assess nares and determine need for appliance change or resting period. 1/5/2023 0534 by Stacy Pang RN  Outcome: Progressing  1/4/2023 1849 by Lety Pina RN  Outcome: Progressing     Problem: ABCDS Injury Assessment  Goal: Absence of physical injury  1/5/2023 0534 by Stacy Pang RN  Outcome: Progressing  1/4/2023 1849 by Lety Pina RN  Outcome: Progressing     Problem: Safety - Adult  Goal: Free from fall injury  1/5/2023 0534 by Stacy Pang RN  Outcome: Progressing  1/4/2023 1849 by Lety Pina RN  Outcome: Progressing     Problem: Pain  Goal: Verbalizes/displays adequate comfort level or baseline comfort level  1/5/2023 0534 by Stacy Pang RN  Outcome: Progressing  1/4/2023 1849 by Lety Pina RN  Outcome: Progressing     Problem: Confusion  Goal: Confusion, delirium, dementia, or psychosis is improved or at baseline  Description: INTERVENTIONS:  1. Assess for possible contributors to thought disturbance, including medications, impaired vision or hearing, underlying metabolic abnormalities, dehydration, psychiatric diagnoses, and notify attending LIP  2. [place] high risk fall precautions, as indicated  3. Provide frequent short contacts to provide reality reorientation, refocusing and direction  4. Decrease environmental stimuli, including noise as appropriate  5.  Monitor and intervene to maintain adequate nutrition, hydration, elimination, sleep and activity  6. If unable to ensure safety without constant attention obtain sitter and review sitter guidelines with assigned personnel  7.  Initiate Psychosocial CNS and Spiritual Care consult, as indicated  1/5/2023 0534 by Malu Green RN  Outcome: Progressing  1/4/2023 1849 by Sherly Celis RN  Outcome: Progressing     Problem: Respiratory - Adult  Goal: Achieves optimal ventilation and oxygenation  1/5/2023 0534 by Malu Green RN  Outcome: Progressing  1/4/2023 1849 by Sherly Celis RN  Outcome: Progressing     Problem: Nutrition Deficit:  Goal: Optimize nutritional status  1/5/2023 0534 by Malu Green RN  Outcome: Progressing  1/4/2023 1849 by Sherly Celis RN  Outcome: Progressing     Problem: Risk for Elopement  Goal: Patient will not exit the unit/facility without proper excort  1/5/2023 0534 by Malu Green RN  Outcome: Progressing  1/4/2023 1849 by Sherly Celis RN  Outcome: Progressing

## 2023-01-05 NOTE — PROGRESS NOTES
Physical Therapy  Facility/Department: [facility] CAR 2- STEPDOWN  Physical Therapy Daily Treatment Note    Name: August Li  : 1995  MRN: [id number]  Date of Service: 2023    Discharge Recommendations:  Patient would benefit from continued therapy after discharge   PT Equipment Recommendations  Equipment Needed: Yes  Mobility Devices: Dorean Aver: Rolling  Other: pt should continue to use RW for safety based on today's session      Patient Diagnosis(es): The encounter diagnosis was Drug overdose of undetermined intent, initial encounter. Past Medical History:  has a past medical history of ADHD (attention deficit hyperactivity disorder), Anxiety, Bipolar 1 disorder ([facility] [address] and Depression. Past Surgical History:  has no past surgical history on file. Assessment   Body Structures, Functions, Activity Limitations Requiring Skilled Therapeutic Intervention: Decreased ROM; Decreased strength;Decreased endurance; Increased pain;Decreased posture;Decreased balance  Assessment: Pt able to complete bed mobility with SBA, required CGA for functional transfers and to ambulated 100ft with RW requiring Luciana for RW navigation. Pt then ambulated 50ft with HHA Luciana, notedly more unsteady than with use of RW. Pt most limited by decreased endurance and B LE strength deficits. Would benefit from continued PT to address deficits to progress toward prior level of independence. Therapy Prognosis: Fair  Barriers to Learning: none  Requires PT Follow-Up: Yes  Activity Tolerance  Activity Tolerance: Patient limited by endurance; Patient tolerated treatment well     Plan   Physcial Therapy Plan  General Plan:  (5-6x/wk)  Current Treatment Recommendations: Strengthening, ROM, Balance training, Functional mobility training, Transfer training, Gait training, Stair training, Endurance training, Home exercise program, Safety education & training, Equipment evaluation, education, & procurement, Pain management  Safety Devices  Type of Devices: All fall risk precautions in place, Call light within reach, Chair alarm in place, Gait belt, Nurse notified, Left in chair, Telesitter in use  Restraints  Restraints Initially in Place: No     Restrictions  Restrictions/Precautions  Restrictions/Precautions: Fall Risk, General Precautions  Required Braces or Orthoses?: No  Position Activity Restriction  Other position/activity restrictions: up with assist, extubated 12/23     Subjective   Pain: 6/10 pain B LEs, RN aware  General  Chart Reviewed: Yes  Patient assessed for rehabilitation services?: Yes  Response To Previous Treatment: Patient with no complaints from previous session. Family / Caregiver Present: No  Follows Commands: Within Functional Limits  General Comment  Comments: pt left seated in recliner with call light within reach, alarm activated and telesitter in use  Subjective  Subjective: Pt and RN agreeable to PT. Pt alert in bed upon arrival, pleasant and cooperative t/o treatment. Pt soft spoken and slow to respond at times, c/o pain 6/10 in B LEs. Cognition   Orientation  Overall Orientation Status: Within Functional Limits  Orientation Level: Oriented X4  Cognition  Overall Cognitive Status: Exceptions  Arousal/Alertness: Delayed responses to stimuli  Following Commands: Follows multistep commands with repitition; Follows multistep commands with increased time; Follows one step commands consistently  Attention Span: Attends with cues to redirect  Memory: Appears intact  Safety Judgement: Decreased awareness of need for assistance  Problem Solving: Assistance required to generate solutions;Assistance required to identify errors made  Insights: Decreased awareness of deficits  Initiation: Requires cues for some  Sequencing: Requires cues for some     Objective   Heart Rate: 95  Heart Rate Source: Monitor  SpO2: 100 %  O2 Device: None (Room air)     Observation/Palpation  Posture: Fair     Bed mobility  Supine to Sit: Stand by assistance  Sit to Supine:  (pt left seated in recliner)  Scooting: Stand by assistance  Bed Mobility Comments: HOB elevated, increased time to complete  Transfers  Sit to Stand: Contact guard assistance  Stand to Sit: Contact guard assistance  Comment: RW used for transfers, cueing for hand placement with good return demo  Ambulation  Surface: Level tile  Device: Rolling Walker  Assistance: Contact guard assistance;Minimal assistance (mostly CGA, pt required Perla for RW navigation while turning)  Quality of Gait: B knees flexed, narrow CHONG with occasional scissoring, L lateral path deviation throughout with cues to correct  Gait Deviations: Slow Elizabeth;Decreased step height;Decreased step length  Distance: 100ft  Comments: mildly unsteady but no true LOB  More Ambulation?: Yes  Ambulation 2  Surface - 2: level tile  Device 2: No device (HHA)  Assistance 2: Minimal assistance  Quality of Gait 2: significantly more unsteady with HHA than with RW  Gait Deviations: Slow Elizabeth;Decreased step length;Decreased step height;Decreased arm swing;Shuffles  Distance: 40ft  Comments: pt moving very slowly, ocassionally reaching for hallway railing with other hand for balance (HHA R UE)  Stairs/Curb  Stairs?: No     Balance  Posture: Fair  Sitting - Static: Good;-  Sitting - Dynamic: Fair;+  Standing - Static: Fair  Standing - Dynamic: Fair;-  Comments: standing balance assessed with HHA, improved standing balance with RW. seated balance assessed EOB  Exercise Treatment:   Seated LE exercise program: Long Arc Quads, hip abduction/adduction, heel/toe raises, and marches.  Reps: 20x     AM-PAC Score  AM-PAC Inpatient Mobility Raw Score : 17 (01/05/23 1238)  AM-PAC Inpatient T-Scale Score : 42.13 (01/05/23 1238)  Mobility Inpatient CMS 0-100% Score: 50.57 (01/05/23 1238)  Mobility Inpatient CMS G-Code Modifier : CK (01/05/23 1238)    Goals  Short Term Goals  Time Frame for Short Term Goals: 14  Short Term Goal 1: Pt to perform bed mobility Perla  Short Term Goal 2: Pt to demonstrate functional transfers Perla  Short Term Goal 3: Ambulate 50ft w/ RW modA  Short Term Goal 4: Pt to demonstrate standing dynamic balance of fair + to decrease fall risk  Patient Goals   Patient Goals : To go home       Education  Patient Education  Education Given To: Patient  Education Provided: Role of Therapy;Plan of Care;Home Exercise Program;Transfer Training; Fall Prevention Strategies; Equipment  Education Method: Demonstration;Verbal  Barriers to Learning: None  Education Outcome: Verbalized understanding;Demonstrated understanding      Therapy Time   Individual Concurrent Group Co-treatment   Time In 1050         Time Out 1115         Minutes 25         Timed Code Treatment Minutes: [facility] [place] [address], [place]

## 2023-01-05 NOTE — PROGRESS NOTES
Physical Medicine & Rehabilitation  Progress Note    1/5/2023 6:40 PM     CC: Ambulatory and ADL dysfunction due to respiratory failure to have questionable etiology urine drug screen negative per nursing    Subjective:   No complaints. Voice better. ROS:  Denies fevers, chills, sweats. No chest pain, palpitations, lightheadedness. Denies coughing, wheezing or shortness of breath. Denies abdominal pain, nausea, diarrhea or constipation. No new areas of joint pain. Denies new areas of numbness or weakness. Denies new anxiety or depression issues. No new skin problems. Rehabilitation:   PT:  Restrictions/Precautions: Fall Risk, General Precautions  Other position/activity restrictions: up with assist, extubated 12/23   Transfers  Sit to Stand: Contact guard assistance  Stand to Sit: Contact guard assistance  Bed to Chair: Minimal assistance  Comment: RW used for transfers, cueing for hand placement with good return demo  Ambulation  Surface: Level tile  Device: Rolling Walker  Other Apparatus: O2  Assistance: Contact guard assistance, Minimal assistance (mostly CGA, pt required Perla for RW navigation while turning)  Quality of Gait: B knees flexed, narrow CHONG with occasional scissoring, L lateral path deviation throughout with cues to correct  Gait Deviations: Slow Elizabeth, Decreased step height, Decreased step length  Distance: 100ft  Comments: mildly unsteady but no true LOB  More Ambulation?: Yes      OT:  ADL  Feeding: NPO (NG tube)  Grooming: Setup, Verbal cueing, Increased time to complete, Contact guard assistance (Brush hair seated EOB.)  UE Bathing: Setup, Verbal cueing, Increased time to complete, Contact guard assistance (Wash face seated EOB.)  LE Bathing: Dependent/Total  UE Dressing: Dependent/Total, Maximum assistance  UE Dressing Skilled Clinical Factors: Change of hospsital gown.  Patient attempt to pull left arm out of sleeve unsuccessfully  LE Dressing: Dependent/Total  LE Dressing Skilled Clinical Factors: don slipper socks  Toileting: Dependent/Total  Additional Comments: Pt completed supine/sit transfer to seated EOB. Simple grooming completed seated EOB, increased time needed to complete. pt speaks in low tone, hard to hear pt. Pt able to reach up with BUE multiple times to brush hair and ponytail in. Sit/stand transfer using RW for static standing EOB. Functional mobility household distance using RW. Pt retired to seated in chair at end of session. Bed mobility  Rolling to Left: Stand by assistance  Supine to Sit: Stand by assistance  Sit to Supine:  (pt left seated in recliner)  Scooting: Stand by assistance  Bed Mobility Comments: HOB elevated, increased time to complete  Transfers  Sit to stand: Minimal assistance, 2 Person assistance  Stand to sit: Minimal assistance, 2 Person assistance  Transfer Comments: Verbal cues for hand placement during transfer with poor return. ST:    Pt presents with mild-moderate cognitive deficits characterized by difficulties with immediate and delayed recall, verbal reasoning, generative naming. Note delayed response time throughout. Pt with weak/breathy voice. Pt. Presents with no dysarthria, general O/M weakness at this time. ST to follow up and provide treatment to address noted deficits. Education provided. ST recommends continued therapy at this time. Objective:  /84   Pulse 97   Temp 98.2 °F (36.8 °C) (Oral)   Resp 16   Ht 5' 5\" (1.651 m)   Wt 103 lb 9.9 oz (47 kg)   SpO2 99%   BMI 17.24 kg/m²  I Body mass index is 17.24 kg/m². I   Wt Readings from Last 1 Encounters:   23 103 lb 9.9 oz (47 kg)      Temp (24hrs), Av.1 °F (36.7 °C), Min:97.9 °F (36.6 °C), Max:98.3 °F (36.8 °C)         GEN: well developed, well nourished, no acute distress  HEENT: Normocephalic atraumatic, EOMI, mucous membranes pink and moist  CV: RRR, no murmurs, rubs or gallops  PULM: CTAB, no rales or rhonchi. Respirations WNL and unlabored  ABD: soft, NT, ND, +BS and equal  NEURO: A&O x3. Sensation intact to light touch. Today knew year, president location, follows command-voice bit stronger  MSK: Appears to have 4/5 upper and distal lower extremity  EXTREMITIES: No calf tenderness to palpation bilaterally. No edema BLEs  SKIN: warm dry and intact with good turgor  PSYCH: appropriately interactive. Affect WNL. Medications   Scheduled Meds:   nicotine  1 patch TransDERmal Daily    [START ON 1/6/2023] venlafaxine  150 mg Oral Daily    multivitamin  1 tablet Per NG tube Daily    famotidine  20 mg Per NG tube BID    thiamine  100 mg Per NG tube Daily    folic acid  1 mg Per NG tube Daily    enoxaparin  30 mg SubCUTAneous Daily     Continuous Infusions:  PRN Meds:.hydrOXYzine HCl, albuterol, guaiFENesin, ondansetron **OR** ondansetron, polyethylene glycol, acetaminophen **OR** acetaminophen     Diagnostics:     CBC:   Recent Labs     01/03/23  0557   WBC 8.7   RBC 2.78*   HGB 10.1*   HCT 30.8*   .8*   RDW 14.7*        BMP:   Recent Labs     01/04/23  0613   *   K 3.7   CL 98   CO2 25   PHOS 4.7*   BUN 6   CREATININE 0.34*     BNP: No results for input(s): BNP in the last 72 hours. PT/INR: No results for input(s): PROTIME, INR in the last 72 hours. APTT: No results for input(s): APTT in the last 72 hours. CARDIAC ENZYMES: No results for input(s): CKMB, CKMBINDEX, TROPONINT in the last 72 hours. Invalid input(s): CKTOTAL;3  FASTING LIPID PANEL:  Lab Results   Component Value Date    TRIG 109 12/30/2022     LIVER PROFILE: No results for input(s): AST, ALT, ALB, BILIDIR, BILITOT, ALKPHOS in the last 72 hours. I/O (24Hr):   No intake or output data in the 24 hours ending 01/05/23 1840    Glu last 24 hour  Recent Labs     01/04/23  2020 01/05/23  0717 01/05/23  1119 01/05/23  1703   POCGLU 86 83 87 86       No results for input(s): CLARITYU, COLORU, WHITLEY, [address], [address], RBCUA, ALEK, BACTERIA, NITRU, WBCUA, LEUKOCYTESUR, YEAST, Lillian [place] in the last 72 hours. Impression: Ms. Lauren Shaikh is a 32 y.o. female with a history of Drug overdose of undetermined intent, initial encounter     Deconditioning due to drug overdose-however discussed with nursing and -drug screen negative  Recent COVID infection droplet isolation until 44/29/6379 per ID  Alcoholic liver disease-folic acid, multivitamin, thiamine  Status post respiratory failure-extubated   Anxiety/depression /bipolar/ADD-Atarax, Effexor  GERD-Pepcid  Huponatremia sodium 130  Anemia hemoglobin 10.1       Recommendations:  1. Diagnosis: Deconditioning due to drug overdose  2. Therapy: Has PT and OT need, speech seen for dysphagia-await evaluation for cognition ambulated 100 feet contact-guard min assistance  3. Medical  Necessity: As above  4. Support: Clarify, per  broke up with boyfriend- has been talking with mother-need to clarify disposition/support on eventual discharge-patient notes unable to go to mother's, no disposition noted-she would like to go to SNF  5. Rehab recommendation: Would benefit acute inpatient rehabilitation when medically ready -however progressing rapidly already walking 100 feet min assist contact-guard, I suspect will  be high level next few days also will need to clarify disposition/support on eventual discharge  6. DVT proph: Albertina Kline MD       This note is created with the assistance of a speech recognition program.  While intending to generate a document that actually reflects the content of the visit, the document can still have some errors including those of syntax and sound a like substitutions which may escape proof reading.   In such instances, actual meaning can be extrapolated by contextual diversion

## 2023-01-06 LAB
ALBUMIN SERPL-MCNC: 3.3 G/DL (ref 3.5–5.2)
ALBUMIN/GLOBULIN RATIO: 0.7 (ref 1–2.5)
ALP BLD-CCNC: 119 U/L (ref 35–104)
ALT SERPL-CCNC: 44 U/L (ref 5–33)
ANION GAP SERPL CALCULATED.3IONS-SCNC: 12 MMOL/L (ref 9–17)
AST SERPL-CCNC: 84 U/L
BILIRUB SERPL-MCNC: 0.4 MG/DL (ref 0.3–1.2)
BUN BLDV-MCNC: 4 MG/DL (ref 6–20)
CALCIUM SERPL-MCNC: 9.3 MG/DL (ref 8.6–10.4)
CHLORIDE BLD-SCNC: 100 MMOL/L (ref 98–107)
CO2: 24 MMOL/L (ref 20–31)
CREAT SERPL-MCNC: 0.45 MG/DL (ref 0.5–0.9)
GFR SERPL CREATININE-BSD FRML MDRD: >60 ML/MIN/1.73M2
GLUCOSE BLD-MCNC: 104 MG/DL (ref 70–99)
GLUCOSE BLD-MCNC: 89 MG/DL (ref 65–105)
PHOSPHORUS: 3.6 MG/DL (ref 2.6–4.5)
POTASSIUM SERPL-SCNC: 3.3 MMOL/L (ref 3.7–5.3)
SODIUM BLD-SCNC: 136 MMOL/L (ref 135–144)
TOTAL PROTEIN: 7.9 G/DL (ref 6.4–8.3)

## 2023-01-06 PROCEDURE — 6370000000 HC RX 637 (ALT 250 FOR IP): Performed by: NURSE PRACTITIONER

## 2023-01-06 PROCEDURE — 36415 COLL VENOUS BLD VENIPUNCTURE: CPT

## 2023-01-06 PROCEDURE — 82947 ASSAY GLUCOSE BLOOD QUANT: CPT

## 2023-01-06 PROCEDURE — 2060000000 HC ICU INTERMEDIATE R&B

## 2023-01-06 PROCEDURE — 6370000000 HC RX 637 (ALT 250 FOR IP): Performed by: INTERNAL MEDICINE

## 2023-01-06 PROCEDURE — 80053 COMPREHEN METABOLIC PANEL: CPT

## 2023-01-06 PROCEDURE — 6370000000 HC RX 637 (ALT 250 FOR IP): Performed by: STUDENT IN AN ORGANIZED HEALTH CARE EDUCATION/TRAINING PROGRAM

## 2023-01-06 PROCEDURE — 97129 THER IVNTJ 1ST 15 MIN: CPT

## 2023-01-06 PROCEDURE — 84100 ASSAY OF PHOSPHORUS: CPT

## 2023-01-06 PROCEDURE — 6360000002 HC RX W HCPCS: Performed by: HEALTH CARE PROVIDER

## 2023-01-06 PROCEDURE — 97130 THER IVNTJ EA ADDL 15 MIN: CPT

## 2023-01-06 PROCEDURE — 99231 SBSQ HOSP IP/OBS SF/LOW 25: CPT | Performed by: STUDENT IN AN ORGANIZED HEALTH CARE EDUCATION/TRAINING PROGRAM

## 2023-01-06 RX ORDER — POTASSIUM CHLORIDE 20 MEQ/1
40 TABLET, EXTENDED RELEASE ORAL ONCE
Status: COMPLETED | OUTPATIENT
Start: 2023-01-06 | End: 2023-01-07

## 2023-01-06 RX ADMIN — HYDROXYZINE HYDROCHLORIDE 25 MG: 25 TABLET ORAL at 21:40

## 2023-01-06 RX ADMIN — VENLAFAXINE HYDROCHLORIDE 150 MG: 150 CAPSULE, EXTENDED RELEASE ORAL at 08:28

## 2023-01-06 RX ADMIN — HYDROXYZINE HYDROCHLORIDE 25 MG: 25 TABLET ORAL at 11:32

## 2023-01-06 RX ADMIN — HYDROXYZINE HYDROCHLORIDE 25 MG: 25 TABLET ORAL at 04:25

## 2023-01-06 RX ADMIN — FOLIC ACID 1 MG: 1 TABLET ORAL at 08:28

## 2023-01-06 RX ADMIN — FAMOTIDINE 20 MG: 20 TABLET, FILM COATED ORAL at 21:40

## 2023-01-06 RX ADMIN — ALCOHOL 1 TABLET: 70.47 GEL TOPICAL at 08:28

## 2023-01-06 RX ADMIN — FAMOTIDINE 20 MG: 20 TABLET, FILM COATED ORAL at 08:28

## 2023-01-06 RX ADMIN — ENOXAPARIN SODIUM 30 MG: 100 INJECTION SUBCUTANEOUS at 08:28

## 2023-01-06 NOTE — CARE COORDINATION
TRansitional planning    [place] of [facility] and left  requesting return phone call. [address] of [address] and spoke Isabel. They are not able to accept. [address] and spoke to [facility]. Received and being reviewed. [facility] and spoke to [facility]. They are concerned with where patient will go after SNF. They cannot discharge to a homeless shelter. They can accept if patient has a place to d/c to when done at [facility].     1505 Spoke to patient about plan for discharge. CM relates notes say she plans to go to SNF. SHe asked how long she would stay there. CM told her it depended on her progress. CM asked if she could call her Mom and discuss plan for discharge. She said yes. AGUEDA called Brandi Julian and she relates patient was living with her boyfriend Alexis before she came in hospital and that she expects Dayanna Marcelo to go there when discharged from SNF.     1511 Spoke to Dayanna Marcelo and she said that she is no longer with Alexis and will not be able to stay with him post SNF. Patient said she may be able to stay with her sister Matt Hyatt. Called Luly and related that Alexis is no longer patient boyfriend. Ezequiel Osborn gave Jamaal Tomlin9 phone [phone].    [phone] Called sister Matt Hyatt and left  requested return phone call.

## 2023-01-06 NOTE — PLAN OF CARE
Problem: Discharge Planning  Goal: Discharge to home or other facility with appropriate resources  Outcome: Progressing     Problem: Skin/Tissue Integrity  Goal: Absence of new skin breakdown  Description: 1. Monitor for areas of redness and/or skin breakdown  2. Assess vascular access sites hourly  3. Every 4-6 hours minimum:  Change oxygen saturation probe site  4. Every 4-6 hours:  If on nasal continuous positive airway pressure, respiratory therapy assess nares and determine need for appliance change or resting period. Outcome: Progressing     Problem: ABCDS Injury Assessment  Goal: Absence of physical injury  Outcome: Progressing     Problem: Safety - Adult  Goal: Free from fall injury  Outcome: Progressing     Problem: Pain  Goal: Verbalizes/displays adequate comfort level or baseline comfort level  Outcome: Progressing     Problem: Confusion  Goal: Confusion, delirium, dementia, or psychosis is improved or at baseline  Description: INTERVENTIONS:  1. Assess for possible contributors to thought disturbance, including medications, impaired vision or hearing, underlying metabolic abnormalities, dehydration, psychiatric diagnoses, and notify attending LIP  2. [place] high risk fall precautions, as indicated  3. Provide frequent short contacts to provide reality reorientation, refocusing and direction  4. Decrease environmental stimuli, including noise as appropriate  5. Monitor and intervene to maintain adequate nutrition, hydration, elimination, sleep and activity  6. If unable to ensure safety without constant attention obtain sitter and review sitter guidelines with assigned personnel  7.  Initiate Psychosocial CNS and Spiritual Care consult, as indicated  Outcome: Progressing     Problem: Respiratory - Adult  Goal: Achieves optimal ventilation and oxygenation  Outcome: Progressing     Problem: Nutrition Deficit:  Goal: Optimize nutritional status  Outcome: Progressing     Problem: Risk for Elopement  Goal: Patient will not exit the unit/facility without proper excort  Outcome: Progressing

## 2023-01-06 NOTE — PROGRESS NOTES
[facility]  Office: [address], DO, Starr Kumar, DO, Irmapillo Gupta, DO, Antonio Richardson, DO, David Santoyo MD, Josi Hummel MD, Lam Maurer MD, Kim Rm MD,  Valentino Delgado MD, Renay Corona MD, Coby Thomas, DO, Goran Valdivia MD,  Emili Gray MD, Parrish Lewis MD, Salina De La Vega DO, Sneha Hobbs MD, Maria Street MD, Mark Campbell DO, Lindsey Ahumada MD, Lauren Ott MD, Danii Haile MD, Ric Eisenberg MD, Shameka Harrison DO, Chyna Yañez MD, Clinton Delgado MD, Augustin Mtz, CNP,  Patti Gasca, CNP, Chela Pang, CNP, Tatyana Fuentes, CNP,  Eva Cornelius, [facility], Segundo Barron, CNP, Shayan Liu, CNP, Zoltan Johns, CNP, Mariann Ashley, CNP, Krista Celestin, CNP, Lillian Marin PA-C, Amos Deutsch, [facility], Bridger Solomon, CNP, Macho Flood, [facility] [place] [facility]    Progress Note    1/6/2023    4:24 PM    Name:   Festus Watson  MRN:     [id number]     [place]:      [de-identified]   Room:   2015/2015-01  IP Day:  25  Admit Date:  12/15/2022 12:08 PM    PCP:   Jolene Cosme  Code Status:  Full Code    Subjective:     C/C:   Chief Complaint   Patient presents with    Drug Overdose     Interval History Status: improved. Patient is awake and alert  No acute events overnight  No new complaints  Awaiting placement    Brief History:     75-year-old female with past medical history of drug overdose, chronic alcohol use presented to the hospital after she was found unresponsive due to suspected drug overdose. GCS was 3 on arrival.  Patient was intubated for airway protection. ED work-up showed elevated alcohol level. Patient was also noted to have COVID-positive test.  Patient was admitted on 12/15 and was kept in ICU. She was extubated on 12/23. Extubation was delayed due to absence of cuff leak.   ENT was consulted, the cuff leak absence was due to presence of large ET tube.  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 Patient failed bedside swallow study and NG tube was placed for swallow study. Patient gradually improved and passed swallow study on 1/3. Review of Systems:     Constitutional:  negative for chills, fevers, sweats  Respiratory:  negative for cough, dyspnea on exertion, shortness of breath, wheezing  Cardiovascular:  negative for chest pain, chest pressure/discomfort, lower extremity edema, palpitations  Gastrointestinal:  negative for abdominal pain, constipation, diarrhea, nausea, vomiting  Neurological:  negative for dizziness, headache    Medications: Allergies:  No Known Allergies    Current Meds:   Scheduled Meds:    nicotine  1 patch TransDERmal Daily    venlafaxine  150 mg Oral Daily    famotidine  20 mg Oral BID    multivitamin  1 tablet Per NG tube Daily    thiamine  100 mg Per NG tube Daily    folic acid  1 mg Per NG tube Daily    enoxaparin  30 mg SubCUTAneous Daily     Continuous Infusions:   PRN Meds: hydrOXYzine HCl, albuterol, guaiFENesin, ondansetron **OR** ondansetron, polyethylene glycol, acetaminophen **OR** acetaminophen    Data:     Past Medical History:   has a past medical history of ADHD (attention deficit hyperactivity disorder), Anxiety, Bipolar 1 disorder ([facility] [address] and Depression. Social History:   reports that she has been smoking cigarettes. She has been smoking an average of .5 packs per day. She has never used smokeless tobacco. She reports current alcohol use. She reports current drug use. Drug: Marijuana Kenard Snooks). Family History:   Family History   Problem Relation Age of Onset    Hypertension Mother     Depression Mother     Bipolar Disorder Father     Alcohol Abuse Father     Cancer Other         Uncle ?        Vitals:  /85   Pulse 95   Temp 98.4 °F (36.9 °C) (Oral)   Resp 13   Ht 5' 5\" (1.651 m)   Wt 103 lb 9.9 oz (47 kg)   SpO2 100%   BMI 17.24 kg/m²   Temp (24hrs), Av.3 °F (36.8 °C), Min:98 °F (36.7 °C), Max:98.4 °F (36.9 °C)    Recent Labs     23   POCGLU 83 87 86 128*         I/O (24Hr): No intake or output data in the 24 hours ending 23 1624      Labs:  Hematology:  No results for input(s): WBC, RBC, HGB, HCT, MCV, MCH, MCHC, RDW, PLT, MPV, SEDRATE, CRP, INR, DDIMER, RV0DFEAI, LABABSO in the last 72 hours. Invalid input(s): PT    Chemistry:  Recent Labs     23   * 136   K 3.7 3.3*   CL 98 100   CO2 25 24   GLUCOSE 86 104*   BUN 6 4*   CREATININE 0.34* 0.45*   ANIONGAP 7* 12   LABGLOM >60 >60   CALCIUM 9.7 9.3   PHOS 4.7* 3.6       Recent Labs     23  1326 23  0722   PROT  --   --   --   --   --   --  7.9   LABALBU  --   --   --   --   --   --  3.3*   AST  --   --   --   --   --   --  84*   ALT  --   --   --   --   --   --  44*   ALKPHOS  --   --   --   --   --   --  119*   BILITOT  --   --   --   --   --   --  0.4   POCGLU 89 86 83 87 86 128*  --        ABG:  Lab Results   Component Value Date/Time    POCPH 7.416 2022 04:01 PM    POCPCO2 46.0 2022 04:01 PM    POCPO2 124.2 2022 04:01 PM    POCHCO3 29.5 2022 04:01 PM    NBEA 1 2022 04:29 AM    PBEA 4 2022 04:01 PM    ROFJ7BSF 99 2022 04:01 PM    FIO2 28.0 2022 04:52 PM     Lab Results   Component Value Date/Time    SPECIAL L HAND 5ML 2022 10:01 AM     Lab Results   Component Value Date/Time    CULTURE NO GROWTH 5 DAYS 2022 10:01 AM       Radiology:  CT ABDOMEN PELVIS WO CONTRAST Additional Contrast? None    Result Date: 2022  1. Consolidation in both lung bases with bilateral pleural effusions, consistent with pneumonia. 2. No acute abdominal abnormality. 3. Hepatic steatosis.      XR CHEST PORTABLE    Result Date: 2022  Stable patchy bibasilar atelectasis or airspace disease. XR CHEST PORTABLE    Result Date: 12/28/2022  Patchy ground-glass change in the lungs felt to represent multilobar pneumonia given clinical history. XR ABDOMEN FOR NG/OG/NE TUBE PLACEMENT    Result Date: 12/31/2022  Enteric tube in the stomach as above. No evidence of bowel obstruction. XR ABDOMEN FOR NG/OG/NE TUBE PLACEMENT    Result Date: 12/28/2022  Unremarkable limited KUB. NG tube tip projects at the left upper quadrant, overlying the expected location of the stomach. FL MODIFIED BARIUM SWALLOW W VIDEO    Result Date: 1/3/2023  Deep laryngeal penetration of thin liquid. Trace laryngeal penetration of soft solid. Puree, cookie and nectar thick liquid well-tolerated. Please see separate speech pathology report for full discussion of findings and recommendations. FL MODIFIED BARIUM SWALLOW W VIDEO    Result Date: 12/28/2022  Delayed oral phase of swallowing. Gross aspiration of puree with a delayed weak cough response. No other consistencies given. Please see separate speech pathology report for full discussion of findings and recommendations.        Physical Examination:        General appearance:  alert, cooperative and no distress, very weak, malnourished  Mental Status:  oriented to person, place and time and normal affect  Lungs:  clear to auscultation bilaterally, normal effort  Heart:  regular rate and rhythm, no murmur  Abdomen:  soft, ng in place with tube feeds, non tender, nondistended, normal bowel sounds, no masses, hepatomegaly, splenomegaly  Extremities:  no edema, redness, tenderness in the calves  Skin:  no gross lesions, rashes, induration    Assessment:        Hospital Problems             Last Modified POA    * (Principal) Drug overdose of undetermined intent, initial encounter 12/15/2022 Yes    COVID-19 12/18/2022 Yes    Acute respiratory insufficiency 85/40/8117 Yes    Alcoholic intoxication with complication ([facility] [address] 29/39/1678 Yes    Altered mental status 12/18/2022 Yes    Acute respiratory failure with hypoxia ([facility] [address] 12/26/2022 Yes   Plan:        Patient passed swallow study  Continue venlafaxine to home dose of 150 mg  Patient will think about seeing psychiatry for anxiety and depression  Resume diet per swallow study recommendations  Thiamine and folic acid  Tolerating diet  Patient counseled regarding alcohol cessation  PM&R consulted for consideration of acute rehab  Patient would benefit from inpatient rehab but will need eventual discharge plan. Macrocytosis likely secondary to alcohol use, vitamin P20 folic acid within normal limits  Replace potassium    Discussed case with     Planning for discharge once placement established.     Jude Rose MD  1/6/2023  4:24 PM

## 2023-01-06 NOTE — CARE COORDINATION
Met with pt again. Noted plans for SNF at discharge. Pt was reviewing SNF list for more choices. Discussed where she would go after SNF and pt stated once her sister gets furniture, she can stay with her. Otherwise, she was not sure. Stated she has never stayed in a shelter and SW did provide her with shelter info and discussed [facility].  Also discussed recovery issues/urges and cravings, etc and the importance of following up with a tx program once she is out of the SNF. Pt was given list of tx programs earlier this week.  Support provided

## 2023-01-06 NOTE — PROGRESS NOTES
Speech Language Pathology  Speech Language Pathology  [facility]    Cognitive Treatment Note    Date: 1/6/2023  Patients Name: Earl Jorge  MRN: [id number]  Patient Active Problem List   Diagnosis Code    Bipolar I disorder, most recent episode depressed ([facility] [address] F31.30    Hyperbilirubinemia E80.6    Fatty liver K76.0    Ascites due to alcoholic hepatitis X44.98    Alcohol abuse F10.10    S/P cholecystectomy Z90.49    Abnormal LFTs R79.89    Hypokalemia E87.6    Hypoalbuminemia E88.09    Portal hypertension ([facility] [address] K76.6    Drug overdose of undetermined intent, initial encounter T50.904A    COVID-19 U07.1    Acute respiratory insufficiency I99.51    Alcoholic intoxication with complication ([facility] [address] A07.230    Altered mental status R41.82    Ac[facility] failure [facility] J96.01       Pain: 0/10    Cognitive Treatment    Treatment time: 7997-7120      Subjective: [x] Alert [x] Cooperative     [] Confused     [] Agitated    [] Lethargic      Objective/Assessment:    Recall: Memory and mental Manipulation, size 3 words: 5/6 increased to 6/6 with repetition                 Size 4 words: 7/11 increased to 10/11 with min verbal cues and repetition    Delayed recall of 3 units related: 0/3 increased to 3/3 with min-mod verbal cues    **Pt. Provided with education regarding memory compensatory strategies. Pt. Encouraged to utilize strategies once discharged from the hospital. Pt. Verbalized understanding. Tip sheep left at bedside. Organization: [employer], concrete: 23/32 increased to 32/32 with min verbal cues    Problem Solving/Reasoning: Word Deduction: 22/24 increased to 24/24 with min verbal cues      Stating if treatments are T/F:  15/15     Other: Pt. Continues with decreased vocal intensity. Increased from previous therapy sessions.      Plan:  [x] Continue ST services    [] Discharge from ST:      Discharge recommendations: []  Further therapy recommended at discharge. The patient should be able to tolerate at least 3 hours of therapy per day over 5 days or 15 hours over 7 days. [x] Further therapy recommended at discharge. [] No therapy recommended at discharge. Treatment completed by: ZEHRA Lackey, M.A.  LILLY-SLP

## 2023-01-07 PROCEDURE — 99231 SBSQ HOSP IP/OBS SF/LOW 25: CPT | Performed by: STUDENT IN AN ORGANIZED HEALTH CARE EDUCATION/TRAINING PROGRAM

## 2023-01-07 PROCEDURE — 6370000000 HC RX 637 (ALT 250 FOR IP): Performed by: HEALTH CARE PROVIDER

## 2023-01-07 PROCEDURE — 6370000000 HC RX 637 (ALT 250 FOR IP): Performed by: STUDENT IN AN ORGANIZED HEALTH CARE EDUCATION/TRAINING PROGRAM

## 2023-01-07 PROCEDURE — 6360000002 HC RX W HCPCS: Performed by: HEALTH CARE PROVIDER

## 2023-01-07 PROCEDURE — 2060000000 HC ICU INTERMEDIATE R&B

## 2023-01-07 PROCEDURE — 6370000000 HC RX 637 (ALT 250 FOR IP): Performed by: NURSE PRACTITIONER

## 2023-01-07 PROCEDURE — 6370000000 HC RX 637 (ALT 250 FOR IP): Performed by: INTERNAL MEDICINE

## 2023-01-07 RX ORDER — ARIPIPRAZOLE 10 MG/1
10 TABLET, ORALLY DISINTEGRATING ORAL DAILY
Status: DISCONTINUED | OUTPATIENT
Start: 2023-01-07 | End: 2023-01-09 | Stop reason: HOSPADM

## 2023-01-07 RX ADMIN — POTASSIUM CHLORIDE 40 MEQ: 1500 TABLET, EXTENDED RELEASE ORAL at 06:10

## 2023-01-07 RX ADMIN — ALCOHOL 1 TABLET: 70.47 GEL TOPICAL at 08:20

## 2023-01-07 RX ADMIN — FAMOTIDINE 20 MG: 20 TABLET, FILM COATED ORAL at 21:20

## 2023-01-07 RX ADMIN — HYDROXYZINE HYDROCHLORIDE 25 MG: 25 TABLET ORAL at 18:29

## 2023-01-07 RX ADMIN — ARIPIPRAZOLE 10 MG: 10 TABLET, ORALLY DISINTEGRATING ORAL at 21:20

## 2023-01-07 RX ADMIN — VENLAFAXINE HYDROCHLORIDE 150 MG: 150 CAPSULE, EXTENDED RELEASE ORAL at 08:20

## 2023-01-07 RX ADMIN — ACETAMINOPHEN 650 MG: 325 TABLET ORAL at 08:27

## 2023-01-07 RX ADMIN — FOLIC ACID 1 MG: 1 TABLET ORAL at 08:20

## 2023-01-07 RX ADMIN — Medication 100 MG: at 08:20

## 2023-01-07 RX ADMIN — ENOXAPARIN SODIUM 30 MG: 100 INJECTION SUBCUTANEOUS at 08:20

## 2023-01-07 RX ADMIN — FAMOTIDINE 20 MG: 20 TABLET, FILM COATED ORAL at 08:20

## 2023-01-07 RX ADMIN — HYDROXYZINE HYDROCHLORIDE 25 MG: 25 TABLET ORAL at 11:07

## 2023-01-07 ASSESSMENT — PAIN DESCRIPTION - ORIENTATION: ORIENTATION: LEFT

## 2023-01-07 ASSESSMENT — PAIN DESCRIPTION - LOCATION: LOCATION: NECK

## 2023-01-07 ASSESSMENT — PAIN SCALES - GENERAL
PAINLEVEL_OUTOF10: 0
PAINLEVEL_OUTOF10: 0
PAINLEVEL_OUTOF10: 5
PAINLEVEL_OUTOF10: 0

## 2023-01-07 ASSESSMENT — PAIN DESCRIPTION - DESCRIPTORS: DESCRIPTORS: ACHING

## 2023-01-07 NOTE — PROGRESS NOTES
[facility]  Office: [address], DO, Burt José, DO, Amber Martin, DO, Xiao Gabriel Blood, DO, Orion Lopez MD, Raghavendra Crowley MD, Vignesh Rice MD, Renold Galeazzi, MD,  Minh Calles MD, Aly Saenz MD, Jamie Burgos, DO, eCly Zamora MD,  Huang Huston MD, Freada Osgood, MD, Tulio Desai, DO, Oskar Ugalde MD, Gail To MD, Jorge Cutler, DO, vAis Yañez MD, Hillary Pacheco MD, Kaleb Felix MD, Suman Desouza MD, Ortega Saleem, DO, Kristina Chua MD, Brooks Mohamud MD, Vin Dinero, CNP,  Jorge Shelton, CNP, Awa Chavez, CNP, Sofy Petersen, CNP,  Nancy Lay, [facility], Beverly Underwood, CNP, Kanika Rai, CNP, Inez Torre, CNP, Nupur Gould, CNP, Lora Herrera, CNP, Obdulia Arroyo PA-C, Sekou Almendarez, [facility], Lindsay Mesa, CNP, Purcell Bloch, CNP         [place] [address]    Progress Note    1/7/2023    12:20 PM    Name:   Earl Jorge  MRN:     [id number]     [place]:      [de-identified]   Room:   2015/2015-01   Day:  23  Admit Date:  12/15/2022 12:08 PM    PCP:   Nay Goldsmith  Code Status:  Full Code    Subjective:     C/C:   Chief Complaint   Patient presents with    Drug Overdose     Interval History Status: improved. No acute change in patient mentation. Patient is alert and oriented  She does not understand her medical issues but says that she does not want to go live with someone. She wanted to go out and smoke yesterday. Vitals are stable  Patient walked without any issues    Brief History:     49-year-old female with past medical history of drug overdose, chronic alcohol use presented to the hospital after she was found unresponsive due to suspected drug overdose. GCS was 3 on arrival.  Patient was intubated for airway protection. ED work-up showed elevated alcohol level.   Patient was also noted to have COVID-positive test.  Patient was admitted on 12/15 and was kept in ICU. She was extubated on 12/23. Extubation was delayed due to absence of cuff leak. ENT was consulted, the cuff leak absence was due to presence of large ET tube. 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 Patient failed bedside swallow study and NG tube was placed for swallow study. Patient gradually improved and passed swallow study on 1/3. Review of Systems:     Constitutional:  negative for chills, fevers, sweats  Respiratory:  negative for cough, dyspnea on exertion, shortness of breath, wheezing  Cardiovascular:  negative for chest pain, chest pressure/discomfort, lower extremity edema, palpitations  Gastrointestinal:  negative for abdominal pain, constipation, diarrhea, nausea, vomiting  Neurological:  negative for dizziness, headache    Medications: Allergies:  No Known Allergies    Current Meds:   Scheduled Meds:    nicotine  1 patch TransDERmal Daily    venlafaxine  150 mg Oral Daily    famotidine  20 mg Oral BID    multivitamin  1 tablet Per NG tube Daily    thiamine  100 mg Per NG tube Daily    folic acid  1 mg Per NG tube Daily    enoxaparin  30 mg SubCUTAneous Daily     Continuous Infusions:   PRN Meds: hydrOXYzine HCl, albuterol, guaiFENesin, ondansetron **OR** ondansetron, polyethylene glycol, acetaminophen **OR** acetaminophen    Data:     Past Medical History:   has a past medical history of ADHD (attention deficit hyperactivity disorder), Anxiety, Bipolar 1 disorder ([address] and Depression. Social History:   reports that she has been smoking cigarettes. She has been smoking an average of .5 packs per day. She has never used smokeless tobacco. She reports current alcohol use. She reports current drug use. Drug: Marijuana Garrel Calender). Family History:   Family History   Problem Relation Age of Onset    Hypertension Mother     Depression Mother     Bipolar Disorder Father     Alcohol Abuse Father     Cancer Other         Uncle ? Vitals:  /82   Pulse 89   Temp 97.3 °F (36.3 °C) (Oral)   Resp 16   Ht 5' 5\" (1.651 m)   Wt 104 lb 15 oz (47.6 kg)   SpO2 98%   BMI 17.46 kg/m²   Temp (24hrs), Av °F (36.7 °C), Min:97.3 °F (36.3 °C), Max:98.4 °F (36.9 °C)    Recent Labs     23  11123   POCGLU 87 86 128* 89         I/O (24Hr): Intake/Output Summary (Last 24 hours) at 2023 1220  Last data filed at 2023 0800  Gross per 24 hour   Intake 350 ml   Output --   Net 350 ml         Labs:  Hematology:  No results for input(s): WBC, RBC, HGB, HCT, MCV, MCH, MCHC, RDW, PLT, MPV, SEDRATE, CRP, INR, DDIMER, PB3OYYFU, LABABSO in the last 72 hours. Invalid input(s): PT    Chemistry:  Recent Labs     23  0722      K 3.3*      CO2 24   GLUCOSE 104*   BUN 4*   CREATININE 0.45*   ANIONGAP 12   LABGLOM >60   CALCIUM 9.3   PHOS 3.6       Recent Labs     23   PROT  --   --   --   --   --  7.9  --    LABALBU  --   --   --   --   --  3.3*  --    AST  --   --   --   --   --  84*  --    ALT  --   --   --   --   --  44*  --    ALKPHOS  --   --   --   --   --  119*  --    BILITOT  --   --   --   --   --  0.4  --    POCGLU 86 83 87 86 128*  --  89       ABG:  Lab Results   Component Value Date/Time    POCPH 7.416 2022 04:01 PM    POCPCO2 46.0 2022 04:01 PM    POCPO2 124.2 2022 04:01 PM    POCHCO3 29.5 2022 04:01 PM    NBEA 1 2022 04:29 AM    PBEA 4 2022 04:01 PM    XTTR9CCD 99 2022 04:01 PM    FIO2 28.0 2022 04:52 PM     Lab Results   Component Value Date/Time    SPECIAL L HAND 5ML 2022 10:01 AM     Lab Results   Component Value Date/Time    CULTURE NO GROWTH 5 DAYS 2022 10:01 AM       Radiology:  CT ABDOMEN PELVIS WO CONTRAST Additional Contrast? None    Result Date: 2022  1.  Consolidation in both lung bases with bilateral pleural effusions, consistent with pneumonia. 2. No acute abdominal abnormality. 3. Hepatic steatosis. XR CHEST PORTABLE    Result Date: 12/29/2022  Stable patchy bibasilar atelectasis or airspace disease. XR CHEST PORTABLE    Result Date: 12/28/2022  Patchy ground-glass change in the lungs felt to represent multilobar pneumonia given clinical history. XR ABDOMEN FOR NG/OG/NE TUBE PLACEMENT    Result Date: 12/31/2022  Enteric tube in the stomach as above. No evidence of bowel obstruction. XR ABDOMEN FOR NG/OG/NE TUBE PLACEMENT    Result Date: 12/28/2022  Unremarkable limited KUB. NG tube tip projects at the left upper quadrant, overlying the expected location of the stomach. FL MODIFIED BARIUM SWALLOW W VIDEO    Result Date: 1/3/2023  Deep laryngeal penetration of thin liquid. Trace laryngeal penetration of soft solid. Puree, cookie and nectar thick liquid well-tolerated. Please see separate speech pathology report for full discussion of findings and recommendations. FL MODIFIED BARIUM SWALLOW W VIDEO    Result Date: 12/28/2022  Delayed oral phase of swallowing. Gross aspiration of puree with a delayed weak cough response. No other consistencies given. Please see separate speech pathology report for full discussion of findings and recommendations.        Physical Examination:        General appearance:  alert, cooperative and no distress, very weak, malnourished  Mental Status:  oriented to person, place and time and normal affect  Lungs:  clear to auscultation bilaterally, normal effort  Heart:  regular rate and rhythm, no murmur  Abdomen:  soft, ng in place with tube feeds, non tender, nondistended, normal bowel sounds, no masses, hepatomegaly, splenomegaly  Extremities:  no edema, redness, tenderness in the calves  Skin:  no gross lesions, rashes, induration    Assessment:        Hospital Problems             Last Modified POA    * (Principal) Drug overdose of undetermined intent, initial encounter 12/15/2022 Yes    COVID-19 12/18/2022 Yes    Acute respiratory insufficiency 78/14/2663 Yes    Alcoholic intoxication with complication ([address] 87/88/2526 Yes    Altered mental status 12/18/2022 Yes    Acute respiratory failure with hypoxia ([facility] [address] 12/26/2022 Yes   Plan:        Patient passed swallow study  Continue venlafaxine to home dose of 150 mg  Patient will think about seeing psychiatry for anxiety and depression  Tolerating diet without any issues  Thiamine and folic acid  Tolerating diet  Patient counseled regarding alcohol cessation  Patient is walking without issues, possible discharge to shelter  Will discuss with mother regarding patient's baseline as it appears that patient does not understand her medical issues  Macrocytosis likely secondary to alcohol use, vitamin J63 folic acid within normal limits  Replace  electrolytes as needed    Discussed case with   Discharge planning ongoing    Kobe Gonzalez MD  1/7/2023  12:20 PM

## 2023-01-07 NOTE — CARE COORDINATION
Voicemail left for patient's mother, Amirah Abel, requesting return call to discuss patient's baseline status    [phone] spoke to Amirah Abel via telephone. She relates that before admission, patient was able to talk normally and responded normally. She relates that how the patient is now is new for her.  She spoke to the patient's boyfriend and he said they did not break up and that she can return to their house when she discharges to the hospital. Transferred call to patient so that her mother can talk to her about her discharge plan

## 2023-01-07 NOTE — PLAN OF CARE
Problem: Discharge Planning  Goal: Discharge to home or other facility with appropriate resources  Outcome: Progressing     Problem: Skin/Tissue Integrity  Goal: Absence of new skin breakdown  Description: 1. Monitor for areas of redness and/or skin breakdown  2. Assess vascular access sites hourly  3. Every 4-6 hours minimum:  Change oxygen saturation probe site  4. Every 4-6 hours:  If on nasal continuous positive airway pressure, respiratory therapy assess nares and determine need for appliance change or resting period. Outcome: Progressing     Problem: ABCDS Injury Assessment  Goal: Absence of physical injury  Outcome: Progressing     Problem: Safety - Adult  Goal: Free from fall injury  Outcome: Progressing     Problem: Pain  Goal: Verbalizes/displays adequate comfort level or baseline comfort level  Outcome: Progressing     Problem: Confusion  Goal: Confusion, delirium, dementia, or psychosis is improved or at baseline  Description: INTERVENTIONS:  1. Assess for possible contributors to thought disturbance, including medications, impaired vision or hearing, underlying metabolic abnormalities, dehydration, psychiatric diagnoses, and notify attending LIP  2. Brick high risk fall precautions, as indicated  3. Provide frequent short contacts to provide reality reorientation, refocusing and direction  4. Decrease environmental stimuli, including noise as appropriate  5. Monitor and intervene to maintain adequate nutrition, hydration, elimination, sleep and activity  6. If unable to ensure safety without constant attention obtain sitter and review sitter guidelines with assigned personnel  7.  Initiate Psychosocial CNS and Spiritual Care consult, as indicated  Outcome: Progressing     Problem: Respiratory - Adult  Goal: Achieves optimal ventilation and oxygenation  Outcome: Progressing     Problem: Nutrition Deficit:  Goal: Optimize nutritional status  Outcome: Progressing     Problem: Risk for Elopement  Goal: Patient will not exit the unit/facility without proper excort  Outcome: Progressing

## 2023-01-08 ENCOUNTER — APPOINTMENT (OUTPATIENT)
Dept: MRI IMAGING | Age: 28
DRG: 812 | End: 2023-01-08
Payer: MEDICARE

## 2023-01-08 LAB
ANION GAP SERPL CALCULATED.3IONS-SCNC: 10 MMOL/L (ref 9–17)
BUN BLDV-MCNC: 4 MG/DL (ref 6–20)
CALCIUM SERPL-MCNC: 9.8 MG/DL (ref 8.6–10.4)
CHLORIDE BLD-SCNC: 101 MMOL/L (ref 98–107)
CO2: 25 MMOL/L (ref 20–31)
CREAT SERPL-MCNC: 0.43 MG/DL (ref 0.5–0.9)
GFR SERPL CREATININE-BSD FRML MDRD: >60 ML/MIN/1.73M2
GLUCOSE BLD-MCNC: 86 MG/DL (ref 70–99)
POTASSIUM SERPL-SCNC: 3.7 MMOL/L (ref 3.7–5.3)
SODIUM BLD-SCNC: 136 MMOL/L (ref 135–144)

## 2023-01-08 PROCEDURE — 6370000000 HC RX 637 (ALT 250 FOR IP): Performed by: STUDENT IN AN ORGANIZED HEALTH CARE EDUCATION/TRAINING PROGRAM

## 2023-01-08 PROCEDURE — 80048 BASIC METABOLIC PNL TOTAL CA: CPT

## 2023-01-08 PROCEDURE — 6370000000 HC RX 637 (ALT 250 FOR IP): Performed by: HEALTH CARE PROVIDER

## 2023-01-08 PROCEDURE — 97530 THERAPEUTIC ACTIVITIES: CPT

## 2023-01-08 PROCEDURE — 2060000000 HC ICU INTERMEDIATE R&B

## 2023-01-08 PROCEDURE — 6360000004 HC RX CONTRAST MEDICATION: Performed by: STUDENT IN AN ORGANIZED HEALTH CARE EDUCATION/TRAINING PROGRAM

## 2023-01-08 PROCEDURE — 2580000003 HC RX 258: Performed by: STUDENT IN AN ORGANIZED HEALTH CARE EDUCATION/TRAINING PROGRAM

## 2023-01-08 PROCEDURE — 70553 MRI BRAIN STEM W/O & W/DYE: CPT

## 2023-01-08 PROCEDURE — 97535 SELF CARE MNGMENT TRAINING: CPT

## 2023-01-08 PROCEDURE — 36415 COLL VENOUS BLD VENIPUNCTURE: CPT

## 2023-01-08 PROCEDURE — 6370000000 HC RX 637 (ALT 250 FOR IP): Performed by: NURSE PRACTITIONER

## 2023-01-08 PROCEDURE — 6370000000 HC RX 637 (ALT 250 FOR IP): Performed by: INTERNAL MEDICINE

## 2023-01-08 PROCEDURE — 99232 SBSQ HOSP IP/OBS MODERATE 35: CPT | Performed by: STUDENT IN AN ORGANIZED HEALTH CARE EDUCATION/TRAINING PROGRAM

## 2023-01-08 PROCEDURE — A9576 INJ PROHANCE MULTIPACK: HCPCS | Performed by: STUDENT IN AN ORGANIZED HEALTH CARE EDUCATION/TRAINING PROGRAM

## 2023-01-08 PROCEDURE — 6360000002 HC RX W HCPCS: Performed by: HEALTH CARE PROVIDER

## 2023-01-08 RX ORDER — SODIUM CHLORIDE 0.9 % (FLUSH) 0.9 %
10 SYRINGE (ML) INJECTION PRN
Status: DISCONTINUED | OUTPATIENT
Start: 2023-01-08 | End: 2023-01-09 | Stop reason: HOSPADM

## 2023-01-08 RX ADMIN — ENOXAPARIN SODIUM 30 MG: 100 INJECTION SUBCUTANEOUS at 08:01

## 2023-01-08 RX ADMIN — GADOTERIDOL 9 ML: 279.3 INJECTION, SOLUTION INTRAVENOUS at 17:22

## 2023-01-08 RX ADMIN — ALCOHOL 1 TABLET: 70.47 GEL TOPICAL at 07:59

## 2023-01-08 RX ADMIN — SODIUM CHLORIDE, PRESERVATIVE FREE 10 ML: 5 INJECTION INTRAVENOUS at 17:23

## 2023-01-08 RX ADMIN — ACETAMINOPHEN 650 MG: 325 TABLET ORAL at 03:44

## 2023-01-08 RX ADMIN — FAMOTIDINE 20 MG: 20 TABLET, FILM COATED ORAL at 20:24

## 2023-01-08 RX ADMIN — FOLIC ACID 1 MG: 1 TABLET ORAL at 07:59

## 2023-01-08 RX ADMIN — ARIPIPRAZOLE 10 MG: 10 TABLET, ORALLY DISINTEGRATING ORAL at 07:55

## 2023-01-08 RX ADMIN — FAMOTIDINE 20 MG: 20 TABLET, FILM COATED ORAL at 07:59

## 2023-01-08 RX ADMIN — VENLAFAXINE HYDROCHLORIDE 150 MG: 150 CAPSULE, EXTENDED RELEASE ORAL at 10:38

## 2023-01-08 RX ADMIN — HYDROXYZINE HYDROCHLORIDE 25 MG: 25 TABLET ORAL at 21:33

## 2023-01-08 RX ADMIN — Medication 100 MG: at 07:59

## 2023-01-08 RX ADMIN — SODIUM CHLORIDE, PRESERVATIVE FREE 10 ML: 5 INJECTION INTRAVENOUS at 20:25

## 2023-01-08 ASSESSMENT — PAIN DESCRIPTION - DESCRIPTORS: DESCRIPTORS: ACHING;DULL

## 2023-01-08 ASSESSMENT — PAIN SCALES - GENERAL
PAINLEVEL_OUTOF10: 5
PAINLEVEL_OUTOF10: 5
PAINLEVEL_OUTOF10: 0

## 2023-01-08 ASSESSMENT — PAIN - FUNCTIONAL ASSESSMENT: PAIN_FUNCTIONAL_ASSESSMENT: ACTIVITIES ARE NOT PREVENTED

## 2023-01-08 ASSESSMENT — PAIN DESCRIPTION - LOCATION: LOCATION: GENERALIZED

## 2023-01-08 ASSESSMENT — PAIN DESCRIPTION - ORIENTATION: ORIENTATION: RIGHT;LEFT;MID

## 2023-01-08 NOTE — CARE COORDINATION
Met with patient to discuss transitional planning. She plans to return home with her boyfriend, Alexis. She denies needs. Awaiting psych consult. Her mother, Ellen Cantu, provided phone numbers for her sister, Katja Smiley. Ellen Cnatu is not sure what number is accurate [phone] and [phone] received call from [address] [place] [address]. She relates that patient is able to go to her house at discharge. Patient updated.  Jayna's phone number given to patient

## 2023-01-08 NOTE — PLAN OF CARE
Problem: Discharge Planning  Goal: Discharge to home or other facility with appropriate resources  Outcome: Progressing     Problem: Skin/Tissue Integrity  Goal: Absence of new skin breakdown  Description: 1. Monitor for areas of redness and/or skin breakdown  2. Assess vascular access sites hourly  3. Every 4-6 hours minimum:  Change oxygen saturation probe site  4. Every 4-6 hours:  If on nasal continuous positive airway pressure, respiratory therapy assess nares and determine need for appliance change or resting period. Outcome: Progressing     Problem: ABCDS Injury Assessment  Goal: Absence of physical injury  Outcome: Progressing     Problem: Safety - Adult  Goal: Free from fall injury  Outcome: Progressing     Problem: Pain  Goal: Verbalizes/displays adequate comfort level or baseline comfort level  Outcome: Progressing     Problem: Confusion  Goal: Confusion, delirium, dementia, or psychosis is improved or at baseline  Description: INTERVENTIONS:  1. Assess for possible contributors to thought disturbance, including medications, impaired vision or hearing, underlying metabolic abnormalities, dehydration, psychiatric diagnoses, and notify attending LIP  2. [place] high risk fall precautions, as indicated  3. Provide frequent short contacts to provide reality reorientation, refocusing and direction  4. Decrease environmental stimuli, including noise as appropriate  5. Monitor and intervene to maintain adequate nutrition, hydration, elimination, sleep and activity  6. If unable to ensure safety without constant attention obtain sitter and review sitter guidelines with assigned personnel  7.  Initiate Psychosocial CNS and Spiritual Care consult, as indicated  Outcome: Progressing     Problem: Respiratory - Adult  Goal: Achieves optimal ventilation and oxygenation  Outcome: Progressing     Problem: Nutrition Deficit:  Goal: Optimize nutritional status  Outcome: Progressing     Problem: Risk for Elopement  Goal: Patient will not exit the unit/facility without proper excort  Outcome: Progressing

## 2023-01-08 NOTE — PROGRESS NOTES
[facility]  Office: [address], DO, [facility], DO, Robin Leventhal, DO, Sylvester Mccann Blood, DO, Jessica Daniel MD, Grazyna Pinto MD, Ricky Pascual MD, Chelsea Jolley MD,  Manoj Nick MD, Vinnie Menendez MD, Chetna Dalton, DO, Julieta Schaefer MD,  Diane Luna MD, Bethanie Pratt MD, Myron Morales DO, Korin Pond MD, Janak Harden MD, Mendoza Neal DO, Kimmy Franklin MD, Nell Lawrence MD, Miko Carranza MD, Andrei Neal MD, Lynn Hsieh DO, Nolvia Domínguez MD, Kim Malagon MD, aRnda Starr, CNP,  Tim Caldwell, CNP, Emigdio Pugh, CNP, Herlinda Teixeira, CNP,  Silas Wang, [facility], Negar Newby, CNP, Heather Oneal, CNP, Mora Girard, CNP, Cassie Bettencourt, CNP, Al Emerson, CNP, Kristin Gómez PA-C, Niall Almazan, CNS, Hunter Driver, CNP, Domenic Saleh, CNP         [place] [address]    Progress Note    1/8/2023    1:27 PM    Name:   Renetta Salcedo  MRN:     [id number]     [place]:      [de-identified]   Room:   2015/2015-01   Day:  24  Admit Date:  12/15/2022 12:08 PM    PCP:   Farnaz Be  Code Status:  Full Code    Subjective:     C/C:   Chief Complaint   Patient presents with    Drug Overdose     Interval History Status: no change     No acute change in patient mentation. Patient is alert and oriented. She knows she is at [facility].  Patient intermittently gets confused. Yesterday she told the nurse that she could not leave as the bus had 30 people to take. Today she is telling me that her boyfriend broke up with her and he is dating another girl. Patient is on Abilify and Effexor at home per home med list but does not remember taking any medications, states that she never saw a psychiatrist. Chart review shows h/o bipolar disease.     Brief History:     17-year-old female with past medical history of drug overdose, chronic alcohol use presented to the hospital after she was found unresponsive due to suspected drug overdose. GCS was 3 on arrival.  Patient was intubated for airway protection. ED work-up showed elevated alcohol level. Patient was also noted to have COVID-positive test.  Patient was admitted on 12/15 and was kept in ICU. She was extubated on 12/23. Extubation was delayed due to absence of cuff leak. ENT was consulted, the cuff leak absence was due to presence of large ET tube. 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 Patient failed bedside swallow study and NG tube was placed for swallow study. Patient gradually improved and passed swallow study on 1/3. Patient gets mood swings and gets intermittently confused, psych consulted, mri brain ordered, home meds abilify and venlafaxine resumed. Review of Systems:     Constitutional:  negative for chills, fevers, sweats  Respiratory:  negative for cough, dyspnea on exertion, shortness of breath, wheezing  Cardiovascular:  negative for chest pain, chest pressure/discomfort, lower extremity edema, palpitations  Gastrointestinal:  negative for abdominal pain, constipation, diarrhea, nausea, vomiting  Neurological:  negative for dizziness, headache    Medications: Allergies:  No Known Allergies    Current Meds:   Scheduled Meds:    ARIPiprazole  10 mg Oral Daily    nicotine  1 patch TransDERmal Daily    venlafaxine  150 mg Oral Daily    famotidine  20 mg Oral BID    multivitamin  1 tablet Per NG tube Daily    thiamine  100 mg Per NG tube Daily    folic acid  1 mg Per NG tube Daily    enoxaparin  30 mg SubCUTAneous Daily     Continuous Infusions:   PRN Meds: hydrOXYzine HCl, albuterol, guaiFENesin, ondansetron **OR** ondansetron, polyethylene glycol, acetaminophen **OR** acetaminophen    Data:     Past Medical History:   has a past medical history of ADHD (attention deficit hyperactivity disorder), Anxiety, Bipolar 1 disorder ([facility] [address] and Depression.     Social History: reports that she has been smoking cigarettes. She has been smoking an average of .5 packs per day. She has never used smokeless tobacco. She reports current alcohol use. She reports current drug use. Drug: Marijuana [place]). Family History:   Family History   Problem Relation Age of Onset    Hypertension Mother     Depression Mother     Bipolar Disorder Father     Alcohol Abuse Father     Cancer Other         Uncle ? Vitals:  /87   Pulse 86   Temp 98.1 °F (36.7 °C) (Oral)   Resp 16   Ht 5' 5\" (1.651 m)   Wt 105 lb 6.1 oz (47.8 kg)   SpO2 96%   BMI 17.54 kg/m²   Temp (24hrs), Av °F (36.7 °C), Min:97.6 °F (36.4 °C), Max:98.6 °F (37 °C)    Recent Labs     23   POCGLU 86 128* 89         I/O (24Hr): Intake/Output Summary (Last 24 hours) at 2023 1327  Last data filed at 2023 0800  Gross per 24 hour   Intake 880 ml   Output --   Net 880 ml         Labs:  Hematology:  No results for input(s): WBC, RBC, HGB, HCT, MCV, MCH, MCHC, RDW, PLT, MPV, SEDRATE, CRP, INR, DDIMER, AK8PXCFR, LABABSO in the last 72 hours.     Invalid input(s): PT    Chemistry:  Recent Labs     23  1035    136   K 3.3* 3.7    101   CO2 24 25   GLUCOSE 104* 86   BUN 4* 4*   CREATININE 0.45* 0.43*   ANIONGAP 12 10   LABGLOM >60 >60   CALCIUM 9.3 9.8   PHOS 3.6  --        Recent Labs     23   PROT  --   --  7.9  --    LABALBU  --   --  3.3*  --    AST  --   --  84*  --    ALT  --   --  44*  --    ALKPHOS  --   --  119*  --    BILITOT  --   --  0.4  --    POCGLU 86 128*  --  89       ABG:  Lab Results   Component Value Date/Time    POCPH 7.416 2022 04:01 PM    POCPCO2 46.0 2022 04:01 PM    POCPO2 124.2 2022 04:01 PM    POCHCO3 29.5 2022 04:01 PM    NBEA 1 2022 04:29 AM    PBEA 4 2022 04:01 PM    PYEI1ZHA 99 2022 04:01 PM    FIO2 28.0 2022 04:52 PM Lab Results   Component Value Date/Time    SPECIAL L HAND 5ML 12/28/2022 10:01 AM     Lab Results   Component Value Date/Time    CULTURE NO GROWTH 5 DAYS 12/28/2022 10:01 AM       Radiology:  CT ABDOMEN PELVIS WO CONTRAST Additional Contrast? None    Result Date: 12/28/2022  1. Consolidation in both lung bases with bilateral pleural effusions, consistent with pneumonia. 2. No acute abdominal abnormality. 3. Hepatic steatosis. XR CHEST PORTABLE    Result Date: 12/29/2022  Stable patchy bibasilar atelectasis or airspace disease. XR CHEST PORTABLE    Result Date: 12/28/2022  Patchy ground-glass change in the lungs felt to represent multilobar pneumonia given clinical history. XR ABDOMEN FOR NG/OG/NE TUBE PLACEMENT    Result Date: 12/31/2022  Enteric tube in the stomach as above. No evidence of bowel obstruction. XR ABDOMEN FOR NG/OG/NE TUBE PLACEMENT    Result Date: 12/28/2022  Unremarkable limited KUB. NG tube tip projects at the left upper quadrant, overlying the expected location of the stomach. FL MODIFIED BARIUM SWALLOW W VIDEO    Result Date: 1/3/2023  Deep laryngeal penetration of thin liquid. Trace laryngeal penetration of soft solid. Puree, cookie and nectar thick liquid well-tolerated. Please see separate speech pathology report for full discussion of findings and recommendations. FL MODIFIED BARIUM SWALLOW W VIDEO    Result Date: 12/28/2022  Delayed oral phase of swallowing. Gross aspiration of puree with a delayed weak cough response. No other consistencies given. Please see separate speech pathology report for full discussion of findings and recommendations.        Physical Examination:        General appearance:  alert, cooperative and no distress, very weak, malnourished  Mental Status:  oriented to person, place and time and normal affect  Lungs:  clear to auscultation bilaterally, normal effort  Heart:  regular rate and rhythm, no murmur  Abdomen:  soft, ng in place with tube feeds, non tender, nondistended, normal bowel sounds, no masses, hepatomegaly, splenomegaly  Extremities:  no edema, redness, tenderness in the calves  Skin:  no gross lesions, rashes, induration    Assessment:        Hospital Problems             Last Modified POA    * (Principal) Drug overdose of undetermined intent, initial encounter 12/15/2022 Yes    COVID-19 12/18/2022 Yes    Acute respiratory insufficiency 84/33/9480 Yes    Alcoholic intoxication with complication ([address] 18/50/2824 Yes    Altered mental status 12/18/2022 Yes    Acute respiratory failure with hypoxia ([address] 12/26/2022 Yes   Plan:        Patient passed swallow study  Continue venlafaxine to home dose of 150 mg  Start abilify 10 mg daily  Consult psych  Obtain MRI brain due to confusion  Thiamine and folic acid  Tolerating diet  Patient counseled regarding alcohol cessation  Macrocytosis likely secondary to alcohol use, vitamin O92 folic acid within normal limits  Replace  electrolytes as needed    Discussed case with   Discharge planning ongoing, will need a place to go  Patient is not stable for discharge to a shelter.     Madelyn De La Cruz MD  1/8/2023  1:27 PM

## 2023-01-08 NOTE — PROGRESS NOTES
Occupational Therapy  Facility/Department: [facility] CAR 2- STEPDOWN  Occupational Daily Treatment Note    Name: Robson Ellison  : 1995  MRN: [id number]  Date of Service: 2023    Discharge Recommendations:  Patient would benefit from continued therapy after discharge    Patient Diagnosis(es): The encounter diagnosis was Drug overdose of undetermined intent, initial encounter. Past Medical History:  has a past medical history of ADHD (attention deficit hyperactivity disorder), Anxiety, Bipolar 1 disorder ([address] and Depression. Past Surgical History:  has no past surgical history on file. Assessment   Performance deficits / Impairments: Decreased functional mobility ; Decreased ADL status; Decreased high-level IADLs;Decreased balance;Decreased cognition  Prognosis: Good  Activity Tolerance  Activity Tolerance: Patient Tolerated treatment well        Plan   Occupational Therapy Plan  Times Per Week: 3-4x/wk     Restrictions  Restrictions/Precautions  Restrictions/Precautions: Fall Risk, General Precautions  Required Braces or Orthoses?: No  Position Activity Restriction  Other position/activity restrictions: up with assist, extubated   Pain assessment: Pt c/o pain/discomfort RUE, did not state number  Pain intervention: Repositioned. Subjective   General  Patient assessed for rehabilitation services?: Yes  Response to previous treatment: Patient with no complaints from previous session  Family / Caregiver Present: No  Diagnosis: drug overdose     Safety Devices  Type of Devices: All fall risk precautions in place;Call light within reach;Gait belt;Nurse notified; Left in bed;Telesitter in use  Balance  Sitting: Without support (Independent seated EOB.)  Standing: With support (SBA static standig EOB w/o device, functional mobility to/from bathroom w/o device, simpe grooming standing at sink. Total time 10 minutes.)  Gait  Overall Level of Assistance: Stand-by assistance; Additional time  Assistive Device: Other (comment) (No device.)     ADL  Grooming: Setup; Increased time to complete;Modified independent  (Oral care standing at sink. Put hair up into a ponytail seated EOB.)  UE Bathing: Setup; Increased time to complete;Modified independent  (Wash face/hands standing at sink,.)  LE Dressing: Setup;Modified independent  (Doff/don socks seated EOB.)  Additional Comments: Pt supine in bed upon therapist arrival. Supine/sit transfer to seated EOB. Pt doffed/donned socks seated EOB. Sit/stand transfer w/o device for static standing EOB. Functional mobility to bathroom w/o device. Pt completed simple grooming standing at sink, increased time needed to complete tasks. . Pt exited bathroom to return to seated EOB. Pt continues to speak in a low whisper tone. Bed mobility  Supine to Sit: Independent  Scooting: Independent  Transfers  Sit to stand: Supervision  Stand to sit: Modified independent     Cognition  Arousal/Alertness: Appropriate responses to stimuli  Following Commands: Follows all commands without difficulty  Attention Span: Appears intact  Memory: Appears intact  Safety Judgement: Decreased awareness of need for assistance  Problem Solving: Assistance required to identify errors made;Assistance required to correct errors made  Insights: Decreased awareness of deficits  Initiation: Does not require cues  Sequencing: Does not require cues  Orientation  Overall Orientation Status: Impaired  Orientation Level: Oriented to time;Oriented to place;Oriented to person;Disoriented to situation     Education Given To: Patient  Education Provided Comments: OT POC, transfer, importance of participation in therapy with good return.   AM-PAC Score     [facility] Inpatient Daily Activity Raw Score: 24 (01/08/23 1527)  AM-PAC Inpatient ADL T-Scale Score : 57.54 (01/08/23 1527)  ADL Inpatient CMS 0-100% Score: 0 (01/08/23 1527)  ADL Inpatient CMS G-Code Modifier : CH (01/08/23 1527)    Goals  Short Term Goals  Time Frame for Short Term Goals: pt will, by discharge  Short Term Goal 1: complete simple feeding/grooming tasks with mod A and set up  Short Term Goal 2: participate in meaningful task for ~8 minutes in order to increase endurance  Short Term Goal 3: dem mod A during bed mobility  Short Term Goal 4: dem ~15 minutes static/dynamic sitting tolerance on eOB with CGA  Short Term Goal 5: dem mod A during functional transfers with LRD, as needed       Therapy Time   Individual Concurrent Group Co-treatment   Time In 1424         Time Out 1450         Minutes 26         Timed Code Treatment Minutes: 26 Minutes    Pt supine in bed upon therapist arrival. Pleasant and agreeable to therapy. See above for LOF for all tasks. Pt retired to seated in bed at end of session with call light within reach and telesitter present in room.       Lonnie Began, FRASER/L

## 2023-01-08 NOTE — PLAN OF CARE
Problem: Discharge Planning  Goal: Discharge to home or other facility with appropriate resources  1/8/2023 0954 by Jackie Clemente RN  Outcome: Progressing  1/8/2023 6382 by Betzaida Dueñas RN  Outcome: Progressing     Problem: Skin/Tissue Integrity  Goal: Absence of new skin breakdown  Description: 1. Monitor for areas of redness and/or skin breakdown  2. Assess vascular access sites hourly  3. Every 4-6 hours minimum:  Change oxygen saturation probe site  4. Every 4-6 hours:  If on nasal continuous positive airway pressure, respiratory therapy assess nares and determine need for appliance change or resting period. 1/8/2023 0954 by Jackie Clemente RN  Outcome: Progressing  1/8/2023 5522 by Betzaida Dueñas RN  Outcome: Progressing     Problem: ABCDS Injury Assessment  Goal: Absence of physical injury  1/8/2023 0954 by Jackie Clemente RN  Outcome: Progressing  1/8/2023 2563 by Betzaida Dueñas RN  Outcome: Progressing     Problem: Safety - Adult  Goal: Free from fall injury  1/8/2023 0954 by Jackie Clemente RN  Outcome: Progressing  1/8/2023 6834 by Betzaida Dueñas RN  Outcome: Progressing     Problem: Pain  Goal: Verbalizes/displays adequate comfort level or baseline comfort level  1/8/2023 0954 by Jackie Clemente RN  Outcome: Progressing  1/8/2023 3311 by Betzaida Dueñas RN  Outcome: Progressing     Problem: Confusion  Goal: Confusion, delirium, dementia, or psychosis is improved or at baseline  Description: INTERVENTIONS:  1. Assess for possible contributors to thought disturbance, including medications, impaired vision or hearing, underlying metabolic abnormalities, dehydration, psychiatric diagnoses, and notify attending LIP  2. [place] high risk fall precautions, as indicated  3. Provide frequent short contacts to provide reality reorientation, refocusing and direction  4.  Decrease environmental stimuli, including noise as appropriate  5. Monitor and intervene to maintain adequate nutrition, hydration, elimination, sleep and activity  6. If unable to ensure safety without constant attention obtain sitter and review sitter guidelines with assigned personnel  7.  Initiate Psychosocial CNS and Spiritual Care consult, as indicated  1/8/2023 0954 by Grace Rebolledo RN  Outcome: Progressing  1/8/2023 9727 by Celeste Lincoln RN  Outcome: Progressing     Problem: Respiratory - Adult  Goal: Achieves optimal ventilation and oxygenation  1/8/2023 0954 by Grace Rebolledo RN  Outcome: Progressing  1/8/2023 2493 by Celeste Lincoln RN  Outcome: Progressing     Problem: Nutrition Deficit:  Goal: Optimize nutritional status  1/8/2023 0954 by Grace Rebolledo RN  Outcome: Progressing  1/8/2023 2665 by Celeste Lincoln RN  Outcome: Progressing     Problem: Risk for Elopement  Goal: Patient will not exit the unit/facility without proper excort  1/8/2023 0954 by Grace Rebolledo RN  Outcome: Progressing  1/8/2023 0633 by Celeste Lincoln RN  Outcome: Progressing

## 2023-01-08 NOTE — PROGRESS NOTES
Patient back to Car2 from MRI. Hooked to cardiac monitor. All vitals stable. Will continue to monitor.

## 2023-01-09 VITALS
HEIGHT: 65 IN | DIASTOLIC BLOOD PRESSURE: 92 MMHG | BODY MASS INDEX: 17.56 KG/M2 | SYSTOLIC BLOOD PRESSURE: 123 MMHG | OXYGEN SATURATION: 98 % | TEMPERATURE: 98.3 F | RESPIRATION RATE: 17 BRPM | HEART RATE: 94 BPM | WEIGHT: 105.38 LBS

## 2023-01-09 PROBLEM — F39 UNSPECIFIED MOOD (AFFECTIVE) DISORDER (HCC): Status: ACTIVE | Noted: 2023-01-09

## 2023-01-09 LAB
ANION GAP SERPL CALCULATED.3IONS-SCNC: 11 MMOL/L (ref 9–17)
BUN BLDV-MCNC: 6 MG/DL (ref 6–20)
CALCIUM SERPL-MCNC: 9.8 MG/DL (ref 8.6–10.4)
CHLORIDE BLD-SCNC: 100 MMOL/L (ref 98–107)
CO2: 23 MMOL/L (ref 20–31)
CREAT SERPL-MCNC: 0.47 MG/DL (ref 0.5–0.9)
GFR SERPL CREATININE-BSD FRML MDRD: >60 ML/MIN/1.73M2
GLUCOSE BLD-MCNC: 115 MG/DL (ref 70–99)
PHOSPHORUS: 4.2 MG/DL (ref 2.6–4.5)
POTASSIUM SERPL-SCNC: 3.6 MMOL/L (ref 3.7–5.3)
SODIUM BLD-SCNC: 134 MMOL/L (ref 135–144)

## 2023-01-09 PROCEDURE — 6370000000 HC RX 637 (ALT 250 FOR IP): Performed by: STUDENT IN AN ORGANIZED HEALTH CARE EDUCATION/TRAINING PROGRAM

## 2023-01-09 PROCEDURE — 84100 ASSAY OF PHOSPHORUS: CPT

## 2023-01-09 PROCEDURE — 80048 BASIC METABOLIC PNL TOTAL CA: CPT

## 2023-01-09 PROCEDURE — 6370000000 HC RX 637 (ALT 250 FOR IP): Performed by: INTERNAL MEDICINE

## 2023-01-09 PROCEDURE — 6360000002 HC RX W HCPCS: Performed by: HEALTH CARE PROVIDER

## 2023-01-09 PROCEDURE — 6370000000 HC RX 637 (ALT 250 FOR IP): Performed by: NURSE PRACTITIONER

## 2023-01-09 PROCEDURE — 99239 HOSP IP/OBS DSCHRG MGMT >30: CPT | Performed by: STUDENT IN AN ORGANIZED HEALTH CARE EDUCATION/TRAINING PROGRAM

## 2023-01-09 PROCEDURE — 36415 COLL VENOUS BLD VENIPUNCTURE: CPT

## 2023-01-09 RX ORDER — ARIPIPRAZOLE 10 MG/1
10 TABLET ORAL DAILY
Qty: 30 TABLET | Refills: 3 | Status: SHIPPED | OUTPATIENT
Start: 2023-01-09

## 2023-01-09 RX ORDER — ARIPIPRAZOLE 10 MG/1
10 TABLET, ORALLY DISINTEGRATING ORAL DAILY
Qty: 30 TABLET | Refills: 3 | Status: SHIPPED | OUTPATIENT
Start: 2023-01-10 | End: 2023-01-09 | Stop reason: HOSPADM

## 2023-01-09 RX ORDER — VENLAFAXINE HYDROCHLORIDE 150 MG/1
150 CAPSULE, EXTENDED RELEASE ORAL
Qty: 30 CAPSULE | Refills: 0 | Status: SHIPPED | OUTPATIENT
Start: 2023-01-09

## 2023-01-09 RX ADMIN — ENOXAPARIN SODIUM 30 MG: 100 INJECTION SUBCUTANEOUS at 08:21

## 2023-01-09 RX ADMIN — HYDROXYZINE HYDROCHLORIDE 25 MG: 25 TABLET ORAL at 05:31

## 2023-01-09 RX ADMIN — Medication 100 MG: at 08:21

## 2023-01-09 RX ADMIN — VENLAFAXINE HYDROCHLORIDE 150 MG: 150 CAPSULE, EXTENDED RELEASE ORAL at 08:21

## 2023-01-09 RX ADMIN — ARIPIPRAZOLE 10 MG: 10 TABLET, ORALLY DISINTEGRATING ORAL at 08:21

## 2023-01-09 RX ADMIN — FAMOTIDINE 20 MG: 20 TABLET, FILM COATED ORAL at 08:21

## 2023-01-09 RX ADMIN — FOLIC ACID 1 MG: 1 TABLET ORAL at 08:21

## 2023-01-09 RX ADMIN — ALCOHOL 1 TABLET: 70.47 GEL TOPICAL at 08:21

## 2023-01-09 NOTE — CONSULTS
Department of Psychiatry   Psychiatric Assessment      Thank you very much for allowing us to participate in the care of this patient. Reason for Consult:  Abnormal behaviors, depression and anxiety    HISTORY OF PRESENT ILLNESS:    Patient is a 68-year-old female with recent history of TBI presented to the emergency department on 12/15/2022 via EMS after she became suddenly unresponsive while speaking to her boyfriend. Patient did receive Narcan and was also intubated when she initially presented. UDS was negative for any drugs. Patient does have a long history of alcohol abuse. Psychiatrist consulted after she was making statements that were not adding up. Patient was very pleasant when approached. Reports that she has been feeling somewhat down and low with some ongoing conflicts with her boyfriend. Reports that 2 days before Thanksgiving she fell and hit her head. Reports that she did get to an emergency department to get that looked at. Mentions some feelings of helplessness and hopelessness. Denies any suicidal or homicidal ideation plan or intent. Mentions that [place] [facility] [place] medical issues following which she was started on Effexor and Abilify when she was on the inpatient side. Mentions that she has not been compliant on these medications after she got [place] [facility]  I gently confronted her on some of the vague statements that she made about having to wait on a bus to go to a shelter home. Patient did mention that there were several people waiting on the unit to be taken to the shelter home. Some illogical statements were made during the conversation. Reviewed records and patient was also going back and forth about her relationship with a boyfriend. Today she did mention that she is currently in the relationship with this person for last 13 years and will be able to go back and live with him.   Other than brief periods of illogical thought process no other significant psychosis or recent manic episodes noted. Denies any auditory or visual hallucinations today. She did discuss about extensive use of alcohol. Reports that since age 13 she has been drinking excess alcohol. Reports that lately she has been drinking at least half a bottle to a bottle of wine every day. Mentions she only went around 3 to 5 days when she was hospitalized without drinking alcohol. Denies any other significant drug use    PSYCHIATRIC HISTORY:  Reports history of depression. Currently not following up with any outpatient psychiatrist.  Currently prescribed Effexor and Abilify here in the hospital however patient has not been taking them at home. Reports 1 previous inpatient psychiatric hospitalization for severe depression but denies any previous suicide attempts. Lifetime Psychiatric Review of Systems         Obsessions and Compulsions: Denies       Vanita or Hypomania: Denies     Hallucinations: Denies     Panic Attacks:  Denies     Delusions:  Denies     Phobias:  Denies     Trauma: Denies    Prior to Admission medications    Medication Sig Start Date End Date Taking? Authorizing Provider   ARIPiprazole (ABILIFY) 10 MG tablet Take 1 tablet by mouth daily 1/9/23  Yes Anna Marie Rushing MD   folic acid (FOLVITE) 1 MG tablet Take 1 tablet by mouth daily 3/11/22   Kimmie Jimenez,    bisacodyl (DULCOLAX) 5 MG EC tablet Take 2 tablets by mouth daily as needed for Constipation 3/10/22   Kimmie Jimenez DO   thiamine 100 MG tablet Take 1 tablet by mouth daily 3/11/22   Kimmie Jimenez DO   venlafaxine (EFFEXOR-XR) 150 MG XR capsule Take 1 capsule by mouth daily (with breakfast).  9/15/14   Gray Romero MD        Medications:    Current Facility-Administered Medications: sodium chloride flush 0.9 % injection 10 mL, 10 mL, IntraVENous, PRN  ARIPiprazole (ABILIFY) disintegrating tablet 10 mg, 10 mg, Oral, Daily  nicotine (NICODERM CQ) 21 MG/24HR 1 patch, 1 patch, TransDERmal, Daily  venlafaxine (EFFEXOR XR) extended release capsule 150 mg, 150 mg, Oral, Daily  famotidine (PEPCID) tablet 20 mg, 20 mg, Oral, BID  hydrOXYzine HCl (ATARAX) tablet 25 mg, 25 mg, Oral, TID PRN  albuterol (PROVENTIL) nebulizer solution 2.5 mg, 2.5 mg, Nebulization, Q6H PRN  guaiFENesin (ROBITUSSIN) 100 MG/5ML liquid 200 mg, 200 mg, Oral, Q4H PRN  multivitamin 1 tablet, 1 tablet, Per NG tube, Daily  thiamine tablet 100 mg, 100 mg, Per NG tube, Daily  folic acid (FOLVITE) tablet 1 mg, 1 mg, Per NG tube, Daily  enoxaparin Sodium (LOVENOX) injection 30 mg, 30 mg, SubCUTAneous, Daily  ondansetron (ZOFRAN-ODT) disintegrating tablet 4 mg, 4 mg, Oral, Q8H PRN **OR** ondansetron (ZOFRAN) injection 4 mg, 4 mg, IntraVENous, Q6H PRN  polyethylene glycol (GLYCOLAX) packet 17 g, 17 g, Oral, Daily PRN  acetaminophen (TYLENOL) tablet 650 mg, 650 mg, Oral, Q6H PRN **OR** acetaminophen (TYLENOL) suppository 650 mg, 650 mg, Rectal, Q6H PRN     Past Medical History:        Diagnosis Date    ADHD (attention deficit hyperactivity disorder)     Anxiety     Bipolar 1 disorder (HCC)     Depression        Past Surgical History:    No past surgical history on file.    Allergies: Patient has no known allergies.      Social History:    Patient reports that she was born and raised in [facility].  She has 2 children ages 10 and 4.  Reports that she lives with her boyfriend.  Mother is supportive.  She is unemployed    SUBSTANCE USE HISTORY: See HPI for alcohol use    Family Medical and Psychiatric History:           Problem Relation Age of Onset    Hypertension Mother     Depression Mother     Bipolar Disorder Father     Alcohol Abuse Father     Cancer Other         Uncle ?       Physical  BP (!) 123/92   Pulse 94   Temp 98.3 °F (36.8 °C) (Oral)   Resp 17   Ht 5' 5\" (1.651 m)   Wt 105 lb 6.1 oz (47.8 kg)   SpO2 98%   BMI 17.54 kg/m²     Mental Status Examination:  Level of consciousness:  Within normal limits  Appearance:  hospital attire, lying in bed, fair grooming  Behavior/Motor:  no abnormalities noted  Attitude toward examiner:  cooperative, attentive and good eye contact  Speech:  Spontaneous, normal rate and volume  Mood: Anxious  Affect: mood congruent  Thought processes: At times illogical associations were mostly linear  Thought content: Denies suicidal ideations   Denies homicidal ideations    Denies hallucinations   Denies delusions  Cognition:  Oriented to self, situation, location, date  Concentration clinically adequate  Memory age appropriate  Insight & Judgment:  fair    DSM-5 DIAGNOSIS:  Mood disorder unspecified  Rule out Wernicke-Korsakoff  Alcohol use disorder severe dependence    Stressors     Severity of stressors is moderate  Source of stressors include: Other psychosocial and environmental stressors    PLAN:    Does not require admission to [facility]. Can discharged home after she is medically stable. Please give her a prescription for Effexor and Abilify at time of discharge. Recommend social work to connect her with outpatient psychiatric services. Will sign off. Please call us back with any questions. Thank you very much for allowing us to participate in the care of this patient.      Electronically signed by Kimberly Paula MD on 1/9/23 at 3:18 PM EST

## 2023-01-09 NOTE — PLAN OF CARE
Problem: Discharge Planning  Goal: Discharge to home or other facility with appropriate resources  1/8/2023 2129 by Meaghan Nova RN  Outcome: Progressing  Flowsheets (Taken 1/3/2023 0800 by Harsh Rivera RN)  Discharge to home or other facility with appropriate resources: Identify barriers to discharge with patient and caregiver  1/8/2023 0954 by Rocco Meigs, RN  Outcome: Progressing     Problem: Skin/Tissue Integrity  Goal: Absence of new skin breakdown  Description: 1. Monitor for areas of redness and/or skin breakdown  2. Assess vascular access sites hourly  3. Every 4-6 hours minimum:  Change oxygen saturation probe site  4. Every 4-6 hours:  If on nasal continuous positive airway pressure, respiratory therapy assess nares and determine need for appliance change or resting period.   1/8/2023 2129 by Meaghan Nova RN  Outcome: Progressing  1/8/2023 0954 by Rocco Meigs, RN  Outcome: Progressing     Problem: ABCDS Injury Assessment  Goal: Absence of physical injury  1/8/2023 2129 by Meaghan Nova RN  Outcome: Progressing  1/8/2023 0954 by Rocco Meigs, RN  Outcome: Progressing     Problem: Safety - Adult  Goal: Free from fall injury  1/8/2023 2129 by Meaghan Nova RN  Outcome: Pedro Brown (Taken 1/8/2023 2129)  Free From Fall Injury: Instruct family/caregiver on patient safety  1/8/2023 0954 by Rocco Meigs, RN  Outcome: Progressing     Problem: Pain  Goal: Verbalizes/displays adequate comfort level or baseline comfort level  1/8/2023 2129 by Meaghan Nova RN  Outcome: Progressing  Flowsheets (Taken 12/25/2022 1200 by Lindsey Rodriguez RN)  Verbalizes/displays adequate comfort level or baseline comfort level: Assess pain using appropriate pain scale  1/8/2023 0954 by Rocco Meigs, RN  Outcome: Progressing     Problem: Confusion  Goal: Confusion, delirium, dementia, or psychosis is improved or at baseline  Description: INTERVENTIONS:  1. Assess for possible contributors to thought disturbance, including medications, impaired vision or hearing, underlying metabolic abnormalities, dehydration, psychiatric diagnoses, and notify attending LIP  2. [place] high risk fall precautions, as indicated  3. Provide frequent short contacts to provide reality reorientation, refocusing and direction  4. Decrease environmental stimuli, including noise as appropriate  5. Monitor and intervene to maintain adequate nutrition, hydration, elimination, sleep and activity  6. If unable to ensure safety without constant attention obtain sitter and review sitter guidelines with assigned personnel  7.  Initiate Psychosocial CNS and Spiritual Care consult, as indicated  1/8/2023 2129 by Andrzej Enciso RN  Outcome: Progressing  Flowsheets (Taken 1/3/2023 0800 by Jeane Dockery RN)  Effect of thought disturbance (confusion, delirium, dementia, or psychosis) are managed with adequate functional status: Assess for contributors to thought disturbance, including medications, impaired vision or hearing, underlying metabolic abnormalities, dehydration, psychiatric diagnoses, notify LIP  1/8/2023 0954 by Marti Ball RN  Outcome: Progressing     Problem: Respiratory - Adult  Goal: Achieves optimal ventilation and oxygenation  1/8/2023 2129 by Andrzej Enciso RN  Outcome: Progressing  Flowsheets (Taken 1/3/2023 0800 by Jeane Dockery RN)  Achieves optimal ventilation and oxygenation: Assess for changes in respiratory status  1/8/2023 0954 by Marti Ball RN  Outcome: Progressing     Problem: Nutrition Deficit:  Goal: Optimize nutritional status  1/8/2023 2129 by Andrzej Enciso RN  Outcome: Progressing  Flowsheets (Taken 1/5/2023 1248 by Dora Chaidez, DAMASO, LD)  Nutrient intake appropriate for improving, restoring, or maintaining nutritional needs:   Assess nutritional status and recommend course of action   Monitor oral intake, labs, and treatment plans  1/8/2023 0954 by Maxim Rice RN  Outcome: Progressing     Problem: Risk for Elopement  Goal: Patient will not exit the unit/facility without proper excort  1/8/2023 2129 by Angy Moya RN  Outcome: Progressing  Flowsheets (Taken 1/3/2023 0800 by Virgil Mari RN)  Nursing Interventions for Elopement Risk:   Assist with personal care needs such as toileting, eating, dressing, as needed to reduce the risk of wandering   Collaborate with family members/caregivers to mitigate the elopement risk   Collaborate with treatment team for drug withdrawal symptoms treatment  1/8/2023 0954 by Maxim Rice RN  Outcome: Progressing

## 2023-01-09 NOTE — PLAN OF CARE
Problem: Discharge Planning  Goal: Discharge to home or other facility with appropriate resources  Outcome: Completed     Problem: Skin/Tissue Integrity  Goal: Absence of new skin breakdown  Description: 1. Monitor for areas of redness and/or skin breakdown  2. Assess vascular access sites hourly  3. Every 4-6 hours minimum:  Change oxygen saturation probe site  4. Every 4-6 hours:  If on nasal continuous positive airway pressure, respiratory therapy assess nares and determine need for appliance change or resting period. Outcome: Completed     Problem: ABCDS Injury Assessment  Goal: Absence of physical injury  Outcome: Completed     Problem: Safety - Adult  Goal: Free from fall injury  Outcome: Completed     Problem: Pain  Goal: Verbalizes/displays adequate comfort level or baseline comfort level  Outcome: Completed     Problem: Confusion  Goal: Confusion, delirium, dementia, or psychosis is improved or at baseline  Description: INTERVENTIONS:  1. Assess for possible contributors to thought disturbance, including medications, impaired vision or hearing, underlying metabolic abnormalities, dehydration, psychiatric diagnoses, and notify attending LIP  2. [place] high risk fall precautions, as indicated  3. Provide frequent short contacts to provide reality reorientation, refocusing and direction  4. Decrease environmental stimuli, including noise as appropriate  5. Monitor and intervene to maintain adequate nutrition, hydration, elimination, sleep and activity  6. If unable to ensure safety without constant attention obtain sitter and review sitter guidelines with assigned personnel  7.  Initiate Psychosocial CNS and Spiritual Care consult, as indicated  Outcome: Completed     Problem: Respiratory - Adult  Goal: Achieves optimal ventilation and oxygenation  Outcome: Completed     Problem: Nutrition Deficit:  Goal: Optimize nutritional status  Outcome: Completed     Problem: Risk for Elopement  Goal: Patient will not exit the unit/facility without proper excort  Outcome: Completed

## 2023-01-09 NOTE — PROGRESS NOTES
CLINICAL PHARMACY NOTE: MEDS TO BEDS    Total # of Prescriptions Filled: 1   The following medications were delivered to the patient:  Abilify 5mg     Additional Documentation:

## 2023-01-09 NOTE — PROGRESS NOTES
CLINICAL PHARMACY NOTE: MEDS TO BEDS    Total # of Prescriptions Filled: 1   The following medications were delivered to the patient:  [facility]      Additional Documentation:     SECOND DELIVERY

## 2023-01-09 NOTE — PROGRESS NOTES
[facility]  Office: [address], DO, Silviano Mckeon, DO, Cori Huerta, DO, Rachel Aldana Blood, DO, Ellen Domínguez MD, Miles Smith MD, Margret Gasca MD, Steven Dotson MD,  Vidhi Damon MD, Myra Salter MD, Erik Schultz, DO, Mode Fay MD,  Andi Pino MD, Monika Crews MD, Keenan Acuña, DO, Ann Schmitt MD, Johanna Cornell MD, Kimmie Jimenez DO, Saurabh Dickson MD, Ronan Maria MD, Zan Cruz MD, Woody Vila MD, Doe Pradhan DO, Aniya Weaver MD, Manish Sears MD, Traci Lee, CNP,  Yuliet Irving, CNP, Bonnie Jones, CNP, Ramy Caceres, CNP,  Amelia Ovalle, [facility], Alison Pratt, CNP, Jeanmarie Anderson, CNP, Leona Parkinson, CNP, Nette Paniagua, CNP, Sanjiv Hough, CNP, Ana Borden PANazarioC, Alejandro Moss, CNS, Elva Posada, CNP, Dolores Austin, [address] [place] [address]    Progress Note    1/9/2023    1:05 PM    Name:   Corwin Aguirre  MRN:     [id number]     [place]:      [de-identified]   Room:   2015/2015-01   Day:  25  Admit Date:  12/15/2022 12:08 PM    PCP:   Kenyatta Merino  Code Status:  Full Code    Subjective:     C/C:   Chief Complaint   Patient presents with    Drug Overdose     Interval History Status: no change     No acute change in patient mentation. Patient is going to stay with her sister. No acute events overnight. MRI came back negative. Patient on psych evaluation, as per RN psychiatrist was okay for discharge. Will start discharge planning today. Brief History:     68-year-old female with past medical history of drug overdose, chronic alcohol use presented to the hospital after she was found unresponsive due to suspected drug overdose. GCS was 3 on arrival.  Patient was intubated for airway protection. ED work-up showed elevated alcohol level. Patient was also noted to have COVID-positive test.  Patient was admitted on 12/15 and was kept in ICU. She was extubated on 12/23. Extubation was delayed due to absence of cuff leak. ENT was consulted, the cuff leak absence was due to presence of large ET tube. 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 Patient failed bedside swallow study and NG tube was placed for swallow study. Patient gradually improved and passed swallow study on 1/3. Patient gets mood swings and gets intermittently confused, psych consulted, mri brain ordered, home meds abilify and venlafaxine resumed. Review of Systems:     Constitutional:  negative for chills, fevers, sweats  Respiratory:  negative for cough, dyspnea on exertion, shortness of breath, wheezing  Cardiovascular:  negative for chest pain, chest pressure/discomfort, lower extremity edema, palpitations  Gastrointestinal:  negative for abdominal pain, constipation, diarrhea, nausea, vomiting  Neurological:  negative for dizziness, headache    Medications: Allergies:  No Known Allergies    Current Meds:   Scheduled Meds:    ARIPiprazole  10 mg Oral Daily    nicotine  1 patch TransDERmal Daily    venlafaxine  150 mg Oral Daily    famotidine  20 mg Oral BID    multivitamin  1 tablet Per NG tube Daily    thiamine  100 mg Per NG tube Daily    folic acid  1 mg Per NG tube Daily    enoxaparin  30 mg SubCUTAneous Daily     Continuous Infusions:   PRN Meds: sodium chloride flush, hydrOXYzine HCl, albuterol, guaiFENesin, ondansetron **OR** ondansetron, polyethylene glycol, acetaminophen **OR** acetaminophen    Data:     Past Medical History:   has a past medical history of ADHD (attention deficit hyperactivity disorder), Anxiety, Bipolar 1 disorder ([address] and Depression. Social History:   reports that she has been smoking cigarettes. She has been smoking an average of .5 packs per day. She has never used smokeless tobacco. She reports current alcohol use. She reports current drug use. Drug: Marijuana [place]).      Family History: Family History   Problem Relation Age of Onset    Hypertension Mother     Depression Mother     Bipolar Disorder Father     Alcohol Abuse Father     Cancer Other         Uncle ? Vitals:  /68   Pulse 91   Temp 98.5 °F (36.9 °C) (Oral)   Resp 19   Ht 5' 5\" (1.651 m)   Wt 105 lb 6.1 oz (47.8 kg)   SpO2 99%   BMI 17.54 kg/m²   Temp (24hrs), Av.3 °F (36.8 °C), Min:97.7 °F (36.5 °C), Max:98.8 °F (37.1 °C)    Recent Labs     23   POCGLU 89         I/O (24Hr): No intake or output data in the 24 hours ending 23 1305      Labs:  Hematology:  No results for input(s): WBC, RBC, HGB, HCT, MCV, MCH, MCHC, RDW, PLT, MPV, SEDRATE, CRP, INR, DDIMER, TV2ZTUZP, LABABSO in the last 72 hours. Invalid input(s): PT    Chemistry:  Recent Labs     23  1035 23  0627    134*   K 3.7 3.6*    100   CO2 25 23   GLUCOSE 86 115*   BUN 4* 6   CREATININE 0.43* 0.47*   ANIONGAP 10 11   LABGLOM >60 >60   CALCIUM 9.8 9.8   PHOS  --  4.2       Recent Labs     23   POCGLU 89       ABG:  Lab Results   Component Value Date/Time    POCPH 7.416 2022 04:01 PM    POCPCO2 46.0 2022 04:01 PM    POCPO2 124.2 2022 04:01 PM    POCHCO3 29.5 2022 04:01 PM    NBEA 1 2022 04:29 AM    PBEA 4 2022 04:01 PM    YJQG9LCI 99 2022 04:01 PM    FIO2 28.0 2022 04:52 PM     Lab Results   Component Value Date/Time    SPECIAL L HAND 5ML 2022 10:01 AM     Lab Results   Component Value Date/Time    CULTURE NO GROWTH 5 DAYS 2022 10:01 AM       Radiology:  CT ABDOMEN PELVIS WO CONTRAST Additional Contrast? None    Result Date: 2022  1. Consolidation in both lung bases with bilateral pleural effusions, consistent with pneumonia. 2. No acute abdominal abnormality. 3. Hepatic steatosis. XR CHEST PORTABLE    Result Date: 2022  Stable patchy bibasilar atelectasis or airspace disease.      XR CHEST PORTABLE    Result Date: 12/28/2022  Patchy ground-glass change in the lungs felt to represent multilobar pneumonia given clinical history. XR ABDOMEN FOR NG/OG/NE TUBE PLACEMENT    Result Date: 12/31/2022  Enteric tube in the stomach as above. No evidence of bowel obstruction. XR ABDOMEN FOR NG/OG/NE TUBE PLACEMENT    Result Date: 12/28/2022  Unremarkable limited KUB. NG tube tip projects at the left upper quadrant, overlying the expected location of the stomach. FL MODIFIED BARIUM SWALLOW W VIDEO    Result Date: 1/3/2023  Deep laryngeal penetration of thin liquid. Trace laryngeal penetration of soft solid. Puree, cookie and nectar thick liquid well-tolerated. Please see separate speech pathology report for full discussion of findings and recommendations. FL MODIFIED BARIUM SWALLOW W VIDEO    Result Date: 12/28/2022  Delayed oral phase of swallowing. Gross aspiration of puree with a delayed weak cough response. No other consistencies given. Please see separate speech pathology report for full discussion of findings and recommendations.        Physical Examination:        General appearance:  alert, cooperative and no distress, very weak, malnourished  Mental Status:  oriented to person, place and time and normal affect  Lungs:  clear to auscultation bilaterally, normal effort  Heart:  regular rate and rhythm, no murmur  Abdomen:  soft, ng in place with tube feeds, non tender, nondistended, normal bowel sounds, no masses, hepatomegaly, splenomegaly  Extremities:  no edema, redness, tenderness in the calves  Skin:  no gross lesions, rashes, induration    Assessment:        Hospital Problems             Last Modified POA    * (Principal) Drug overdose of undetermined intent, initial encounter 12/15/2022 Yes    COVID-19 12/18/2022 Yes    Acute respiratory insufficiency 06/36/2133 Yes    Alcoholic intoxication with complication ([address] 32/50/3296 Yes    Altered mental status 12/18/2022 Yes    Acute respiratory failure with hypoxia ([facility] 12/26/2022 Yes   Plan:        Patient passed swallow study  Continue venlafaxine to home dose of 150 mg  Continue abilify 10 mg daily  Per RN psych cleared patient for discharge  MRI brain negative, will arrange psych follow-up outpatient. Thiamine and folic acid  Tolerating diet  Patient counseled regarding alcohol cessation  Macrocytosis likely secondary to alcohol use, vitamin K75 folic acid within normal limits  Replace  electrolytes as needed    Discussed case with   Discharge today to sisters home.     Darryl Ford MD  1/9/2023  1:05 PM

## 2023-02-08 NOTE — PROGRESS NOTES
[facility]  Office: [address], DO, Cindy Sep, DO, Yadira Heart, DO, Nima Salazar Blood, DO, Lurdes Chris MD, Andree Martinez MD, Michael Amanda MD, Mahi Palm MD,  Hernandez Griffith MD, Damaso Maria MD, Tanya Cullen, DO, Lemuel Gilbert MD,  Roseann Artis MD, Jaun Flores MD, Bing Boxer, DO, Rahul Simons MD, Nikolay Nelson MD, Santana Sheppard, DO, Moo Benton MD, Juan R Chilel MD, Guille Tian MD, Cindy Salehr, MD, Tarun Broussard DO, Koko Brandt MD, Brandan Vega MD, Leila Gamez, CNP,  Arsen Be, CNP, Jeannine Hernandez, CNP, Latoya Hooper, CNP,  Dangelo Alicea, [facility], Cyndy Broussard, CNP, Kerry Alfaro, CNP, Sloane White, CNP, Cameron Leo, CNP, Alex Cota, CNP, Haley Foleyr, PAN[facility]ario[facility], Thomas Pineda, [facility], Kerri Rodríguez, CNP, Troy Tena, CNP         [place] Poll[place] [address]    Progress Note    12/29/2022    5:31 PM    Name:   August Li  MRN:     [id number]     [place]:      [de-identified]   Room:   2015/2015-01   Day:  14  Admit Date:  12/15/2022 12:08 PM    PCP:   Sukhi Levin  Code Status:  Full Code    Subjective:     C/C:   Chief Complaint   Patient presents with    Drug Overdose     Interval History Status: significantly worsened. Brief History:         Review of Systems:     Constitutional:  negative for chills, fevers, sweats  Respiratory:  negative for cough, dyspnea on exertion, shortness of breath, wheezing  Cardiovascular:  negative for chest pain, chest pressure/discomfort, lower extremity edema, palpitations  Gastrointestinal:  negative for abdominal pain, constipation, diarrhea, nausea, vomiting  Neurological:  negative for dizziness, headache    Medications:      Allergies:  No Known Allergies    Current Meds:   Scheduled Meds:    ampicillin-sulbactam  3,000 mg IntraVENous Q6H    ipratropium  0.5 mg Nebulization 4x daily    venlafaxine  37.5 mg Per NG tube Daily    sodium chloride  500 mL IntraVENous Once    sodium chloride flush  5-40 mL IntraVENous 2 times per day    multivitamin  1 tablet Per NG tube Daily    famotidine  20 mg Per NG tube BID    thiamine  100 mg Per NG tube Daily    folic acid  1 mg Per NG tube Daily    enoxaparin  30 mg SubCUTAneous Daily     Continuous Infusions:    sodium chloride Stopped (22 1810)     PRN Meds: albuterol, guaiFENesin, albuterol, sodium chloride flush, sodium chloride, midodrine, sodium phosphate IVPB **OR** sodium phosphate IVPB, sodium chloride flush, ondansetron **OR** ondansetron, polyethylene glycol, acetaminophen **OR** acetaminophen    Data:     Past Medical History:   has a past medical history of ADHD (attention deficit hyperactivity disorder), Anxiety, Bipolar 1 disorder ([address] and Depression. Social History:   reports that she has been smoking cigarettes. She has been smoking an average of .5 packs per day. She has never used smokeless tobacco. She reports current alcohol use. She reports current drug use. Drug: Marijuana Pam Diogo). Family History:   Family History   Problem Relation Age of Onset    Hypertension Mother     Depression Mother     Bipolar Disorder Father     Alcohol Abuse Father     Cancer Other         Uncle ? Vitals:  /73   Pulse (!) 114   Temp 100 °F (37.8 °C) (Axillary)   Resp 15   Ht 5' 5\" (1.651 m)   Wt 102 lb 11.8 oz (46.6 kg)   SpO2 100%   BMI 17.10 kg/m²   Temp (24hrs), Av.9 °F (37.2 °C), Min:98.4 °F (36.9 °C), Max:100 °F (37.8 °C)    Recent Labs     22  0811 22  1652 22  1542 22  1601   POCGLU 107* 99 109* 125*       I/O (24Hr):     Intake/Output Summary (Last 24 hours) at 2022 1731  Last data filed at 2022 0211  Gross per 24 hour   Intake 1224.51 ml   Output --   Net 1224.51 ml       Labs:  Hematology:  Recent Labs     22  0500 22  0309 22  0620   WBC 13.5* 17.0* 15.6*   RBC 2.47* 2.51* 2.24*   HGB 9.4* 9. 9* 9.3*   HCT 28.6* 29.2* 27.4*   .8* 116.3* 122.3*   MCH 38.1* 39.4* 41.5*   MCHC 32.9 33.9 33.9   RDW 15.1* 15.0* 15.5*    274 296   MPV 10.9 10.7 10.1   CRP  --  40.1* 46.5*   DDIMER  --   --  0.20     Chemistry:  Recent Labs     12/27/22  0500 12/28/22  0309 12/28/22  1152 12/29/22  0620 12/29/22  1640   * 130*  --  131*  --    K 3.7 3.7  --  3.9  --    CL 99 94*  --  95*  --    CO2 26 22  --  25  --    GLUCOSE 93 108*  --  106* 110*   BUN 4* 4*  --  4*  --    CREATININE 0.26* 0.27*  --  0.25*  --    MG  --  1.6  --  1.9  --    ANIONGAP 9 14  --  11  --    LABGLOM >60 >60  --  >60  --    CALCIUM 9.1 9.2  --  9.1  --    PHOS  --  4.5  --  3.6  --    PROBNP  --   --   --  145  --    TROPHS  --  <6  --   --   --    LACTACIDWB  --   --  0.8  --   --      Recent Labs     12/28/22  0309 12/28/22  0811 12/28/22  1652 12/29/22  0620 12/29/22  1542 12/29/22  1601   TSH 1.96  --   --   --   --   --    LIPASE  --   --   --  111*  --   --    POCGLU  --  107* 99  --  109* 125*     ABG:  Lab Results   Component Value Date/Time    POCPH 7.416 12/29/2022 04:01 PM    POCPCO2 46.0 12/29/2022 04:01 PM    POCPO2 124.2 12/29/2022 04:01 PM    POCHCO3 29.5 12/29/2022 04:01 PM    NBEA 1 12/20/2022 04:29 AM    PBEA 4 12/29/2022 04:01 PM    MWHQ3GNR 99 12/29/2022 04:01 PM    FIO2 28.0 12/28/2022 04:52 PM     Lab Results   Component Value Date/Time    SPECIAL L HAND 5ML 12/28/2022 10:01 AM     Lab Results   Component Value Date/Time    CULTURE NO GROWTH 1 DAY 12/28/2022 10:01 AM       Radiology:  CT ABDOMEN PELVIS WO CONTRAST Additional Contrast? None    Result Date: 12/28/2022  1. Consolidation in both lung bases with bilateral pleural effusions, consistent with pneumonia. 2. No acute abdominal abnormality. 3. Hepatic steatosis. XR CHEST PORTABLE    Result Date: 12/28/2022  Patchy ground-glass change in the lungs felt to represent multilobar pneumonia given clinical history.      XR ABDOMEN FOR NG/OG/NE TUBE PLACEMENT    Result Date: 12/28/2022  Unremarkable limited KUB. NG tube tip projects at the left upper quadrant, overlying the expected location of the stomach. XR ABDOMEN FOR NG/OG/NE TUBE PLACEMENT    Result Date: 12/25/2022  1. Borderline dilated 3 cm small bowel loop in the left lower quadrant which can be seen with ileus or small-bowel obstruction. 2. NG tube distal tip likely in the body of the stomach. 3. Mild left basilar airspace disease. XR ABDOMEN FOR NG/OG/NE TUBE PLACEMENT    Result Date: 12/24/2022  Nasogastric tube tip is in the stomach     FL MODIFIED BARIUM SWALLOW W VIDEO    Result Date: 12/28/2022  Delayed oral phase of swallowing. Gross aspiration of puree with a delayed weak cough response. No other consistencies given. Please see separate speech pathology report for full discussion of findings and recommendations.        Physical Examination:        General appearance:  alert, cooperative and no distress  Mental Status:  oriented to person, place and time and normal affect  Lungs:  clear to auscultation bilaterally, normal effort  Heart:  regular rate and rhythm, no murmur  Abdomen:  soft, nontender, nondistended, normal bowel sounds, no masses, hepatomegaly, splenomegaly  Extremities:  no edema, redness, tenderness in the calves  Skin:  no gross lesions, rashes, induration    Assessment:        Hospital Problems             Last Modified POA    * (Principal) Drug overdose of undetermined intent, initial encounter 12/15/2022 Yes    COVID-19 12/18/2022 Yes    Acute respiratory insufficiency 64/23/5490 Yes    Alcoholic intoxication with complication ([address] 41/94/9501 Yes    Altered mental status 12/18/2022 Yes    Acute respiratory failure with hypoxia ([address] 12/26/2022 Yes       Plan:        ***    Bud Meng MD  12/29/2022  5:31 PM Detail Level: Detailed

## 2023-03-07 ENCOUNTER — APPOINTMENT (OUTPATIENT)
Dept: GENERAL RADIOLOGY | Age: 28
End: 2023-03-07
Payer: MEDICAID

## 2023-03-07 ENCOUNTER — HOSPITAL ENCOUNTER (INPATIENT)
Age: 28
LOS: 1 days | Discharge: HOME | End: 2023-03-08
Attending: EMERGENCY MEDICINE | Admitting: INTERNAL MEDICINE
Payer: MEDICAID

## 2023-03-07 DIAGNOSIS — F10.10 ETOH ABUSE: Primary | ICD-10-CM

## 2023-03-07 PROBLEM — E83.42 HYPOMAGNESEMIA: Status: ACTIVE | Noted: 2023-03-07

## 2023-03-07 PROBLEM — K29.20 ALCOHOLIC GASTRITIS WITHOUT BLEEDING: Status: ACTIVE | Noted: 2023-03-07

## 2023-03-07 PROBLEM — D53.9 MACROCYTIC ANEMIA: Status: ACTIVE | Noted: 2023-03-07

## 2023-03-07 PROBLEM — F10.239 ALCOHOL DEPENDENCE WITH WITHDRAWAL (HCC): Status: ACTIVE | Noted: 2023-03-07

## 2023-03-07 PROBLEM — E87.29 HIGH ANION GAP METABOLIC ACIDOSIS: Status: ACTIVE | Noted: 2023-03-07

## 2023-03-07 PROBLEM — D69.6 THROMBOCYTOPENIA (HCC): Status: ACTIVE | Noted: 2023-03-07

## 2023-03-07 PROBLEM — Z86.16 HISTORY OF COVID-19: Status: ACTIVE | Noted: 2023-03-07

## 2023-03-07 PROBLEM — K70.10 ALCOHOLIC HEPATITIS: Status: ACTIVE | Noted: 2023-03-07

## 2023-03-07 LAB
ABSOLUTE EOS #: <0.03 K/UL (ref 0–0.44)
ABSOLUTE IMMATURE GRANULOCYTE: <0.03 K/UL (ref 0–0.3)
ABSOLUTE LYMPH #: 1.01 K/UL (ref 1.1–3.7)
ABSOLUTE MONO #: 0.45 K/UL (ref 0.1–1.2)
ALBUMIN SERPL-MCNC: 4.4 G/DL (ref 3.5–5.2)
ALBUMIN/GLOBULIN RATIO: 1.2 (ref 1–2.5)
ALP SERPL-CCNC: 127 U/L (ref 35–104)
ALT SERPL-CCNC: 37 U/L (ref 5–33)
ANION GAP SERPL CALCULATED.3IONS-SCNC: 19 MMOL/L (ref 9–17)
AST SERPL-CCNC: 335 U/L
BASOPHILS # BLD: 1 % (ref 0–2)
BASOPHILS ABSOLUTE: 0.03 K/UL (ref 0–0.2)
BETA-HYDROXYBUTYRATE: 1.52 MMOL/L (ref 0.02–0.27)
BILIRUB SERPL-MCNC: 0.9 MG/DL (ref 0.3–1.2)
BUN SERPL-MCNC: 4 MG/DL (ref 6–20)
CALCIUM SERPL-MCNC: 8.8 MG/DL (ref 8.6–10.4)
CARBOXYHEMOGLOBIN: 3.4 % (ref 0–5)
CHLORIDE SERPL-SCNC: 98 MMOL/L (ref 98–107)
CO2 SERPL-SCNC: 21 MMOL/L (ref 20–31)
CREAT SERPL-MCNC: 0.42 MG/DL (ref 0.5–0.9)
EOSINOPHILS RELATIVE PERCENT: 0 % (ref 1–4)
FIO2: ABNORMAL
GFR SERPL CREATININE-BSD FRML MDRD: >60 ML/MIN/1.73M2
GLUCOSE SERPL-MCNC: 90 MG/DL (ref 70–99)
HCG QUALITATIVE: NEGATIVE
HCO3 VENOUS: 21.9 MMOL/L (ref 24–30)
HCT VFR BLD AUTO: 31.6 % (ref 36.3–47.1)
HGB BLD-MCNC: 10.8 G/DL (ref 11.9–15.1)
IMMATURE GRANULOCYTES: 0 %
LIPASE SERPL-CCNC: 41 U/L (ref 13–60)
LYMPHOCYTES # BLD: 26 % (ref 24–43)
MAGNESIUM SERPL-MCNC: 1.6 MG/DL (ref 1.6–2.6)
MCH RBC QN AUTO: 34.3 PG (ref 25.2–33.5)
MCHC RBC AUTO-ENTMCNC: 34.2 G/DL (ref 28.4–34.8)
MCV RBC AUTO: 100.3 FL (ref 82.6–102.9)
MONOCYTES # BLD: 12 % (ref 3–12)
NEGATIVE BASE EXCESS, VEN: 2 MMOL/L (ref 0–2)
NRBC AUTOMATED: 0 PER 100 WBC
O2 SAT, VEN: 95.1 % (ref 60–85)
PATIENT TEMP: 37
PCO2, VEN: 36.2 MM HG (ref 39–55)
PDW BLD-RTO: 16.6 % (ref 11.8–14.4)
PH VENOUS: 7.4 (ref 7.32–7.42)
PLATELET # BLD AUTO: ABNORMAL K/UL (ref 138–453)
PLATELET, FLUORESCENCE: 70 K/UL (ref 138–453)
PLATELET, IMMATURE FRACTION: 7 % (ref 1.1–10.3)
PO2, VEN: 78.7 MM HG (ref 30–50)
POTASSIUM SERPL-SCNC: 3.4 MMOL/L (ref 3.7–5.3)
PROT SERPL-MCNC: 8.2 G/DL (ref 6.4–8.3)
RBC # BLD: 3.15 M/UL (ref 3.95–5.11)
RBC # BLD: ABNORMAL 10*6/UL
SEG NEUTROPHILS: 60 % (ref 36–65)
SEGMENTED NEUTROPHILS ABSOLUTE COUNT: 2.31 K/UL (ref 1.5–8.1)
SODIUM SERPL-SCNC: 138 MMOL/L (ref 135–144)
WBC # BLD AUTO: 3.8 K/UL (ref 3.5–11.3)

## 2023-03-07 PROCEDURE — 82010 KETONE BODYS QUAN: CPT

## 2023-03-07 PROCEDURE — 84703 CHORIONIC GONADOTROPIN ASSAY: CPT

## 2023-03-07 PROCEDURE — 99285 EMERGENCY DEPT VISIT HI MDM: CPT

## 2023-03-07 PROCEDURE — 2580000003 HC RX 258: Performed by: STUDENT IN AN ORGANIZED HEALTH CARE EDUCATION/TRAINING PROGRAM

## 2023-03-07 PROCEDURE — 2060000000 HC ICU INTERMEDIATE R&B

## 2023-03-07 PROCEDURE — 80053 COMPREHEN METABOLIC PANEL: CPT

## 2023-03-07 PROCEDURE — 71046 X-RAY EXAM CHEST 2 VIEWS: CPT

## 2023-03-07 PROCEDURE — 93005 ELECTROCARDIOGRAM TRACING: CPT | Performed by: STUDENT IN AN ORGANIZED HEALTH CARE EDUCATION/TRAINING PROGRAM

## 2023-03-07 PROCEDURE — 96365 THER/PROPH/DIAG IV INF INIT: CPT

## 2023-03-07 PROCEDURE — 99223 1ST HOSP IP/OBS HIGH 75: CPT | Performed by: INTERNAL MEDICINE

## 2023-03-07 PROCEDURE — 2500000003 HC RX 250 WO HCPCS: Performed by: STUDENT IN AN ORGANIZED HEALTH CARE EDUCATION/TRAINING PROGRAM

## 2023-03-07 PROCEDURE — 83690 ASSAY OF LIPASE: CPT

## 2023-03-07 PROCEDURE — 82805 BLOOD GASES W/O2 SATURATION: CPT

## 2023-03-07 PROCEDURE — 6360000002 HC RX W HCPCS: Performed by: STUDENT IN AN ORGANIZED HEALTH CARE EDUCATION/TRAINING PROGRAM

## 2023-03-07 PROCEDURE — 96375 TX/PRO/DX INJ NEW DRUG ADDON: CPT

## 2023-03-07 PROCEDURE — 83735 ASSAY OF MAGNESIUM: CPT

## 2023-03-07 PROCEDURE — 85025 COMPLETE CBC W/AUTO DIFF WBC: CPT

## 2023-03-07 PROCEDURE — 36415 COLL VENOUS BLD VENIPUNCTURE: CPT

## 2023-03-07 PROCEDURE — 2580000003 HC RX 258: Performed by: NURSE PRACTITIONER

## 2023-03-07 PROCEDURE — 85055 RETICULATED PLATELET ASSAY: CPT

## 2023-03-07 RX ORDER — FOLIC ACID 5 MG/ML
1 INJECTION, SOLUTION INTRAMUSCULAR; INTRAVENOUS; SUBCUTANEOUS ONCE
Status: DISCONTINUED | OUTPATIENT
Start: 2023-03-07 | End: 2023-03-07

## 2023-03-07 RX ORDER — LORAZEPAM 2 MG/1
4 TABLET ORAL
Status: DISCONTINUED | OUTPATIENT
Start: 2023-03-07 | End: 2023-03-08 | Stop reason: HOSPADM

## 2023-03-07 RX ORDER — ARIPIPRAZOLE 10 MG/1
10 TABLET ORAL DAILY
Status: DISCONTINUED | OUTPATIENT
Start: 2023-03-08 | End: 2023-03-08 | Stop reason: HOSPADM

## 2023-03-07 RX ORDER — ONDANSETRON 4 MG/1
4 TABLET, ORALLY DISINTEGRATING ORAL EVERY 8 HOURS PRN
Status: DISCONTINUED | OUTPATIENT
Start: 2023-03-07 | End: 2023-03-08 | Stop reason: HOSPADM

## 2023-03-07 RX ORDER — LORAZEPAM 1 MG/1
1 TABLET ORAL
Status: DISCONTINUED | OUTPATIENT
Start: 2023-03-07 | End: 2023-03-08 | Stop reason: HOSPADM

## 2023-03-07 RX ORDER — LORAZEPAM 2 MG/ML
3 INJECTION INTRAMUSCULAR
Status: DISCONTINUED | OUTPATIENT
Start: 2023-03-07 | End: 2023-03-08 | Stop reason: HOSPADM

## 2023-03-07 RX ORDER — ONDANSETRON 2 MG/ML
4 INJECTION INTRAMUSCULAR; INTRAVENOUS EVERY 6 HOURS PRN
Status: DISCONTINUED | OUTPATIENT
Start: 2023-03-07 | End: 2023-03-08 | Stop reason: HOSPADM

## 2023-03-07 RX ORDER — SODIUM CHLORIDE 0.9 % (FLUSH) 0.9 %
5-40 SYRINGE (ML) INJECTION EVERY 12 HOURS SCHEDULED
Status: DISCONTINUED | OUTPATIENT
Start: 2023-03-07 | End: 2023-03-08 | Stop reason: HOSPADM

## 2023-03-07 RX ORDER — POTASSIUM CHLORIDE 7.45 MG/ML
10 INJECTION INTRAVENOUS PRN
Status: DISCONTINUED | OUTPATIENT
Start: 2023-03-07 | End: 2023-03-08 | Stop reason: HOSPADM

## 2023-03-07 RX ORDER — LORAZEPAM 2 MG/ML
2 INJECTION INTRAMUSCULAR
Status: DISCONTINUED | OUTPATIENT
Start: 2023-03-07 | End: 2023-03-08 | Stop reason: HOSPADM

## 2023-03-07 RX ORDER — ACETAMINOPHEN 650 MG/1
650 SUPPOSITORY RECTAL EVERY 6 HOURS PRN
Status: DISCONTINUED | OUTPATIENT
Start: 2023-03-07 | End: 2023-03-08 | Stop reason: HOSPADM

## 2023-03-07 RX ORDER — 0.9 % SODIUM CHLORIDE 0.9 %
1000 INTRAVENOUS SOLUTION INTRAVENOUS ONCE
Status: COMPLETED | OUTPATIENT
Start: 2023-03-07 | End: 2023-03-07

## 2023-03-07 RX ORDER — LANOLIN ALCOHOL/MO/W.PET/CERES
100 CREAM (GRAM) TOPICAL DAILY
Status: DISCONTINUED | OUTPATIENT
Start: 2023-03-08 | End: 2023-03-08 | Stop reason: HOSPADM

## 2023-03-07 RX ORDER — SODIUM CHLORIDE 9 MG/ML
INJECTION, SOLUTION INTRAVENOUS PRN
Status: DISCONTINUED | OUTPATIENT
Start: 2023-03-07 | End: 2023-03-08 | Stop reason: HOSPADM

## 2023-03-07 RX ORDER — FOLIC ACID 1 MG/1
1 TABLET ORAL DAILY
Status: DISCONTINUED | OUTPATIENT
Start: 2023-03-08 | End: 2023-03-08 | Stop reason: HOSPADM

## 2023-03-07 RX ORDER — POLYETHYLENE GLYCOL 3350 17 G/17G
17 POWDER, FOR SOLUTION ORAL DAILY PRN
Status: DISCONTINUED | OUTPATIENT
Start: 2023-03-07 | End: 2023-03-08 | Stop reason: HOSPADM

## 2023-03-07 RX ORDER — ACETAMINOPHEN 325 MG/1
650 TABLET ORAL EVERY 6 HOURS PRN
Status: DISCONTINUED | OUTPATIENT
Start: 2023-03-07 | End: 2023-03-08 | Stop reason: HOSPADM

## 2023-03-07 RX ORDER — LORAZEPAM 2 MG/ML
1 INJECTION INTRAMUSCULAR
Status: DISCONTINUED | OUTPATIENT
Start: 2023-03-07 | End: 2023-03-08 | Stop reason: HOSPADM

## 2023-03-07 RX ORDER — LORAZEPAM 2 MG/1
2 TABLET ORAL
Status: DISCONTINUED | OUTPATIENT
Start: 2023-03-07 | End: 2023-03-08 | Stop reason: HOSPADM

## 2023-03-07 RX ORDER — MULTIVITAMIN WITH IRON
1 TABLET ORAL DAILY
Status: DISCONTINUED | OUTPATIENT
Start: 2023-03-08 | End: 2023-03-08 | Stop reason: HOSPADM

## 2023-03-07 RX ORDER — VENLAFAXINE HYDROCHLORIDE 150 MG/1
150 CAPSULE, EXTENDED RELEASE ORAL
Status: DISCONTINUED | OUTPATIENT
Start: 2023-03-08 | End: 2023-03-08 | Stop reason: HOSPADM

## 2023-03-07 RX ORDER — ONDANSETRON 2 MG/ML
4 INJECTION INTRAMUSCULAR; INTRAVENOUS ONCE
Status: COMPLETED | OUTPATIENT
Start: 2023-03-07 | End: 2023-03-07

## 2023-03-07 RX ORDER — SODIUM CHLORIDE 9 MG/ML
INJECTION, SOLUTION INTRAVENOUS CONTINUOUS
Status: DISCONTINUED | OUTPATIENT
Start: 2023-03-07 | End: 2023-03-08 | Stop reason: HOSPADM

## 2023-03-07 RX ORDER — LANOLIN ALCOHOL/MO/W.PET/CERES
100 CREAM (GRAM) TOPICAL DAILY
Status: DISCONTINUED | OUTPATIENT
Start: 2023-03-08 | End: 2023-03-07 | Stop reason: SDUPTHER

## 2023-03-07 RX ORDER — THIAMINE HYDROCHLORIDE 100 MG/ML
100 INJECTION, SOLUTION INTRAMUSCULAR; INTRAVENOUS ONCE
Status: DISCONTINUED | OUTPATIENT
Start: 2023-03-07 | End: 2023-03-07

## 2023-03-07 RX ORDER — POTASSIUM CHLORIDE 20 MEQ/1
40 TABLET, EXTENDED RELEASE ORAL PRN
Status: DISCONTINUED | OUTPATIENT
Start: 2023-03-07 | End: 2023-03-08 | Stop reason: HOSPADM

## 2023-03-07 RX ORDER — ENOXAPARIN SODIUM 100 MG/ML
40 INJECTION SUBCUTANEOUS DAILY
Status: DISCONTINUED | OUTPATIENT
Start: 2023-03-08 | End: 2023-03-08 | Stop reason: HOSPADM

## 2023-03-07 RX ORDER — SODIUM CHLORIDE 0.9 % (FLUSH) 0.9 %
10 SYRINGE (ML) INJECTION PRN
Status: DISCONTINUED | OUTPATIENT
Start: 2023-03-07 | End: 2023-03-08 | Stop reason: HOSPADM

## 2023-03-07 RX ORDER — LORAZEPAM 2 MG/ML
1 INJECTION INTRAMUSCULAR ONCE
Status: COMPLETED | OUTPATIENT
Start: 2023-03-07 | End: 2023-03-07

## 2023-03-07 RX ORDER — LORAZEPAM 2 MG/ML
4 INJECTION INTRAMUSCULAR
Status: DISCONTINUED | OUTPATIENT
Start: 2023-03-07 | End: 2023-03-08 | Stop reason: HOSPADM

## 2023-03-07 RX ADMIN — ONDANSETRON 4 MG: 2 INJECTION INTRAMUSCULAR; INTRAVENOUS at 19:26

## 2023-03-07 RX ADMIN — LORAZEPAM 1 MG: 2 INJECTION INTRAMUSCULAR; INTRAVENOUS at 19:24

## 2023-03-07 RX ADMIN — Medication 1 MG: at 21:43

## 2023-03-07 RX ADMIN — SODIUM CHLORIDE 1000 ML: 9 INJECTION, SOLUTION INTRAVENOUS at 19:28

## 2023-03-07 RX ADMIN — FOLIC ACID: 5 INJECTION, SOLUTION INTRAMUSCULAR; INTRAVENOUS; SUBCUTANEOUS at 20:34

## 2023-03-07 RX ADMIN — SODIUM CHLORIDE, PRESERVATIVE FREE 5 ML: 5 INJECTION INTRAVENOUS at 23:07

## 2023-03-07 RX ADMIN — SODIUM CHLORIDE: 9 INJECTION, SOLUTION INTRAVENOUS at 23:07

## 2023-03-07 ASSESSMENT — ENCOUNTER SYMPTOMS
VOMITING: 1
DIARRHEA: 0
SHORTNESS OF BREATH: 0
ABDOMINAL PAIN: 1
NAUSEA: 1
BACK PAIN: 0

## 2023-03-08 VITALS
TEMPERATURE: 99.1 F | WEIGHT: 111.11 LBS | HEART RATE: 76 BPM | OXYGEN SATURATION: 96 % | BODY MASS INDEX: 18.51 KG/M2 | SYSTOLIC BLOOD PRESSURE: 124 MMHG | RESPIRATION RATE: 18 BRPM | HEIGHT: 65 IN | DIASTOLIC BLOOD PRESSURE: 88 MMHG

## 2023-03-08 LAB
ALBUMIN SERPL-MCNC: 3.6 G/DL (ref 3.5–5.2)
ALBUMIN/GLOBULIN RATIO: 1.1 (ref 1–2.5)
ALP SERPL-CCNC: 101 U/L (ref 35–104)
ALT SERPL-CCNC: 33 U/L (ref 5–33)
ANION GAP SERPL CALCULATED.3IONS-SCNC: 14 MMOL/L (ref 9–17)
AST SERPL-CCNC: 318 U/L
BILIRUB SERPL-MCNC: 1.4 MG/DL (ref 0.3–1.2)
BUN SERPL-MCNC: 3 MG/DL (ref 6–20)
CA-I BLD-SCNC: 0.96 MMOL/L (ref 1.13–1.33)
CALCIUM SERPL-MCNC: 7.9 MG/DL (ref 8.6–10.4)
CHLORIDE SERPL-SCNC: 99 MMOL/L (ref 98–107)
CO2 SERPL-SCNC: 21 MMOL/L (ref 20–31)
CREAT SERPL-MCNC: 0.32 MG/DL (ref 0.5–0.9)
EKG ATRIAL RATE: 105 BPM
EKG P AXIS: 64 DEGREES
EKG P-R INTERVAL: 146 MS
EKG Q-T INTERVAL: 360 MS
EKG QRS DURATION: 66 MS
EKG QTC CALCULATION (BAZETT): 475 MS
EKG R AXIS: 53 DEGREES
EKG T AXIS: 54 DEGREES
EKG VENTRICULAR RATE: 105 BPM
EST. AVERAGE GLUCOSE BLD GHB EST-MCNC: 97 MG/DL
ETHANOL PERCENT: <0.01 %
ETHANOL: <10 MG/DL
FERRITIN SERPL-MCNC: 289 NG/ML (ref 13–150)
FOLATE SERPL-MCNC: 13.1 NG/ML
GFR SERPL CREATININE-BSD FRML MDRD: >60 ML/MIN/1.73M2
GLUCOSE SERPL-MCNC: 80 MG/DL (ref 70–99)
HBA1C MFR BLD: 5 % (ref 4–6)
INR PPP: 1.2
IRON SATURATION: 64 % (ref 20–55)
IRON SERPL-MCNC: 131 UG/DL (ref 37–145)
LDLC SERPL-MCNC: 314 U/L (ref 135–214)
MAGNESIUM SERPL-MCNC: 1.2 MG/DL (ref 1.6–2.6)
PHOSPHATE SERPL-MCNC: 2.8 MG/DL (ref 2.6–4.5)
POTASSIUM SERPL-SCNC: 3.3 MMOL/L (ref 3.7–5.3)
PROT SERPL-MCNC: 6.9 G/DL (ref 6.4–8.3)
PROTHROMBIN TIME: 12.7 SEC (ref 9.1–12.3)
SODIUM SERPL-SCNC: 134 MMOL/L (ref 135–144)
TIBC SERPL-MCNC: 205 UG/DL (ref 250–450)
TSH SERPL-ACNC: 1.25 UIU/ML (ref 0.3–5)
UNSATURATED IRON BINDING CAPACITY: 74 UG/DL (ref 112–347)
VIT B12 SERPL-MCNC: 547 PG/ML (ref 232–1245)

## 2023-03-08 PROCEDURE — 83540 ASSAY OF IRON: CPT

## 2023-03-08 PROCEDURE — 84100 ASSAY OF PHOSPHORUS: CPT

## 2023-03-08 PROCEDURE — 2580000003 HC RX 258: Performed by: INTERNAL MEDICINE

## 2023-03-08 PROCEDURE — 83615 LACTATE (LD) (LDH) ENZYME: CPT

## 2023-03-08 PROCEDURE — 84443 ASSAY THYROID STIM HORMONE: CPT

## 2023-03-08 PROCEDURE — 2580000003 HC RX 258: Performed by: NURSE PRACTITIONER

## 2023-03-08 PROCEDURE — 36415 COLL VENOUS BLD VENIPUNCTURE: CPT

## 2023-03-08 PROCEDURE — 6370000000 HC RX 637 (ALT 250 FOR IP): Performed by: NURSE PRACTITIONER

## 2023-03-08 PROCEDURE — 2500000003 HC RX 250 WO HCPCS: Performed by: INTERNAL MEDICINE

## 2023-03-08 PROCEDURE — 82330 ASSAY OF CALCIUM: CPT

## 2023-03-08 PROCEDURE — 85610 PROTHROMBIN TIME: CPT

## 2023-03-08 PROCEDURE — 93010 ELECTROCARDIOGRAM REPORT: CPT | Performed by: INTERNAL MEDICINE

## 2023-03-08 PROCEDURE — 82746 ASSAY OF FOLIC ACID SERUM: CPT

## 2023-03-08 PROCEDURE — 83036 HEMOGLOBIN GLYCOSYLATED A1C: CPT

## 2023-03-08 PROCEDURE — C9113 INJ PANTOPRAZOLE SODIUM, VIA: HCPCS | Performed by: INTERNAL MEDICINE

## 2023-03-08 PROCEDURE — 6360000002 HC RX W HCPCS: Performed by: NURSE PRACTITIONER

## 2023-03-08 PROCEDURE — 83735 ASSAY OF MAGNESIUM: CPT

## 2023-03-08 PROCEDURE — 80053 COMPREHEN METABOLIC PANEL: CPT

## 2023-03-08 PROCEDURE — 82728 ASSAY OF FERRITIN: CPT

## 2023-03-08 PROCEDURE — G0480 DRUG TEST DEF 1-7 CLASSES: HCPCS

## 2023-03-08 PROCEDURE — 6360000002 HC RX W HCPCS: Performed by: INTERNAL MEDICINE

## 2023-03-08 PROCEDURE — 83550 IRON BINDING TEST: CPT

## 2023-03-08 PROCEDURE — 82607 VITAMIN B-12: CPT

## 2023-03-08 RX ORDER — MAGNESIUM HYDROXIDE/ALUMINUM HYDROXICE/SIMETHICONE 120; 1200; 1200 MG/30ML; MG/30ML; MG/30ML
30 SUSPENSION ORAL EVERY 6 HOURS PRN
Status: DISCONTINUED | OUTPATIENT
Start: 2023-03-08 | End: 2023-03-08 | Stop reason: HOSPADM

## 2023-03-08 RX ORDER — PANTOPRAZOLE SODIUM 40 MG/1
40 TABLET, DELAYED RELEASE ORAL
Qty: 60 TABLET | Refills: 0 | Status: SHIPPED | OUTPATIENT
Start: 2023-03-08 | End: 2023-04-07

## 2023-03-08 RX ORDER — POTASSIUM CHLORIDE 20 MEQ/1
20 TABLET, EXTENDED RELEASE ORAL DAILY
Qty: 5 TABLET | Refills: 0 | Status: SHIPPED | OUTPATIENT
Start: 2023-03-08 | End: 2023-03-13

## 2023-03-08 RX ADMIN — OLANZAPINE 2.5 MG: 10 INJECTION, POWDER, LYOPHILIZED, FOR SOLUTION INTRAMUSCULAR at 03:22

## 2023-03-08 RX ADMIN — ARIPIPRAZOLE 10 MG: 10 TABLET ORAL at 09:58

## 2023-03-08 RX ADMIN — LORAZEPAM 2 MG: 2 INJECTION INTRAMUSCULAR; INTRAVENOUS at 02:03

## 2023-03-08 RX ADMIN — VENLAFAXINE HYDROCHLORIDE 150 MG: 150 CAPSULE, EXTENDED RELEASE ORAL at 09:58

## 2023-03-08 RX ADMIN — ENOXAPARIN SODIUM 40 MG: 100 INJECTION SUBCUTANEOUS at 09:59

## 2023-03-08 RX ADMIN — SODIUM CHLORIDE, PRESERVATIVE FREE 10 ML: 5 INJECTION INTRAVENOUS at 09:59

## 2023-03-08 RX ADMIN — SODIUM CHLORIDE, PRESERVATIVE FREE 40 MG: 5 INJECTION INTRAVENOUS at 11:18

## 2023-03-08 RX ADMIN — ONDANSETRON 4 MG: 2 INJECTION INTRAMUSCULAR; INTRAVENOUS at 02:03

## 2023-03-08 RX ADMIN — SODIUM CHLORIDE: 9 INJECTION, SOLUTION INTRAVENOUS at 02:12

## 2023-03-08 RX ADMIN — ALCOHOL 1 TABLET: 70.47 GEL TOPICAL at 09:58

## 2023-03-08 RX ADMIN — ACETAMINOPHEN 650 MG: 325 TABLET ORAL at 02:03

## 2023-03-08 RX ADMIN — Medication 100 MG: at 09:58

## 2023-03-08 RX ADMIN — FOLIC ACID 1 MG: 1 TABLET ORAL at 09:58

## 2023-03-08 ASSESSMENT — PAIN SCALES - GENERAL
PAINLEVEL_OUTOF10: 10
PAINLEVEL_OUTOF10: 10

## 2023-03-08 ASSESSMENT — PAIN DESCRIPTION - PAIN TYPE: TYPE: ACUTE PAIN

## 2023-03-08 ASSESSMENT — PAIN DESCRIPTION - LOCATION
LOCATION: HEAD
LOCATION: HEAD

## 2023-03-08 ASSESSMENT — LIFESTYLE VARIABLES
HOW MANY STANDARD DRINKS CONTAINING ALCOHOL DO YOU HAVE ON A TYPICAL DAY: 10 OR MORE
HOW OFTEN DO YOU HAVE A DRINK CONTAINING ALCOHOL: 4 OR MORE TIMES A WEEK

## 2023-03-08 ASSESSMENT — PAIN DESCRIPTION - FREQUENCY: FREQUENCY: CONTINUOUS

## 2023-03-08 ASSESSMENT — PAIN DESCRIPTION - DESCRIPTORS: DESCRIPTORS: THROBBING

## 2023-03-08 NOTE — ED PROVIDER NOTES
[facility]  Emergency Department  Faculty Attestation     I performed a history and physical examination of the patient and discussed management with the resident. I reviewed the residents note and agree with the documented findings and plan of care. Any areas of disagreement are noted on the chart. I was personally present for the key portions of any procedures. I have documented in the chart those procedures where I was not present during the key portions. I have reviewed the emergency nurses triage note. I agree with the chief complaint, past medical history, past surgical history, allergies, medications, social and family history as documented unless otherwise noted below. For Physician Assistant/ Nurse Practitioner cases/documentation I have personally evaluated this patient and have completed at least one if not all key elements of the E/M (history, physical exam, and MDM). Additional findings are as noted.       Primary Care Physician:  Francisco Cooper    Screenings:  [unfilled]    CHIEF COMPLAINT       Chief Complaint   Patient presents with    Emesis    Tremors       RECENT VITALS:   Temp: 98.9 °F (37.2 °C),  Heart Rate: 97, Resp: 20, BP: (!) 158/99    LABS:  Labs Reviewed   CBC WITH AUTO DIFFERENTIAL - Abnormal; Notable for the following components:       Result Value    RBC 3.15 (*)     Hemoglobin 10.8 (*)     Hematocrit 31.6 (*)     MCH 34.3 (*)     RDW 16.6 (*)     Eosinophils % 0 (*)     Absolute Lymph # 1.01 (*)     All other components within normal limits   IMMATURE PLATELET FRACTION - Abnormal; Notable for the following components:    Platelet, Fluorescence 70 (*)     All other components within normal limits   HCG, SERUM, QUALITATIVE   LIPASE   COMPREHENSIVE METABOLIC PANEL   MAGNESIUM   BETA-HYDROXYBUTYRATE       Radiology  XR CHEST (2 VW)    (Results Pending)       CRITICAL CARE: There was a high probability of clinically significant/life threatening deterioration in this patient's condition which required my urgent intervention. Total critical care time was 5 minutes. This excludes any time for separately reportable procedures. EKG:  EKG Interpretation    Interpreted by me    Rhythm: normal sinus   Rate: Tachycardia  Axis: normal  Ectopy: none  Conduction: normal  ST Segments: no acute change  T Waves: no acute change  Q Waves: none    Clinical Impression: Acute cardiac, no acute ischemic changes    Attending Physician Additional  Notes    Patient is been over the whole day with nausea vomiting dry heaves and severe epigastric abdominal pain with radiation through the back. No blood in the emesis. No diarrhea constipation. No fevers. She does have shakiness. She is a daily drinker of large amounts, history of pancreatitis, portal hypertension from cirrhosis, gastritis, withdrawal symptoms but no seizures. On exam she is in moderate distress, having dry heaves, tachycardic, tremulous, tongue fasciculations. Mouth is dry. She is hypertensive but afebrile and no respiratory distress. No order to the breath. Lungs are clear. Abdomen is soft no distention or rebound. She does have significant subxiphoid epigastric tenderness. Negative Padilla sign. No peripheral edema. Impression is gastritis, consider pancreatitis, alcoholic ketoacidosis, dehydration, concern for alcohol withdrawal symptoms/syndrome. Plan is IV access, fluids, antiemetics, antacids, Ativan, CIWA protocol, consider phenobarbital, laboratory studies and reassess. If patient is interested in sobriety consider admission. Akil Mandel.  Duncan Red MD, [facility]  Attending Emergency  Physician                Carlos Roberto MD  03/07/23 [id number]

## 2023-03-08 NOTE — ED NOTES
Pt to ED for nausea, vomiting, abdominal pain, and tremors. Pt states symptoms started this morning. Pt states she is a daily drinker and has a pint of vodka a day. Pt states she has not had any alcohol today. On exam pt has tremors and states she is cold. Patient alert and oriented x4, talking in complete sentences. Respirations even and unlabored. Patient placed on continuous cardiac monitoring, BP cuff, and pulse ox. EKG obtained, blood work obtained.  Call light in reach, all needs met at this time     Gail Navarro RN  03/07/23 1953

## 2023-03-08 NOTE — PROGRESS NOTES
[facility]  Office: [address], DO, Author Heron, DO, Paz Gonzalez, DO, Enedelia Richardson, DO, Radha Vanessa MD, Maury Lamb MD, Sandra Dietrich MD, Carolan Schilder, MD,  Cassidy Mi MD, Joe Oh MD, Yasemin Abdul, DO, David Bob MD,  Ubaldo Matthew MD, Sukhdev Quintana MD, Joseph Kwok DO, Prosper Ramirez MD, Hi Harmon MD, Gurdeep Candelaria DO, Brooke Dale MD, Mahi Gabriel MD, Becki Fu MD, Martha Scott MD, Dilan Benitez DO, Cecile Botello MD, Maile Bauman MD, Megan Escobar, [facility],  Jesus Waite, CNP, Ac Fischer, CNP, Heraclio Gresham, CNP,  José Nielson, [facility], Heriberto Cantu, CNP, Ryan Lawson, CNP, Brenda Cameron, CNP, Nicole Encarnacion, CNP, Reyna Kaiser, CNP, Christian Amor PA-C, Nitesh Carter, CNS, Belem Montalvo, CNP, Pineda Cronin, CNP         [place] Pollard [address]    Progress Note    3/8/2023    10:03 AM    Name:   Zana Robledo  MRN:     [id number]     Acct:      [de-identified]   Room:   [address] [place] [address] Day:  1  Admit Date:  3/7/2023  6:48 PM    PCP:   Amish Dolan  Code Status:  Full Code    Subjective:     C/C:   Chief Complaint   Patient presents with    Emesis    Tremors     Interval History Status: improved. Patient feeling better than yesterday, was able to eat breakfast without any issues, still complaining of anxiety and tremors. Last drink was yesterday at 10 AM    Brief History:     From H&P  Zana Robledo is a 32 y.o.  female with chronic alcohol dependence with history alcoholic pancreatitis /alcoholic hepatitis status post prior detox  12/2022, 07/2022, 03/2022 who immediately went back to drinking now returns with Emesis and Tremors   and is admitted to the hospital for the management of acute alcohol withdrawal.     Patient describes abrupt onset of severe nausea status post intractable emesis earlier today upon awakening.   Last drink 10 AM. Patient with worsening epigastric abdominal pain that is waxing and waning over the past few days. Denies radiation . Sharp stabbing quality . Poor p.o. intake over the past few days secondary worsening symptoms with attempts to eat but continued to drink 1 pint of vodka daily until this morning when patient developed intractable nausea and vomiting, worsening abdominal pain. Previously has declined inpatient alcohol treatment programs, stating that she has minor children that she needs to take care of, cannot be away during the day.  + Chills. Patient does describe worsening restlessness anxiety with discontinuation of alcohol. Denies history of seizures. Previously diagnosed with bipolar with issues with cresencio, denies any hallucinations. + Restlessness. + Anxiety.  + Occipital headaches began earlier today. Review of Systems:     Constitutional:  negative for chills, fevers, sweats  Respiratory:  negative for cough, dyspnea on exertion, shortness of breath, wheezing  Cardiovascular:  negative for chest pain, chest pressure/discomfort, lower extremity edema, palpitations  Gastrointestinal:  negative for abdominal pain, constipation, diarrhea, nausea, vomiting  Neurological:  +tremor negative for dizziness, headache    Medications:      Allergies:  No Known Allergies    Current Meds:   Scheduled Meds:    pantoprazole (PROTONIX) 40 mg injection  40 mg IntraVENous Q12H    ARIPiprazole  10 mg Oral Daily    folic acid  1 mg Oral Daily    thiamine  100 mg Oral Daily    venlafaxine  150 mg Oral Daily with breakfast    sodium chloride flush  5-40 mL IntraVENous 2 times per day    enoxaparin  40 mg SubCUTAneous Daily    multivitamin  1 tablet Oral Daily     Continuous Infusions:    sodium chloride 125 mL/hr at 03/08/23 0212    sodium chloride       PRN Meds: aluminum & magnesium hydroxide-simethicone, bisacodyl, sodium chloride flush, sodium chloride, potassium chloride **OR** potassium alternative oral replacement **OR** potassium chloride, ondansetron **OR** ondansetron, polyethylene glycol, acetaminophen **OR** acetaminophen, LORazepam **OR** LORazepam **OR** LORazepam **OR** LORazepam **OR** LORazepam **OR** LORazepam **OR** LORazepam **OR** LORazepam    Data:     Past Medical History:   has a past medical history of ADHD (attention deficit hyperactivity disorder), Anxiety, Bipolar 1 disorder ([address] and Depression. Social History:   reports that she has been smoking cigarettes. She has been smoking an average of .5 packs per day. She has never used smokeless tobacco. She reports current alcohol use. She reports current drug use. Drug: Marijuana Valdene Penaloza). Family History:   Family History   Problem Relation Age of Onset    Hypertension Mother     Depression Mother     Bipolar Disorder Father     Alcohol Abuse Father     Cancer Other         Uncle ? Vitals:  /81   Pulse 71   Temp 99.4 °F (37.4 °C) (Oral)   Resp 15   LMP 2023 (Exact Date)   SpO2 98%   Temp (24hrs), Av.1 °F (37.3 °C), Min:98.9 °F (37.2 °C), Max:99.4 °F (37.4 °C)    No results for input(s): POCGLU in the last 72 hours. I/O (24Hr):     Intake/Output Summary (Last 24 hours) at 3/8/2023 1003  Last data filed at 3/8/2023 0237  Gross per 24 hour   Intake 100 ml   Output 50 ml   Net 50 ml       Labs:  Hematology:  Recent Labs     23   WBC 3.8  --    RBC 3.15*  --    HGB 10.8*  --    HCT 31.6*  --    .3  --    MCH 34.3*  --    MCHC 34.2  --    RDW 16.6*  --    PLT See Reflexed IPF Result  --    INR  --  1.2     Chemistry:  Recent Labs     23    134*   K 3.4* 3.3*   CL 98 99   CO2 21 21   GLUCOSE 90 80   BUN 4* 3*   CREATININE 0.42* 0.32*   MG 1.6 1.2*   ANIONGAP 19* 14   LABGLOM >60 >60   CALCIUM 8.8 7.9*   CAION  --  0.96*   PHOS  --  2.8     Recent Labs     23  0253   PROT 8.2 6.9   LABALBU 4.4 3.6   TSH  --  1.25   * 318* ALT 37* 33   LDH  --  314*   ALKPHOS 127* 101   BILITOT 0.9 1.4*   LIPASE 41  --      ABG:  Lab Results   Component Value Date/Time    POCPH 7.416 12/29/2022 04:01 PM    POCPCO2 46.0 12/29/2022 04:01 PM    POCPO2 124.2 12/29/2022 04:01 PM    POCHCO3 29.5 12/29/2022 04:01 PM    NBEA 1 12/20/2022 04:29 AM    PBEA 4 12/29/2022 04:01 PM    NUTD0JVM 99 12/29/2022 04:01 PM    FIO2 INFORMATION NOT PROVIDED 03/07/2023 08:33 PM     Lab Results   Component Value Date/Time    SPECIAL L HAND 5ML 12/28/2022 10:01 AM     Lab Results   Component Value Date/Time    CULTURE NO GROWTH 5 DAYS 12/28/2022 10:01 AM       Radiology:  XR CHEST (2 VW)    Result Date: 3/7/2023  No acute process. Physical Examination:        General appearance:  alert, cooperative and no distress  Mental Status:  oriented to person, place and time and normal affect  Lungs:  clear to auscultation bilaterally, normal effort  Heart:  regular rate and rhythm, no murmur  Abdomen:  soft, nontender, nondistended, normal bowel sounds, no masses, hepatomegaly, splenomegaly  Extremities:  no edema, redness, tenderness in the calves  Skin:  no gross lesions, rashes, induration    Assessment:        Hospital Problems             Last Modified POA    * (Principal) Alcohol dependence with withdrawal ([facility] [address] 9/1/9243 Yes    Alcoholic gastritis without bleeding 3/7/2023 Yes    High anion gap metabolic acidosis 7/2/2517 Yes    Alcoholic hepatitis 8/2/7437 Yes    Hypomagnesemia 3/7/2023 Yes    Thrombocytopenia ([address] 3/7/2023 Yes    Macrocytic anemia 3/7/2023 Yes    History of COVID-19 3/7/2023 Yes    Bipolar I disorder, most recent episode depressed ([address] 3/7/2023 Yes    Hypokalemia 3/7/2023 Yes       Plan:        Patient only requiring minimal Ativan overnight, patient is awake alert and feeling better than yesterday  Patient inquired about discharge, met with  regarding alcohol detox  Possible discharge later this afternoon if still feeling well.   Will evaluate Ativan usage throughout the day    Lady Palmer DO  3/8/2023  10:03 AM

## 2023-03-08 NOTE — PROGRESS NOTES
SPIRITUAL CARE DEPARTMENT - [address]  PROGRESS NOTE    Shift date: 3/7/23  Shift day: Tuesday   Shift # 2    Room # 10/10   Name: Mary Ann Mcfarland                Yazidism:    Place of Congregational:     Referral: Routine Visit    Admit Date & Time: 3/7/2023  6:48 PM    Assessment:  Mary Ann Mcfarland is a 32 y.o. female       Intervention:  Writer introduced self and title as . Patient did not appear to mind  presence and engaged in conversation. Writer offered space for patient  to express feelings, needs, and concerns and provided a ministry presence. Patient discussed her health and its impact along with having anxiety for a long time along with feelings sick.  was a supportive presence validating patient feelings and emotions. Outcome:  Patient appeared receptive to  visit while coping with her current health. Plan:  Chaplains will remain available to offer spiritual and emotional support as needed.       Electronically signed by Adrian Vega on 3/8/2023 at 12:45 AM.  [facility]  [phone]

## 2023-03-08 NOTE — PROGRESS NOTES
Patient resting in bed, appears calm and comfortable. She is answering all questions and cooperating with admission. CIWA scale was 22 on admission, however, pt activity and demeanor are not consistent with answers to CIWA questions. Half dose of suggested ativan given. 2mg IVP, since actively vomiting. Pt appears comfortable in bed. Will continue to monitor.

## 2023-03-08 NOTE — CARE COORDINATION
Consult received for ETOH abuse, 1 pint of vodka a day, knows she needs to stop but doesn't think its the right time  Pt has been seen by SW in the past    Met with pt who stated she lives with her ericribrittany. Pt stated she does not work, has [employer] in place. Pt reports she cont to drink 1-2 pints of vodka/day. Stated she last drank on 3/6. She report she did take a hit off a blunt a few days ago but does not smoke marijuana normally. She denies all other drug use. Pt stated she is concerned about her drinking. However, stated she has been drinking daily since she was 9yo. Pt admitted she does \"not really\" want to quit and is unsure if she wants any help. She is able to acknowledge that her drinking does have negative effects on her life. Pt had stated she went to IP tx last year, unsure where, and left after 1 day. Stated the longest she had gone without drinking has been 33 days, after a stay at [facility]. Discussed options of IP/OP tx if she should decide she wants to get help - stated if she ever agreed to tx, it would only be OP. Provided pt with list of area tx programs and walk in assessment clinics. Offered to schedule her an appt but pt not agreeable at this time, stating she would f/u if she decided on tx.  Pt was agreeable to SW following up with her again to see if she changes her mind

## 2023-03-08 NOTE — ED PROVIDER NOTES
[facility] ED  Emergency Department Encounter  Emergency Medicine Resident     Pt Tk Cosme  MRN: [id number]  [place] 1995  Date of evaluation: 3/7/23  PCP:  Kaylan Miranda  Note Started: 7:01 PM EST      CHIEF COMPLAINT       Chief Complaint   Patient presents with    Emesis    Tremors       HISTORY OF PRESENT ILLNESS  (Location/Symptom, Timing/Onset, Context/Setting, Quality, Duration, Modifying Factors, Severity.)      Robert Leon is a 32 y.o. female who presents with nausea vomiting epigastric abdominal pain as well as ethanol withdrawals. Patient drinks about a pint to 2 pints a day. Is complaining of epigastric abdominal pain has been nauseous and vomiting ever since this morning. Not had a drink today. Is been unable to keep it down. Does have a history of ethanol withdrawals when she gets extremely anxious and tremulous but denies any history of delirium tremens or seizures. Denies any true chest pain or shortness of breath. Denies any hematemesis hematochezia or melena. PAST MEDICAL / SURGICAL / SOCIAL / FAMILY HISTORY      has a past medical history of ADHD (attention deficit hyperactivity disorder), Anxiety, Bipolar 1 disorder ([facility] [address] and Depression. has no past surgical history on file.       Social History     Socioeconomic History    Marital status: Single     Spouse name: Not on file    Number of children: Not on file    Years of education: Not on file    Highest education level: Not on file   Occupational History    Not on file   Tobacco Use    Smoking status: Every Day     Packs/day: 0.50     Types: Cigarettes    Smokeless tobacco: Never   Substance and Sexual Activity    Alcohol use: Yes     Comment: pt states approx 1/2 pint of wine per day since she was 11yo    Drug use: Yes     Types: Marijuana Franko Tad)     Comment: Homegrown pot    Sexual activity: Yes     Partners: Male     Comment: Patient is trying to get on disability   Other Topics Concern    Not on file   Social History Narrative    Not on file     Social Determinants of Health     Financial Resource Strain: Not on file   Food Insecurity: Not on file   Transportation Needs: Not on file   Physical Activity: Not on file   Stress: Not on file   Social Connections: Not on file   Intimate Partner Violence: Not on file   Housing Stability: Not on file       Family History   Problem Relation Age of Onset    Hypertension Mother     Depression Mother     Bipolar Disorder Father     Alcohol Abuse Father     Cancer Other         Uncle ? Allergies:  Patient has no known allergies. Home Medications:  Prior to Admission medications    Medication Sig Start Date End Date Taking? Authorizing Provider   ARIPiprazole (ABILIFY) 10 MG tablet Take 1 tablet by mouth daily 1/9/23   José Richardson MD   venlafaxine (EFFEXOR XR) 150 MG extended release capsule Take 1 capsule by mouth daily (with breakfast) 1/9/23   José Richardson MD   folic acid (FOLVITE) 1 MG tablet Take 1 tablet by mouth daily 3/11/22   Harish Madden DO   bisacodyl (DULCOLAX) 5 MG EC tablet Take 2 tablets by mouth daily as needed for Constipation 3/10/22   Harish Madden DO   thiamine 100 MG tablet Take 1 tablet by mouth daily 3/11/22   Harish Madden DO         REVIEW OF SYSTEMS       Review of Systems   Constitutional:  Negative for chills and fever. Eyes:  Negative for visual disturbance. Respiratory:  Negative for shortness of breath. Cardiovascular:  Negative for chest pain. Gastrointestinal:  Positive for abdominal pain, nausea and vomiting. Negative for diarrhea. Musculoskeletal:  Negative for back pain and neck pain. Skin:  Negative for rash and wound. Neurological:  Positive for tremors. Negative for weakness and headaches.      PHYSICAL EXAM      INITIAL VITALS:   /87   Pulse (!) 112   Temp 98.9 °F (37.2 °C) (Oral)   Resp 20   LMP 03/04/2023 (Exact Date)   SpO2 95%     Physical Exam  Constitutional: General: She is not in acute distress. Appearance: She is not ill-appearing, toxic-appearing or diaphoretic. Comments: 3year-old female, actively vomiting on exam, nontoxic-appearing but tremulous appearing with some fasciculations of the tongue   HENT:      Head: Normocephalic and atraumatic. Cardiovascular:      Rate and Rhythm: Normal rate and regular rhythm. Heart sounds: No murmur heard. No friction rub. No gallop. Pulmonary:      Effort: No respiratory distress. Breath sounds: No stridor. No wheezing, rhonchi or rales. Chest:      Chest wall: No tenderness. Abdominal:      General: There is no distension. Palpations: There is no mass. Tenderness: There is abdominal tenderness. There is no guarding or rebound. Hernia: No hernia is present. Comments: Epigastric tenderness to palpation no rebound or guarding   Musculoskeletal:         General: No swelling, tenderness, deformity or signs of injury. Right lower leg: No edema. Left lower leg: No edema. Skin:     Capillary Refill: Capillary refill takes less than 2 seconds. Coloration: Skin is not jaundiced or pale. Findings: No bruising, erythema, lesion or rash. Neurological:      Mental Status: She is oriented to person, place, and time. DDX/DIAGNOSTIC RESULTS / EMERGENCY DEPARTMENT COURSE / MDM     Medical Decision Making  29-year-old female here with a likely ethanol withdrawals prescribed for otitis or gastritis versus Boerhaave's. Will obtain laboratory testing, chest x-ray give IV fluids, Ativan thiamine folic acid reassess. Differential diagnosis of ethanol withdrawals, alcoholic ketoacidosis, starvation ketosis, gastritis, Boerhaave's    Amount and/or Complexity of Data Reviewed  Labs: ordered. Radiology: ordered. ECG/medicine tests: ordered. Risk  Prescription drug management. Decision regarding hospitalization.   Risk Details: Laboratory testing does show an elevated beta hydroxybutyrate with slight anion gap therefore VBG sent. Patient symptoms improved after Ativan IV fluids and Zofran. Concerns for ethanol withdrawals. Low sufficient for gastritis or Boerhaave's. No mediastinal free air on chest x-ray. No pneumonia on chest x-ray. EKG unremarkable. No prolonged QTc. Patient admitted to [facility] for ethanol withdrawals as well as trending of anion gap    Critical Care  Total time providing critical care: < 30 minutes      EKG  EKG Interpretation    Interpreted by me    Rhythm: normal sinus   Rate: Tachycardia  Axis: normal  Ectopy: none  Conduction: normal  ST Segments: no acute change  T Waves: no acute change  Q Waves: none    Clinical Impression: Sinus tachycardia with no ischemic changes    All EKG's are interpreted by the Emergency Department Physician who either signs or Co-signs this chart in the absence of a cardiologist.    EMERGENCY DEPARTMENT COURSE:      ED Course as of 03/07/23 2106   Tue Mar 07, 2023   2023 Patient reevaluated states she is feeling slightly better. Will consult [facility] for admission given that patient would like to get sober at this time [MS]   2053 Patient excepted by [facility]. They will admit for alcohol withdrawals. [MS]      ED Course User Index  [MS] Noemy Bansal DO       PROCEDURES:      CONSULTS:  IP CONSULT TO HOSPITALIST  IP CONSULT TO SOCIAL WORK    CRITICAL CARE:  There was significant risk of life threatening deterioration of patient's condition requiring my direct management. Critical care time  minutes, excluding any documented procedures. FINAL IMPRESSION      1. ETOH abuse          DISPOSITION / PLAN     DISPOSITION Admitted 03/07/2023 08:54:41 PM      PATIENT REFERRED TO:  No follow-up provider specified.     DISCHARGE MEDICATIONS:  New Prescriptions    No medications on file       Angela Cannon DO  Emergency Medicine Resident    (Please note that portions of thisnote were completed with a voice recognition program.  Efforts were made to edit the dictations but occasionally words are mis-transcribed.)        Mariam Inman DO  Resident  03/07/23 9569

## 2023-03-08 NOTE — CARE COORDINATION
Case Management Assessment  Initial Evaluation    Date/Time of Evaluation: 3/8/2023 10:55 AM  Assessment Completed by: Janette Gonzales    If patient is discharged prior to next notation, then this note serves as note for discharge by case management. Patient Name: Yamile Medellin                   YOB: 1995  Diagnosis: ETOH abuse [F10.10]  Alcohol dependence with withdrawal [facility]) [F10.239]                   Date / Time: 3/7/2023  6:48 PM    Patient Admission Status: Inpatient   Readmission Risk (Low < 19, Mod (19-27), High > 27): Readmission Risk Score: 20.7    Current PCP: Callie Avalos  PCP verified by CM? No (states PCP is Devyn Setting)     History Provided by: Patient  Patient Orientation: Alert and Oriented    Patient Cognition: Alert    Hospitalization in the last 30 days (Readmission):  No    If yes, Readmission Assessment in CM Navigator will be completed. Advance Directives:      Code Status: Full Code   Patient's Primary Decision Maker is: Legal Next of Kin      Discharge Planning:    Patient lives with: Spouse/Significant Other, Children Type of Home: House  Primary Care Giver: Self  Patient Support Systems include: Spouse/Significant Other, Children   Current Financial resources: Medicaid  Current community resources:    Current services prior to admission: None            Current DME:              Type of Home Care services:  None    ADLS  Prior functional level: Independent in ADLs/IADLs  Current functional level: Independent in ADLs/IADLs    PT AM-PAC:   /24  OT AM-PAC:   /24    Family can provide assistance at DC: Yes  Would you like Case Management to discuss the discharge plan with any other family members/significant others, and if so, who?  No  Plans to Return to Present Housing: Yes  Other Identified Issues/Barriers to RETURNING to current housing: none  Potential Assistance needed at discharge:              Potential DME:    Patient expects to discharge to: [address] [place] [address] for transportation at discharge: Self    Financial    Payor: [facility] MEDICAID / Plan: [facility] MEDICAID / Product Type: *No Product type* /     Does insurance require precert for SNF: Yes    Potential assistance Purchasing Medications: No  Meds-to-Beds request: Yes    No Pharmacies Listed    Notes:    Factors facilitating achievement of predicted outcomes: Family support, Cooperative, and Pleasant    Barriers to discharge: Pain    Additional Case Management Notes: Pt lives in single level house with SO and daughter. Plan is to return home. Has transportation. SW has already seen pt.     The Plan for Transition of Care is related to the following treatment goals of ETOH abuse [F10.10]  Alcohol dependence with withdrawal (HCC) [F10.239]    IF APPLICABLE: The Patient and/or patient representative Dilcia and her family were provided with a choice of provider and agrees with the discharge plan. Freedom of choice list with basic dialogue that supports the patient's individualized plan of care/goals and shares the quality data associated with the providers was provided to:     Patient Representative Name:       The Patient and/or Patient Representative Agree with the Discharge Plan?      Berenice Tilley  Case Management Department  Ph: [phone]

## 2023-03-08 NOTE — DISCHARGE SUMMARY
[facility]  Office: [address], DO, Sonny Mir, DO, Willi Torres, DO, Lore Alberto Blood, DO, Darrell Hay MD, Carlos Tinoco MD, Sayra Feldman MD, Lorene Schmid MD,  Dorcas Levy MD, Marv May MD, Elvira Nam DO, Tea Glass MD,  Dorian Engel MD, Diane Montiel MD, Daisy Ozuna DO, Peggy Kamara MD, Sherrie Noble MD, Eliecer Enriquez DO, Dylan Martinez MD, Cynthia Jimenez MD, Kris Ansari MD, Angi Gonzalez MD, Ej Saha DO, Susana Mas MD, Juan Luis Odom MD, Chana Rees, CNP,  Ainsley Awad, CNP, Mauricio Peraza, CNP, Aron Will, CNP,  Denisa Waldron, [facility], Tamy Felton, CNP, Shivani Vann, CNP, Supriya Parada, CNP, Mario Aguilar, CNP, Fuad Monteiro, CNP, Yosvany Mcgowan PA-C, Sriram Mccoy, CNS, Dao Cox, [facility], Margret Jackson, [facility]    Discharge Summary     Patient ID: Evonne Padilla  :  1995   MRN: [id number]     ACCOUNT:  [de-identified]   Patient's PCP: Marlon Alejandro  Admit Date: 3/7/2023   Discharge Date: 3/8/2023     Length of Stay: 1  Code Status:  Full Code  Admitting Physician: No admitting provider for patient encounter. Discharge Physician: Elvira Nam DO     Active Discharge Diagnoses:     Hospital Problem Lists:  Principal Problem:    Alcohol dependence with withdrawal [facility])  Active Problems:    Alcoholic gastritis without bleeding    High anion gap metabolic acidosis    Alcoholic hepatitis    Hypomagnesemia    Thrombocytopenia (HCC)    Macrocytic anemia    History of COVID-19    Bipolar I disorder, most recent episode depressed ([facility] [address]    Hypokalemia  Resolved Problems:    * No resolved hospital problems.  *      Admission Condition:  good     Discharged Condition: good    Hospital Stay:     Hospital Course:  Evonne Padilla is a 32 y.o. female who was admitted for the management of   Alcohol dependence with withdrawal [facility]) , presented to ER with Emesis and Tremors    Patient was admitted for symptoms of alcohol withdrawal, patient was started on CIWA protocol and following day patient had improvement in her symptoms. Patient remained without hallucinations and minimal tremors. Patient was eating and drinking without any issues. Patient requested discharge and she was told to cessate from alcohol use.   Patient was discharged to continue care outpatient      Significant therapeutic interventions: none    Significant Diagnostic Studies:   Labs / Micro:  CBC:   Lab Results   Component Value Date/Time    WBC 3.8 03/07/2023 07:26 PM    RBC 3.15 03/07/2023 07:26 PM    HGB 10.8 03/07/2023 07:26 PM    HCT 31.6 03/07/2023 07:26 PM    .3 03/07/2023 07:26 PM    MCH 34.3 03/07/2023 07:26 PM    MCHC 34.2 03/07/2023 07:26 PM    RDW 16.6 03/07/2023 07:26 PM    PLT See Reflexed IPF Result 03/07/2023 07:26 PM     BMP:    Lab Results   Component Value Date/Time    GLUCOSE 80 03/08/2023 02:53 AM     03/08/2023 02:53 AM    K 3.3 03/08/2023 02:53 AM    CL 99 03/08/2023 02:53 AM    CO2 21 03/08/2023 02:53 AM    ANIONGAP 14 03/08/2023 02:53 AM    BUN 3 03/08/2023 02:53 AM    CREATININE 0.32 03/08/2023 02:53 AM    BUNCRER NOT REPORTED 04/14/2014 07:12 PM    CALCIUM 7.9 03/08/2023 02:53 AM    LABGLOM >60 03/08/2023 02:53 AM    GFRAA Can not be calculated 03/10/2022 06:14 AM    GFR      03/10/2022 06:14 AM     HFP:    Lab Results   Component Value Date/Time    PROT 6.9 03/08/2023 02:53 AM     CMP:    Lab Results   Component Value Date/Time    GLUCOSE 80 03/08/2023 02:53 AM     03/08/2023 02:53 AM    K 3.3 03/08/2023 02:53 AM    CL 99 03/08/2023 02:53 AM    CO2 21 03/08/2023 02:53 AM    BUN 3 03/08/2023 02:53 AM    CREATININE 0.32 03/08/2023 02:53 AM    ANIONGAP 14 03/08/2023 02:53 AM    ALKPHOS 101 03/08/2023 02:53 AM    ALT 33 03/08/2023 02:53 AM     03/08/2023 02:53 AM    BILITOT 1.4 03/08/2023 02:53 AM    LABALBU 3.6 03/08/2023 02:53 AM    ALBUMIN 1.1 03/08/2023 02:53 AM    LABGLOM >60 03/08/2023 02:53 AM    GFRAA Can not be calculated 03/10/2022 06:14 AM    GFR      03/10/2022 06:14 AM    PROT 6.9 03/08/2023 02:53 AM    CALCIUM 7.9 03/08/2023 02:53 AM     PT/INR:    Lab Results   Component Value Date/Time    PROTIME 12.7 03/08/2023 02:53 AM    INR 1.2 03/08/2023 02:53 AM     PTT: No results found for: APTT  FLP:    Lab Results   Component Value Date/Time    TRIG 109 12/30/2022 06:45 AM     U/A:    Lab Results   Component Value Date/Time    COLORU Yellow 12/28/2022 11:10 AM    TURBIDITY Turbid 12/28/2022 11:10 AM    SPECGRAV 1.012 12/28/2022 11:10 AM    HGBUR NEGATIVE 12/28/2022 11:10 AM    PHUR 7.5 12/28/2022 11:10 AM    PROTEINU NEGATIVE 12/28/2022 11:10 AM    GLUCOSEU NEGATIVE 12/28/2022 11:10 AM    KETUA NEGATIVE 12/28/2022 11:10 AM    BILIRUBINUR NEGATIVE 12/28/2022 11:10 AM    BILIRUBINUR negative 07/22/2014 11:58 AM    UROBILINOGEN Normal 12/28/2022 11:10 AM    NITRU NEGATIVE 12/28/2022 11:10 AM    LEUKOCYTESUR NEGATIVE 12/28/2022 11:10 AM     TSH:    Lab Results   Component Value Date/Time    TSH 1.25 03/08/2023 02:53 AM        Radiology:  XR CHEST (2 VW)    Result Date: 3/7/2023  No acute process. Consultations:    Consults:     Final Specialist Recommendations/Findings:   IP CONSULT TO HOSPITALIST  IP CONSULT TO SOCIAL WORK  IP CONSULT TO SOCIAL WORK      The patient was seen and examined on day of discharge and this discharge summary is in conjunction with any daily progress note from day of discharge. Discharge plan:     Disposition: Home    Physician Follow Up:     Magda Rossi  [address] [place] [id number]  [phone]    Schedule an appointment as soon as possible for a visit in 1 week(s)  hospital followup       Requiring Further Evaluation/Follow Up POST HOSPITALIZATION/Incidental Findings: none    Diet: regular diet    Activity: As tolerated    Instructions to Patient: see pcp outpatient. Discharge Medications:      Medication List        START taking these medications      Magnesium 400 MG Caps  Take 400 mg by mouth daily for 5 days     pantoprazole 40 MG tablet  Commonly known as: PROTONIX  Take 1 tablet by mouth 2 times daily (before meals)     potassium chloride 20 MEQ extended release tablet  Commonly known as: KLOR-CON M  Take 1 tablet by mouth daily for 5 days            STOP taking these medications      ARIPiprazole 10 MG tablet  Commonly known as: Abilify     bisacodyl 5 MG EC tablet  Commonly known as: DULCOLAX     folic acid 1 MG tablet  Commonly known as: FOLVITE     thiamine 100 MG tablet     venlafaxine 150 MG extended release capsule  Commonly known as: EFFEXOR XR               Where to Get Your Medications        These medications were sent to [facility] [address] [facility], [address]  [address] [place] [address], [facility] [address]      Phone: [phone]   Magnesium 400 MG Caps  pantoprazole 40 MG tablet  potassium chloride 20 MEQ extended release tablet         No discharge procedures on file. Time Spent on discharge is  39 mins in patient examination, evaluation, counseling as well as medication reconciliation, prescriptions for required medications, discharge plan and follow up. Electronically signed by   Richard Rocha DO  3/8/2023  2:03 PM      Thank you Dr. Sosa Beyer for the opportunity to be involved in this patient's care.

## 2023-03-08 NOTE — PROGRESS NOTES
Approx 0320 this writer checked on patient, upon entering room patient had head covered with blankets. This writer asked her if she was feeling better. Pt stated that she did but now she was starting to feel panicked and began to rub her face like she was hot. .. This writer offered her the PRN zyprexa 2.5mg that was ordered. She accepted and medication given in left vastest lateralis. She fell asleep quickly and has been asleep since.

## 2023-03-08 NOTE — DISCHARGE SUMMARY
Patient discharged at 1600 . All discharge instructions explained. AVS explained and papers given to pt. Pt refused to wait for medication drop off from pharmacy and stated she will get them on her own time.

## 2023-03-08 NOTE — PLAN OF CARE
Problem: Discharge Planning  Goal: Discharge to home or other facility with appropriate resources  Outcome: Adequate for Discharge  Flowsheets (Taken 3/8/2023 0200 by León Mendez RN)  Discharge to home or other facility with appropriate resources:   Identify barriers to discharge with patient and caregiver   Arrange for needed discharge resources and transportation as appropriate   Identify discharge learning needs (meds, wound care, etc)   Refer to discharge planning if patient needs post-hospital services based on physician order or complex needs related to functional status, cognitive ability or social support system     Problem: ABCDS Injury Assessment  Goal: Absence of physical injury  Outcome: Adequate for Discharge     Problem: Pain  Goal: Verbalizes/displays adequate comfort level or baseline comfort level  Outcome: Adequate for Discharge

## 2023-03-08 NOTE — ED NOTES
Report given to Lui, [place].  All questions answered      Isaac Gutierrez, RN  03/07/23 7946

## 2023-03-08 NOTE — H&P
[facility]  Office: [address], DO, Ranjeet Toneyroe, DO, Tedstevie Dry, DO, Jemal Waqas Blood, DO, Diana Encarnacion MD, Ubaldo Peres MD, Sarahy Choudhary MD, Giuliana Dale MD,  Dangelo Franco MD, Kiki Pereira MD, Franki Snyder, DO, Armida Lam MD,  Bj Guerra MD, Rekha Rockwell MD, Rufina Burks, DO, Juliane Gomez MD, Chuy Singh MD, Flip Garcia, DO, Chantal Benedict MD, Loretta Esteban MD, Amna Dominguez MD, Anay Reyes MD, Manju Nieves, DO, Kd Mtz MD, Kevan Rollins MD, Oliva Cardona, CNP,  Manas Mehta, CNP, Lauren Jacob, CNP, Elina Kelsey, CNP,  Echo Mendez, [facility], Phyllistine Eneida, CNP, Antoinette Calixto, CNP, Jacqueline Pollen, CNP, Belynda Bel, CNP, Anna Fallon, CNP, Jackie Ramirez PA-C, Kemal Barr, CNS, Nilda Rdz, CNP, Romeo Thomas, [facility]    HISTORY AND PHYSICAL EXAMINATION            Date:   3/7/2023  Patient name:  Sona Gray  Date of admission:  3/7/2023  6:48 PM  MRN:   [id number]  Account:  [de-identified]  YOB: 1995  PCP:    Jorge Rodriguez  Room:   10/10  Code Status:    Full Code    Chief Complaint:     Chief Complaint   Patient presents with    Emesis    Tremors   \" I kept throwing up\"    History Obtained From:     patient    History of Present Illness:     Sona Gray is a 32 y.o.  female with chronic alcohol dependence with history alcoholic pancreatitis /alcoholic hepatitis status post prior detox  12/2022, 07/2022, 03/2022 who immediately went back to drinking now returns with Emesis and Tremors   and is admitted to the hospital for the management of acute alcohol withdrawal.    Patient describes abrupt onset of severe nausea status post intractable emesis earlier today upon awakening. Last drink 10 AM.  Patient with worsening epigastric abdominal pain that is waxing and waning over the past few days.   Denies radiation . Sharp stabbing quality . Poor p.o. intake over the past few days secondary worsening symptoms with attempts to eat but continued to drink 1 pint of vodka daily until this morning when patient developed intractable nausea and vomiting, worsening abdominal pain. Previously has declined inpatient alcohol treatment programs, stating that she has minor children that she needs to take care of, cannot be away during the day.  + Chills. Patient does describe worsening restlessness anxiety with discontinuation of alcohol. Denies history of seizures. Previously diagnosed with bipolar with issues with cresencio, denies any hallucinations. + Restlessness. + Anxiety.  + Occipital headaches began earlier today. Past Medical History:     Past Medical History:   Diagnosis Date    ADHD (attention deficit hyperactivity disorder)     Anxiety     Bipolar 1 disorder ([facility] [address]     Depression         Past Surgical History:     Status post EGD 02/2022-gastritis    Medications Prior to Admission:     Prior to Admission medications    Medication Sig Start Date End Date Taking? Authorizing Provider   ARIPiprazole (ABILIFY) 10 MG tablet Take 1 tablet by mouth daily 1/9/23   Sharif Hewitt MD   venlafaxine (EFFEXOR XR) 150 MG extended release capsule Take 1 capsule by mouth daily (with breakfast) 1/9/23   Sharif Hewitt MD   folic acid (FOLVITE) 1 MG tablet Take 1 tablet by mouth daily 3/11/22   Hallie Patches, DO   bisacodyl (DULCOLAX) 5 MG EC tablet Take 2 tablets by mouth daily as needed for Constipation 3/10/22   Hallie Patches, DO   thiamine 100 MG tablet Take 1 tablet by mouth daily 3/11/22   Hallie Patches, DO        Allergies:     Patient has no known allergies. Social History:     Tobacco:    reports that she has been smoking cigarettes. She has been smoking an average of .5 packs per day. She has never used smokeless tobacco.  Alcohol:      reports current alcohol use. Drug Use:  reports current drug use.  Drug: Marijuana (Farzaneh Leaver). Smokes marijuana, tobacco, last drink 10 AM with daily alcohol since released in December. Lives with her boyfriend and her kids. Daughter does have cold symptoms currently    Family History:     Family History   Problem Relation Age of Onset    Hypertension Mother     Depression Mother     Bipolar Disorder Father     Alcohol Abuse Father     Cancer Other         Uncle ? Denies family history of liver disease  Review of Systems:     Positive and Negative as described in HPI. Review of Systems  Remotely diagnosed with bipolar as a teenager. At one point was on Abilify and Effexor with moderate improvement. Denies any recent issues with cresencio or hallucinations. Struggles with depression. Denies any suicidal homicidal ideation. Denies inpatient psychiatric evaluation. Denies history of overdose. + intermittent chronic diarrhea with 2-4 bowel movements daily. Denies rectal bleeding melanotic stools. Denies hematemesis. Denies easy bruising or bleeding issues. Last menstrual period started 4 days ago, denies any concern for pregnancy. Denies asthma recurrent bronchitis or pneumonia, denies history of DVT PE. Denies history of seizures. Denies urinary symptoms no dysuria hematuria frequency urgency. Recently did have COVID 1 to 2 months ago. Denies any flulike symptoms. Denies any cough congestion sore throat or URI symptoms, sinus congestion. Denies diabetes. Denies heat or cold intolerance. Denies palpitations irregular heartbeat tachycardia near syncope collapse orthostasis. Denies lower extremity edema. Denies myalgias or arthralgias. Denies falls injuries or syncope.   Review of systems otherwise -12 system reviewed otherwise unremarkable  Physical Exam:   /85   Pulse (!) 122   Temp 98.9 °F (37.2 °C) (Oral)   Resp 19   LMP 2023 (Exact Date)   SpO2 96%   Temp (24hrs), Av.9 °F (37.2 °C), Min:98.9 °F (37.2 °C), Max:98.9 °F (37.2 °C)    No results for input(s): POCGLU in the last 72 hours.   No intake or output data in the 24 hours ending 03/07/23 4320    Physical Exam  General actively vomiting intermittently during evaluation with bilious emesis groggy sensorium but appropriate, follows commands appears older than stated age, oriented x3  Eye exam pupils equally round reactive sclera nonicterus normal horizontal gaze  ENT oral pharynx with dry mucous membranes face symmetric neck supple  Cardiovascular regular rhythm, tachycardic with limited evaluation for murmurs rubs or gallop secondary to tachycardia, normal S1-S2 no JVD  Lungs slightly diminished at bases with adequate air exchange nonlabored no tachypnea no accessory muscle use no wheezing or rhonchi  GI soft nondistended with minimal tenderness to palpation that localizes to the epigastrium, no rebound or guarding bowel sounds hypoactive  Vascular intact dorsalis pedis and posterior tibialis without edema  Neuro nonfocal, fine intention tremor in upper extremities with repositioning, no asterixis, strength symmetric in upper and lower limbs sensation grossly intact  Derm no rashes lesions or skin infections, no obvious ecchymosis  Musculoskeletal passive range of motion of upper and lower limbs unremarkable no synovitis or joint effusions    Investigations:      Laboratory Testing:  Recent Results (from the past 24 hour(s))   Lipase    Collection Time: 03/07/23  7:26 PM   Result Value Ref Range    Lipase 41 13 - 60 U/L   CBC with Auto Differential    Collection Time: 03/07/23  7:26 PM   Result Value Ref Range    WBC 3.8 3.5 - 11.3 k/uL    RBC 3.15 (L) 3.95 - 5.11 m/uL    Hemoglobin 10.8 (L) 11.9 - 15.1 g/dL    Hematocrit 31.6 (L) 36.3 - 47.1 %    .3 82.6 - 102.9 fL    MCH 34.3 (H) 25.2 - 33.5 pg    MCHC 34.2 28.4 - 34.8 g/dL    RDW 16.6 (H) 11.8 - 14.4 %    Platelets See Reflexed IPF Result 138 - 453 k/uL    NRBC Automated 0.0 0.0 per 100 WBC    RBC Morphology ANISOCYTOSIS PRESENT     Seg Neutrophils 60 36 - 65 %    Lymphocytes 26 24 - 43 %    Monocytes 12 3 - 12 %    Eosinophils % 0 (L) 1 - 4 %    Basophils 1 0 - 2 %    Immature Granulocytes 0 0 %    Segs Absolute 2.31 1.50 - 8.10 k/uL    Absolute Lymph # 1.01 (L) 1.10 - 3.70 k/uL    Absolute Mono # 0.45 0.10 - 1.20 k/uL    Absolute Eos # <0.03 0.00 - 0.44 k/uL    Basophils Absolute 0.03 0.00 - 0.20 k/uL    Absolute Immature Granulocyte <0.03 0.00 - 0.30 k/uL   Comprehensive Metabolic Panel    Collection Time: 03/07/23  7:26 PM   Result Value Ref Range    Glucose 90 70 - 99 mg/dL    BUN 4 (L) 6 - 20 mg/dL    Creatinine 0.42 (L) 0.50 - 0.90 mg/dL    Est, Glom Filt Rate >60 >60 mL/min/1.73m2    Calcium 8.8 8.6 - 10.4 mg/dL    Sodium 138 135 - 144 mmol/L    Potassium 3.4 (L) 3.7 - 5.3 mmol/L    Chloride 98 98 - 107 mmol/L    CO2 21 20 - 31 mmol/L    Anion Gap 19 (H) 9 - 17 mmol/L    Alkaline Phosphatase 127 (H) 35 - 104 U/L    ALT 37 (H) 5 - 33 U/L     (H) <32 U/L    Total Bilirubin 0.9 0.3 - 1.2 mg/dL    Total Protein 8.2 6.4 - 8.3 g/dL    Albumin 4.4 3.5 - 5.2 g/dL    Albumin/Globulin Ratio 1.2 1.0 - 2.5   Magnesium    Collection Time: 03/07/23  7:26 PM   Result Value Ref Range    Magnesium 1.6 1.6 - 2.6 mg/dL   Beta-Hydroxybutyrate    Collection Time: 03/07/23  7:26 PM   Result Value Ref Range    Beta-Hydroxybutyrate 1.52 (H) 0.02 - 0.27 mmol/L   HCG Qualitative, Serum    Collection Time: 03/07/23  7:26 PM   Result Value Ref Range    hCG Qual NEGATIVE NEGATIVE   Immature Platelet Fraction    Collection Time: 03/07/23  7:26 PM   Result Value Ref Range    Platelet, Immature Fraction 7.0 1.1 - 10.3 %    Platelet, Fluorescence 70 (L) 138 - 453 k/uL   EKG 12 Lead    Collection Time: 03/07/23  7:38 PM   Result Value Ref Range    Ventricular Rate 105 BPM    Atrial Rate 105 BPM    P-R Interval 146 ms    QRS Duration 66 ms    Q-T Interval 360 ms    QTc Calculation (Bazett) 475 ms    P Axis 64 degrees    R Axis 53 degrees    T Axis 54 degrees   BLOOD GAS, VENOUS    Collection Time: 03/07/23  8:33 PM   Result Value Ref Range    pH, Doni 7.400 7.320 - 7.420    pCO2, Doni 36.2 (L) 39 - 55 mm Hg    pO2, Doni 78.7 (H) 30 - 50 mm Hg    HCO3, Venous 21.9 (L) 24 - 30 mmol/L    Negative Base Excess, Doni 2.0 0.0 - 2.0 mmol/L    O2 Sat, Doni 95.1 (H) 60.0 - 85.0 %    Carboxyhemoglobin 3.4 0 - 5 %    Pt Temp 37.0     FIO2 INFORMATION NOT PROVIDED       Latest Reference Range & Units 1/6/23 07:22   Albumin 3.5 - 5.2 g/dL 3.3 (L)   ALBUMIN/GLOBULIN RATIO 1.0 - 2.5  0.7 (L)   Alk Phos 35 - 104 U/L 119 (H)   ALT 5 - 33 U/L 44 (H)   AST <32 U/L 84 (H)   BILIRUBIN TOTAL 0.3 - 1.2 mg/dL 0.4   Total Protein 6.4 - 8.3 g/dL 7.9   (L): Data is abnormally low  (H): Data is abnormally high  Imaging/Diagnostics:  XR CHEST (2 VW)    Result Date: 3/7/2023  No acute process. EKG reviewed independently by myself, sinus mechanism with tachycardia, no acute ST-T changes    EGD 2/22/22  The proximal esophagus and distal esophagus were normal. Biopsies were   taken with a cold forceps for histology. Mild focal patchy white adherent exudate in the mid esophagus (possible   Candida fundus vs pill debris). Biopsied. The Z-line was found 35 cm from the incisors. The cardia and gastric fundus were normal.  Mild nodular mucosa in the gastric body and antrum of the stomach,   biopsied. Random incisura angularis biopsy also obtained. Benign appearing nodule appreciated in the pyloric channel of the   stomach, biopsied. The duodenal bulb and second portion of the duodenum were normal.   Biopsies were taken with a cold forceps for histology. Estimated Blood Loss: Estimated blood loss was minimal.  Impression: - Normal proximal esophagus and distal   esophagus. Biopsied.  - Mild focal patchy white adherent exudate in   the mid esophagus (possible Candida fundus vs   pill debris). Biopsied.  - Z-line, 35 cm from the incisors.   - Normal cardia and gastric fundus.  - Mild nodular mucosa in the gastric body and   antrum of the stomach, biopsied. Random   incisura angularis biopsy also obtained. - Benign appearing nodule appreciated in the   pyloric channel of the stomach. Biopsied.  - Normal duodenal bulb and second portion of   the duodenum. Biopsied. Recommendation: - [facility] to [facility] for [facility]   care. - Await pathology results. - Follow an antireflux regimen. . Second portion of duodenum, biopsy:        Fragments of duodenal mucosa with no histologic abnormalities. 2. Duodenal bulb, biopsy:        Duodenal bulb mucosa with no histologic abnormalities. 3. Pyloric channel, biopsy:        Mild-to-moderate chronic gastritis. No Helicobacter pylori organisms seen on H&E stain. 4. Stomach, biopsy:        Mild-to-moderate chronic gastritis. No Helicobacter pylori organisms seen on H&E stain. Comment: Confirmatory immunostains for Helicobacter pylori organisms are performed on part 3 and part 4 and are negative. 5. Distal esophagus, biopsy:        Esophageal squamous mucosa with no histologic abnormalities. No chronic or active inflammation, eosinophilia or dysplasia identified. 6. Mid esophagus, biopsy:        Candida esophagitis. Comment: PAS stain for fungi is performed with appropriate controls and demonstrates numerous fungal pseudohyphae morphologically consistent with Candida. 7. Proximal esophagus, biopsy:        Esophageal squamous mucosa with no histologic abnormalities. No chronic or active inflammation, eosinophilia or dysplasia identified.                   Assessment :      Hospital Problems             Last Modified POA    * (Principal) Alcohol dependence with withdrawal ([address] 6/3/9576 Yes    Alcoholic gastritis without bleeding 3/7/2023 Yes    High anion gap metabolic acidosis 9/8/6756 Yes    Alcoholic hepatitis 0/2/4204 Yes    Hypomagnesemia 3/7/2023 Yes    Thrombocytopenia ([address] 3/7/2023 Yes    Macrocytic anemia 3/7/2023 Yes    History of COVID-19 3/7/2023 Yes    Bipolar I disorder, most recent episode depressed ([facility] [address] 3/7/2023 Yes    Hypokalemia 3/7/2023 Yes       Plan:     Patient status inpatient in the Progressive Unit/Step down    Acute alcohol withdrawal with chronic alcohol dependence. Status post prior detoxing 3x past year without success. Discussed with patient recommendation for inpatient treatment program.  Patient reluctant to consider. Discussed concern regarding relapsing, strongly encouraged ongoing alcohol treatment program once clinically stabilized as high risk for relapse discussed. Continue CIWA protocol. Replace thiamine and folic acid. Await further input from social work  Acute alcoholic gastritis with intractable nausea and vomiting. Prior EGD 2/2022 without evidence of varices. Continue IV PPI, antiemetics, IV fluid hydration and supportive therapy  High anion gap metabolic acidosis likely related to starvation ketosis, dehydration. Continue IV fluids. Replace electrolytes  Macrocytic anemia with thrombocytopenia, previously evaluated at [facility], recommended outpatient hematology follow-up, patient lost in follow-up. Recheck labs. Previously with iron deficiency anemia. Bipolar. Resume Abilify and Effexor. As needed low-dose Zyprexa  Tobacco abuse, encourage cessation    Prior records from [facility] reviewed independently by myself. Labs and chart reviewed in detail. Counseled on alcohol cessation approximately 15 minutes with consideration for inpatient treatment program discussed in detail. Concern regarding high risk for relapse discussed. Patient verbalized understanding, states she will think about it, however remains extremely reluctant to consider, arguing she has to be home for her children. Question concerns and treatment plan discussed with patient. Anticipate likely hospitalization 3 to 5 days contingent on clinical progress.       Consultations:   Chiquis Pérez TO HOSPITALIST  IP CONSULT TO SOCIAL WORK    Patient is admitted as inpatient status because of co-morbidities listed above, severity of signs and symptoms as outlined, requirement for current medical therapies and most importantly because of direct risk to patient if care not provided in a hospital setting. Expected length of stay > 48 hours.     Nick Orellana MD  3/7/2023  11:24 PM    Copy sent to Dr. Diane Tran

## 2023-04-17 ENCOUNTER — HOSPITAL ENCOUNTER (INPATIENT)
Age: 28
LOS: 4 days | Discharge: PSYCHIATRIC OR SUBSTANCE USE FACILITY | End: 2023-04-21
Attending: EMERGENCY MEDICINE | Admitting: INTERNAL MEDICINE
Payer: MEDICAID

## 2023-04-17 DIAGNOSIS — R45.851 SUICIDAL IDEATION: ICD-10-CM

## 2023-04-17 DIAGNOSIS — F10.930 ALCOHOL WITHDRAWAL SYNDROME WITHOUT COMPLICATION (HCC): Primary | ICD-10-CM

## 2023-04-17 PROBLEM — E87.29 ALCOHOLIC KETOACIDOSIS: Status: ACTIVE | Noted: 2023-04-17

## 2023-04-17 LAB
ABSOLUTE EOS #: 0.04 K/UL (ref 0–0.44)
ABSOLUTE IMMATURE GRANULOCYTE: 0 K/UL (ref 0–0.3)
ABSOLUTE LYMPH #: 0.85 K/UL (ref 1.1–3.7)
ABSOLUTE MONO #: 0.56 K/UL (ref 0.1–1.2)
ACETAMINOPHEN LEVEL: <5 UG/ML (ref 10–30)
ALBUMIN SERPL-MCNC: 4.2 G/DL (ref 3.5–5.2)
ALBUMIN/GLOBULIN RATIO: 1.2 (ref 1–2.5)
ALP SERPL-CCNC: 123 U/L (ref 35–104)
ALT SERPL-CCNC: 29 U/L (ref 5–33)
AMPHETAMINE SCREEN URINE: NEGATIVE
ANION GAP SERPL CALCULATED.3IONS-SCNC: 26 MMOL/L (ref 9–17)
ANION GAP: 15 MMOL/L (ref 7–16)
AST SERPL-CCNC: 216 U/L
BARBITURATE SCREEN URINE: NEGATIVE
BASOPHILS # BLD: 1 % (ref 0–2)
BASOPHILS ABSOLUTE: 0.04 K/UL (ref 0–0.2)
BENZODIAZEPINE SCREEN, URINE: NEGATIVE
BETA-HYDROXYBUTYRATE: 3.3 MMOL/L (ref 0.02–0.27)
BILIRUB SERPL-MCNC: 1.9 MG/DL (ref 0.3–1.2)
BILIRUBIN URINE: NEGATIVE
BUN SERPL-MCNC: 2 MG/DL (ref 6–20)
CALCIUM SERPL-MCNC: 8.5 MG/DL (ref 8.6–10.4)
CANNABINOID SCREEN URINE: POSITIVE
CARBOXYHEMOGLOBIN: 3.2 % (ref 0–5)
CHLORIDE SERPL-SCNC: 96 MMOL/L (ref 98–107)
CO2 SERPL-SCNC: 15 MMOL/L (ref 20–31)
COCAINE METABOLITE, URINE: NEGATIVE
COLOR: YELLOW
COMMENT UA: ABNORMAL
CREAT SERPL-MCNC: 0.43 MG/DL (ref 0.5–0.9)
EGFR, POC: >60 ML/MIN/1.73M2
EOSINOPHILS RELATIVE PERCENT: 1 % (ref 1–4)
ETHANOL PERCENT: 0.06 %
ETHANOL: 64 MG/DL
FENTANYL URINE: NEGATIVE
FIO2: ABNORMAL
GFR SERPL CREATININE-BSD FRML MDRD: >60 ML/MIN/1.73M2
GLUCOSE BLD-MCNC: 66 MG/DL (ref 74–100)
GLUCOSE BLD-MCNC: 72 MG/DL (ref 65–105)
GLUCOSE SERPL-MCNC: 63 MG/DL (ref 70–99)
GLUCOSE UR STRIP.AUTO-MCNC: NEGATIVE MG/DL
HCG(URINE) PREGNANCY TEST: NEGATIVE
HCO3 VENOUS: 17.4 MMOL/L (ref 24–30)
HCO3 VENOUS: 22.1 MMOL/L (ref 22–29)
HCT VFR BLD AUTO: 30.5 % (ref 36.3–47.1)
HGB BLD-MCNC: 10.3 G/DL (ref 11.9–15.1)
IMMATURE GRANULOCYTES: 0 %
INR PPP: 1.3
KETONES UR STRIP.AUTO-MCNC: ABNORMAL MG/DL
LACTIC ACID, WHOLE BLOOD: 2.5 MMOL/L (ref 0.7–2.1)
LEUKOCYTE ESTERASE UR QL STRIP.AUTO: NEGATIVE
LYMPHOCYTES # BLD: 23 % (ref 24–43)
MAGNESIUM SERPL-MCNC: 1.1 MG/DL (ref 1.6–2.6)
MCH RBC QN AUTO: 37.7 PG (ref 25.2–33.5)
MCHC RBC AUTO-ENTMCNC: 33.8 G/DL (ref 28.4–34.8)
MCV RBC AUTO: 111.7 FL (ref 82.6–102.9)
METHADONE SCREEN, URINE: NEGATIVE
MONOCYTES # BLD: 15 % (ref 3–12)
MORPHOLOGY: ABNORMAL
MORPHOLOGY: ABNORMAL
NEGATIVE BASE EXCESS, VEN: 2 (ref 0–2)
NEGATIVE BASE EXCESS, VEN: 6.1 MMOL/L (ref 0–2)
NITRITE UR QL STRIP.AUTO: NEGATIVE
NRBC AUTOMATED: 0 PER 100 WBC
O2 SAT, VEN: 92 % (ref 60–85)
O2 SAT, VEN: 98.9 % (ref 60–85)
OPIATES, URINE: NEGATIVE
OXYCODONE SCREEN URINE: NEGATIVE
PATIENT TEMP: 37
PCO2, VEN: 28.8 MM HG (ref 39–55)
PCO2, VEN: 33.6 MM HG (ref 41–51)
PDW BLD-RTO: 18.6 % (ref 11.8–14.4)
PH VENOUS: 7.4 (ref 7.32–7.42)
PH VENOUS: 7.43 (ref 7.32–7.43)
PHENCYCLIDINE, URINE: NEGATIVE
PHOSPHATE SERPL-MCNC: 1.8 MG/DL (ref 2.6–4.5)
PLATELET # BLD AUTO: ABNORMAL K/UL (ref 138–453)
PLATELET, FLUORESCENCE: NORMAL K/UL (ref 138–453)
PO2, VEN: 133 MM HG (ref 30–50)
PO2, VEN: 62.9 MM HG (ref 30–50)
POC BUN: <3 MG/DL (ref 8–26)
POC CHLORIDE: 103 MMOL/L (ref 98–107)
POC CREATININE: 0.58 MG/DL (ref 0.51–1.19)
POC HEMATOCRIT: 32 % (ref 36–46)
POC HEMOGLOBIN: 11 G/DL (ref 12–16)
POC IONIZED CALCIUM: 0.98 MMOL/L (ref 1.15–1.33)
POC LACTIC ACID: 1.28 MMOL/L (ref 0.56–1.39)
POC POTASSIUM: 3.8 MMOL/L (ref 3.5–4.5)
POC SODIUM: 138 MMOL/L (ref 138–146)
POC TCO2: 21 MMOL/L (ref 22–30)
POTASSIUM SERPL-SCNC: 3.6 MMOL/L (ref 3.7–5.3)
PROT SERPL-MCNC: 7.7 G/DL (ref 6.4–8.3)
PROT UR STRIP.AUTO-MCNC: 6.5 MG/DL (ref 5–8)
PROT UR STRIP.AUTO-MCNC: NEGATIVE MG/DL
PROTHROMBIN TIME: 15.8 SEC (ref 11.7–14.9)
RBC # BLD: 2.73 M/UL (ref 3.95–5.11)
REASON FOR REJECTION: NORMAL
SALICYLATE LEVEL: <1 MG/DL (ref 3–10)
SARS-COV-2 RDRP RESP QL NAA+PROBE: NOT DETECTED
SEG NEUTROPHILS: 60 % (ref 36–65)
SEGMENTED NEUTROPHILS ABSOLUTE COUNT: 2.21 K/UL (ref 1.5–8.1)
SERUM OSMOLALITY: 271 MOSM/KG (ref 275–295)
SODIUM SERPL-SCNC: 137 MMOL/L (ref 135–144)
SPECIFIC GRAVITY UA: 1.01 (ref 1–1.03)
SPECIMEN DESCRIPTION: NORMAL
TEST INFORMATION: ABNORMAL
TOXIC TRICYCLIC SC,BLOOD: NEGATIVE
TURBIDITY: CLEAR
URINE HGB: NEGATIVE
UROBILINOGEN, URINE: ABNORMAL
WBC # BLD AUTO: 3.7 K/UL (ref 3.5–11.3)
ZZ NTE CLEAN UP: ORDERED TEST: NORMAL
ZZ NTE WITH NAME CLEAN UP: SPECIMEN SOURCE: NORMAL

## 2023-04-17 PROCEDURE — 87635 SARS-COV-2 COVID-19 AMP PRB: CPT

## 2023-04-17 PROCEDURE — 82803 BLOOD GASES ANY COMBINATION: CPT

## 2023-04-17 PROCEDURE — 84520 ASSAY OF UREA NITROGEN: CPT

## 2023-04-17 PROCEDURE — 6360000002 HC RX W HCPCS: Performed by: STUDENT IN AN ORGANIZED HEALTH CARE EDUCATION/TRAINING PROGRAM

## 2023-04-17 PROCEDURE — 2060000000 HC ICU INTERMEDIATE R&B

## 2023-04-17 PROCEDURE — 2580000003 HC RX 258: Performed by: STUDENT IN AN ORGANIZED HEALTH CARE EDUCATION/TRAINING PROGRAM

## 2023-04-17 PROCEDURE — 6360000002 HC RX W HCPCS

## 2023-04-17 PROCEDURE — 80051 ELECTROLYTE PANEL: CPT

## 2023-04-17 PROCEDURE — 82947 ASSAY GLUCOSE BLOOD QUANT: CPT

## 2023-04-17 PROCEDURE — 85610 PROTHROMBIN TIME: CPT

## 2023-04-17 PROCEDURE — 6360000002 HC RX W HCPCS: Performed by: EMERGENCY MEDICINE

## 2023-04-17 PROCEDURE — 80143 DRUG ASSAY ACETAMINOPHEN: CPT

## 2023-04-17 PROCEDURE — 82010 KETONE BODYS QUAN: CPT

## 2023-04-17 PROCEDURE — 83735 ASSAY OF MAGNESIUM: CPT

## 2023-04-17 PROCEDURE — 84600 ASSAY OF VOLATILES: CPT

## 2023-04-17 PROCEDURE — 96375 TX/PRO/DX INJ NEW DRUG ADDON: CPT

## 2023-04-17 PROCEDURE — 85025 COMPLETE CBC W/AUTO DIFF WBC: CPT

## 2023-04-17 PROCEDURE — 93005 ELECTROCARDIOGRAM TRACING: CPT | Performed by: STUDENT IN AN ORGANIZED HEALTH CARE EDUCATION/TRAINING PROGRAM

## 2023-04-17 PROCEDURE — 83605 ASSAY OF LACTIC ACID: CPT

## 2023-04-17 PROCEDURE — 36415 COLL VENOUS BLD VENIPUNCTURE: CPT

## 2023-04-17 PROCEDURE — 81025 URINE PREGNANCY TEST: CPT

## 2023-04-17 PROCEDURE — 99285 EMERGENCY DEPT VISIT HI MDM: CPT

## 2023-04-17 PROCEDURE — 6370000000 HC RX 637 (ALT 250 FOR IP): Performed by: STUDENT IN AN ORGANIZED HEALTH CARE EDUCATION/TRAINING PROGRAM

## 2023-04-17 PROCEDURE — 96374 THER/PROPH/DIAG INJ IV PUSH: CPT

## 2023-04-17 PROCEDURE — 81001 URINALYSIS AUTO W/SCOPE: CPT

## 2023-04-17 PROCEDURE — 85055 RETICULATED PLATELET ASSAY: CPT

## 2023-04-17 PROCEDURE — 2580000003 HC RX 258: Performed by: EMERGENCY MEDICINE

## 2023-04-17 PROCEDURE — 2580000003 HC RX 258

## 2023-04-17 PROCEDURE — 83930 ASSAY OF BLOOD OSMOLALITY: CPT

## 2023-04-17 PROCEDURE — 80179 DRUG ASSAY SALICYLATE: CPT

## 2023-04-17 PROCEDURE — 80307 DRUG TEST PRSMV CHEM ANLYZR: CPT

## 2023-04-17 PROCEDURE — 82330 ASSAY OF CALCIUM: CPT

## 2023-04-17 PROCEDURE — 82565 ASSAY OF CREATININE: CPT

## 2023-04-17 PROCEDURE — 85014 HEMATOCRIT: CPT

## 2023-04-17 PROCEDURE — 80053 COMPREHEN METABOLIC PANEL: CPT

## 2023-04-17 PROCEDURE — 96376 TX/PRO/DX INJ SAME DRUG ADON: CPT

## 2023-04-17 PROCEDURE — 82805 BLOOD GASES W/O2 SATURATION: CPT

## 2023-04-17 PROCEDURE — 84100 ASSAY OF PHOSPHORUS: CPT

## 2023-04-17 PROCEDURE — 81003 URINALYSIS AUTO W/O SCOPE: CPT

## 2023-04-17 PROCEDURE — G0480 DRUG TEST DEF 1-7 CLASSES: HCPCS

## 2023-04-17 PROCEDURE — 2500000003 HC RX 250 WO HCPCS: Performed by: STUDENT IN AN ORGANIZED HEALTH CARE EDUCATION/TRAINING PROGRAM

## 2023-04-17 RX ORDER — 0.9 % SODIUM CHLORIDE 0.9 %
1000 INTRAVENOUS SOLUTION INTRAVENOUS ONCE
Status: COMPLETED | OUTPATIENT
Start: 2023-04-17 | End: 2023-04-17

## 2023-04-17 RX ORDER — CHLORDIAZEPOXIDE HYDROCHLORIDE 25 MG/1
25 CAPSULE, GELATIN COATED ORAL 3 TIMES DAILY
Status: DISCONTINUED | OUTPATIENT
Start: 2023-04-17 | End: 2023-04-21 | Stop reason: HOSPADM

## 2023-04-17 RX ORDER — SODIUM CHLORIDE 0.9 % (FLUSH) 0.9 %
5-40 SYRINGE (ML) INJECTION EVERY 12 HOURS SCHEDULED
Status: DISCONTINUED | OUTPATIENT
Start: 2023-04-17 | End: 2023-04-21 | Stop reason: HOSPADM

## 2023-04-17 RX ORDER — POTASSIUM CHLORIDE 7.45 MG/ML
10 INJECTION INTRAVENOUS
Status: COMPLETED | OUTPATIENT
Start: 2023-04-17 | End: 2023-04-18

## 2023-04-17 RX ORDER — ONDANSETRON 2 MG/ML
4 INJECTION INTRAMUSCULAR; INTRAVENOUS ONCE
Status: COMPLETED | OUTPATIENT
Start: 2023-04-17 | End: 2023-04-17

## 2023-04-17 RX ORDER — ONDANSETRON 4 MG/1
4 TABLET, ORALLY DISINTEGRATING ORAL EVERY 8 HOURS PRN
Status: DISCONTINUED | OUTPATIENT
Start: 2023-04-17 | End: 2023-04-21 | Stop reason: HOSPADM

## 2023-04-17 RX ORDER — LORAZEPAM 2 MG/ML
4 INJECTION INTRAMUSCULAR
Status: DISCONTINUED | OUTPATIENT
Start: 2023-04-17 | End: 2023-04-21 | Stop reason: HOSPADM

## 2023-04-17 RX ORDER — DEXTROSE MONOHYDRATE 100 MG/ML
INJECTION, SOLUTION INTRAVENOUS CONTINUOUS PRN
Status: DISCONTINUED | OUTPATIENT
Start: 2023-04-17 | End: 2023-04-21 | Stop reason: HOSPADM

## 2023-04-17 RX ORDER — SODIUM CHLORIDE 9 MG/ML
INJECTION, SOLUTION INTRAVENOUS PRN
Status: DISCONTINUED | OUTPATIENT
Start: 2023-04-17 | End: 2023-04-21 | Stop reason: HOSPADM

## 2023-04-17 RX ORDER — LORAZEPAM 2 MG/ML
1 INJECTION INTRAMUSCULAR
Status: DISCONTINUED | OUTPATIENT
Start: 2023-04-17 | End: 2023-04-21 | Stop reason: HOSPADM

## 2023-04-17 RX ORDER — ONDANSETRON 2 MG/ML
INJECTION INTRAMUSCULAR; INTRAVENOUS
Status: COMPLETED
Start: 2023-04-17 | End: 2023-04-17

## 2023-04-17 RX ORDER — ACETAMINOPHEN 650 MG/1
650 SUPPOSITORY RECTAL EVERY 6 HOURS PRN
Status: DISCONTINUED | OUTPATIENT
Start: 2023-04-17 | End: 2023-04-21 | Stop reason: HOSPADM

## 2023-04-17 RX ORDER — LORAZEPAM 2 MG/1
4 TABLET ORAL
Status: DISCONTINUED | OUTPATIENT
Start: 2023-04-17 | End: 2023-04-21 | Stop reason: HOSPADM

## 2023-04-17 RX ORDER — DEXTROSE AND SODIUM CHLORIDE 5; .9 G/100ML; G/100ML
INJECTION, SOLUTION INTRAVENOUS CONTINUOUS
Status: DISCONTINUED | OUTPATIENT
Start: 2023-04-17 | End: 2023-04-19

## 2023-04-17 RX ORDER — LORAZEPAM 1 MG/1
1 TABLET ORAL
Status: DISCONTINUED | OUTPATIENT
Start: 2023-04-17 | End: 2023-04-21 | Stop reason: HOSPADM

## 2023-04-17 RX ORDER — LORAZEPAM 2 MG/ML
1 INJECTION INTRAMUSCULAR ONCE
Status: COMPLETED | OUTPATIENT
Start: 2023-04-17 | End: 2023-04-17

## 2023-04-17 RX ORDER — PHENOBARBITAL SODIUM 130 MG/ML
260 INJECTION INTRAMUSCULAR ONCE
Status: COMPLETED | OUTPATIENT
Start: 2023-04-17 | End: 2023-04-17

## 2023-04-17 RX ORDER — POLYETHYLENE GLYCOL 3350 17 G/17G
17 POWDER, FOR SOLUTION ORAL DAILY PRN
Status: DISCONTINUED | OUTPATIENT
Start: 2023-04-17 | End: 2023-04-21 | Stop reason: HOSPADM

## 2023-04-17 RX ORDER — LORAZEPAM 2 MG/1
2 TABLET ORAL
Status: DISCONTINUED | OUTPATIENT
Start: 2023-04-17 | End: 2023-04-21 | Stop reason: HOSPADM

## 2023-04-17 RX ORDER — ACETAMINOPHEN 325 MG/1
650 TABLET ORAL EVERY 6 HOURS PRN
Status: DISCONTINUED | OUTPATIENT
Start: 2023-04-17 | End: 2023-04-21 | Stop reason: HOSPADM

## 2023-04-17 RX ORDER — ENOXAPARIN SODIUM 100 MG/ML
40 INJECTION SUBCUTANEOUS DAILY
Status: DISCONTINUED | OUTPATIENT
Start: 2023-04-17 | End: 2023-04-21 | Stop reason: HOSPADM

## 2023-04-17 RX ORDER — LORAZEPAM 2 MG/ML
3 INJECTION INTRAMUSCULAR
Status: DISCONTINUED | OUTPATIENT
Start: 2023-04-17 | End: 2023-04-21 | Stop reason: HOSPADM

## 2023-04-17 RX ORDER — LORAZEPAM 0.5 MG/1
2 TABLET ORAL ONCE
Status: COMPLETED | OUTPATIENT
Start: 2023-04-17 | End: 2023-04-17

## 2023-04-17 RX ORDER — DEXTROSE MONOHYDRATE 50 MG/ML
INJECTION, SOLUTION INTRAVENOUS CONTINUOUS
Status: DISCONTINUED | OUTPATIENT
Start: 2023-04-17 | End: 2023-04-17

## 2023-04-17 RX ORDER — LORAZEPAM 2 MG/ML
1 INJECTION INTRAMUSCULAR ONCE
Status: DISCONTINUED | OUTPATIENT
Start: 2023-04-17 | End: 2023-04-17

## 2023-04-17 RX ORDER — MAGNESIUM SULFATE IN WATER 40 MG/ML
4000 INJECTION, SOLUTION INTRAVENOUS ONCE
Status: COMPLETED | OUTPATIENT
Start: 2023-04-17 | End: 2023-04-18

## 2023-04-17 RX ORDER — SODIUM CHLORIDE 0.9 % (FLUSH) 0.9 %
5-40 SYRINGE (ML) INJECTION PRN
Status: DISCONTINUED | OUTPATIENT
Start: 2023-04-17 | End: 2023-04-21 | Stop reason: HOSPADM

## 2023-04-17 RX ORDER — LORAZEPAM 2 MG/ML
2 INJECTION INTRAMUSCULAR
Status: DISCONTINUED | OUTPATIENT
Start: 2023-04-17 | End: 2023-04-21 | Stop reason: HOSPADM

## 2023-04-17 RX ORDER — DEXTROSE AND SODIUM CHLORIDE 5; .9 G/100ML; G/100ML
INJECTION, SOLUTION INTRAVENOUS
Status: COMPLETED
Start: 2023-04-17 | End: 2023-04-17

## 2023-04-17 RX ORDER — ONDANSETRON 2 MG/ML
4 INJECTION INTRAMUSCULAR; INTRAVENOUS EVERY 6 HOURS PRN
Status: DISCONTINUED | OUTPATIENT
Start: 2023-04-17 | End: 2023-04-21 | Stop reason: HOSPADM

## 2023-04-17 RX ADMIN — DEXTROSE AND SODIUM CHLORIDE: 5; 900 INJECTION, SOLUTION INTRAVENOUS at 19:39

## 2023-04-17 RX ADMIN — FOLIC ACID: 5 INJECTION, SOLUTION INTRAMUSCULAR; INTRAVENOUS; SUBCUTANEOUS at 20:48

## 2023-04-17 RX ADMIN — SODIUM CHLORIDE 1000 ML: 9 INJECTION, SOLUTION INTRAVENOUS at 14:01

## 2023-04-17 RX ADMIN — LORAZEPAM 1 MG: 2 INJECTION INTRAMUSCULAR; INTRAVENOUS at 15:34

## 2023-04-17 RX ADMIN — SODIUM CHLORIDE, PRESERVATIVE FREE 10 ML: 5 INJECTION INTRAVENOUS at 22:40

## 2023-04-17 RX ADMIN — ONDANSETRON 4 MG: 2 INJECTION INTRAMUSCULAR; INTRAVENOUS at 11:52

## 2023-04-17 RX ADMIN — LORAZEPAM 4 MG: 2 INJECTION INTRAMUSCULAR; INTRAVENOUS at 21:43

## 2023-04-17 RX ADMIN — POTASSIUM CHLORIDE 10 MEQ: 10 INJECTION, SOLUTION INTRAVENOUS at 22:39

## 2023-04-17 RX ADMIN — ACETAMINOPHEN 650 MG: 325 TABLET ORAL at 19:39

## 2023-04-17 RX ADMIN — SODIUM CHLORIDE 1000 ML: 9 INJECTION, SOLUTION INTRAVENOUS at 12:20

## 2023-04-17 RX ADMIN — ONDANSETRON 4 MG: 2 INJECTION INTRAMUSCULAR; INTRAVENOUS at 19:39

## 2023-04-17 RX ADMIN — ONDANSETRON 4 MG: 2 INJECTION INTRAMUSCULAR; INTRAVENOUS at 14:08

## 2023-04-17 RX ADMIN — SODIUM CHLORIDE 1000 ML: 9 INJECTION, SOLUTION INTRAVENOUS at 15:30

## 2023-04-17 RX ADMIN — LORAZEPAM 4 MG: 2 INJECTION INTRAMUSCULAR; INTRAVENOUS at 19:39

## 2023-04-17 RX ADMIN — PHENOBARBITAL SODIUM 260 MG: 130 INJECTION INTRAMUSCULAR; INTRAVENOUS at 15:34

## 2023-04-17 RX ADMIN — LORAZEPAM 1 MG: 2 INJECTION INTRAMUSCULAR; INTRAVENOUS at 14:08

## 2023-04-17 RX ADMIN — DEXTROSE MONOHYDRATE: 50 INJECTION, SOLUTION INTRAVENOUS at 16:56

## 2023-04-17 RX ADMIN — MAGNESIUM SULFATE HEPTAHYDRATE 4000 MG: 40 INJECTION, SOLUTION INTRAVENOUS at 23:40

## 2023-04-17 RX ADMIN — SODIUM CHLORIDE: 9 INJECTION, SOLUTION INTRAVENOUS at 23:35

## 2023-04-17 RX ADMIN — POTASSIUM CHLORIDE 10 MEQ: 10 INJECTION, SOLUTION INTRAVENOUS at 19:46

## 2023-04-17 RX ADMIN — LORAZEPAM 2 MG: 0.5 TABLET ORAL at 13:45

## 2023-04-17 ASSESSMENT — ENCOUNTER SYMPTOMS
SHORTNESS OF BREATH: 0
ABDOMINAL PAIN: 1
ABDOMINAL DISTENTION: 0
CHEST TIGHTNESS: 0
COUGH: 0
CONSTIPATION: 0
BACK PAIN: 0
NAUSEA: 1
DIARRHEA: 0
VOMITING: 1
TROUBLE SWALLOWING: 0
SORE THROAT: 0

## 2023-04-17 NOTE — ED TRIAGE NOTES
LS 4 reports that pt ingestion 15 to 17  unknown pills last night around 7 pm  Pt drinks 1 pint of Vodka a day  Smokes weed and cigarettes     Pt was brought in after boyfriend called 911 for nausea and chills and is suicidal   Has no plan   Vitals reports /81, , Sp 02 100 RA Glucose 89      Pt reports that last night was an attempt to take her own life when she ingested the 17 pills    Felt more depressed , \" Can't do anything right\"  Pt has her father, is close

## 2023-04-17 NOTE — H&P
[address]     Department of Internal Medicine - Staff Internal Medicine Teaching Service          ADMISSION NOTE/HISTORY AND PHYSICAL EXAMINATION   Date: 4/17/2023  Patient Name: Rosales Hernadez  Date of admission: 4/17/2023 11:35 AM  YOB: 1995  PCP: Shay Leggett  History Obtained From:  patient, electronic medical record    CHIEF COMPLAINT     Chief complaint: Nausea, suicide attempt    HISTORY OF PRESENTING ILLNESS     The patient is a pleasant 32 y.o. female known alcohol abuser, drinks a pint of vodka per day, PMH significant for ADHD, bipolar 1 disorder, depression, anxiety presented to ED with nausea, chills and suicide attempt. History cannot be obtained from patient as patient speech is incomprehensible. Boyfriend called EMS due to concerns of suicidal attempt and overdose. Last night, patient took 15-17 unknown pills in an attempt to end her life. He, patient was nauseous, vomiting, tremulous denies any fever, diarrhea, constipation, chest pain, difficulty breathing. Denies any previous suicide attempt. In ED, patient had multiple episodes of nausea and vomiting. Tachycardic and ill-appearing. Dry mucous membrane. Concerns for overdose, alcoholic ketosis versus dehydration ketosis.       Review of Systems:  General ROS: Completed and except as mentioned above were negative   HEENT ROS: Completed and except as mentioned above were negative   Allergy and Immunology ROS:  Completed and except as mentioned above were negative  Hematological and Lymphatic ROS:  Completed and except as mentioned above were negative  Respiratory ROS:  Completed and except as mentioned above were negative  Cardiovascular ROS:  Completed and except as mentioned above were negative  Gastrointestinal ROS: Completed and except as mentioned above were negative  Genito-Urinary ROS:  Completed and except as mentioned above were negative  Musculoskeletal ROS:  Completed and

## 2023-04-17 NOTE — ED NOTES
Escorted by staff member to bathroom   Urine sample obtained      China Zimmerman RN  04/17/23 1057
SW consulted as patient arrives with an intentional ingestion of 17 unknown pills. Patient also a chronic alcoholic and drinks approximately a pint of vodka per day. Patient with a history of alcoholic gastritis/ascites/alcoholic hepatitis and a previous intentional overdose. Patient voicing SI, denies HI. SW attempted to assess patient but she was extremely uncomfortable and unable to answer SW questions at this time. Patient actively vomiting and has extreme tremors. Patient agreeable to an assessment when she is feeling better. Patient will be admitted for detox. Inpatient SW updated on the need for follow-up. Lorenzo Whitfield.  Sofiya Luke, [facility]  04/17/23 8632
The following labs were labeled with appropriate pt sticker and tubed to lab:     [x] Blue     [x] Lavender   [] on ice  [x] Green/yellow  [] Green/black [] on ice  [] Margo Sequin  [] on ice  [] Yellow  [] Red  [] Type/ Screen  [] ABG  [] VBG    [] COVID-19 swab    [] Rapid  [] PCR  [] Flu swab  [] Peds Viral Panel     [] Urine Sample  [] Fecal Sample  [] Pelvic Cultures  [] Blood Cultures  [] X 2  [] STREP Cultures       Yrn Chin RN  04/17/23 6573
to)  [x] Expressed or implied suicidal ideation/behavior  [x] Depression  [x] Suicide attempt      [x] Low self-esteem  [] Hallucinations      [x] Feeling of Hopelessness  [x] Substance abuse or withdrawal    [x] Dysfunctional family  [] Major traumatic event, eg., divorce, etc   [x] Excessive stress/anxiety    4/17/23    Expected Outcomes    Patient will:   [x] Patient will remain safe for the duration of their stay   [x] Patient's environment will be safe, eg. Free of potential suicide weapons   [] Verbalize Recovery from suicidal episode and improvement in self-worth   [x] Discuss feeling that precipitated suicide attempt/thoughts/behavior   [x] Will describe available resources for crisis prevention and management   [x] Will verbalize positive coping skills     Nursing Intervention   [x] Assessment and Observations hourly   [x] Suicide Precautions implemented with patient, should be 1:1 observation   [x] Document observation a44dway and RN assessment hourly   [] Consult physician for:    [] Psychiatric consult    [] Pharmacological therapy    [] Other:    [x] Patient search completed by security   [x] Initiated appropriate safety protocols by removing from the patient's environment anything that could be used to inflict self injury, eg. Order safe tray, snap gown, etc   [x] Maintain open, warm, caring, non-judgmental attitude/manner towards patient   [] Discuss advantages and disadvantages of existing coping methods/skills   [x] Assist and educate patient with identifying present strengths and coping skills   [x] Keep patient informed regarding plan of care and provide clear concise explanations. Provide the patient/family education information as well as telephone numbers and other information about crisis centers, hot lines, and counselors.     Discharge Planning:   [x] Referral  [] Groups [] Health agencies  [] Other:            Pam Salmeron RN  04/17/23 1478
Medications    ondansetron (ZOFRAN) 4 MG/2ML injection     Batch, Melyssa KASPER: cabinet override    ondansetron (ZOFRAN) injection 4 mg    0.9 % sodium chloride bolus    LORazepam (ATIVAN) tablet 2 mg    0.9 % sodium chloride bolus    LORazepam (ATIVAN) injection 1 mg    ondansetron (ZOFRAN) injection 4 mg    DISCONTD: LORazepam (ATIVAN) injection 1 mg    0.9 % sodium chloride bolus    LORazepam (ATIVAN) injection 1 mg    PHENobarbital (LUMINAL) injection 260 mg    dextrose 5 % solution       SURGICAL HISTORY       Past Surgical History:   Procedure Laterality Date    CHOLECYSTECTOMY         PAST MEDICAL HISTORY       Past Medical History:   Diagnosis Date    ADHD (attention deficit hyperactivity disorder)     Anxiety     Bipolar 1 disorder (HCC)     Depression        Labs:  Labs Reviewed   URINE DRUG SCREEN - Abnormal; Notable for the following components:       Result Value    Cannabinoid Scrn, Ur POSITIVE (*)     All other components within normal limits   COMPREHENSIVE METABOLIC PANEL - Abnormal; Notable for the following components:    Glucose 63 (*)     BUN 2 (*)     Creatinine 0.43 (*)     Calcium 8.5 (*)     Potassium 3.6 (*)     Chloride 96 (*)     CO2 15 (*)     Anion Gap 26 (*)     Alkaline Phosphatase 123 (*)      (*)     Total Bilirubin 1.9 (*)     All other components within normal limits   CBC WITH AUTO DIFFERENTIAL - Abnormal; Notable for the following components:    RBC 2.73 (*)     Hemoglobin 10.3 (*)     Hematocrit 30.5 (*)     .7 (*)     MCH 37.7 (*)     RDW 18.6 (*)     Lymphocytes 23 (*)     Monocytes 15 (*)     Absolute Lymph # 0.85 (*)     All other components within normal limits   TOX SCR, BLD, ED - Abnormal; Notable for the following components:    Acetaminophen Level <5 (*)     Ethanol 64 (*)     Ethanol percent 6.136 (*)     Salicylate Lvl <1 (*)     All other components within normal limits   URINALYSIS WITH REFLEX TO CULTURE - Abnormal; Notable for the following components:

## 2023-04-17 NOTE — ED PROVIDER NOTES
[facility] HANDOFF       Handoff taken on the following patient from prior Attending Physician:  Pt Name: Suzie Silverman  PCP:  Yasmin Bonner  I was available and discussed any additional care issues that arose and coordinated the management plans with the resident(s) caring for the patient during my duty period. Any areas of disagreement with resident's documentation of care or procedures are noted on the chart. I was personally present for the key portions of any/all procedures during my duty period. I have documented in the chart those procedures where I was not present during the key portions. CHIEF COMPLAINT       Chief Complaint   Patient presents with    Nausea    Suicidal    Drug Overdose    Chills         CURRENT MEDICATIONS     Previous Medications  Previous Medications    MAGNESIUM 400 MG CAPS    Take 400 mg by mouth daily for 5 days    PANTOPRAZOLE (PROTONIX) 40 MG TABLET    Take 1 tablet by mouth 2 times daily (before meals)    POTASSIUM CHLORIDE (KLOR-CON M) 20 MEQ EXTENDED RELEASE TABLET    Take 1 tablet by mouth daily for 5 days       Encounter Medications  Orders Placed This Encounter   Medications    ondansetron (ZOFRAN) 4 MG/2ML injection     Batch, Melyssa M: cabinet override    ondansetron (ZOFRAN) injection 4 mg    0.9 % sodium chloride bolus    LORazepam (ATIVAN) tablet 2 mg    0.9 % sodium chloride bolus    LORazepam (ATIVAN) injection 1 mg    ondansetron (ZOFRAN) injection 4 mg    DISCONTD: LORazepam (ATIVAN) injection 1 mg    0.9 % sodium chloride bolus    LORazepam (ATIVAN) injection 1 mg    PHENobarbital (LUMINAL) injection 260 mg    dextrose 5 % solution       ALLERGIES     has No Known Allergies.       RECENT VITALS:   Temp: 99 °F (37.2 °C),  Heart Rate: (!) 101, Resp: 26, BP: (!) 128/90    RADIOLOGY:   No orders to display       LABS:  Labs Reviewed   URINE DRUG SCREEN - Abnormal; Notable for the following components:       Result
[address]     Emergency Department     Faculty Attestation    I performed a history and physical examination of the patient and discussed management with the resident. I have reviewed and agree with the residents findings including all diagnostic interpretations, and treatment plans as written. Any areas of disagreement are noted on the chart. I was personally present for the key portions of any procedures. I have documented in the chart those procedures where I was not present during the key portions. I have reviewed the emergency nurses triage note. I agree with the chief complaint, past medical history, past surgical history, allergies, medications, social and family history as documented unless otherwise noted below. Documentation of the HPI, Physical Exam and Medical Decision Making performed by scribrosas is based on my personal performance of the HPI, PE and MDM. For Physician Assistant/ Nurse Practitioner cases/documentation I have personally evaluated this patient and have completed at least one if not all key elements of the E/M (history, physical exam, and MDM). Additional findings are as noted. Primary Care Physician: Olga Greenwood    Patient brought in due to concern for suicidal attempt. She took 15 unknown tablets of medication last night. She is a chronic alcoholic. Last drink last night. Brought in and is tremulous. Multiple episodes of nausea and vomiting. Does have dry mucous membranes and smells ketotic. She does have some mild tenderness in her abdomen. But nonperitoneal.  She is tachycardic. And ill-appearing  Concern for possible alcoholic ketosis versus dehydration ketosis. We will plan on IV fluids, check labs including VBG and plan for medical admission. She does have a guard at bedside due to her suicidal attempt. But requires medical admission.   Psych will need to be consulted    EKG shows sinus tachycardia CO
medications on file       Yaquelin Chatman MD  Emergency Medicine Resident    (Please note that portions of thisnote were completed with a voice recognition program.  Efforts were made to edit the dictations but occasionally words are mis-transcribed.)       Yaquelin Chatman MD  Resident  04/17/23 6319

## 2023-04-18 ENCOUNTER — APPOINTMENT (OUTPATIENT)
Dept: ULTRASOUND IMAGING | Age: 28
End: 2023-04-18
Payer: MEDICAID

## 2023-04-18 PROBLEM — R74.01 TRANSAMINITIS: Status: ACTIVE | Noted: 2023-04-18

## 2023-04-18 PROBLEM — E16.2 HYPOGLYCEMIA: Status: ACTIVE | Noted: 2023-04-18

## 2023-04-18 PROBLEM — F10.930 ALCOHOL WITHDRAWAL SYNDROME WITHOUT COMPLICATION (HCC): Status: ACTIVE | Noted: 2023-04-18

## 2023-04-18 PROBLEM — T50.902A INTENTIONAL DRUG OVERDOSE (HCC): Status: ACTIVE | Noted: 2022-12-15

## 2023-04-18 PROBLEM — F10.932 ALCOHOL WITHDRAWAL HALLUCINOSIS (HCC): Status: ACTIVE | Noted: 2023-04-18

## 2023-04-18 LAB
ABSOLUTE EOS #: 0.03 K/UL (ref 0–0.44)
ABSOLUTE IMMATURE GRANULOCYTE: 0 K/UL (ref 0–0.3)
ABSOLUTE LYMPH #: 1.31 K/UL (ref 1.1–3.7)
ABSOLUTE MONO #: 0.52 K/UL (ref 0.1–1.2)
ALBUMIN SERPL-MCNC: 3.7 G/DL (ref 3.5–5.2)
ALBUMIN/GLOBULIN RATIO: 1.2 (ref 1–2.5)
ALP SERPL-CCNC: 108 U/L (ref 35–104)
ALT SERPL-CCNC: 24 U/L (ref 5–33)
ANION GAP SERPL CALCULATED.3IONS-SCNC: 11 MMOL/L (ref 9–17)
ANION GAP SERPL CALCULATED.3IONS-SCNC: 18 MMOL/L (ref 9–17)
AST SERPL-CCNC: 162 U/L
BASOPHILS # BLD: 0 % (ref 0–2)
BASOPHILS ABSOLUTE: 0 K/UL (ref 0–0.2)
BILIRUB SERPL-MCNC: 2.2 MG/DL (ref 0.3–1.2)
BILIRUBIN URINE: NEGATIVE
BUN SERPL-MCNC: <2 MG/DL (ref 6–20)
BUN SERPL-MCNC: <2 MG/DL (ref 6–20)
CALCIUM SERPL-MCNC: 7.8 MG/DL (ref 8.6–10.4)
CALCIUM SERPL-MCNC: 8 MG/DL (ref 8.6–10.4)
CASTS UA: ABNORMAL /LPF (ref 0–8)
CHLORIDE SERPL-SCNC: 100 MMOL/L (ref 98–107)
CHLORIDE SERPL-SCNC: 99 MMOL/L (ref 98–107)
CO2 SERPL-SCNC: 18 MMOL/L (ref 20–31)
CO2 SERPL-SCNC: 19 MMOL/L (ref 20–31)
COLOR: YELLOW
CREAT SERPL-MCNC: 0.3 MG/DL (ref 0.5–0.9)
CREAT SERPL-MCNC: 0.39 MG/DL (ref 0.5–0.9)
EKG ATRIAL RATE: 105 BPM
EKG ATRIAL RATE: 97 BPM
EKG P AXIS: 61 DEGREES
EKG P AXIS: 80 DEGREES
EKG P-R INTERVAL: 142 MS
EKG P-R INTERVAL: 154 MS
EKG Q-T INTERVAL: 364 MS
EKG Q-T INTERVAL: 366 MS
EKG QRS DURATION: 68 MS
EKG QRS DURATION: 72 MS
EKG QTC CALCULATION (BAZETT): 462 MS
EKG QTC CALCULATION (BAZETT): 483 MS
EKG R AXIS: 61 DEGREES
EKG R AXIS: 72 DEGREES
EKG T AXIS: 50 DEGREES
EKG T AXIS: 53 DEGREES
EKG VENTRICULAR RATE: 105 BPM
EKG VENTRICULAR RATE: 97 BPM
EOSINOPHILS RELATIVE PERCENT: 1 % (ref 1–4)
EPITHELIAL CELLS UA: ABNORMAL /HPF (ref 0–5)
GFR SERPL CREATININE-BSD FRML MDRD: >60 ML/MIN/1.73M2
GFR SERPL CREATININE-BSD FRML MDRD: >60 ML/MIN/1.73M2
GLUCOSE BLD-MCNC: 101 MG/DL (ref 65–105)
GLUCOSE BLD-MCNC: 106 MG/DL (ref 65–105)
GLUCOSE BLD-MCNC: 111 MG/DL (ref 65–105)
GLUCOSE BLD-MCNC: 111 MG/DL (ref 65–105)
GLUCOSE SERPL-MCNC: 104 MG/DL (ref 70–99)
GLUCOSE SERPL-MCNC: 99 MG/DL (ref 70–99)
GLUCOSE UR STRIP.AUTO-MCNC: NEGATIVE MG/DL
HCT VFR BLD AUTO: 30 % (ref 36.3–47.1)
HGB BLD-MCNC: 10.4 G/DL (ref 11.9–15.1)
IMMATURE GRANULOCYTES: 0 %
KETONES UR STRIP.AUTO-MCNC: ABNORMAL MG/DL
LACTIC ACID, WHOLE BLOOD: 1.3 MMOL/L (ref 0.7–2.1)
LEUKOCYTE ESTERASE UR QL STRIP.AUTO: NEGATIVE
LYMPHOCYTES # BLD: 45 % (ref 24–43)
MAGNESIUM SERPL-MCNC: 2.7 MG/DL (ref 1.6–2.6)
MCH RBC QN AUTO: 39.4 PG (ref 25.2–33.5)
MCHC RBC AUTO-ENTMCNC: 34.7 G/DL (ref 28.4–34.8)
MCV RBC AUTO: 113.6 FL (ref 82.6–102.9)
MONOCYTES # BLD: 18 % (ref 3–12)
MORPHOLOGY: ABNORMAL
MORPHOLOGY: ABNORMAL
NITRITE UR QL STRIP.AUTO: NEGATIVE
NRBC AUTOMATED: 0.7 PER 100 WBC
PDW BLD-RTO: 18.2 % (ref 11.8–14.4)
PLATELET # BLD AUTO: 187 K/UL (ref 138–453)
PMV BLD AUTO: 11.8 FL (ref 8.1–13.5)
POTASSIUM SERPL-SCNC: 3.5 MMOL/L (ref 3.7–5.3)
POTASSIUM SERPL-SCNC: 3.9 MMOL/L (ref 3.7–5.3)
PROT SERPL-MCNC: 6.9 G/DL (ref 6.4–8.3)
PROT UR STRIP.AUTO-MCNC: 8 MG/DL (ref 5–8)
PROT UR STRIP.AUTO-MCNC: NEGATIVE MG/DL
RBC # BLD: 2.64 M/UL (ref 3.95–5.11)
RBC CLUMPS #/AREA URNS AUTO: ABNORMAL /HPF (ref 0–4)
SEG NEUTROPHILS: 36 % (ref 36–65)
SEGMENTED NEUTROPHILS ABSOLUTE COUNT: 1.04 K/UL (ref 1.5–8.1)
SODIUM SERPL-SCNC: 130 MMOL/L (ref 135–144)
SODIUM SERPL-SCNC: 135 MMOL/L (ref 135–144)
SPECIFIC GRAVITY UA: 1.01 (ref 1–1.03)
TURBIDITY: CLEAR
URINE HGB: NEGATIVE
UROBILINOGEN, URINE: NORMAL
WBC # BLD AUTO: 2.9 K/UL (ref 3.5–11.3)
WBC UA: ABNORMAL /HPF (ref 0–5)

## 2023-04-18 PROCEDURE — 6370000000 HC RX 637 (ALT 250 FOR IP)

## 2023-04-18 PROCEDURE — 36415 COLL VENOUS BLD VENIPUNCTURE: CPT

## 2023-04-18 PROCEDURE — 2500000003 HC RX 250 WO HCPCS: Performed by: STUDENT IN AN ORGANIZED HEALTH CARE EDUCATION/TRAINING PROGRAM

## 2023-04-18 PROCEDURE — 85025 COMPLETE CBC W/AUTO DIFF WBC: CPT

## 2023-04-18 PROCEDURE — 80048 BASIC METABOLIC PNL TOTAL CA: CPT

## 2023-04-18 PROCEDURE — 82947 ASSAY GLUCOSE BLOOD QUANT: CPT

## 2023-04-18 PROCEDURE — 97161 PT EVAL LOW COMPLEX 20 MIN: CPT

## 2023-04-18 PROCEDURE — 80053 COMPREHEN METABOLIC PANEL: CPT

## 2023-04-18 PROCEDURE — 76705 ECHO EXAM OF ABDOMEN: CPT

## 2023-04-18 PROCEDURE — 83735 ASSAY OF MAGNESIUM: CPT

## 2023-04-18 PROCEDURE — 99222 1ST HOSP IP/OBS MODERATE 55: CPT | Performed by: INTERNAL MEDICINE

## 2023-04-18 PROCEDURE — 6360000002 HC RX W HCPCS: Performed by: STUDENT IN AN ORGANIZED HEALTH CARE EDUCATION/TRAINING PROGRAM

## 2023-04-18 PROCEDURE — 87040 BLOOD CULTURE FOR BACTERIA: CPT

## 2023-04-18 PROCEDURE — 97116 GAIT TRAINING THERAPY: CPT

## 2023-04-18 PROCEDURE — 2580000003 HC RX 258: Performed by: STUDENT IN AN ORGANIZED HEALTH CARE EDUCATION/TRAINING PROGRAM

## 2023-04-18 PROCEDURE — 83605 ASSAY OF LACTIC ACID: CPT

## 2023-04-18 PROCEDURE — 2060000000 HC ICU INTERMEDIATE R&B

## 2023-04-18 PROCEDURE — 97535 SELF CARE MNGMENT TRAINING: CPT

## 2023-04-18 PROCEDURE — 97166 OT EVAL MOD COMPLEX 45 MIN: CPT

## 2023-04-18 PROCEDURE — 93005 ELECTROCARDIOGRAM TRACING: CPT | Performed by: STUDENT IN AN ORGANIZED HEALTH CARE EDUCATION/TRAINING PROGRAM

## 2023-04-18 RX ADMIN — DEXTROSE AND SODIUM CHLORIDE: 5; 900 INJECTION, SOLUTION INTRAVENOUS at 04:08

## 2023-04-18 RX ADMIN — ENOXAPARIN SODIUM 40 MG: 40 INJECTION SUBCUTANEOUS at 09:13

## 2023-04-18 RX ADMIN — CHLORDIAZEPOXIDE HYDROCHLORIDE 25 MG: 25 CAPSULE ORAL at 09:13

## 2023-04-18 RX ADMIN — POTASSIUM PHOSPHATE, MONOBASIC AND POTASSIUM PHOSPHATE, DIBASIC 20 MMOL: 224; 236 INJECTION, SOLUTION, CONCENTRATE INTRAVENOUS at 09:26

## 2023-04-18 RX ADMIN — SODIUM CHLORIDE: 9 INJECTION, SOLUTION INTRAVENOUS at 01:27

## 2023-04-18 RX ADMIN — SODIUM CHLORIDE, PRESERVATIVE FREE 10 ML: 5 INJECTION INTRAVENOUS at 09:13

## 2023-04-18 RX ADMIN — POTASSIUM CHLORIDE 10 MEQ: 10 INJECTION, SOLUTION INTRAVENOUS at 01:28

## 2023-04-18 RX ADMIN — FOLIC ACID: 5 INJECTION, SOLUTION INTRAMUSCULAR; INTRAVENOUS; SUBCUTANEOUS at 09:19

## 2023-04-18 RX ADMIN — POTASSIUM CHLORIDE 10 MEQ: 10 INJECTION, SOLUTION INTRAVENOUS at 02:50

## 2023-04-18 RX ADMIN — LORAZEPAM 3 MG: 2 INJECTION INTRAMUSCULAR; INTRAVENOUS at 02:42

## 2023-04-18 RX ADMIN — LORAZEPAM 2 MG: 2 INJECTION INTRAMUSCULAR; INTRAVENOUS at 13:28

## 2023-04-18 RX ADMIN — ENOXAPARIN SODIUM 40 MG: 40 INJECTION SUBCUTANEOUS at 01:36

## 2023-04-18 RX ADMIN — CHLORDIAZEPOXIDE HYDROCHLORIDE 25 MG: 25 CAPSULE ORAL at 13:29

## 2023-04-18 RX ADMIN — LORAZEPAM 1 MG: 2 INJECTION INTRAMUSCULAR; INTRAVENOUS at 01:36

## 2023-04-18 RX ADMIN — CHLORDIAZEPOXIDE HYDROCHLORIDE 25 MG: 25 CAPSULE ORAL at 21:29

## 2023-04-18 RX ADMIN — SODIUM CHLORIDE: 9 INJECTION, SOLUTION INTRAVENOUS at 02:49

## 2023-04-19 LAB
ABSOLUTE EOS #: 0.05 K/UL (ref 0–0.4)
ABSOLUTE IMMATURE GRANULOCYTE: 0 K/UL (ref 0–0.3)
ABSOLUTE LYMPH #: 1.15 K/UL (ref 1–4.8)
ABSOLUTE MONO #: 0.22 K/UL (ref 0.1–0.8)
ACETONE BLOOD: 16.6
ALBUMIN SERPL-MCNC: 3.6 G/DL (ref 3.5–5.2)
ALBUMIN/GLOBULIN RATIO: 1.3 (ref 1–2.5)
ALP SERPL-CCNC: 97 U/L (ref 35–104)
ALT SERPL-CCNC: 20 U/L (ref 5–33)
ANION GAP SERPL CALCULATED.3IONS-SCNC: 9 MMOL/L (ref 9–17)
AST SERPL-CCNC: 116 U/L
BASOPHILS # BLD: 1 % (ref 0–2)
BASOPHILS ABSOLUTE: 0.02 K/UL (ref 0–0.2)
BILIRUB SERPL-MCNC: 1.4 MG/DL (ref 0.3–1.2)
BUN SERPL-MCNC: <2 MG/DL (ref 6–20)
CALCIUM SERPL-MCNC: 7.9 MG/DL (ref 8.6–10.4)
CHLORIDE SERPL-SCNC: 108 MMOL/L (ref 98–107)
CO2 SERPL-SCNC: 19 MMOL/L (ref 20–31)
CREAT SERPL-MCNC: 0.37 MG/DL (ref 0.5–0.9)
EOSINOPHILS RELATIVE PERCENT: 2 % (ref 1–4)
ETHANOL: 29.6 (ref 0–0.08)
ETHYLENE GLYCOL: <1
GFR SERPL CREATININE-BSD FRML MDRD: >60 ML/MIN/1.73M2
GLUCOSE BLD-MCNC: 80 MG/DL (ref 65–105)
GLUCOSE BLD-MCNC: 84 MG/DL (ref 65–105)
GLUCOSE BLD-MCNC: 92 MG/DL (ref 65–105)
GLUCOSE BLD-MCNC: 97 MG/DL (ref 65–105)
GLUCOSE SERPL-MCNC: 92 MG/DL (ref 70–99)
HCT VFR BLD AUTO: 29.2 % (ref 36.3–47.1)
HGB BLD-MCNC: 9.3 G/DL (ref 11.9–15.1)
IMMATURE GRANULOCYTES: 0 %
ISOPROPANOL BLOOD: <1
LYMPHOCYTES # BLD: 48 % (ref 24–44)
MCH RBC QN AUTO: 37.7 PG (ref 25.2–33.5)
MCHC RBC AUTO-ENTMCNC: 31.8 G/DL (ref 28.4–34.8)
MCV RBC AUTO: 118.2 FL (ref 82.6–102.9)
METHANOL BLOOD: <1
MONOCYTES # BLD: 9 % (ref 1–7)
MORPHOLOGY: ABNORMAL
MORPHOLOGY: ABNORMAL
NRBC AUTOMATED: 0 PER 100 WBC
PDW BLD-RTO: 18.2 % (ref 11.8–14.4)
PHOSPHATE SERPL-MCNC: 2 MG/DL (ref 2.6–4.5)
PLATELET # BLD AUTO: ABNORMAL K/UL (ref 138–453)
PLATELET, FLUORESCENCE: 78 K/UL (ref 138–453)
PLATELET, IMMATURE FRACTION: 4.6 % (ref 1.1–10.3)
POTASSIUM SERPL-SCNC: 3.8 MMOL/L (ref 3.7–5.3)
PROT SERPL-MCNC: 6.4 G/DL (ref 6.4–8.3)
RBC # BLD: 2.47 M/UL (ref 3.95–5.11)
SEG NEUTROPHILS: 40 % (ref 36–66)
SEGMENTED NEUTROPHILS ABSOLUTE COUNT: 0.96 K/UL (ref 1.8–7.7)
SODIUM SERPL-SCNC: 136 MMOL/L (ref 135–144)
WBC # BLD AUTO: 2.4 K/UL (ref 3.5–11.3)

## 2023-04-19 PROCEDURE — 85055 RETICULATED PLATELET ASSAY: CPT

## 2023-04-19 PROCEDURE — 84100 ASSAY OF PHOSPHORUS: CPT

## 2023-04-19 PROCEDURE — 2500000003 HC RX 250 WO HCPCS: Performed by: STUDENT IN AN ORGANIZED HEALTH CARE EDUCATION/TRAINING PROGRAM

## 2023-04-19 PROCEDURE — 80053 COMPREHEN METABOLIC PANEL: CPT

## 2023-04-19 PROCEDURE — 99232 SBSQ HOSP IP/OBS MODERATE 35: CPT | Performed by: INTERNAL MEDICINE

## 2023-04-19 PROCEDURE — 6370000000 HC RX 637 (ALT 250 FOR IP)

## 2023-04-19 PROCEDURE — 82947 ASSAY GLUCOSE BLOOD QUANT: CPT

## 2023-04-19 PROCEDURE — 2060000000 HC ICU INTERMEDIATE R&B

## 2023-04-19 PROCEDURE — 6360000002 HC RX W HCPCS: Performed by: STUDENT IN AN ORGANIZED HEALTH CARE EDUCATION/TRAINING PROGRAM

## 2023-04-19 PROCEDURE — 85025 COMPLETE CBC W/AUTO DIFF WBC: CPT

## 2023-04-19 PROCEDURE — 2580000003 HC RX 258: Performed by: STUDENT IN AN ORGANIZED HEALTH CARE EDUCATION/TRAINING PROGRAM

## 2023-04-19 PROCEDURE — 6370000000 HC RX 637 (ALT 250 FOR IP): Performed by: STUDENT IN AN ORGANIZED HEALTH CARE EDUCATION/TRAINING PROGRAM

## 2023-04-19 PROCEDURE — 36415 COLL VENOUS BLD VENIPUNCTURE: CPT

## 2023-04-19 RX ADMIN — ENOXAPARIN SODIUM 40 MG: 40 INJECTION SUBCUTANEOUS at 08:22

## 2023-04-19 RX ADMIN — SODIUM CHLORIDE, PRESERVATIVE FREE 10 ML: 5 INJECTION INTRAVENOUS at 08:23

## 2023-04-19 RX ADMIN — CHLORDIAZEPOXIDE HYDROCHLORIDE 25 MG: 25 CAPSULE ORAL at 08:22

## 2023-04-19 RX ADMIN — SODIUM CHLORIDE, PRESERVATIVE FREE 10 ML: 5 INJECTION INTRAVENOUS at 21:13

## 2023-04-19 RX ADMIN — DEXTROSE AND SODIUM CHLORIDE: 5; 900 INJECTION, SOLUTION INTRAVENOUS at 08:27

## 2023-04-19 RX ADMIN — CHLORDIAZEPOXIDE HYDROCHLORIDE 25 MG: 25 CAPSULE ORAL at 17:05

## 2023-04-19 RX ADMIN — FOLIC ACID: 5 INJECTION, SOLUTION INTRAMUSCULAR; INTRAVENOUS; SUBCUTANEOUS at 08:29

## 2023-04-19 RX ADMIN — SODIUM CHLORIDE 25 ML: 9 INJECTION, SOLUTION INTRAVENOUS at 08:29

## 2023-04-19 RX ADMIN — ACETAMINOPHEN 650 MG: 325 TABLET ORAL at 09:13

## 2023-04-19 RX ADMIN — CHLORDIAZEPOXIDE HYDROCHLORIDE 25 MG: 25 CAPSULE ORAL at 21:12

## 2023-04-19 ASSESSMENT — PAIN SCALES - GENERAL: PAINLEVEL_OUTOF10: 3

## 2023-04-19 ASSESSMENT — PAIN DESCRIPTION - LOCATION: LOCATION: HEAD

## 2023-04-20 PROBLEM — R45.851 DEPRESSION WITH SUICIDAL IDEATION: Status: ACTIVE | Noted: 2023-04-20

## 2023-04-20 PROBLEM — F32.A DEPRESSION WITH SUICIDAL IDEATION: Status: ACTIVE | Noted: 2023-04-20

## 2023-04-20 LAB
ABSOLUTE EOS #: 0.06 K/UL (ref 0–0.44)
ABSOLUTE IMMATURE GRANULOCYTE: 0 K/UL (ref 0–0.3)
ABSOLUTE LYMPH #: 1.47 K/UL (ref 1.1–3.7)
ABSOLUTE MONO #: 0.42 K/UL (ref 0.1–1.2)
ALBUMIN SERPL-MCNC: 3.6 G/DL (ref 3.5–5.2)
ALBUMIN/GLOBULIN RATIO: 1.2 (ref 1–2.5)
ALP SERPL-CCNC: 99 U/L (ref 35–104)
ALT SERPL-CCNC: 22 U/L (ref 5–33)
ANION GAP SERPL CALCULATED.3IONS-SCNC: 10 MMOL/L (ref 9–17)
AST SERPL-CCNC: 149 U/L
BASOPHILS # BLD: 1 % (ref 0–2)
BASOPHILS ABSOLUTE: 0.03 K/UL (ref 0–0.2)
BILIRUB SERPL-MCNC: 1.3 MG/DL (ref 0.3–1.2)
BUN SERPL-MCNC: <2 MG/DL (ref 6–20)
CALCIUM SERPL-MCNC: 9.2 MG/DL (ref 8.6–10.4)
CHLORIDE SERPL-SCNC: 101 MMOL/L (ref 98–107)
CO2 SERPL-SCNC: 19 MMOL/L (ref 20–31)
CREAT SERPL-MCNC: 0.37 MG/DL (ref 0.5–0.9)
EOSINOPHILS RELATIVE PERCENT: 2 % (ref 1–4)
GFR SERPL CREATININE-BSD FRML MDRD: >60 ML/MIN/1.73M2
GLUCOSE SERPL-MCNC: 91 MG/DL (ref 70–99)
HCT VFR BLD AUTO: 28.6 % (ref 36.3–47.1)
HGB BLD-MCNC: 10 G/DL (ref 11.9–15.1)
IMMATURE GRANULOCYTES: 0 %
LYMPHOCYTES # BLD: 46 % (ref 24–43)
MCH RBC QN AUTO: 40 PG (ref 25.2–33.5)
MCHC RBC AUTO-ENTMCNC: 35 G/DL (ref 28.4–34.8)
MCV RBC AUTO: 114.4 FL (ref 82.6–102.9)
MONOCYTES # BLD: 13 % (ref 3–12)
MORPHOLOGY: ABNORMAL
MORPHOLOGY: ABNORMAL
NRBC AUTOMATED: 0 PER 100 WBC
PDW BLD-RTO: 18.3 % (ref 11.8–14.4)
PLATELET # BLD AUTO: 241 K/UL (ref 138–453)
PMV BLD AUTO: 12 FL (ref 8.1–13.5)
POTASSIUM SERPL-SCNC: 4 MMOL/L (ref 3.7–5.3)
PROT SERPL-MCNC: 6.7 G/DL (ref 6.4–8.3)
RBC # BLD: 2.5 M/UL (ref 3.95–5.11)
SEG NEUTROPHILS: 38 % (ref 36–65)
SEGMENTED NEUTROPHILS ABSOLUTE COUNT: 1.22 K/UL (ref 1.5–8.1)
SODIUM SERPL-SCNC: 130 MMOL/L (ref 135–144)
WBC # BLD AUTO: 3.2 K/UL (ref 3.5–11.3)

## 2023-04-20 PROCEDURE — 85025 COMPLETE CBC W/AUTO DIFF WBC: CPT

## 2023-04-20 PROCEDURE — 6370000000 HC RX 637 (ALT 250 FOR IP)

## 2023-04-20 PROCEDURE — 2060000000 HC ICU INTERMEDIATE R&B

## 2023-04-20 PROCEDURE — 97535 SELF CARE MNGMENT TRAINING: CPT

## 2023-04-20 PROCEDURE — 80053 COMPREHEN METABOLIC PANEL: CPT

## 2023-04-20 PROCEDURE — 6360000002 HC RX W HCPCS: Performed by: STUDENT IN AN ORGANIZED HEALTH CARE EDUCATION/TRAINING PROGRAM

## 2023-04-20 PROCEDURE — 2500000003 HC RX 250 WO HCPCS: Performed by: STUDENT IN AN ORGANIZED HEALTH CARE EDUCATION/TRAINING PROGRAM

## 2023-04-20 PROCEDURE — 6370000000 HC RX 637 (ALT 250 FOR IP): Performed by: STUDENT IN AN ORGANIZED HEALTH CARE EDUCATION/TRAINING PROGRAM

## 2023-04-20 PROCEDURE — 2580000003 HC RX 258: Performed by: STUDENT IN AN ORGANIZED HEALTH CARE EDUCATION/TRAINING PROGRAM

## 2023-04-20 PROCEDURE — 97530 THERAPEUTIC ACTIVITIES: CPT

## 2023-04-20 PROCEDURE — 99232 SBSQ HOSP IP/OBS MODERATE 35: CPT | Performed by: INTERNAL MEDICINE

## 2023-04-20 PROCEDURE — 36415 COLL VENOUS BLD VENIPUNCTURE: CPT

## 2023-04-20 RX ORDER — LANOLIN ALCOHOL/MO/W.PET/CERES
100 CREAM (GRAM) TOPICAL DAILY
Status: DISCONTINUED | OUTPATIENT
Start: 2023-04-20 | End: 2023-04-21 | Stop reason: HOSPADM

## 2023-04-20 RX ORDER — FOLIC ACID 1 MG/1
1 TABLET ORAL DAILY
Status: DISCONTINUED | OUTPATIENT
Start: 2023-04-20 | End: 2023-04-21 | Stop reason: HOSPADM

## 2023-04-20 RX ADMIN — LORAZEPAM 2 MG: 2 TABLET ORAL at 00:41

## 2023-04-20 RX ADMIN — CHLORDIAZEPOXIDE HYDROCHLORIDE 25 MG: 25 CAPSULE ORAL at 21:17

## 2023-04-20 RX ADMIN — ENOXAPARIN SODIUM 40 MG: 40 INJECTION SUBCUTANEOUS at 09:02

## 2023-04-20 RX ADMIN — CHLORDIAZEPOXIDE HYDROCHLORIDE 25 MG: 25 CAPSULE ORAL at 09:04

## 2023-04-20 RX ADMIN — SODIUM CHLORIDE, PRESERVATIVE FREE 10 ML: 5 INJECTION INTRAVENOUS at 09:00

## 2023-04-20 RX ADMIN — SODIUM CHLORIDE, PRESERVATIVE FREE 10 ML: 5 INJECTION INTRAVENOUS at 21:18

## 2023-04-20 RX ADMIN — CHLORDIAZEPOXIDE HYDROCHLORIDE 25 MG: 25 CAPSULE ORAL at 13:58

## 2023-04-20 RX ADMIN — FOLIC ACID: 5 INJECTION, SOLUTION INTRAMUSCULAR; INTRAVENOUS; SUBCUTANEOUS at 09:08

## 2023-04-20 ASSESSMENT — PAIN SCALES - GENERAL: PAINLEVEL_OUTOF10: 0

## 2023-04-20 NOTE — CONSULTS
overdosed. Could not elicit any manic or hypomanic symptoms. Could not elicit any psychotic symptoms today. PSYCHIATRIC HISTORY:  Reports history of depression. Currently not following up with any outpatient psychiatrist.  Previously prescribed Effexor and Abilify here in the hospital however patient has not been taking them at home. Reports 1 previous inpatient psychiatric hospitalization for severe depression but denies any previous suicide attempts. Lifetime Psychiatric Review of Systems         Obsessions and Compulsions: Denies       Vanita or Hypomania: Denies     Hallucinations: Denies     Panic Attacks:  Denies     Delusions:  Denies     Phobias:  Denies     Trauma: Denies    Prior to Admission medications    Medication Sig Start Date End Date Taking?  Authorizing Provider   pantoprazole (PROTONIX) 40 MG tablet Take 1 tablet by mouth 2 times daily (before meals) 3/8/23 4/7/23  Jay Ramirez DO   Magnesium 400 MG CAPS Take 400 mg by mouth daily for 5 days 3/8/23 3/13/23  Jay Ramirez DO   potassium chloride (KLOR-CON M) 20 MEQ extended release tablet Take 1 tablet by mouth daily for 5 days 3/8/23 3/13/23  Jay Ramirez DO        Medications:    Current Facility-Administered Medications: folic acid (FOLVITE) tablet 1 mg, 1 mg, Oral, Daily  thiamine tablet 100 mg, 100 mg, Oral, Daily  sodium chloride flush 0.9 % injection 5-40 mL, 5-40 mL, IntraVENous, 2 times per day  sodium chloride flush 0.9 % injection 5-40 mL, 5-40 mL, IntraVENous, PRN  0.9 % sodium chloride infusion, , IntraVENous, PRN  enoxaparin (LOVENOX) injection 40 mg, 40 mg, SubCUTAneous, Daily  ondansetron (ZOFRAN-ODT) disintegrating tablet 4 mg, 4 mg, Oral, Q8H PRN **OR** ondansetron (ZOFRAN) injection 4 mg, 4 mg, IntraVENous, Q6H PRN  polyethylene glycol (GLYCOLAX) packet 17 g, 17 g, Oral, Daily PRN  acetaminophen (TYLENOL) tablet 650 mg, 650 mg, Oral, Q6H PRN **OR** acetaminophen (TYLENOL) suppository 650 mg, 650 mg, Rectal, Q6H

## 2023-04-21 ENCOUNTER — HOSPITAL ENCOUNTER (INPATIENT)
Age: 28
LOS: 4 days | Discharge: HOME | DRG: 751 | End: 2023-04-25
Attending: PSYCHIATRY & NEUROLOGY | Admitting: PSYCHIATRY & NEUROLOGY
Payer: MEDICAID

## 2023-04-21 VITALS
WEIGHT: 118 LBS | TEMPERATURE: 97.9 F | HEART RATE: 105 BPM | DIASTOLIC BLOOD PRESSURE: 73 MMHG | RESPIRATION RATE: 23 BRPM | HEIGHT: 67 IN | OXYGEN SATURATION: 92 % | SYSTOLIC BLOOD PRESSURE: 107 MMHG | BODY MASS INDEX: 18.52 KG/M2

## 2023-04-21 LAB
ABSOLUTE EOS #: 0.03 K/UL (ref 0–0.4)
ABSOLUTE IMMATURE GRANULOCYTE: 0 K/UL (ref 0–0.3)
ABSOLUTE LYMPH #: 1.33 K/UL (ref 1–4.8)
ABSOLUTE MONO #: 0.26 K/UL (ref 0.1–0.8)
ANION GAP SERPL CALCULATED.3IONS-SCNC: 12 MMOL/L (ref 9–17)
BASOPHILS # BLD: 0 % (ref 0–2)
BASOPHILS ABSOLUTE: 0 K/UL (ref 0–0.2)
BUN SERPL-MCNC: 3 MG/DL (ref 6–20)
CALCIUM SERPL-MCNC: 9.5 MG/DL (ref 8.6–10.4)
CHLORIDE SERPL-SCNC: 99 MMOL/L (ref 98–107)
CO2 SERPL-SCNC: 21 MMOL/L (ref 20–31)
CREAT SERPL-MCNC: 0.48 MG/DL (ref 0.5–0.9)
EOSINOPHILS RELATIVE PERCENT: 1 % (ref 1–4)
GFR SERPL CREATININE-BSD FRML MDRD: >60 ML/MIN/1.73M2
GLUCOSE SERPL-MCNC: 81 MG/DL (ref 70–99)
HCT VFR BLD AUTO: 32.7 % (ref 36.3–47.1)
HGB BLD-MCNC: 10.3 G/DL (ref 11.9–15.1)
IMMATURE GRANULOCYTES: 0 %
LYMPHOCYTES # BLD: 46 % (ref 24–44)
MCH RBC QN AUTO: 38 PG (ref 25.2–33.5)
MCHC RBC AUTO-ENTMCNC: 31.5 G/DL (ref 28.4–34.8)
MCV RBC AUTO: 120.7 FL (ref 82.6–102.9)
MONOCYTES # BLD: 9 % (ref 1–7)
MORPHOLOGY: ABNORMAL
MORPHOLOGY: ABNORMAL
NRBC AUTOMATED: 0 PER 100 WBC
PDW BLD-RTO: 18.3 % (ref 11.8–14.4)
PLATELET # BLD AUTO: ABNORMAL K/UL (ref 138–453)
PLATELET, FLUORESCENCE: 88 K/UL (ref 138–453)
PLATELET, IMMATURE FRACTION: 6.1 % (ref 1.1–10.3)
POTASSIUM SERPL-SCNC: 4 MMOL/L (ref 3.7–5.3)
RBC # BLD: 2.71 M/UL (ref 3.95–5.11)
SEG NEUTROPHILS: 44 % (ref 36–66)
SEGMENTED NEUTROPHILS ABSOLUTE COUNT: 1.28 K/UL (ref 1.8–7.7)
SODIUM SERPL-SCNC: 132 MMOL/L (ref 135–144)
WBC # BLD AUTO: 2.9 K/UL (ref 3.5–11.3)

## 2023-04-21 PROCEDURE — 99232 SBSQ HOSP IP/OBS MODERATE 35: CPT | Performed by: INTERNAL MEDICINE

## 2023-04-21 PROCEDURE — 1240000000 HC EMOTIONAL WELLNESS R&B

## 2023-04-21 PROCEDURE — 85055 RETICULATED PLATELET ASSAY: CPT

## 2023-04-21 PROCEDURE — 36415 COLL VENOUS BLD VENIPUNCTURE: CPT

## 2023-04-21 PROCEDURE — 80048 BASIC METABOLIC PNL TOTAL CA: CPT

## 2023-04-21 PROCEDURE — 6370000000 HC RX 637 (ALT 250 FOR IP): Performed by: STUDENT IN AN ORGANIZED HEALTH CARE EDUCATION/TRAINING PROGRAM

## 2023-04-21 PROCEDURE — 6370000000 HC RX 637 (ALT 250 FOR IP)

## 2023-04-21 PROCEDURE — 85025 COMPLETE CBC W/AUTO DIFF WBC: CPT

## 2023-04-21 PROCEDURE — 6360000002 HC RX W HCPCS: Performed by: STUDENT IN AN ORGANIZED HEALTH CARE EDUCATION/TRAINING PROGRAM

## 2023-04-21 PROCEDURE — 6370000000 HC RX 637 (ALT 250 FOR IP): Performed by: INTERNAL MEDICINE

## 2023-04-21 PROCEDURE — 6370000000 HC RX 637 (ALT 250 FOR IP): Performed by: NURSE PRACTITIONER

## 2023-04-21 RX ORDER — MAGNESIUM HYDROXIDE/ALUMINUM HYDROXICE/SIMETHICONE 120; 1200; 1200 MG/30ML; MG/30ML; MG/30ML
30 SUSPENSION ORAL EVERY 6 HOURS PRN
Status: DISCONTINUED | OUTPATIENT
Start: 2023-04-21 | End: 2023-04-25 | Stop reason: HOSPADM

## 2023-04-21 RX ORDER — ACETAMINOPHEN 325 MG/1
650 TABLET ORAL EVERY 4 HOURS PRN
Status: DISCONTINUED | OUTPATIENT
Start: 2023-04-21 | End: 2023-04-25 | Stop reason: HOSPADM

## 2023-04-21 RX ORDER — CHLORDIAZEPOXIDE HYDROCHLORIDE 25 MG/1
25 CAPSULE, GELATIN COATED ORAL 3 TIMES DAILY
Status: CANCELLED | OUTPATIENT
Start: 2023-04-21

## 2023-04-21 RX ORDER — HYDROXYZINE 50 MG/1
50 TABLET, FILM COATED ORAL 3 TIMES DAILY PRN
Status: DISCONTINUED | OUTPATIENT
Start: 2023-04-21 | End: 2023-04-25 | Stop reason: HOSPADM

## 2023-04-21 RX ORDER — CHLORDIAZEPOXIDE HYDROCHLORIDE 10 MG/1
10 CAPSULE, GELATIN COATED ORAL 3 TIMES DAILY
Status: DISCONTINUED | OUTPATIENT
Start: 2023-04-21 | End: 2023-04-22

## 2023-04-21 RX ORDER — POLYETHYLENE GLYCOL 3350 17 G/17G
17 POWDER, FOR SOLUTION ORAL DAILY PRN
Status: DISCONTINUED | OUTPATIENT
Start: 2023-04-21 | End: 2023-04-25 | Stop reason: HOSPADM

## 2023-04-21 RX ORDER — FOLIC ACID 1 MG/1
1 TABLET ORAL DAILY
Status: CANCELLED | OUTPATIENT
Start: 2023-04-22

## 2023-04-21 RX ORDER — IBUPROFEN 400 MG/1
400 TABLET ORAL EVERY 6 HOURS PRN
Status: DISCONTINUED | OUTPATIENT
Start: 2023-04-21 | End: 2023-04-25 | Stop reason: HOSPADM

## 2023-04-21 RX ORDER — TRAZODONE HYDROCHLORIDE 50 MG/1
50 TABLET ORAL NIGHTLY PRN
Status: DISCONTINUED | OUTPATIENT
Start: 2023-04-21 | End: 2023-04-25 | Stop reason: HOSPADM

## 2023-04-21 RX ORDER — LANOLIN ALCOHOL/MO/W.PET/CERES
100 CREAM (GRAM) TOPICAL DAILY
Status: CANCELLED | OUTPATIENT
Start: 2023-04-22

## 2023-04-21 RX ADMIN — ENOXAPARIN SODIUM 40 MG: 40 INJECTION SUBCUTANEOUS at 08:34

## 2023-04-21 RX ADMIN — Medication 100 MG: at 08:33

## 2023-04-21 RX ADMIN — FOLIC ACID 1 MG: 1 TABLET ORAL at 08:33

## 2023-04-21 RX ADMIN — CHLORDIAZEPOXIDE HYDROCHLORIDE 25 MG: 25 CAPSULE ORAL at 15:36

## 2023-04-21 RX ADMIN — HYDROXYZINE HYDROCHLORIDE 50 MG: 50 TABLET, FILM COATED ORAL at 23:20

## 2023-04-21 RX ADMIN — CHLORDIAZEPOXIDE HYDROCHLORIDE 25 MG: 25 CAPSULE ORAL at 08:33

## 2023-04-21 RX ADMIN — CHLORDIAZEPOXIDE HYDROCHLORIDE 10 MG: 10 CAPSULE ORAL at 23:20

## 2023-04-21 RX ADMIN — TRAZODONE HYDROCHLORIDE 50 MG: 50 TABLET ORAL at 23:20

## 2023-04-21 RX ADMIN — LORAZEPAM 1 MG: 1 TABLET ORAL at 06:45

## 2023-04-21 ASSESSMENT — SLEEP AND FATIGUE QUESTIONNAIRES
AVERAGE NUMBER OF SLEEP HOURS: 5
DO YOU USE A SLEEP AID: NO
DO YOU HAVE DIFFICULTY SLEEPING: NO

## 2023-04-21 ASSESSMENT — LIFESTYLE VARIABLES
HOW OFTEN DO YOU HAVE A DRINK CONTAINING ALCOHOL: 4 OR MORE TIMES A WEEK
HOW MANY STANDARD DRINKS CONTAINING ALCOHOL DO YOU HAVE ON A TYPICAL DAY: 10 OR MORE

## 2023-04-21 ASSESSMENT — PATIENT HEALTH QUESTIONNAIRE - PHQ9: SUM OF ALL RESPONSES TO PHQ QUESTIONS 1-9: 18

## 2023-04-21 ASSESSMENT — PAIN SCALES - GENERAL: PAINLEVEL_OUTOF10: 0

## 2023-04-21 NOTE — DISCHARGE INSTR - COC
Continuity of Care Form    Patient Name: Olga Lidia Taylor   :  1995  MRN:  [id number]    Admit date:  2023  Discharge date:  ***    Code Status Order: Full Code   Advance Directives:     Admitting Physician:  Surendra Espinosa MD  PCP: Harini Macario    Discharging Nurse: [facility] Unit/Room#: 0453/7962-17  Discharging Unit Phone Number: ***    Emergency Contact:   Extended Emergency Contact Information  Primary Emergency Contact: Luly Jaramillo  Address: [address]  Home Phone: [phone]  Relation: Parent  Secondary Emergency Contact: Alexis Ferguson  Home Phone: [phone]  Mobile Phone: [phone]  Relation: Boyfriend    Past Surgical History:  Past Surgical History:   Procedure Laterality Date    CHOLECYSTECTOMY         Immunization History: There is no immunization history on file for this patient.     Active Problems:  Patient Active Problem List   Diagnosis Code    Bipolar I disorder, most recent episode depressed ([facility] F31.30    Hyperbilirubinemia E80.6    Hepatic steatosis K76.0    Ascites due to alcoholic hepatitis O61.59    Alcohol abuse F10.10    S/P cholecystectomy Z90.49    Abnormal LFTs R79.89    Hypokalemia E87.6    Hypoalbuminemia E88.09    Portal hypertension (HCC) K76.6    Intentional drug overdose ([facility] T50.902A    COVID-19 U07.1    Acute respiratory insufficiency J70.76    Alcoholic intoxication with complication (HCC) M63.702    Altered mental status R41.82    Acute respiratory failure with hypoxia (HCC) J96.01    Unspecified mood (affective) disorder (HCC) F39    Alcohol dependence with withdrawal (HCC) I30.093    Alcoholic gastritis without bleeding K29.20    High anion gap metabolic acidosis R94.32    Alcoholic hepatitis D84.19    Hypomagnesemia E83.42    Thrombocytopenia (HCC) D69.6    Macrocytic anemia D53.9    History of COVID-19 X77.20    Alcoholic ketoacidosis V66.55    Hypoglycemia E16.2    Alcohol withdrawal hallucinosis ([facility]

## 2023-04-21 NOTE — PROGRESS NOTES
Attempted to see the patient t however she is unable to answer any questions and is very somnolent. Discussed briefly with her boyfriend and he could not identify any specific stressors that could have led to this attempt. Mentions that she did cut down on her drinking recently. Plan from psychiatry will be to admit her to [facility] after she is medically stable. Will reassess her tomorrow when her mentation improves.   Please continue one-to-one sitter    Electronically signed by Marya Pritchett MD on 4/19/2023 at 3:55 PM
Occupational Therapy  Facility/Department: [facility]  Occupational Therapy Daily Treatment Note    Name: Georges Merritt  : 1995  MRN: [id number]  Date of Service: 2023    Discharge Recommendations:  Patient would benefit from continued therapy after discharge          Patient Diagnosis(es): The primary encounter diagnosis was Alcohol withdrawal syndrome without complication ([facility] A diagnosis of Suicidal ideation was also pertinent to this visit. Past Medical History:  has a past medical history of ADHD (attention deficit hyperactivity disorder), Anxiety, Bipolar 1 disorder ([facility] and Depression. Past Surgical History:  has a past surgical history that includes Cholecystectomy. Assessment   Performance deficits / Impairments: Decreased functional mobility ; Decreased ADL status; Decreased strength;Decreased safe awareness;Decreased endurance;Decreased high-level IADLs;Decreased cognition  Assessment: Pt limited in ADL/IADLs due to the noted deficits above; most significantly decreased safety awareness. Pt demonstrates ability to perform grooming/UB ADLs with SBA. Pt demonstrates good potential to meet therapy goals and is expected to be safe to return to prior living arrangement with assistance PRN. Pt will benefit from continued therapy in order to maximize pt's safety and independence in performing ADLs.   Prognosis: Good  Activity Tolerance  Activity Tolerance: Patient limited by fatigue;Patient Tolerated treatment well        Plan   Occupational Therapy Plan  Times Per Week: 2-3x/wk  Current Treatment Recommendations: Strengthening, Balance training, Functional mobility training, Endurance training, Gait training, Safety education & training, Patient/Caregiver education & training, Self-Care / ADL     Restrictions  Restrictions/Precautions  Restrictions/Precautions: Fall Risk, Up as Tolerated, Other (comment) (Suicide precautions)  Required Braces or Orthoses?: No    Subjective
Physical Therapy  Facility/Department: [facility]  Physical Therapy Initial Assessment    Name: Rosales Hernadez  : 1995  MRN: [id number]  Date of Service: 2023  Intentional overdose of unknown substance. Ethanol elevated. Discharge Recommendations:  Patient would benefit from continued therapy after discharge   PT Equipment Recommendations  Equipment Needed: Yes  Mobility Devices: Dannis Osborn: Rolling      Patient Diagnosis(es): The primary encounter diagnosis was Alcohol withdrawal syndrome without complication ([facility] A diagnosis of Suicidal ideation was also pertinent to this visit. Past Medical History:  has a past medical history of ADHD (attention deficit hyperactivity disorder), Anxiety, Bipolar 1 disorder ([facility] and Depression. Past Surgical History:  has a past surgical history that includes Cholecystectomy. Assessment   Body Structures, Functions, Activity Limitations Requiring Skilled Therapeutic Intervention: Decreased functional mobility ; Decreased balance;Decreased strength;Decreased coordination;Decreased cognition;Decreased safe awareness  Assessment: Lethargic, unsteady, weak in appearance. Able to ambulate 200' unsteadily, better with the walker than without. Patient will receive further physical therapy to return to independent functioning.   Therapy Prognosis: Good  Decision Making: Low Complexity  Clinical Presentation: stable  Requires PT Follow-Up: Yes  Activity Tolerance  Activity Tolerance: Patient limited by fatigue  Activity Tolerance Comments: Lethargic     Plan   Physcial Therapy Plan  General Plan:  (5-6x/wk)  Current Treatment Recommendations: Strengthening, Home exercise program, Gait training, Stair training, Functional mobility training, Transfer training, Balance training, Safety education & training, Therapeutic activities, Patient/Caregiver education & training, Equipment evaluation, education, & procurement  Safety Devices  Type of Devices: Sitter
[facility]  Internal Medicine Teaching Residency Program  Inpatient Daily Progress Note  ______________________________________________________________________________    Patient: Suzie Silverman  YOB: 1995   PQW:[id number]    Acct: [de-identified]     Room: [address] [place] [address]  Admit date: 4/17/2023  Today's date: 04/18/23  Number of days in the hospital: 1    SUBJECTIVE   Admitting Diagnosis: Alcoholic ketoacidosis  CC: nausea, vomiting. Pt examined at bedside. Chart & results reviewed. No acute events overnight. Patient is hemodynamically stable, saturating well  Mentation slightly improved since admission but still speech partially incomprehensible. Monitor for signs of alcohol withdrawal.  Suicide precautions in place. ROS:  Constitutional:  negative for chills, fevers, sweats  Respiratory:  negative for cough, dyspnea on exertion, hemoptysis, shortness of breath, wheezing  Cardiovascular:  negative for chest pain, chest pressure/discomfort, lower extremity edema, palpitations  Gastrointestinal:  negative for abdominal pain, constipation, diarrhea, nausea, vomiting  Neurological:  negative for dizziness, headache  BRIEF HISTORY     The patient is a pleasant 32 y.o. female known alcohol abuser, drinks a pint of vodka per day, PMH significant for ADHD, bipolar 1 disorder, depression, anxiety presented to ED with nausea, chills and suicide attempt. History cannot be obtained from patient as patient speech is incomprehensible. Boyfriend called EMS due to concerns of suicidal attempt and overdose. Last night, patient took 15-17 unknown pills in an attempt to end her life. He, patient was nauseous, vomiting, tremulous denies any fever, diarrhea, constipation, chest pain, difficulty breathing. Denies any previous suicide attempt. In ED, patient had multiple episodes of nausea and vomiting. Tachycardic and ill-appearing. Dry mucous membrane.
Writer called report to Lolly Deleon, nurse at [facility] unit of [facility]. Patient left with belongings from security via transport. Transporters aware and will keep eyes on patient during transfer.  Belongings to be given to [facility] on arrival.
ketosis. OBJECTIVE     Vital Signs:  BP 99/72   Pulse (!) 108   Temp 98.4 °F (36.9 °C) (Oral)   Resp 22   Ht 5' 7\" (1.702 m)   Wt 118 lb (53.5 kg)   SpO2 100%   BMI 18.48 kg/m²     Temp (24hrs), Av.4 °F (36.9 °C), Min:97.5 °F (36.4 °C), Max:99 °F (37.2 °C)    In: 3034.5   Out: -     Physical Exam:  Constitutional: This is a well developed, well nourished,  32y.o. year old female who is somnolent unable to keep conversation. head:normocephalic and atraumatic. EENT:  PERRLA. No conjunctival injections. Septum was midline, mucosa was without erythema, exudates or cobblestoning. No thrush was noted. Neck: Supple without thyromegaly. No elevated JVP. Trachea was midline. Respiratory: Chest was symmetrical without dullness to percussion. Breath sounds bilaterally were clear to auscultation. There were no wheezes, rhonchi or rales. There is no intercostal retraction or use of accessory muscles. No egophony noted. Cardiovascular: Regular without murmur, clicks, gallops or rubs. Abdomen: Slightly rounded and soft without organomegaly. No rebound, rigidity or guarding was appreciated. Lymphatic: No lymphadenopathy. Musculoskeletal: Normal curvature of the spine. No gross muscle weakness. Extremities:  No lower extremity edema, ulcerations, tenderness, varicosities or erythema. Muscle size, tone and strength are normal.  No involuntary movements are noted. Skin:  Warm and dry. Good color, turgor and pigmentation. No lesions or scars.   No cyanosis or clubbing  Neurological/Psychiatric: The patient's general behavior, level of consciousness, thought content and emotional status is normal.        Medications:  Scheduled Medications:    sodium chloride flush  5-40 mL IntraVENous 2 times per day    enoxaparin  40 mg SubCUTAneous Daily    thiamine and folic acid IVPB   IntraVENous Daily    chlordiazePOXIDE  25 mg Oral TID     Continuous Infusions:    sodium chloride 25 mL (23 0829)
Continuous Infusions:    sodium chloride 25 mL (04/19/23 0829)    dextrose       PRN Medicationssodium chloride flush, 5-40 mL, PRN  sodium chloride, , PRN  ondansetron, 4 mg, Q8H PRN   Or  ondansetron, 4 mg, Q6H PRN  polyethylene glycol, 17 g, Daily PRN  acetaminophen, 650 mg, Q6H PRN   Or  acetaminophen, 650 mg, Q6H PRN  glucose, 4 tablet, PRN  dextrose bolus, 125 mL, PRN   Or  dextrose bolus, 250 mL, PRN  glucagon (rDNA), 1 mg, PRN  dextrose, , Continuous PRN  LORazepam, 1 mg, Q1H PRN   Or  LORazepam, 1 mg, Q1H PRN   Or  LORazepam, 2 mg, Q1H PRN   Or  LORazepam, 2 mg, Q1H PRN   Or  LORazepam, 3 mg, Q1H PRN   Or  LORazepam, 3 mg, Q1H PRN   Or  LORazepam, 4 mg, Q1H PRN   Or  LORazepam, 4 mg, Q1H PRN        Diagnostic Labs:  CBC:   Recent Labs     04/18/23  0708 04/19/23  0720 04/20/23  0640   WBC 2.9* 2.4* 3.2*   RBC 2.64* 2.47* 2.50*   HGB 10.4* 9.3* 10.0*   HCT 30.0* 29.2* 28.6*   .6* 118.2* 114.4*   RDW 18.2* 18.2* 18.3*    See Reflexed IPF Result 241     BMP:   Recent Labs     04/17/23  2140 04/18/23  0708 04/18/23  0912 04/19/23  0720 04/20/23  0640   NA  --    < > 135 136 130*   K  --    < > 3.5* 3.8 4.0   CL  --    < > 99 108* 101   CO2  --    < > 18* 19* 19*   PHOS 1.8*  --   --  2.0*  --    BUN  --    < > <2* <2* <2*   CREATININE  --    < > 0.30* 0.37* 0.37*    < > = values in this interval not displayed. BNP: No results for input(s): BNP in the last 72 hours. PT/INR:   Recent Labs     04/17/23  2140   PROTIME 15.8*   INR 1.3     APTT: No results for input(s): APTT in the last 72 hours. CARDIAC ENZYMES: No results for input(s): CKMB, CKMBINDEX, TROPONINI in the last 72 hours.     Invalid input(s): CKTOTAL;3  FASTING LIPID PANEL:  Lab Results   Component Value Date    TRIG 109 12/30/2022     LIVER PROFILE:   Recent Labs     04/18/23  0708 04/19/23  0720 04/20/23  0640   * 116* 149*   ALT 24 20 22   BILITOT 2.2* 1.4* 1.3*   ALKPHOS 108* 97 99      MICROBIOLOGY:   Lab Results
Mobility  Overall Level of Assistance: Contact-guard assistance; Additional time (pt demonstrated functional mobility throguh side step transfers at EOB to position self closer to the [facility]; pt required CGA due to unsteady gait as demonstrated through BLE shaking secondary to pt reported weakness, however, no LOB noted)     AROM: Within functional limits (BUE assessed; WFL)  Strength: Decreased, functional (BUE grossly 3+/5)  Coordination: Within functional limits (FMC/GMC intact)  Sensation: Intact (pt denies numbness and tingling at this time)    ADL  Feeding: Independent  Grooming: Increased time to complete;Stand by assistance  UE Bathing: Increased time to complete;Stand by assistance  LE Bathing: Minimal assistance; Increased time to complete  UE Dressing: Stand by assistance; Increased time to complete  LE Dressing: Minimal assistance; Increased time to complete (pt required Min A for LB dressing this date in order to thread socks on feet; once socks partially in feet, pt able to perform sock management to complete functional task)  Toileting: Minimal assistance; Increased time to complete    Bed mobility  Supine to Sit: Stand by assistance  Sit to Supine: Stand by assistance  Scooting: Stand by assistance  Bed Mobility Comments: HOB raised ~30* prior to attempting bed mobility; pt did not utilize bed rails throughout functional task; pt demonstrated good awareness of lines and tubes throughout    Transfers  Stand Step Transfers: Contact guard assistance  Sit to stand: Contact guard assistance  Stand to sit: Contact guard assistance  Transfer Comments: Functional transfers performed with increased time and effort; pt performed side step transfer to position self closer to [address]: Impaired  Vision Exceptions:  (pt reports needing glasses for reading and distance, however, does not have any)  Hearing  Hearing: Within functional limits    Cognition  Overall Cognitive Status:
PLAN     IMPRESSION  This is a 32 y.o. female who presented with nausea, vomiting and a suicide attempt with overdose on 15-17 unknown medication. Anion gap metabolic acidosis-resolved. Anion gap was 26 on admission, lactic acid 2.5. Delta delta 1.5, no concern for mixed disorder. Alcohol level 64, glucose 63, BHB 3.3. Large ketones in urine. Alcoholic ketoacidosis versus starvation ketoacidosis. Patient has been started on CIWA protocol, received around 6 mg ativan in last 24 hours. Patient is ready to be discharged to [facility] as per psychiatry. Electrolyte derangements  Replace K, Mg, Po4 as needed  New onset hyponatremia. Monitor     Alcohol abuse  Watch for signs of withdrawal. Start patient on thiamine, folic acid and multivitamin. CIWA protocol in place. Suicide attempt. Ingested 15 or 17 tablets. Substance unknown. Work-up unremarkable. Suicide precautions in place. Psych evaluated the patient on 4/19 but patient was very somnolent and drowsy and unable to answer any questions. Psych will reevaluate the patient today. Patient is medically stable to go to [facility] today. New onset leukopenia  Possibly secondary to unknown substance ingestion. Count slowly improving. Monitor, no fever or signs of opportunistic infection. History of bipolar/depression/anxiety:  Not on any home medication. Psych has been consulted. Recommended BHI     DVT ppx: Lovenox  GI ppx: Not indicated     PT/OT/SW: Consulted  Discharge Planning: Discharged to [facility], pending psych reevaluation today. Kassi Frankel MD  Internal Medicine Resident, PGY-1  [facility];  [place], [place]  4/21/2023, 9:54 AM

## 2023-04-21 NOTE — DISCHARGE INSTRUCTIONS
Continue librium 25 tid for now and taper it down from tomorrow  Continue folic acid, thiamine  Suicide precautions  Follow up with your PCP for evaluation of your low white cell counts and anemia

## 2023-04-21 NOTE — CARE COORDINATION
Consult received from ED SW, who attempted to meet with pt on 4/17, but pt was unable to answer questions. SW attempted to meet with yest but unable to awaken. Met with pt this am, sitter present. Pt is known to SW from previous admissions. Pt stated she ingested ~ 10 of her anxiety pills in an attempt to end her life. Stated this was her only suicide attempts. Stated she has a lot of stress, hard to do daily tasks, and felt she couldn't do anything right. Pt admits she would like to feel better. Stated she is not linked with any [facility]. Pt stated she cont to drink alcohol daily, a liter of vodka. Stated she used to drink a 1/5 of vodka. Stated she last drank the night this all happened. Pt reports she has been drinking since age 13. Pt admits to occasional marijuana use. She denies all other drug use. Pt stated she is a \"little bit\" concerned about her alcohol use. Stated the longest she has gone without drinking has been 31 days - stated she was in [employer] `~6m agp for 30 days and then stayed sober one day once she got out. Pt reports 4-5m ago, she went to tx at [facility] but left after 1 day. Pt stated she is unsure is she wants to quit drinking at this time and prefers to just slow down.    Informed pt that psych will see her and SW will f/u pending psych eval/recs
Discharge order noted on chart - recommendation that patient discharge to [facility]. Met with patient and she confirms that she is agreeable to voluntary admission. Provided application for voluntary admission form, signed by patient and witnessed by CM. Call to [facility] to initiate [facility] transfer, spoke with Liyah.  Voluntary form faxed to [address] [place] [address] Case Management Department  Written by: Fredy Copeland RN    Patient Name: Azam Gaffney  Attending Provider: Madhu Verma MD  Admit Date: 2023 11:35 AM  MRN: [id number]  Account: [de-identified]                     : 1995  Discharge Date: 2023      Disposition: [facility]     Fredy Copeland RN
Referral received from ED SW who attempted to meet with pt yest after an ingestion of 17 unknown pills. Pt is also a daily drinker. Pt has been seen by SW in the past for her drinking.    Attempted x2 to meet with pt - pt would not awaken when name called  Will f/u in am.
today)  Other Identified Issues/Barriers to RETURNING to current housing: SUICIDAL/BHI  Potential Assistance needed at discharge: Other (Comment) (BHI)            Potential DME:  NONE  Patient expects to discharge to: Other (comment) (VS HOME)  Plan for transportation at discharge:      Financial    Payor: Saud Salt / Plan: Saud Salt / Product Type: *No Product type* /     Does insurance require precert for SNF: Yes    Potential assistance Purchasing Medications: No  Meds-to-Beds request: Yes      [facility] [id number] Jenny Talavera, [address] [place] [address]  [address] [id number]  Phone: [phone] Fax: [phone]      Notes:    Factors facilitating achievement of predicted outcomes: Cooperative    Barriers to discharge: [facility] EVAL    Additional Case Management Notes: AWAIT PSYCH EVAL TODAY TO DETERMINE DISPOSITION [address]    The Plan for Transition of Care is related to the following treatment goals of Suicidal ideation [O50.924]  Alcoholic ketoacidosis [T23.63]  Alcohol withdrawal syndrome without complication ([facility] [address] [N25.488]    IF APPLICABLE: The Patient and/or patient representative Lainey Jalloh and her family were provided with a choice of provider and agrees with the discharge plan. Freedom of choice list with basic dialogue that supports the patient's individualized plan of care/goals and shares the quality data associated with the providers was provided to:     Patient     The Patient and/or Patient Representative Agree with the Discharge Plan?   Alethea Godinez RN  Case Management Department

## 2023-04-21 NOTE — PLAN OF CARE
Patient is medically cleared for discharge. Patient is willing to go to [facility] voluntarily.      Jerome Marquez MD  INTERNAL MEDICINE RESIDENT, PGY3  [address], [place]   4/21/2023
Problem: Discharge Planning  Goal: Discharge to home or other facility with appropriate resources  4/18/2023 1457 by Robert Ochoa RN  Outcome: Progressing  Flowsheets (Taken 4/18/2023 0800)  Discharge to home or other facility with appropriate resources: Identify barriers to discharge with patient and caregiver  4/18/2023 0535 by Abdirashid Blackman RN  Outcome: Progressing     Problem: Confusion  Goal: Confusion, delirium, dementia, or psychosis is improved or at baseline  Description: INTERVENTIONS:  1. Assess for possible contributors to thought disturbance, including medications, impaired vision or hearing, underlying metabolic abnormalities, dehydration, psychiatric diagnoses, and notify attending LIP  2. [place] high risk fall precautions, as indicated  3. Provide frequent short contacts to provide reality reorientation, refocusing and direction  4. Decrease environmental stimuli, including noise as appropriate  5. Monitor and intervene to maintain adequate nutrition, hydration, elimination, sleep and activity  6. If unable to ensure safety without constant attention obtain sitter and review sitter guidelines with assigned personnel  7. Initiate Psychosocial CNS and Spiritual Care consult, as indicated  4/18/2023 1457 by Robert Ochoa RN  Outcome: Progressing  Flowsheets (Taken 4/18/2023 0800)  Effect of thought disturbance (confusion, delirium, dementia, or psychosis) are managed with adequate functional status:   Assess for contributors to thought disturbance, including medications, impaired vision or hearing, underlying metabolic abnormalities, dehydration, psychiatric diagnoses, notify [facility] high risk fall precautions, as indicated  4/18/2023 0535 by Abdirashid Blackman RN  Outcome: Progressing     Problem: Skin/Tissue Integrity  Goal: Absence of new skin breakdown  Description: 1. Monitor for areas of redness and/or skin breakdown  2. Assess vascular access sites hourly  3.
Problem: Discharge Planning  Goal: Discharge to home or other facility with appropriate resources  4/18/2023 1907 by Claudette Comings, RN  Outcome: Progressing  4/18/2023 1457 by Shayy Rosenthal RN  Outcome: Progressing  Flowsheets (Taken 4/18/2023 0800)  Discharge to home or other facility with appropriate resources: Identify barriers to discharge with patient and caregiver  4/18/2023 0535 by Johnny Edgar RN  Outcome: Progressing     Problem: Confusion  Goal: Confusion, delirium, dementia, or psychosis is improved or at baseline  Description: INTERVENTIONS:  1. Assess for possible contributors to thought disturbance, including medications, impaired vision or hearing, underlying metabolic abnormalities, dehydration, psychiatric diagnoses, and notify attending LIP  2. [place] high risk fall precautions, as indicated  3. Provide frequent short contacts to provide reality reorientation, refocusing and direction  4. Decrease environmental stimuli, including noise as appropriate  5. Monitor and intervene to maintain adequate nutrition, hydration, elimination, sleep and activity  6. If unable to ensure safety without constant attention obtain sitter and review sitter guidelines with assigned personnel  7.  Initiate Psychosocial CNS and Spiritual Care consult, as indicated  4/18/2023 1907 by Claudette Comings, RN  Outcome: Progressing  4/18/2023 1457 by Shayy Rosenthal RN  Outcome: Progressing  Flowsheets (Taken 4/18/2023 0800)  Effect of thought disturbance (confusion, delirium, dementia, or psychosis) are managed with adequate functional status:   Assess for contributors to thought disturbance, including medications, impaired vision or hearing, underlying metabolic abnormalities, dehydration, psychiatric diagnoses, notify [place] high risk fall precautions, as indicated  4/18/2023 0535 by Johnny Edgar RN  Outcome: Progressing     Problem: Skin/Tissue Integrity  Goal: Absence of new skin
Problem: Discharge Planning  Goal: Discharge to home or other facility with appropriate resources  Outcome: Progressing     Problem: Confusion  Goal: Confusion, delirium, dementia, or psychosis is improved or at baseline  Description: INTERVENTIONS:  1. Assess for possible contributors to thought disturbance, including medications, impaired vision or hearing, underlying metabolic abnormalities, dehydration, psychiatric diagnoses, and notify attending LIP  2. [place] high risk fall precautions, as indicated  3. Provide frequent short contacts to provide reality reorientation, refocusing and direction  4. Decrease environmental stimuli, including noise as appropriate  5. Monitor and intervene to maintain adequate nutrition, hydration, elimination, sleep and activity  6. If unable to ensure safety without constant attention obtain sitter and review sitter guidelines with assigned personnel  7. Initiate Psychosocial CNS and Spiritual Care consult, as indicated  Outcome: Progressing     Problem: Skin/Tissue Integrity  Goal: Absence of new skin breakdown  Description: 1. Monitor for areas of redness and/or skin breakdown  2. Assess vascular access sites hourly  3. Every 4-6 hours minimum:  Change oxygen saturation probe site  4. Every 4-6 hours:  If on nasal continuous positive airway pressure, respiratory therapy assess nares and determine need for appliance change or resting period.   Outcome: Progressing
Problem: Self Harm/Suicidality  Goal: Will have no self-injury during hospital stay  4/20/2023 0925 by Sulma Zamora RN  Outcome: Progressing  Flowsheets (Taken 4/20/2023 0800)  Will have no self-injury during hospital stay:   Ensure constant observer at bedside with Q15M safety checks   Maintain a safe environment   Secure patient belongings   Ensure family/visitors adhere to safety recommendations   Ensure safety tray has been added to patient's diet order   Every shift and PRN: Re-assess suicidal risk via Frequent Screener  4/20/2023 0106 by Arlene Umana RN  Outcome: Progressing  Flowsheets (Taken 4/19/2023 2000)  Will have no self-injury during hospital stay: Ensure constant observer at bedside with Q15M safety checks     Problem: Pain  Goal: Verbalizes/displays adequate comfort level or baseline comfort level  4/20/2023 0925 by Sulma Zamora RN  Outcome: Progressing  4/20/2023 0106 by Arlene Umana RN  Outcome: Progressing     Problem: Skin/Tissue Integrity  Goal: Absence of new skin breakdown  4/20/2023 0925 by Sulma Zamora RN  Outcome: Progressing  4/20/2023 0106 by Arlene Umana RN  Outcome: Progressing     Problem: Confusion  Goal: Confusion, delirium, dementia, or psychosis is improved or at baseline  4/20/2023 0925 by Sulma Zamora RN  Outcome: Progressing  Flowsheets (Taken 4/20/2023 0800)  Effect of thought disturbance (confusion, delirium, dementia, or psychosis) are managed with adequate functional status: Assess for contributors to thought disturbance, including medications, impaired vision or hearing, underlying metabolic abnormalities, dehydration, psychiatric diagnoses, notify LIP  4/20/2023 0106 by Arlene Umana RN  Outcome: Progressing  Flowsheets (Taken 4/19/2023 2000)  Effect of thought disturbance (confusion, delirium, dementia, or psychosis) are managed with adequate functional status: Assess for contributors to thought disturbance, including medications, impaired vision
Problem: Self Harm/Suicidality  Goal: Will have no self-injury during hospital stay  Outcome: Progressing
adequate comfort level or baseline comfort level  Outcome: Progressing
checks  2. Maintain a safe environment  3. Secure patient belongings  4. Ensure family/visitors adhere to safety recommendations  5. Ensure safety tray has been added to patient's diet order  6.   Every shift and PRN: Re-assess suicidal risk via Frequent Screener    Outcome: Progressing  Flowsheets (Taken 4/19/2023 2000)  Will have no self-injury during hospital stay: Ensure constant observer at bedside with Q15M safety checks     Problem: Pain  Goal: Verbalizes/displays adequate comfort level or baseline comfort level  Outcome: Progressing
RN  Outcome: Progressing     Problem: ABCDS Injury Assessment  Goal: Absence of physical injury  Outcome: Completed  Flowsheets (Taken 4/21/2023 4249)  Absence of Physical Injury: Implement safety measures based on patient assessment

## 2023-04-22 PROBLEM — R63.6 UNDERWEIGHT: Status: ACTIVE | Noted: 2023-04-22

## 2023-04-22 PROBLEM — F33.2 SEVERE RECURRENT MAJOR DEPRESSION WITHOUT PSYCHOTIC FEATURES (HCC): Status: ACTIVE | Noted: 2023-04-22

## 2023-04-22 PROBLEM — D61.818 PANCYTOPENIA (HCC): Status: ACTIVE | Noted: 2023-04-22

## 2023-04-22 PROCEDURE — 6370000000 HC RX 637 (ALT 250 FOR IP): Performed by: PSYCHIATRY & NEUROLOGY

## 2023-04-22 PROCEDURE — 1240000000 HC EMOTIONAL WELLNESS R&B

## 2023-04-22 PROCEDURE — 6370000000 HC RX 637 (ALT 250 FOR IP): Performed by: INTERNAL MEDICINE

## 2023-04-22 PROCEDURE — 6370000000 HC RX 637 (ALT 250 FOR IP): Performed by: STUDENT IN AN ORGANIZED HEALTH CARE EDUCATION/TRAINING PROGRAM

## 2023-04-22 PROCEDURE — 99223 1ST HOSP IP/OBS HIGH 75: CPT | Performed by: INTERNAL MEDICINE

## 2023-04-22 PROCEDURE — 6370000000 HC RX 637 (ALT 250 FOR IP): Performed by: NURSE PRACTITIONER

## 2023-04-22 RX ORDER — CHLORDIAZEPOXIDE HYDROCHLORIDE 25 MG/1
25 CAPSULE, GELATIN COATED ORAL 3 TIMES DAILY
Status: DISCONTINUED | OUTPATIENT
Start: 2023-04-22 | End: 2023-04-22

## 2023-04-22 RX ORDER — GAUZE BANDAGE 2" X 2"
100 BANDAGE TOPICAL DAILY
Status: DISCONTINUED | OUTPATIENT
Start: 2023-04-22 | End: 2023-04-25 | Stop reason: HOSPADM

## 2023-04-22 RX ORDER — FOLIC ACID 1 MG/1
1 TABLET ORAL DAILY
Status: DISCONTINUED | OUTPATIENT
Start: 2023-04-22 | End: 2023-04-25 | Stop reason: HOSPADM

## 2023-04-22 RX ORDER — LANOLIN ALCOHOL/MO/W.PET/CERES
400 CREAM (GRAM) TOPICAL DAILY
Status: DISCONTINUED | OUTPATIENT
Start: 2023-04-22 | End: 2023-04-25

## 2023-04-22 RX ORDER — OLANZAPINE 5 MG/1
2.5 TABLET ORAL NIGHTLY
Status: DISCONTINUED | OUTPATIENT
Start: 2023-04-22 | End: 2023-04-25 | Stop reason: HOSPADM

## 2023-04-22 RX ORDER — VENLAFAXINE HYDROCHLORIDE 37.5 MG/1
37.5 CAPSULE, EXTENDED RELEASE ORAL
Status: DISCONTINUED | OUTPATIENT
Start: 2023-04-22 | End: 2023-04-23

## 2023-04-22 RX ORDER — VENLAFAXINE HYDROCHLORIDE 37.5 MG/1
37.5 CAPSULE, EXTENDED RELEASE ORAL
Status: DISCONTINUED | OUTPATIENT
Start: 2023-04-23 | End: 2023-04-22

## 2023-04-22 RX ADMIN — HYDROXYZINE HYDROCHLORIDE 50 MG: 50 TABLET, FILM COATED ORAL at 08:59

## 2023-04-22 RX ADMIN — FOLIC ACID 1 MG: 1 TABLET ORAL at 15:29

## 2023-04-22 RX ADMIN — CHLORDIAZEPOXIDE HYDROCHLORIDE 10 MG: 10 CAPSULE ORAL at 08:59

## 2023-04-22 RX ADMIN — TRAZODONE HYDROCHLORIDE 50 MG: 50 TABLET ORAL at 23:02

## 2023-04-22 RX ADMIN — OLANZAPINE 2.5 MG: 5 TABLET, FILM COATED ORAL at 23:02

## 2023-04-22 RX ADMIN — VENLAFAXINE HYDROCHLORIDE 37.5 MG: 37.5 CAPSULE, EXTENDED RELEASE ORAL at 15:29

## 2023-04-22 RX ADMIN — HYDROXYZINE HYDROCHLORIDE 50 MG: 50 TABLET, FILM COATED ORAL at 23:02

## 2023-04-22 RX ADMIN — THIAMINE HCL TAB 100 MG 100 MG: 100 TAB at 15:29

## 2023-04-22 RX ADMIN — MAGNESIUM OXIDE TAB 400 MG (241.3 MG ELEMENTAL MG) 400 MG: 400 (241.3 MG) TAB at 17:19

## 2023-04-23 LAB
ABSOLUTE EOS #: 0 K/UL (ref 0–0.4)
ABSOLUTE LYMPH #: 1.71 K/UL (ref 1–4.8)
ABSOLUTE MONO #: 0.44 K/UL (ref 0.1–1.3)
ALBUMIN SERPL-MCNC: 4.4 G/DL (ref 3.5–5.2)
ALP SERPL-CCNC: 105 U/L (ref 35–104)
ALT SERPL-CCNC: 36 U/L (ref 5–33)
ANION GAP SERPL CALCULATED.3IONS-SCNC: 11 MMOL/L (ref 9–17)
AST SERPL-CCNC: 169 U/L
BASOPHILS # BLD: 1 % (ref 0–2)
BASOPHILS ABSOLUTE: 0.03 K/UL (ref 0–0.2)
BILIRUB SERPL-MCNC: 0.8 MG/DL (ref 0.3–1.2)
BUN SERPL-MCNC: 8 MG/DL (ref 6–20)
CALCIUM SERPL-MCNC: 10.4 MG/DL (ref 8.6–10.4)
CHLORIDE SERPL-SCNC: 102 MMOL/L (ref 98–107)
CO2 SERPL-SCNC: 27 MMOL/L (ref 20–31)
CREAT SERPL-MCNC: 0.4 MG/DL (ref 0.5–0.9)
EOSINOPHILS RELATIVE PERCENT: 0 % (ref 0–4)
GFR SERPL CREATININE-BSD FRML MDRD: >60 ML/MIN/1.73M2
GLUCOSE SERPL-MCNC: 86 MG/DL (ref 70–99)
HCT VFR BLD AUTO: 34.1 % (ref 36–46)
HGB BLD-MCNC: 11.2 G/DL (ref 12–16)
LYMPHOCYTES # BLD: 50 % (ref 24–44)
MAGNESIUM SERPL-MCNC: 1.6 MG/DL (ref 1.6–2.6)
MCH RBC QN AUTO: 37.5 PG (ref 26–34)
MCHC RBC AUTO-ENTMCNC: 32.8 G/DL (ref 31–37)
MCV RBC AUTO: 114.3 FL (ref 80–100)
MICROORGANISM SPEC CULT: NORMAL
MICROORGANISM SPEC CULT: NORMAL
MONOCYTES # BLD: 13 % (ref 1–7)
MORPHOLOGY: ABNORMAL
MORPHOLOGY: ABNORMAL
PDW BLD-RTO: 20.1 % (ref 11.5–14.9)
PLATELET # BLD AUTO: 106 K/UL (ref 150–450)
PMV BLD AUTO: 7.9 FL (ref 6–12)
POTASSIUM SERPL-SCNC: 4.1 MMOL/L (ref 3.7–5.3)
PROT SERPL-MCNC: 7.9 G/DL (ref 6.4–8.3)
RBC # BLD: 2.99 M/UL (ref 4–5.2)
SEG NEUTROPHILS: 36 % (ref 36–66)
SEGMENTED NEUTROPHILS ABSOLUTE COUNT: 1.22 K/UL (ref 1.3–9.1)
SERVICE CMNT-IMP: NORMAL
SERVICE CMNT-IMP: NORMAL
SODIUM SERPL-SCNC: 140 MMOL/L (ref 135–144)
SPECIMEN DESCRIPTION: NORMAL
SPECIMEN DESCRIPTION: NORMAL
WBC # BLD AUTO: 3.4 K/UL (ref 3.5–11)

## 2023-04-23 PROCEDURE — 85045 AUTOMATED RETICULOCYTE COUNT: CPT

## 2023-04-23 PROCEDURE — 6370000000 HC RX 637 (ALT 250 FOR IP): Performed by: INTERNAL MEDICINE

## 2023-04-23 PROCEDURE — 1240000000 HC EMOTIONAL WELLNESS R&B

## 2023-04-23 PROCEDURE — 6370000000 HC RX 637 (ALT 250 FOR IP): Performed by: NURSE PRACTITIONER

## 2023-04-23 PROCEDURE — 83735 ASSAY OF MAGNESIUM: CPT

## 2023-04-23 PROCEDURE — 80053 COMPREHEN METABOLIC PANEL: CPT

## 2023-04-23 PROCEDURE — 85025 COMPLETE CBC W/AUTO DIFF WBC: CPT

## 2023-04-23 PROCEDURE — 36415 COLL VENOUS BLD VENIPUNCTURE: CPT

## 2023-04-23 PROCEDURE — 6370000000 HC RX 637 (ALT 250 FOR IP): Performed by: STUDENT IN AN ORGANIZED HEALTH CARE EDUCATION/TRAINING PROGRAM

## 2023-04-23 PROCEDURE — 6370000000 HC RX 637 (ALT 250 FOR IP): Performed by: PSYCHIATRY & NEUROLOGY

## 2023-04-23 RX ORDER — VENLAFAXINE HYDROCHLORIDE 75 MG/1
75 CAPSULE, EXTENDED RELEASE ORAL
Status: DISCONTINUED | OUTPATIENT
Start: 2023-04-24 | End: 2023-04-25 | Stop reason: HOSPADM

## 2023-04-23 RX ADMIN — HYDROXYZINE HYDROCHLORIDE 50 MG: 50 TABLET, FILM COATED ORAL at 23:02

## 2023-04-23 RX ADMIN — FOLIC ACID 1 MG: 1 TABLET ORAL at 08:15

## 2023-04-23 RX ADMIN — VENLAFAXINE HYDROCHLORIDE 37.5 MG: 37.5 CAPSULE, EXTENDED RELEASE ORAL at 08:14

## 2023-04-23 RX ADMIN — TRAZODONE HYDROCHLORIDE 50 MG: 50 TABLET ORAL at 23:03

## 2023-04-23 RX ADMIN — THIAMINE HCL TAB 100 MG 100 MG: 100 TAB at 08:15

## 2023-04-23 RX ADMIN — MAGNESIUM OXIDE TAB 400 MG (241.3 MG ELEMENTAL MG) 400 MG: 400 (241.3 MG) TAB at 08:15

## 2023-04-23 RX ADMIN — OLANZAPINE 2.5 MG: 5 TABLET, FILM COATED ORAL at 23:00

## 2023-04-24 LAB
ABSOLUTE RETIC #: 0.07 M/UL (ref 0.02–0.1)
RETICS/RBC NFR AUTO: 2.3 % (ref 0.5–2)

## 2023-04-24 PROCEDURE — 6370000000 HC RX 637 (ALT 250 FOR IP): Performed by: NURSE PRACTITIONER

## 2023-04-24 PROCEDURE — 6370000000 HC RX 637 (ALT 250 FOR IP): Performed by: PSYCHIATRY & NEUROLOGY

## 2023-04-24 PROCEDURE — 6370000000 HC RX 637 (ALT 250 FOR IP): Performed by: STUDENT IN AN ORGANIZED HEALTH CARE EDUCATION/TRAINING PROGRAM

## 2023-04-24 PROCEDURE — 6370000000 HC RX 637 (ALT 250 FOR IP): Performed by: INTERNAL MEDICINE

## 2023-04-24 PROCEDURE — 1240000000 HC EMOTIONAL WELLNESS R&B

## 2023-04-24 RX ADMIN — OLANZAPINE 2.5 MG: 5 TABLET, FILM COATED ORAL at 23:12

## 2023-04-24 RX ADMIN — THIAMINE HCL TAB 100 MG 100 MG: 100 TAB at 08:32

## 2023-04-24 RX ADMIN — MAGNESIUM OXIDE TAB 400 MG (241.3 MG ELEMENTAL MG) 400 MG: 400 (241.3 MG) TAB at 08:32

## 2023-04-24 RX ADMIN — FOLIC ACID 1 MG: 1 TABLET ORAL at 08:32

## 2023-04-24 RX ADMIN — TRAZODONE HYDROCHLORIDE 50 MG: 50 TABLET ORAL at 23:13

## 2023-04-24 RX ADMIN — HYDROXYZINE HYDROCHLORIDE 50 MG: 50 TABLET, FILM COATED ORAL at 20:08

## 2023-04-24 RX ADMIN — VENLAFAXINE HYDROCHLORIDE 75 MG: 75 CAPSULE, EXTENDED RELEASE ORAL at 08:32
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
## 2023-04-24 NOTE — GROUP NOTE
Group Therapy Note    Date: 4/24/2023    Group Start Time: 1330  Group End Time: 2002  Group Topic: Psychoeducation    [facility]    JESUSITA JohnsonS    Group Therapy Note    Attendees: 8/16     Patient's Goal:  Patient will demonstrate improved interpersonal skills and offer peer support. Notes:  Patient attended group and participated    Status After Intervention:  Improved    Participation Level:  Active Listener and Interactive    Participation Quality: Appropriate, Attentive, Sharing, and Supportive      Speech:  normal      Thought Process/Content: Logical  Linear      Affective Functioning: Constricted/Restricted      Mood: euthymic      Level of consciousness:  Alert, Oriented x4, and Attentive      Response to Learning: Able to verbalize current knowledge/experience, Able to verbalize/acknowledge new learning, Able to retain information, Capable of insight, Able to change behavior, and Progressing to goal      Endings: None Reported    Modes of Intervention: Education, Support, Socialization, and Exploration      Discipline Responsible: Psychoeducational Specialist      Signature:  Antonieta Moise, [address]

## 2023-04-24 NOTE — PROGRESS NOTES
04/24/23 1528   Encounter Summary   Encounter Overview/Reason  Spiritual/Emotional Needs   Service Provided For: Patient   Referral/Consult From: [place]   Last Encounter  04/24/23   Complexity of Encounter Moderate   Begin Time 0230   End Time  0300   Total Time Calculated 30 min   Spiritual/Emotional needs   Type Spiritual Support   Behavioral Health    Type  Spirituality Group   Assessment/Intervention/Outcome   Assessment Calm   Intervention Active listening   Outcome Receptive;Engaged in conversation

## 2023-04-24 NOTE — PLAN OF CARE
Problem: Self Harm/Suicidality  Goal: Will have no self-injury during hospital stay  Description: INTERVENTIONS:  1. Ensure constant observer at bedside with Q15M safety checks  2. Maintain a safe environment  3. Secure patient belongings  4. Ensure family/visitors adhere to safety recommendations  5. Ensure safety tray has been added to patient's diet order  6. Every shift and PRN: Re-assess suicidal risk via Frequent Screener    4/23/2023 2157 by Yoana Montez RN  Outcome: Progressing     Problem: Depression  Goal: Will be euthymic at discharge  Description: INTERVENTIONS:  1. Administer medication as ordered  2. Provide emotional support via 1:1 interaction with staff  3. Encourage involvement in milieu/groups/activities  4. Monitor for social isolation  4/23/2023 2157 by Yoana Montez RN  Outcome: Progressing     Problem: Anxiety  Goal: Will report anxiety at manageable levels  Description: INTERVENTIONS:  1. Administer medication as ordered  2. Teach and rehearse alternative coping skills  3. Provide emotional support with 1:1 interaction with staff  4/23/2023 2157 by Yoana Montez RN  Outcome: Progressing     Patient denies suicidal ideations and remains free from self harm and injury. Patient verbally contracts for safety. Patient encouraged to seek out staff if thoughts of self harm arise. A safe environment and Q 15 min checks maintained. Patient verbalizes feeling \"better\". Patient is flat but has a linear, coherent and goal directed thought process. Patient states she has been attending groups and discusses wanting to be positive and not giving into negative thoughts. Patient feels that she has not accomplished much for being 32years of age. Patient states its hard to focus on herself when caring for a 3 y/o at home and she doesn't get out of the house very much. Patient listed a goal to start taking her daughter on walks 3 times a week for 30 min.  Patient also listed her

## 2023-04-24 NOTE — GROUP NOTE
Psych-Ed/Relapse Prevention Group Note        Date: April 24, 2023 Start Time: 11am  End Time: 11:45am      Number of Participants in Group & Unit Census:  13/20    Topic: Leisure skills    Goal of Group:Patient will identify benefits of leisure for coping. Comments:     Patient did not participate in Psych-Ed/Relapse Prevention group, despite staff encouragement and explanation of benefits. Patient remain seclusive to self. Q15 minute safety checks maintained for patient safety and will continue to encourage patient to attend unit programming.          Signature:  Deedee Burt [place]

## 2023-04-24 NOTE — PLAN OF CARE
[facility]  Day 3 Interdisciplinary Treatment Plan NOTE    Review Date & Time: 4/24/2023 1245    Admission Type:   Admission Type: Voluntary    Reason for admission:  Reason for Admission: Patient overdosed and went into alcoholic ketoacidosis. Patient denies suicidal ideations however wants help  Estimated Length of Stay: 5-7 days  Estimated Discharge Date Update: to be determined by physician    PATIENT STRENGTHS:  Patient Strengths    Patient Strengths and Limitations:Limitations: Multiple barriers to leisure interests, Inappropriate/potentially harmful leisure interests, External locus of control  Addictive Behavior:Addictive Behavior  In the Past 3 Months, Have You Felt or Has Someone Told You That You Have a Problem With  : None  Medical Problems:  Past Medical History:   Diagnosis Date    ADHD (attention deficit hyperactivity disorder)     Anxiety     Bipolar 1 disorder ([facility] [address]     Depression        Risk:  Fall Risk   Donny Scale Donny Scale Score: 23  BVC    Change in scores no Changes to plan of Care no    Status EXAM:   Mental Status and Behavioral Exam  Normal: No  Level of Assistance: Independent/Self  Facial Expression: Flat  Affect: Appropriate  Level of Consciousness: Alert  Frequency of Checks: 4 times per hour, close  Mood:Normal: No  Mood: Depressed, Anxious  Motor Activity:Normal: Yes  Motor Activity: Decreased  Eye Contact: Good  Observed Behavior: Cooperative, Friendly  Sexual Misconduct History: Current - no  Preception: [place] to person, [place] to time, [place] to place, [place] to situation  Attention:Normal: Yes  Attention: Distractible  Thought Processes: Unremarkable  Thought Content:Normal: No  Thought Content: Poverty of content  Depression Symptoms: Feelings of helplessness, Feelings of hopelessess  Anxiety Symptoms: Generalized  [place]ita Symptoms: No problems reported or observed.   Hallucinations: None  Delusions: No  Memory:Normal: Yes  Memory: Poor recent  [facility] and

## 2023-04-24 NOTE — GROUP NOTE
Group Therapy Note    Date: 4/24/2023    Group Start Time: 0900  Group End Time: 0930  Group Topic: Group Documentation    [facility]    Chaka Brownlee LPN        Group Therapy Note    Attendees: 9/20       Patient's Goal:  educate     Notes:  inspire    Status After Intervention:  Unchanged    Participation Level: Minimal    Participation Quality: Attentive      Speech:  hesitant      Thought Process/Content: Logical      Affective Functioning: Flat      Mood: anxious      Level of consciousness:  Alert      Response to Learning: Progressing to goal      Endings: None Reported    Modes of Intervention: Education      Discipline Responsible: Licensed Practical Nurse      Signature:  Chaka Brownlee LPN

## 2023-04-24 NOTE — GROUP NOTE
Group Therapy Note    Date: 4/24/2023    Group Start Time: 1100  Group End Time: 8653  Group Topic: Psychoeducation    JERRY Rae    Group Therapy Note    Attendees: 13/20     Patient's Goal:  Patient will identify benefits of leisure for coping. Notes:  Patient attended group and participated    Status After Intervention:  Improved    Participation Level:  Active Listener and Interactive    Participation Quality: Appropriate, Attentive, Sharing, and Supportive      Speech:  normal      Thought Process/Content: Logical  Linear      Affective Functioning: Blunted      Mood: euthymic      Level of consciousness:  Alert, Oriented x4, and Attentive      Response to Learning: Able to verbalize current knowledge/experience, Able to verbalize/acknowledge new learning, Able to retain information, Able to change behavior, and Progressing to goal      Endings: None Reported    Modes of Intervention: Education, Support, Socialization, and Exploration      Discipline Responsible: Psychoeducational Specialist      Signature:  Jens Ayala, [address]

## 2023-04-24 NOTE — PLAN OF CARE
Problem: Self Harm/Suicidality  Goal: Will have no self-injury during hospital stay  Description: INTERVENTIONS:  1. Ensure constant observer at bedside with Q15M safety checks  2. Maintain a safe environment  3. Secure patient belongings  4. Ensure family/visitors adhere to safety recommendations  5. Ensure safety tray has been added to patient's diet order  6. Every shift and PRN: Re-assess suicidal risk via Frequent Screener    4/24/2023 0955 by Arben Hennessy LPN  Outcome: Progressing   Patient denies thoughts of SI 15 min safety checks continue   Problem: Depression  Goal: Will be euthymic at discharge  Description: INTERVENTIONS:  1. Administer medication as ordered  2. Provide emotional support via 1:1 interaction with staff  3. Encourage involvement in milieu/groups/activities  4. Monitor for social isolation  4/24/2023 0955 by Arben Hennessy LPN  Outcome: Progressing   Patient reports depression is improving. Patient is out in the milieu participating in group and activities    Problem: Anxiety  Goal: Will report anxiety at manageable levels  Description: INTERVENTIONS:  1. Administer medication as ordered  2. Teach and rehearse alternative coping skills  3. Provide emotional support with 1:1 interaction with staff  4/24/2023 0955 by Arben Hennessy LPN  Outcome: Progressing   Patient reports improved anxiety  and agrees to come to staff should anxiety increase.   Problem: Nutrition Deficit:  Goal: Optimize nutritional status  Outcome: Progressing   Patient consumes all of meal  supplement

## 2023-04-25 VITALS
BODY MASS INDEX: 16.64 KG/M2 | SYSTOLIC BLOOD PRESSURE: 105 MMHG | OXYGEN SATURATION: 100 % | RESPIRATION RATE: 14 BRPM | HEART RATE: 84 BPM | DIASTOLIC BLOOD PRESSURE: 71 MMHG | TEMPERATURE: 97.1 F | WEIGHT: 106 LBS | HEIGHT: 67 IN

## 2023-04-25 LAB
PATH REV BLD -IMP: NORMAL
SURGICAL PATHOLOGY REPORT: NORMAL

## 2023-04-25 PROCEDURE — 6370000000 HC RX 637 (ALT 250 FOR IP): Performed by: STUDENT IN AN ORGANIZED HEALTH CARE EDUCATION/TRAINING PROGRAM

## 2023-04-25 PROCEDURE — 6370000000 HC RX 637 (ALT 250 FOR IP): Performed by: PSYCHIATRY & NEUROLOGY

## 2023-04-25 PROCEDURE — 6370000000 HC RX 637 (ALT 250 FOR IP): Performed by: INTERNAL MEDICINE

## 2023-04-25 RX ORDER — OLANZAPINE 2.5 MG/1
2.5 TABLET ORAL NIGHTLY
Qty: 30 TABLET | Refills: 0 | Status: SHIPPED | OUTPATIENT
Start: 2023-04-25

## 2023-04-25 RX ORDER — VENLAFAXINE HYDROCHLORIDE 75 MG/1
75 CAPSULE, EXTENDED RELEASE ORAL
Qty: 30 CAPSULE | Refills: 0 | Status: SHIPPED | OUTPATIENT
Start: 2023-04-26

## 2023-04-25 RX ORDER — FOLIC ACID 1 MG/1
1 TABLET ORAL DAILY
Qty: 30 TABLET | Refills: 0 | Status: SHIPPED | OUTPATIENT
Start: 2023-04-26

## 2023-04-25 RX ORDER — THIAMINE MONONITRATE (VIT B1) 100 MG
100 TABLET ORAL DAILY
Qty: 30 TABLET | Refills: 0 | Status: SHIPPED | OUTPATIENT
Start: 2023-04-26

## 2023-04-25 RX ADMIN — THIAMINE HCL TAB 100 MG 100 MG: 100 TAB at 08:00

## 2023-04-25 RX ADMIN — FOLIC ACID 1 MG: 1 TABLET ORAL at 08:00

## 2023-04-25 RX ADMIN — MAGNESIUM OXIDE TAB 400 MG (241.3 MG ELEMENTAL MG) 400 MG: 400 (241.3 MG) TAB at 08:00

## 2023-04-25 RX ADMIN — VENLAFAXINE HYDROCHLORIDE 75 MG: 75 CAPSULE, EXTENDED RELEASE ORAL at 08:00

## 2023-04-25 ASSESSMENT — PAIN SCALES - GENERAL: PAINLEVEL_OUTOF10: 0

## 2023-04-25 NOTE — BH NOTE
Patient given tobacco quitline number 3-532-222-9325 at this time, refusing to call at this time, states \" I just dont want to quit now\"- patient given information as to the dangers of long term tobacco use. Continue to reinforce the importance of tobacco cessation.

## 2023-04-25 NOTE — DISCHARGE INSTR - DIET
Good nutrition is important when healing from an illness, injury, or surgery. Follow any nutrition recommendations given to you during your hospital stay. If you were given an oral nutrition supplement while in the hospital, continue to take this supplement at home. You can take it with meals, in-between meals, and/or before bedtime. These supplements can be purchased at most local grocery stores, pharmacies, and chain [employer]-stores. If you have any questions about your diet or nutrition, call the hospital and ask for the dietitian.   General diet  Increase fluids

## 2023-04-25 NOTE — PLAN OF CARE
Problem: Self Harm/Suicidality  Goal: Will have no self-injury during hospital stay  Description: INTERVENTIONS:  1. Ensure constant observer at bedside with Q15M safety checks  2. Maintain a safe environment  3. Secure patient belongings  4. Ensure family/visitors adhere to safety recommendations  5. Ensure safety tray has been added to patient's diet order  6. Every shift and PRN: Re-assess suicidal risk via Frequent Screener    4/24/2023 2127 by Jean Kang RN  Outcome: Progressing     Problem: Depression  Goal: Will be euthymic at discharge  Description: INTERVENTIONS:  1. Administer medication as ordered  2. Provide emotional support via 1:1 interaction with staff  3. Encourage involvement in milieu/groups/activities  4. Monitor for social isolation  4/24/2023 2127 by Jean Kang RN  Outcome: Progressing     Problem: Anxiety  Goal: Will report anxiety at manageable levels  Description: INTERVENTIONS:  1. Administer medication as ordered  2. Teach and rehearse alternative coping skills  3. Provide emotional support with 1:1 interaction with staff  4/24/2023 2127 by Jean Kang RN  Outcome: Progressing     Patient denies suicidal ideations and remains free from self harm and injury. Patient verbally contracts for safety. Patient encouraged to seek out staff if thoughts of self harm arise. A safe environment and Q 15 min checks maintained. Patient continues to verbalize feeling better. Patient is flat but has a linear, coherent and goal directed thought process. Patient continues to  attend group. Patient endorses slight anxiety and has been utilizing Atarax 50 mg PO which has been showing to be effective to keep her anxiety at a manageable level. Writer will continue to monitor and offer emotional support.

## 2023-04-25 NOTE — BH NOTE
[facility]  Discharge Note    Pt discharged with followings belongings:   Dental Appliances: None  Vision - Corrective Lenses: None  Hearing Aid: None  Jewelry: Ring (red choker necklace)  Body Piercings Removed: N/A  Clothing: Footwear, Shorts, Shirt, Pants, Jacket/Coat  Other Valuables: Other (Comment) (none)   Valuables sent home with patient. Patient educated on aftercare instructions: given to patient  Information faxed to [facility] by staff  at 1:25 PM .Patient verbalize understanding of AVS:  yes, discharged home via family. Status EXAM upon discharge:  Mental Status and Behavioral Exam  Normal: Yes  Level of Assistance: Independent/Self  Facial Expression: Brightened  Affect: Appropriate, Congruent  Level of Consciousness: Alert  Frequency of Checks: 4 times per hour, close  Mood:Normal: Yes  Mood: Other (comment) (euthymic)  Motor Activity:Normal: Yes  Motor Activity: Decreased  Eye Contact: Good  Observed Behavior: Cooperative, Friendly  Sexual Misconduct History: Current - no  Preception: [place] to person, [place] to time, [place] to place, [place] to situation  Attention:Normal: Yes  Attention: Distractible  Thought Processes: Unremarkable  Thought Content:Normal: Yes  Thought Content: Poverty of content  Depression Symptoms: No problems reported or observed. Anxiety Symptoms: No problems reported or observed. Vanita Symptoms: No problems reported or observed.   Hallucinations: None  Delusions: No  Memory:Normal: Yes  Memory: Poor recent  Insight and Judgment: Yes  Insight and Judgment: Poor judgment, Poor insight      Metabolic Screening:    Lab Results   Component Value Date    LABA1C 5.0 03/08/2023       No results found for: CHOL  Lab Results   Component Value Date    TRIG 109 12/30/2022     No results found for: HDL  No components found for: LDLCAL  No results found for: Zina Wyman LPN

## 2023-04-25 NOTE — GROUP NOTE
Group Therapy Note    Date: 4/25/2023    Group Start Time: 0900  Group End Time: 1000  Group Topic: Group Documentation    BETI DUTCH PEREZ    Shamika Tan        Group Therapy Note    Attendees: 11/18       Patient's Goal:  continue to open up and attend groups    Notes:  morning goal group session; patients can reflect on what they need to accomplish for discharge, and choose a step/goal that they would like to accomplish for the day    Status After Intervention:  Improved    Participation Level:  Active Listener and Interactive    Participation Quality: Appropriate, Attentive, Sharing, and Supportive      Speech:  normal      Thought Process/Content: Logical      Affective Functioning: Congruent      Mood: euthymic      Level of consciousness:  Alert, Oriented x4, and Attentive      Response to Learning: Capable of insight and Progressing to goal      Endings: None Reported    Modes of Intervention: Support and Socialization      Discipline Responsible: Behavorial Health Tech      Signature:  Pricilla Cardoza

## 2023-04-25 NOTE — DISCHARGE INSTRUCTIONS
Information:  Medications:   Medication summary provided   I understand that I should take only the medications on my list.     -why and when I need to take each medicine.     -which side effects to watch for.     -that I should carry my medication information at all times in case of     Emergency situations. I will take all of my medicines to follow up appointments.     -check with my physician or pharmacist before taking any new    Medication, over the counter product or drink alcohol.    -Ask about food, drug or dietary supplement interactions.    -discard old lists and update records with medication providers. Notify Physician:  Notify physician if you notice:   Always call 911 if you feel your life is in danger  In case of an emergency call 911 immediately! If 911 is not available call your local emergency medical system for help    Behavioral Health Follow Up:  Original Referral Source: [facility]  Discharge Diagnosis: Depression with suicidal ideation [F32. A, R45.851]  Recommendations for Level of Care: Attend follow up appointments  Patient status at discharge: Stable, alert and oriented  [facility]  Aftercare plan faxed: Yes   -faxed by: Nurse   -date: 4.25.23   -time: 1500  Prescriptions: See AVS    Smoking: Quit Smoking. Call the [employer]'s smoking quitline at 0-745-37D-QUIT  Know the signs of a heart attack   If you have any of the following symptoms call 911 immediately, do not wait more    Than five minutes. 1. Pressure, fullness and/ or squeezing in the center of the chest spreading to    The jaw, neck or shoulder. 2. Chest discomfort with light headedness, fainting, sweating, nausea or    Shortness of breath. 3. Upper abdominal pressure or discomfort. 4. Lower chest pain, back pain, unusual fatigue, shortness of breath, nausea   Or dizziness.      General Information:   Questions regarding your bill: Call HELP program [phone]     Suicide Hotline

## 2023-04-25 NOTE — GROUP NOTE
Group Therapy Note    Date: 4/25/2023    Group Start Time: 1100  Group End Time: 1580  Group Topic: Cognitive Skills    BETI PEREZ    Essence Matias, CTRS        Group Therapy Note    Attendees: 7/16     Patient's Goal:  To improve interpersonal skills and decision-making through collaborating with peers and concentrating on a presented task. Notes:  Patient attended and participated in group. Patient was able to collaborate with peers and concentrate on a presented task. Patient was pleasant and cooperative. Status After Intervention:  Improved    Participation Level: Active Listener and Interactive    Participation Quality: Appropriate, Attentive, and Sharing      Speech:  normal      Thought Process/Content: Minimal thought blocking. Logical and linear to task.        Affective Functioning: blunted, brightened      Mood: euthymic      Level of consciousness:  Alert and Attentive      Response to Learning: Able to verbalize current knowledge/experience, Able to retain information, Capable of insight, and Progressing to goal      Endings: None Reported    Modes of Intervention: Socialization, Exploration, Problem-solving, and Activity      Discipline Responsible: Psychoeducational Specialist      Signature:  Essence Matias, [address]

## 2023-04-25 NOTE — CARE COORDINATION
Name: Vinita Hameed    : 1995    Discharge Date: 23    Primary Auth/Cert #: [id number]    Destination:     Discharge Medications:      Medication List        START taking these medications      folic acid 1 MG tablet  Commonly known as: FOLVITE  Take 1 tablet by mouth daily  Start taking on: 2023  Notes to patient: Supplement     OLANZapine 2.5 MG tablet  Commonly known as: ZYPREXA  Take 1 tablet by mouth nightly  Notes to patient: Clear thoughts     venlafaxine 75 MG extended release capsule  Commonly known as: EFFEXOR XR  Take 1 capsule by mouth daily (with breakfast)  Start taking on: 2023  Notes to patient: Mood     vitamin B-1 100 MG tablet  Commonly known as: THIAMINE  Take 1 tablet by mouth daily  Start taking on: 2023  Notes to patient: Supplement            STOP taking these medications      Magnesium 400 MG Caps     pantoprazole 40 MG tablet  Commonly known as: PROTONIX     potassium chloride 20 MEQ extended release tablet  Commonly known as: KLOR-CON M               Where to Get Your Medications        These medications were sent to [address] [place] [address]      Phone: [phone]   folic acid 1 MG tablet  OLANZapine 2.5 MG tablet  venlafaxine 75 MG extended release capsule  vitamin B-1 100 MG tablet     Pharmacy Instructions:     your medications from [facility]           Follow Up Appointment: [facility]  [address] [id number]    Follow up on 2023  Sena staff will call you at 10:30 am    [facility]  [address] [facility]    Follow up on 2023  You have in person apppointment at 9:00 am with Juliane Herring

## 2023-04-25 NOTE — GROUP NOTE
Group Therapy Note    Date: 4/24/2023    Group Start Time: 1950  Group End Time: 2020  Group Topic: Psychoeducation    [facility]    Juan Denton RN        Group Therapy Note    Attendees: 9       Patient's Goal: to learn coping skills    Notes: ABCs of coping skills    Status After Intervention:  Unchanged    Participation Level:  Active Listener and Interactive    Participation Quality: Appropriate, Attentive, Sharing, and Supportive      Speech:  normal      Thought Process/Content: Logical      Affective Functioning: Congruent      Mood: anxious and depressed      Level of consciousness:  Alert, Oriented x4, and Attentive      Response to Learning: Able to verbalize current knowledge/experience, Able to verbalize/acknowledge new learning, Able to retain information, Capable of insight, Able to change behavior, and Progressing to goal      Endings: None Reported    Modes of Intervention: Education, Support, Socialization, Exploration, Clarifying, Problem-solving, and Activity      Discipline Responsible: Registered Nurse      Signature:  Juan Denton RN

## 2023-04-25 NOTE — DISCHARGE SUMMARY
Provider Discharge Summary     Patient ID:  Robson Ellison  [id number]  88 y.o.  1995    Admit date: 4/21/2023    Discharge date and time: 4/25/2023  9:23 AM     Admitting Physician: Eulalio Maravilla MD     Discharge Physician: ADAM De León - CNP    Admission Diagnoses: Depression with suicidal ideation [F32. A, R45.851]    Discharge Diagnoses:      Severe recurrent major depression without psychotic features [facility])     Patient Active Problem List   Diagnosis Code    Bipolar I disorder, most recent episode depressed ([facility] F31.30    Hyperbilirubinemia E80.6    Hepatic steatosis K76.0    Ascites due to alcoholic hepatitis Z48.04    Alcohol abuse F10.10    S/P cholecystectomy Z90.49    Abnormal LFTs R79.89    Hypokalemia E87.6    Hypoalbuminemia E88.09    Portal hypertension ([facility] K76.6    Intentional drug overdose ([facility] T50.902A    COVID-19 U07.1    Acute respiratory insufficiency K16.18    Alcoholic intoxication with complication (HCC) E56.662    Altered mental status R41.82    Acute respiratory failure with hypoxia (HCC) J96.01    Unspecified mood (affective) disorder (HCC) F39    Alcohol dependence with withdrawal (HCC) T71.440    Alcoholic gastritis without bleeding K29.20    High anion gap metabolic acidosis T91.82    Alcoholic hepatitis M29.09    Hypomagnesemia E83.42    Thrombocytopenia (HCC) D69.6    Macrocytic anemia D53.9    History of COVID-19 F99.74    Alcoholic ketoacidosis E59.43    Hypoglycemia E16.2    Alcohol withdrawal hallucinosis (HCC) F10.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 A, R45.851    Severe recurrent major depression without psychotic features ([facility] F33.2    Pancytopenia ([facility] D61.818    Underweight R63.6        Admission Condition: poor    Discharged Condition: stable    Indication for Admission: threat to self    History of Present Illnes (present tense wording is of findings from

## 2023-06-26 ENCOUNTER — HOSPITAL ENCOUNTER (EMERGENCY)
Age: 28
Discharge: HOME | End: 2023-06-26
Attending: EMERGENCY MEDICINE
Payer: MEDICAID

## 2023-06-26 ENCOUNTER — APPOINTMENT (OUTPATIENT)
Dept: GENERAL RADIOLOGY | Age: 28
End: 2023-06-26
Payer: MEDICAID

## 2023-06-26 VITALS
SYSTOLIC BLOOD PRESSURE: 134 MMHG | TEMPERATURE: 96.8 F | RESPIRATION RATE: 18 BRPM | DIASTOLIC BLOOD PRESSURE: 88 MMHG | OXYGEN SATURATION: 100 % | HEART RATE: 100 BPM

## 2023-06-26 DIAGNOSIS — R07.81 RIB PAIN: Primary | ICD-10-CM

## 2023-06-26 PROCEDURE — 71046 X-RAY EXAM CHEST 2 VIEWS: CPT

## 2023-06-26 PROCEDURE — 99283 EMERGENCY DEPT VISIT LOW MDM: CPT

## 2023-06-26 PROCEDURE — 6370000000 HC RX 637 (ALT 250 FOR IP): Performed by: EMERGENCY MEDICINE

## 2023-06-26 RX ORDER — IBUPROFEN 400 MG/1
400 TABLET ORAL ONCE
Status: COMPLETED | OUTPATIENT
Start: 2023-06-26 | End: 2023-06-26

## 2023-06-26 RX ADMIN — IBUPROFEN 400 MG: 400 TABLET, FILM COATED ORAL at 14:18

## 2023-06-26 ASSESSMENT — PAIN DESCRIPTION - ORIENTATION: ORIENTATION: LEFT

## 2023-06-26 ASSESSMENT — ENCOUNTER SYMPTOMS
SHORTNESS OF BREATH: 0
ABDOMINAL PAIN: 0

## 2023-06-26 ASSESSMENT — PAIN DESCRIPTION - LOCATION: LOCATION: RIB CAGE

## 2023-06-26 ASSESSMENT — PAIN SCALES - GENERAL: PAINLEVEL_OUTOF10: 5

## 2023-06-26 ASSESSMENT — PAIN - FUNCTIONAL ASSESSMENT: PAIN_FUNCTIONAL_ASSESSMENT: 0-10

## 2023-07-23 ENCOUNTER — APPOINTMENT (OUTPATIENT)
Dept: GENERAL RADIOLOGY | Age: 28
End: 2023-07-23
Payer: MEDICAID

## 2023-07-23 ENCOUNTER — HOSPITAL ENCOUNTER (EMERGENCY)
Age: 28
Discharge: HOME | End: 2023-07-23
Attending: EMERGENCY MEDICINE
Payer: MEDICAID

## 2023-07-23 VITALS
DIASTOLIC BLOOD PRESSURE: 90 MMHG | WEIGHT: 103.17 LBS | OXYGEN SATURATION: 99 % | SYSTOLIC BLOOD PRESSURE: 137 MMHG | TEMPERATURE: 97.4 F | BODY MASS INDEX: 16.19 KG/M2 | RESPIRATION RATE: 17 BRPM | HEART RATE: 109 BPM | HEIGHT: 67 IN

## 2023-07-23 DIAGNOSIS — S92.324A CLOSED NONDISPLACED FRACTURE OF SECOND METATARSAL BONE OF RIGHT FOOT, INITIAL ENCOUNTER: Primary | ICD-10-CM

## 2023-07-23 PROCEDURE — 73620 X-RAY EXAM OF FOOT: CPT

## 2023-07-23 PROCEDURE — 99283 EMERGENCY DEPT VISIT LOW MDM: CPT

## 2023-07-23 PROCEDURE — 73610 X-RAY EXAM OF ANKLE: CPT

## 2023-07-23 PROCEDURE — 73590 X-RAY EXAM OF LOWER LEG: CPT

## 2023-07-23 PROCEDURE — 6370000000 HC RX 637 (ALT 250 FOR IP): Performed by: STUDENT IN AN ORGANIZED HEALTH CARE EDUCATION/TRAINING PROGRAM

## 2023-07-23 RX ORDER — OXYCODONE HYDROCHLORIDE 5 MG/1
5 TABLET ORAL ONCE
Status: COMPLETED | OUTPATIENT
Start: 2023-07-23 | End: 2023-07-23

## 2023-07-23 RX ORDER — OXYCODONE HYDROCHLORIDE 5 MG/1
5 TABLET ORAL EVERY 6 HOURS PRN
Qty: 5 TABLET | Refills: 0 | Status: SHIPPED | OUTPATIENT
Start: 2023-07-23 | End: 2023-07-26

## 2023-07-23 RX ORDER — GABAPENTIN 100 MG/1
100 CAPSULE ORAL 3 TIMES DAILY
COMMUNITY
Start: 2022-04-19

## 2023-07-23 RX ORDER — ACETAMINOPHEN 500 MG
1000 TABLET ORAL ONCE
Status: COMPLETED | OUTPATIENT
Start: 2023-07-23 | End: 2023-07-23

## 2023-07-23 RX ORDER — IBUPROFEN 800 MG/1
800 TABLET ORAL ONCE
Status: COMPLETED | OUTPATIENT
Start: 2023-07-23 | End: 2023-07-23

## 2023-07-23 RX ORDER — KETOROLAC TROMETHAMINE 10 MG/1
10 TABLET, FILM COATED ORAL EVERY 6 HOURS PRN
Qty: 20 TABLET | Refills: 0 | Status: SHIPPED | OUTPATIENT
Start: 2023-07-23

## 2023-07-23 RX ADMIN — IBUPROFEN 800 MG: 800 TABLET ORAL at 12:31

## 2023-07-23 RX ADMIN — OXYCODONE HYDROCHLORIDE 5 MG: 5 TABLET ORAL at 14:03

## 2023-07-23 RX ADMIN — ACETAMINOPHEN 1000 MG: 500 TABLET ORAL at 12:31

## 2023-07-23 ASSESSMENT — PAIN SCALES - GENERAL
PAINLEVEL_OUTOF10: 10

## 2023-07-23 ASSESSMENT — PAIN - FUNCTIONAL ASSESSMENT: PAIN_FUNCTIONAL_ASSESSMENT: 0-10

## 2023-07-23 ASSESSMENT — PAIN DESCRIPTION - LOCATION: LOCATION: FOOT

## 2023-07-23 ASSESSMENT — PAIN DESCRIPTION - ORIENTATION: ORIENTATION: RIGHT

## 2023-07-23 NOTE — ED TRIAGE NOTES
Pt arrived to ED 26 via triage. Pt co Rt foot pain. Pt states that she was wrestling with her kids and she fell on it. Pt denies any other pain . Pt is resting on stretcher with call light within reach. Breathing is non labored and no acute distress is noted.    Will continue to follow plan of care

## 2023-07-23 NOTE — DISCHARGE INSTRUCTIONS
Medications: Take your prescriptions as directed  You are receiving new prescriptions for Toradol. If your pain is not severe then you may take the non-prescription medication that you normally take for aches and pains    Ambulation and Activity:  You are advised to go directly home from the hospital  Use crutches as needed  You may not put weight on the Right foot. You should wear the surgical shoe at all times when awake. Avoid stairs if possible. Do not lift or move heavy objects  Do not drive until cleared by your physician    Bandage and Wound Care Instructions:  Keep bandage clean and dry  Do not shower or bathe the operative extremity  Do not remove the bandage (unless otherwise directed)   Do not attempt to put anything between the cast or dressing and your skin, some itching is normal.    Follow up instructions:  Please follow up with Dr. Fawad Pate in the [address] [place] [address] podiatry clinic within 1 week of discharge  Call when you get home to schedule or confirm your appointment. Call your Podiatrist office if you have any questions or concerns. Thank you for coming to [facility] emergency department! You were seen today for foot pain, you were diagnosed with metatarsal fracture. You were prescribed toradol, please pick these medications up at the pharmacy. Follow up with your primary care doctor. If you have any worsening of symptoms or any other concerns, please return to the emergency department. We are always available!

## 2023-10-02 ENCOUNTER — APPOINTMENT (OUTPATIENT)
Dept: CT IMAGING | Age: 28
End: 2023-10-02
Payer: MEDICAID

## 2023-10-02 ENCOUNTER — HOSPITAL ENCOUNTER (INPATIENT)
Age: 28
LOS: 1 days | Discharge: HOME | End: 2023-10-03
Attending: EMERGENCY MEDICINE | Admitting: STUDENT IN AN ORGANIZED HEALTH CARE EDUCATION/TRAINING PROGRAM
Payer: MEDICAID

## 2023-10-02 DIAGNOSIS — E87.6 HYPOKALEMIA: Primary | ICD-10-CM

## 2023-10-02 DIAGNOSIS — R60.0 LEG EDEMA: ICD-10-CM

## 2023-10-02 DIAGNOSIS — E80.6 HYPERBILIRUBINEMIA: ICD-10-CM

## 2023-10-02 PROBLEM — K70.10 ALCOHOLIC HEPATITIS WITHOUT ASCITES: Status: ACTIVE | Noted: 2023-10-02

## 2023-10-02 LAB
ALBUMIN SERPL-MCNC: 2.9 G/DL (ref 3.5–5.2)
ALBUMIN/GLOB SERPL: 0.7 {RATIO} (ref 1–2.5)
ALP SERPL-CCNC: 143 U/L (ref 35–104)
ALT SERPL-CCNC: 28 U/L (ref 5–33)
ANION GAP SERPL CALCULATED.3IONS-SCNC: 15 MMOL/L (ref 9–17)
AST SERPL-CCNC: 190 U/L
BASOPHILS # BLD: <0.03 K/UL (ref 0–0.2)
BASOPHILS NFR BLD: 0 % (ref 0–2)
BILIRUB DIRECT SERPL-MCNC: 13 MG/DL
BILIRUB INDIRECT SERPL-MCNC: 5.1 MG/DL (ref 0–1)
BILIRUB SERPL-MCNC: 18.1 MG/DL (ref 0.3–1.2)
BNP SERPL-MCNC: 360 PG/ML
BUN SERPL-MCNC: <2 MG/DL (ref 6–20)
CALCIUM SERPL-MCNC: 8 MG/DL (ref 8.6–10.4)
CHLORIDE SERPL-SCNC: 104 MMOL/L (ref 98–107)
CO2 SERPL-SCNC: 22 MMOL/L (ref 20–31)
CREAT SERPL-MCNC: <0.2 MG/DL (ref 0.5–0.9)
EOSINOPHIL # BLD: 0.09 K/UL (ref 0–0.44)
EOSINOPHILS RELATIVE PERCENT: 1 % (ref 1–4)
ERYTHROCYTE [DISTWIDTH] IN BLOOD BY AUTOMATED COUNT: 16.4 % (ref 11.8–14.4)
GFR SERPL CREATININE-BSD FRML MDRD: ABNORMAL ML/MIN/1.73M2
GLUCOSE SERPL-MCNC: 104 MG/DL (ref 70–99)
HCG SERPL QL: NEGATIVE
HCT VFR BLD AUTO: 25.3 % (ref 36.3–47.1)
HGB BLD-MCNC: 8.3 G/DL (ref 11.9–15.1)
IMM GRANULOCYTES # BLD AUTO: <0.03 K/UL (ref 0–0.3)
IMM GRANULOCYTES NFR BLD: 0 %
INR PPP: 2.7
LIPASE SERPL-CCNC: 13 U/L (ref 13–60)
LYMPHOCYTES NFR BLD: 1.53 K/UL (ref 1.1–3.7)
LYMPHOCYTES RELATIVE PERCENT: 20 % (ref 24–43)
MCH RBC QN AUTO: 34.3 PG (ref 25.2–33.5)
MCHC RBC AUTO-ENTMCNC: 32.8 G/DL (ref 28.4–34.8)
MCV RBC AUTO: 104.5 FL (ref 82.6–102.9)
MONOCYTES NFR BLD: 0.88 K/UL (ref 0.1–1.2)
MONOCYTES NFR BLD: 11 % (ref 3–12)
NEUTROPHILS NFR BLD: 68 % (ref 36–65)
NEUTS SEG NFR BLD: 5.18 K/UL (ref 1.5–8.1)
NRBC BLD-RTO: 0 PER 100 WBC
PLATELET # BLD AUTO: 232 K/UL (ref 138–453)
PMV BLD AUTO: 9.3 FL (ref 8.1–13.5)
POTASSIUM SERPL-SCNC: 2.3 MMOL/L (ref 3.7–5.3)
POTASSIUM SERPL-SCNC: 2.4 MMOL/L (ref 3.7–5.3)
PROT SERPL-MCNC: 7.1 G/DL (ref 6.4–8.3)
PROTHROMBIN TIME: 27.9 SEC (ref 11.7–14.9)
RBC # BLD AUTO: 2.42 M/UL (ref 3.95–5.11)
RBC # BLD: ABNORMAL 10*6/UL
RBC # BLD: ABNORMAL 10*6/UL
SODIUM SERPL-SCNC: 141 MMOL/L (ref 135–144)
WBC OTHER # BLD: 7.7 K/UL (ref 3.5–11.3)

## 2023-10-02 PROCEDURE — 70450 CT HEAD/BRAIN W/O DYE: CPT

## 2023-10-02 PROCEDURE — 74177 CT ABD & PELVIS W/CONTRAST: CPT

## 2023-10-02 PROCEDURE — 85610 PROTHROMBIN TIME: CPT

## 2023-10-02 PROCEDURE — 99285 EMERGENCY DEPT VISIT HI MDM: CPT

## 2023-10-02 PROCEDURE — 85025 COMPLETE CBC W/AUTO DIFF WBC: CPT

## 2023-10-02 PROCEDURE — 6360000002 HC RX W HCPCS

## 2023-10-02 PROCEDURE — 6370000000 HC RX 637 (ALT 250 FOR IP)

## 2023-10-02 PROCEDURE — 80048 BASIC METABOLIC PNL TOTAL CA: CPT

## 2023-10-02 PROCEDURE — 2060000000 HC ICU INTERMEDIATE R&B

## 2023-10-02 PROCEDURE — 96374 THER/PROPH/DIAG INJ IV PUSH: CPT

## 2023-10-02 PROCEDURE — 6360000004 HC RX CONTRAST MEDICATION

## 2023-10-02 PROCEDURE — 83690 ASSAY OF LIPASE: CPT

## 2023-10-02 PROCEDURE — 99223 1ST HOSP IP/OBS HIGH 75: CPT | Performed by: INTERNAL MEDICINE

## 2023-10-02 PROCEDURE — 84703 CHORIONIC GONADOTROPIN ASSAY: CPT

## 2023-10-02 PROCEDURE — 6370000000 HC RX 637 (ALT 250 FOR IP): Performed by: STUDENT IN AN ORGANIZED HEALTH CARE EDUCATION/TRAINING PROGRAM

## 2023-10-02 PROCEDURE — 2580000003 HC RX 258

## 2023-10-02 PROCEDURE — 96375 TX/PRO/DX INJ NEW DRUG ADDON: CPT

## 2023-10-02 PROCEDURE — 2580000003 HC RX 258: Performed by: STUDENT IN AN ORGANIZED HEALTH CARE EDUCATION/TRAINING PROGRAM

## 2023-10-02 PROCEDURE — 80076 HEPATIC FUNCTION PANEL: CPT

## 2023-10-02 PROCEDURE — 83880 ASSAY OF NATRIURETIC PEPTIDE: CPT

## 2023-10-02 PROCEDURE — 84132 ASSAY OF SERUM POTASSIUM: CPT

## 2023-10-02 PROCEDURE — 6360000002 HC RX W HCPCS: Performed by: STUDENT IN AN ORGANIZED HEALTH CARE EDUCATION/TRAINING PROGRAM

## 2023-10-02 RX ORDER — SODIUM CHLORIDE, SODIUM LACTATE, POTASSIUM CHLORIDE, AND CALCIUM CHLORIDE .6; .31; .03; .02 G/100ML; G/100ML; G/100ML; G/100ML
500 INJECTION, SOLUTION INTRAVENOUS ONCE
Status: COMPLETED | OUTPATIENT
Start: 2023-10-02 | End: 2023-10-02

## 2023-10-02 RX ORDER — POTASSIUM CHLORIDE 7.45 MG/ML
10 INJECTION INTRAVENOUS
Status: DISPENSED | OUTPATIENT
Start: 2023-10-02 | End: 2023-10-03

## 2023-10-02 RX ORDER — MAGNESIUM SULFATE IN WATER 40 MG/ML
2000 INJECTION, SOLUTION INTRAVENOUS ONCE
Status: COMPLETED | OUTPATIENT
Start: 2023-10-02 | End: 2023-10-02

## 2023-10-02 RX ORDER — PROCHLORPERAZINE EDISYLATE 5 MG/ML
10 INJECTION INTRAMUSCULAR; INTRAVENOUS ONCE
Status: COMPLETED | OUTPATIENT
Start: 2023-10-02 | End: 2023-10-02

## 2023-10-02 RX ORDER — SODIUM CHLORIDE, SODIUM LACTATE, POTASSIUM CHLORIDE, AND CALCIUM CHLORIDE .6; .31; .03; .02 G/100ML; G/100ML; G/100ML; G/100ML
1000 INJECTION, SOLUTION INTRAVENOUS ONCE
Status: COMPLETED | OUTPATIENT
Start: 2023-10-02 | End: 2023-10-02

## 2023-10-02 RX ORDER — LANOLIN ALCOHOL/MO/W.PET/CERES
100 CREAM (GRAM) TOPICAL DAILY
Status: DISCONTINUED | OUTPATIENT
Start: 2023-10-02 | End: 2023-10-03 | Stop reason: HOSPADM

## 2023-10-02 RX ORDER — ACETAMINOPHEN 500 MG
1000 TABLET ORAL ONCE
Status: COMPLETED | OUTPATIENT
Start: 2023-10-02 | End: 2023-10-02

## 2023-10-02 RX ORDER — DIPHENHYDRAMINE HYDROCHLORIDE 50 MG/ML
25 INJECTION INTRAMUSCULAR; INTRAVENOUS ONCE
Status: COMPLETED | OUTPATIENT
Start: 2023-10-02 | End: 2023-10-02

## 2023-10-02 RX ORDER — MORPHINE SULFATE 4 MG/ML
4 INJECTION INTRAVENOUS ONCE
Status: COMPLETED | OUTPATIENT
Start: 2023-10-02 | End: 2023-10-02

## 2023-10-02 RX ORDER — POTASSIUM CHLORIDE 20 MEQ/1
40 TABLET, EXTENDED RELEASE ORAL ONCE
Status: COMPLETED | OUTPATIENT
Start: 2023-10-02 | End: 2023-10-02

## 2023-10-02 RX ORDER — 0.9 % SODIUM CHLORIDE 0.9 %
500 INTRAVENOUS SOLUTION INTRAVENOUS ONCE
Status: DISCONTINUED | OUTPATIENT
Start: 2023-10-02 | End: 2023-10-02

## 2023-10-02 RX ADMIN — IOPAMIDOL 75 ML: 755 INJECTION, SOLUTION INTRAVENOUS at 17:46

## 2023-10-02 RX ADMIN — SODIUM CHLORIDE, POTASSIUM CHLORIDE, SODIUM LACTATE AND CALCIUM CHLORIDE 1000 ML: 600; 310; 30; 20 INJECTION, SOLUTION INTRAVENOUS at 20:37

## 2023-10-02 RX ADMIN — MAGNESIUM SULFATE HEPTAHYDRATE 2000 MG: 40 INJECTION, SOLUTION INTRAVENOUS at 19:46

## 2023-10-02 RX ADMIN — PROCHLORPERAZINE EDISYLATE 10 MG: 5 INJECTION INTRAMUSCULAR; INTRAVENOUS at 19:02

## 2023-10-02 RX ADMIN — POTASSIUM CHLORIDE 10 MEQ: 7.46 INJECTION, SOLUTION INTRAVENOUS at 19:47

## 2023-10-02 RX ADMIN — POTASSIUM CHLORIDE 40 MEQ: 1500 TABLET, EXTENDED RELEASE ORAL at 19:44

## 2023-10-02 RX ADMIN — ACETAMINOPHEN 1000 MG: 500 TABLET ORAL at 19:02

## 2023-10-02 RX ADMIN — POTASSIUM CHLORIDE 10 MEQ: 7.46 INJECTION, SOLUTION INTRAVENOUS at 21:02

## 2023-10-02 RX ADMIN — SODIUM CHLORIDE, POTASSIUM CHLORIDE, SODIUM LACTATE AND CALCIUM CHLORIDE 500 ML: 600; 310; 30; 20 INJECTION, SOLUTION INTRAVENOUS at 19:17

## 2023-10-02 RX ADMIN — MORPHINE SULFATE 4 MG: 4 INJECTION INTRAVENOUS at 17:18

## 2023-10-02 RX ADMIN — POTASSIUM CHLORIDE 10 MEQ: 7.46 INJECTION, SOLUTION INTRAVENOUS at 22:06

## 2023-10-02 RX ADMIN — Medication 100 MG: at 19:03

## 2023-10-02 RX ADMIN — DIPHENHYDRAMINE HYDROCHLORIDE 25 MG: 50 INJECTION INTRAMUSCULAR; INTRAVENOUS at 19:02

## 2023-10-02 ASSESSMENT — PAIN SCALES - GENERAL
PAINLEVEL_OUTOF10: 10

## 2023-10-02 ASSESSMENT — ENCOUNTER SYMPTOMS
RHINORRHEA: 0
PHOTOPHOBIA: 0
VOMITING: 0
ABDOMINAL PAIN: 0
COUGH: 0
SHORTNESS OF BREATH: 0
COLOR CHANGE: 0
SORE THROAT: 0
EYE PAIN: 0
NAUSEA: 0
WHEEZING: 0
BACK PAIN: 0
EYE REDNESS: 0

## 2023-10-02 ASSESSMENT — PAIN - FUNCTIONAL ASSESSMENT: PAIN_FUNCTIONAL_ASSESSMENT: 0-10

## 2023-10-02 NOTE — ED TRIAGE NOTES
Patient arrives to the ED by EMS with c/o bilateral ankle edema and global pain. Patient states she has cirrhosis. Patient is very jaundice upon arrival to ED.

## 2023-10-02 NOTE — DISCHARGE INSTRUCTIONS
PLEASE RETURN TO THE EMERGENCY DEPARTMENT IMMEDIATELY for worsening symptoms, or if you develop any concerning symptoms such as: high fever not relieved by acetaminophen (Tylenol) and/or ibuprofen (Motrin), chills, shortness of breath, chest pain, persistent nausea and/or vomiting, numbness, weakness or tingling in the arms or legs or change in color of the extremities, changes in mental status, persistent headache, blurry vision. Return within 8 - 12 hours if you have any of the following: worsening of pain in your abdomen, no food sounds good to you, you continue to vomit, pain goes to your back, have pain in the abdomen when going over a bump in the car or when you jump up and down, develop vaginal bleeding or discharge, inability to urinate, unable to follow up with your physician, or other any other care or concern.

## 2023-10-02 NOTE — ED PROVIDER NOTES
[facility] ED  Emergency Department Encounter  Emergency Medicine Resident     Pt Alonso Mendieta  MRN: [id number]  [address] [facility] [place] 1995  Date of evaluation: 10/2/23  PCP:  Ana Pandya  Note Started: 4:58 PM EDT      CHIEF COMPLAINT       Chief Complaint   Patient presents with    Leg Swelling       HISTORY OF PRESENT ILLNESS  (Location/Symptom, Timing/Onset, Context/Setting, Quality, Duration, Modifying Factors, Severity.)      Spencer Tran is a 25 y.o. female who presents with lower extremity swelling. Patient states that she started having leg swelling and pain starting yesterday. She states that yesterday she drank half a pint of vodka. She reports a history of alcoholic hepatitis and previous withdrawals from alcohol. States that she sees a GI doctor at [facility].  Denies any abdominal pain nausea vomiting diarrhea constipation vaginal bleeding or discharge. States her last menstrual period was a week ago. Patient Vester Lazier any urinary symptoms frequency burning or blood in her urine. PAST MEDICAL / SURGICAL / SOCIAL / FAMILY HISTORY      has a past medical history of ADHD (attention deficit hyperactivity disorder), Anxiety, Bipolar 1 disorder ([facility]), Cirrhosis ([facility]), and Depression. has a past surgical history that includes Cholecystectomy.     Social History     Socioeconomic History    Marital status: Single     Spouse name: Not on file    Number of children: Not on file    Years of education: Not on file    Highest education level: Not on file   Occupational History    Not on file   Tobacco Use    Smoking status: Every Day     Packs/day: .5     Types: Cigarettes    Smokeless tobacco: Never   Substance and Sexual Activity    Alcohol use: Yes     Comment: pt states approx 1 pint of vodka per day since she was 9yo    Drug use: Yes     Types: Marijuana Gerhardt Salle)     Comment: Homegrown pot    Sexual activity: Yes     Partners: Male     Comment: Patient is trying

## 2023-10-03 VITALS
TEMPERATURE: 99.4 F | RESPIRATION RATE: 30 BRPM | WEIGHT: 121.25 LBS | DIASTOLIC BLOOD PRESSURE: 65 MMHG | SYSTOLIC BLOOD PRESSURE: 107 MMHG | HEIGHT: 67 IN | HEART RATE: 105 BPM | OXYGEN SATURATION: 98 % | BODY MASS INDEX: 19.03 KG/M2

## 2023-10-03 PROBLEM — D68.9 COAGULOPATHY (HCC): Status: ACTIVE | Noted: 2023-10-03

## 2023-10-03 LAB
ALBUMIN SERPL-MCNC: 2.3 G/DL (ref 3.5–5.2)
ALBUMIN/GLOB SERPL: 0.7 {RATIO} (ref 1–2.5)
ALP SERPL-CCNC: 122 U/L (ref 35–104)
ALT SERPL-CCNC: 23 U/L (ref 5–33)
AMMONIA PLAS-SCNC: 50 UMOL/L (ref 11–51)
ANION GAP SERPL CALCULATED.3IONS-SCNC: 12 MMOL/L (ref 9–17)
AST SERPL-CCNC: 164 U/L
BASOPHILS # BLD: <0.03 K/UL (ref 0–0.2)
BASOPHILS NFR BLD: 0 % (ref 0–2)
BILIRUB DIRECT SERPL-MCNC: 11.4 MG/DL
BILIRUB INDIRECT SERPL-MCNC: 3.9 MG/DL (ref 0–1)
BILIRUB SERPL-MCNC: 15.3 MG/DL (ref 0.3–1.2)
BUN SERPL-MCNC: <2 MG/DL (ref 6–20)
CALCIUM SERPL-MCNC: 7.3 MG/DL (ref 8.6–10.4)
CHLORIDE SERPL-SCNC: 105 MMOL/L (ref 98–107)
CO2 SERPL-SCNC: 21 MMOL/L (ref 20–31)
CREAT SERPL-MCNC: <0.2 MG/DL (ref 0.5–0.9)
EOSINOPHIL # BLD: 0.06 K/UL (ref 0–0.44)
EOSINOPHILS RELATIVE PERCENT: 1 % (ref 1–4)
ERYTHROCYTE [DISTWIDTH] IN BLOOD BY AUTOMATED COUNT: 16.7 % (ref 11.8–14.4)
GFR SERPL CREATININE-BSD FRML MDRD: ABNORMAL ML/MIN/1.73M2
GLUCOSE SERPL-MCNC: 78 MG/DL (ref 70–99)
HAPTOGLOB SERPL-MCNC: <10 MG/DL (ref 30–200)
HCT VFR BLD AUTO: 20.9 % (ref 36.3–47.1)
HCT VFR BLD AUTO: 21.8 % (ref 36.3–47.1)
HCT VFR BLD AUTO: 24.3 % (ref 36.3–47.1)
HGB BLD-MCNC: 6.7 G/DL (ref 11.9–15.1)
HGB BLD-MCNC: 6.8 G/DL (ref 11.9–15.1)
HGB BLD-MCNC: 7.8 G/DL (ref 11.9–15.1)
IMM GRANULOCYTES # BLD AUTO: <0.03 K/UL (ref 0–0.3)
IMM GRANULOCYTES NFR BLD: 0 %
INR PPP: 2.6
LDH SERPL-CCNC: 168 U/L (ref 135–214)
LYMPHOCYTES NFR BLD: 1.04 K/UL (ref 1.1–3.7)
LYMPHOCYTES RELATIVE PERCENT: 21 % (ref 24–43)
MAGNESIUM SERPL-MCNC: 2 MG/DL (ref 1.6–2.6)
MCH RBC QN AUTO: 34.2 PG (ref 25.2–33.5)
MCHC RBC AUTO-ENTMCNC: 32.1 G/DL (ref 28.4–34.8)
MCV RBC AUTO: 106.6 FL (ref 82.6–102.9)
MONOCYTES NFR BLD: 0.59 K/UL (ref 0.1–1.2)
MONOCYTES NFR BLD: 12 % (ref 3–12)
NEUTROPHILS NFR BLD: 66 % (ref 36–65)
NEUTS SEG NFR BLD: 3.31 K/UL (ref 1.5–8.1)
NRBC BLD-RTO: 0 PER 100 WBC
PHOSPHATE SERPL-MCNC: 2.2 MG/DL (ref 2.6–4.5)
PLATELET # BLD AUTO: 155 K/UL (ref 138–453)
PMV BLD AUTO: 9.2 FL (ref 8.1–13.5)
POTASSIUM SERPL-SCNC: 3.2 MMOL/L (ref 3.7–5.3)
PROT SERPL-MCNC: 5.7 G/DL (ref 6.4–8.3)
PROTHROMBIN TIME: 27.1 SEC (ref 11.7–14.9)
RBC # BLD AUTO: 1.96 M/UL (ref 3.95–5.11)
RBC # BLD: ABNORMAL 10*6/UL
RBC # BLD: ABNORMAL 10*6/UL
SODIUM SERPL-SCNC: 138 MMOL/L (ref 135–144)
WBC OTHER # BLD: 5 K/UL (ref 3.5–11.3)

## 2023-10-03 PROCEDURE — 85025 COMPLETE CBC W/AUTO DIFF WBC: CPT

## 2023-10-03 PROCEDURE — 6370000000 HC RX 637 (ALT 250 FOR IP): Performed by: INTERNAL MEDICINE

## 2023-10-03 PROCEDURE — 99232 SBSQ HOSP IP/OBS MODERATE 35: CPT | Performed by: NURSE PRACTITIONER

## 2023-10-03 PROCEDURE — 86901 BLOOD TYPING SEROLOGIC RH(D): CPT

## 2023-10-03 PROCEDURE — P9040 RBC LEUKOREDUCED IRRADIATED: HCPCS

## 2023-10-03 PROCEDURE — 6360000002 HC RX W HCPCS: Performed by: INTERNAL MEDICINE

## 2023-10-03 PROCEDURE — 82140 ASSAY OF AMMONIA: CPT

## 2023-10-03 PROCEDURE — 84100 ASSAY OF PHOSPHORUS: CPT

## 2023-10-03 PROCEDURE — 36415 COLL VENOUS BLD VENIPUNCTURE: CPT

## 2023-10-03 PROCEDURE — 99254 IP/OBS CNSLTJ NEW/EST MOD 60: CPT | Performed by: INTERNAL MEDICINE

## 2023-10-03 PROCEDURE — 36430 TRANSFUSION BLD/BLD COMPNT: CPT

## 2023-10-03 PROCEDURE — 85014 HEMATOCRIT: CPT

## 2023-10-03 PROCEDURE — 6360000002 HC RX W HCPCS: Performed by: NURSE PRACTITIONER

## 2023-10-03 PROCEDURE — 85018 HEMOGLOBIN: CPT

## 2023-10-03 PROCEDURE — 80048 BASIC METABOLIC PNL TOTAL CA: CPT

## 2023-10-03 PROCEDURE — 6360000002 HC RX W HCPCS: Performed by: STUDENT IN AN ORGANIZED HEALTH CARE EDUCATION/TRAINING PROGRAM

## 2023-10-03 PROCEDURE — 6370000000 HC RX 637 (ALT 250 FOR IP): Performed by: NURSE PRACTITIONER

## 2023-10-03 PROCEDURE — 83615 LACTATE (LD) (LDH) ENZYME: CPT

## 2023-10-03 PROCEDURE — 83010 ASSAY OF HAPTOGLOBIN QUANT: CPT

## 2023-10-03 PROCEDURE — 80076 HEPATIC FUNCTION PANEL: CPT

## 2023-10-03 PROCEDURE — 86920 COMPATIBILITY TEST SPIN: CPT

## 2023-10-03 PROCEDURE — 6370000000 HC RX 637 (ALT 250 FOR IP)

## 2023-10-03 PROCEDURE — 97530 THERAPEUTIC ACTIVITIES: CPT

## 2023-10-03 PROCEDURE — 86850 RBC ANTIBODY SCREEN: CPT

## 2023-10-03 PROCEDURE — 86900 BLOOD TYPING SEROLOGIC ABO: CPT

## 2023-10-03 PROCEDURE — 85610 PROTHROMBIN TIME: CPT

## 2023-10-03 PROCEDURE — 83735 ASSAY OF MAGNESIUM: CPT

## 2023-10-03 PROCEDURE — 2580000003 HC RX 258: Performed by: INTERNAL MEDICINE

## 2023-10-03 PROCEDURE — 97161 PT EVAL LOW COMPLEX 20 MIN: CPT

## 2023-10-03 RX ORDER — LORAZEPAM 2 MG/ML
1 INJECTION INTRAMUSCULAR
Status: DISCONTINUED | OUTPATIENT
Start: 2023-10-03 | End: 2023-10-03 | Stop reason: HOSPADM

## 2023-10-03 RX ORDER — LORAZEPAM 2 MG/ML
2 INJECTION INTRAMUSCULAR
Status: DISCONTINUED | OUTPATIENT
Start: 2023-10-03 | End: 2023-10-03 | Stop reason: HOSPADM

## 2023-10-03 RX ORDER — SODIUM CHLORIDE 0.9 % (FLUSH) 0.9 %
10 SYRINGE (ML) INJECTION PRN
Status: DISCONTINUED | OUTPATIENT
Start: 2023-10-03 | End: 2023-10-03 | Stop reason: HOSPADM

## 2023-10-03 RX ORDER — THIAMINE MONONITRATE (VIT B1) 100 MG
100 TABLET ORAL DAILY
Status: DISCONTINUED | OUTPATIENT
Start: 2023-10-03 | End: 2023-10-03 | Stop reason: SDUPTHER

## 2023-10-03 RX ORDER — OLANZAPINE 5 MG/1
2.5 TABLET ORAL NIGHTLY
Status: DISCONTINUED | OUTPATIENT
Start: 2023-10-03 | End: 2023-10-03 | Stop reason: HOSPADM

## 2023-10-03 RX ORDER — SODIUM CHLORIDE 9 MG/ML
INJECTION, SOLUTION INTRAVENOUS PRN
Status: DISCONTINUED | OUTPATIENT
Start: 2023-10-03 | End: 2023-10-03 | Stop reason: HOSPADM

## 2023-10-03 RX ORDER — LORAZEPAM 2 MG/1
2 TABLET ORAL
Status: DISCONTINUED | OUTPATIENT
Start: 2023-10-03 | End: 2023-10-03 | Stop reason: HOSPADM

## 2023-10-03 RX ORDER — FOLIC ACID 1 MG/1
1 TABLET ORAL DAILY
Status: DISCONTINUED | OUTPATIENT
Start: 2023-10-03 | End: 2023-10-03 | Stop reason: HOSPADM

## 2023-10-03 RX ORDER — POTASSIUM CHLORIDE 20 MEQ/1
40 TABLET, EXTENDED RELEASE ORAL
Status: DISPENSED | OUTPATIENT
Start: 2023-10-03 | End: 2023-10-03

## 2023-10-03 RX ORDER — LACTULOSE 10 G/15ML
10 SOLUTION ORAL 2 TIMES DAILY
Qty: 900 ML | Refills: 0 | Status: SHIPPED | OUTPATIENT
Start: 2023-10-03 | End: 2023-11-02

## 2023-10-03 RX ORDER — ACETAMINOPHEN 650 MG/1
325 SUPPOSITORY RECTAL EVERY 8 HOURS PRN
Status: DISCONTINUED | OUTPATIENT
Start: 2023-10-03 | End: 2023-10-03 | Stop reason: HOSPADM

## 2023-10-03 RX ORDER — MORPHINE SULFATE 2 MG/ML
2 INJECTION, SOLUTION INTRAMUSCULAR; INTRAVENOUS ONCE
Status: COMPLETED | OUTPATIENT
Start: 2023-10-03 | End: 2023-10-03

## 2023-10-03 RX ORDER — ACETAMINOPHEN 325 MG/1
325 TABLET ORAL EVERY 8 HOURS PRN
Status: DISCONTINUED | OUTPATIENT
Start: 2023-10-03 | End: 2023-10-03 | Stop reason: HOSPADM

## 2023-10-03 RX ORDER — LORAZEPAM 2 MG/ML
4 INJECTION INTRAMUSCULAR
Status: DISCONTINUED | OUTPATIENT
Start: 2023-10-03 | End: 2023-10-03 | Stop reason: HOSPADM

## 2023-10-03 RX ORDER — LORAZEPAM 2 MG/ML
3 INJECTION INTRAMUSCULAR
Status: DISCONTINUED | OUTPATIENT
Start: 2023-10-03 | End: 2023-10-03 | Stop reason: HOSPADM

## 2023-10-03 RX ORDER — SODIUM CHLORIDE 9 MG/ML
INJECTION, SOLUTION INTRAVENOUS CONTINUOUS
Status: DISCONTINUED | OUTPATIENT
Start: 2023-10-03 | End: 2023-10-03

## 2023-10-03 RX ORDER — SODIUM CHLORIDE 0.9 % (FLUSH) 0.9 %
5-40 SYRINGE (ML) INJECTION PRN
Status: DISCONTINUED | OUTPATIENT
Start: 2023-10-03 | End: 2023-10-03 | Stop reason: HOSPADM

## 2023-10-03 RX ORDER — SODIUM CHLORIDE 0.9 % (FLUSH) 0.9 %
5-40 SYRINGE (ML) INJECTION EVERY 12 HOURS SCHEDULED
Status: DISCONTINUED | OUTPATIENT
Start: 2023-10-03 | End: 2023-10-03 | Stop reason: HOSPADM

## 2023-10-03 RX ORDER — POTASSIUM CHLORIDE 7.45 MG/ML
10 INJECTION INTRAVENOUS PRN
Status: DISCONTINUED | OUTPATIENT
Start: 2023-10-03 | End: 2023-10-03 | Stop reason: HOSPADM

## 2023-10-03 RX ORDER — LORAZEPAM 1 MG/1
1 TABLET ORAL
Status: DISCONTINUED | OUTPATIENT
Start: 2023-10-03 | End: 2023-10-03 | Stop reason: HOSPADM

## 2023-10-03 RX ORDER — LANOLIN ALCOHOL/MO/W.PET/CERES
100 CREAM (GRAM) TOPICAL DAILY
Status: DISCONTINUED | OUTPATIENT
Start: 2023-10-03 | End: 2023-10-03 | Stop reason: SDUPTHER

## 2023-10-03 RX ORDER — ONDANSETRON 2 MG/ML
4 INJECTION INTRAMUSCULAR; INTRAVENOUS EVERY 6 HOURS PRN
Status: DISCONTINUED | OUTPATIENT
Start: 2023-10-03 | End: 2023-10-03 | Stop reason: HOSPADM

## 2023-10-03 RX ORDER — POTASSIUM CHLORIDE 20 MEQ/1
40 TABLET, EXTENDED RELEASE ORAL PRN
Status: DISCONTINUED | OUTPATIENT
Start: 2023-10-03 | End: 2023-10-03 | Stop reason: HOSPADM

## 2023-10-03 RX ORDER — ONDANSETRON 4 MG/1
4 TABLET, ORALLY DISINTEGRATING ORAL EVERY 8 HOURS PRN
Status: DISCONTINUED | OUTPATIENT
Start: 2023-10-03 | End: 2023-10-03 | Stop reason: HOSPADM

## 2023-10-03 RX ORDER — MAGNESIUM SULFATE 1 G/100ML
1000 INJECTION INTRAVENOUS PRN
Status: DISCONTINUED | OUTPATIENT
Start: 2023-10-03 | End: 2023-10-03 | Stop reason: HOSPADM

## 2023-10-03 RX ORDER — LACTULOSE 10 G/15ML
20 SOLUTION ORAL 3 TIMES DAILY
Status: DISCONTINUED | OUTPATIENT
Start: 2023-10-03 | End: 2023-10-03 | Stop reason: HOSPADM

## 2023-10-03 RX ORDER — LORAZEPAM 2 MG/1
4 TABLET ORAL
Status: DISCONTINUED | OUTPATIENT
Start: 2023-10-03 | End: 2023-10-03 | Stop reason: HOSPADM

## 2023-10-03 RX ORDER — POLYETHYLENE GLYCOL 3350 17 G/17G
17 POWDER, FOR SOLUTION ORAL DAILY PRN
Status: DISCONTINUED | OUTPATIENT
Start: 2023-10-03 | End: 2023-10-03 | Stop reason: HOSPADM

## 2023-10-03 RX ADMIN — SODIUM CHLORIDE: 9 INJECTION, SOLUTION INTRAVENOUS at 06:35

## 2023-10-03 RX ADMIN — FOLIC ACID 1 MG: 1 TABLET ORAL at 08:45

## 2023-10-03 RX ADMIN — POTASSIUM CHLORIDE 40 MEQ: 1500 TABLET, EXTENDED RELEASE ORAL at 06:31

## 2023-10-03 RX ADMIN — OLANZAPINE 2.5 MG: 5 TABLET, FILM COATED ORAL at 01:52

## 2023-10-03 RX ADMIN — POTASSIUM CHLORIDE 10 MEQ: 7.46 INJECTION, SOLUTION INTRAVENOUS at 00:43

## 2023-10-03 RX ADMIN — LORAZEPAM 1 MG: 1 TABLET ORAL at 11:30

## 2023-10-03 RX ADMIN — LACTULOSE 20 G: 20 SOLUTION ORAL at 13:33

## 2023-10-03 RX ADMIN — PIPERACILLIN AND TAZOBACTAM 3375 MG: 3; .375 INJECTION, POWDER, LYOPHILIZED, FOR SOLUTION INTRAVENOUS at 08:44

## 2023-10-03 RX ADMIN — SODIUM CHLORIDE: 9 INJECTION, SOLUTION INTRAVENOUS at 00:46

## 2023-10-03 RX ADMIN — Medication 100 MG: at 08:45

## 2023-10-03 RX ADMIN — SODIUM CHLORIDE: 9 INJECTION, SOLUTION INTRAVENOUS at 00:42

## 2023-10-03 RX ADMIN — MORPHINE SULFATE 2 MG: 2 INJECTION, SOLUTION INTRAMUSCULAR; INTRAVENOUS at 01:45

## 2023-10-03 RX ADMIN — PIPERACILLIN AND TAZOBACTAM 3375 MG: 3; .375 INJECTION, POWDER, LYOPHILIZED, FOR SOLUTION INTRAVENOUS at 00:47

## 2023-10-03 RX ADMIN — SODIUM CHLORIDE, PRESERVATIVE FREE 10 ML: 5 INJECTION INTRAVENOUS at 08:45

## 2023-10-03 RX ADMIN — POTASSIUM CHLORIDE 10 MEQ: 7.46 INJECTION, SOLUTION INTRAVENOUS at 01:43

## 2023-10-03 RX ADMIN — ACETAMINOPHEN 325 MG: 325 TABLET ORAL at 10:54

## 2023-10-03 ASSESSMENT — PAIN SCALES - GENERAL
PAINLEVEL_OUTOF10: 0
PAINLEVEL_OUTOF10: 5
PAINLEVEL_OUTOF10: 4
PAINLEVEL_OUTOF10: 5
PAINLEVEL_OUTOF10: 10

## 2023-10-03 ASSESSMENT — PAIN DESCRIPTION - LOCATION: LOCATION: LEG

## 2023-10-03 NOTE — CONSENT
Informed Consent for Blood Component Transfusion Note    I have discussed with the patient the rationale for blood component transfusion; its benefits in treating or preventing fatigue, organ damage, or death; and its risk which includes mild transfusion reactions, rare risk of blood borne infection, or more serious but rare reactions. I have discussed the alternatives to transfusion, including the risk and consequences of not receiving transfusion. The patient had an opportunity to ask questions and had agreed to proceed with transfusion of blood components.     Electronically signed by Ismael Barron MD on 10/3/23 at 9:44 AM EDT

## 2023-10-03 NOTE — CARE COORDINATION
Met with pt after receiving a social work consult for \"consideration of rehab\". Pt very sleepy and not wanting to talk. Pt states that she has been in many rehab's in the past and states that she plans to go to [facility] at discharge. Pt states that she does not need help setting up services as she plans to go home first.  Provided pt a list of resources with   [facility] walk in hours.

## 2023-10-03 NOTE — CONSULTS
[facility]    GASTROENTEROLOGY CONSULT    Patient:   Toña Vieira   :    1995   Facility:   [facility] List   Date:    10/3/2023  Admission Dx:  Hypokalemia [E87.6]  Hyperbilirubinemia [E80.6]  Leg edema [G27.6]  Alcoholic hepatitis without ascites [K70.10]  Requesting physician: Darshan Lopez MD  Reason for consult:  Alcoholic hepatitis   CC : \"ankle swelling and pain\"    SUBJECTIVE     HISTORY OF PRESENT ILLNESS  This is a 25 y.o.   female who was admitted 10/2/2023 with Hypokalemia [E87.6]  Hyperbilirubinemia [E80.6]  Leg edema [W08.8]  Alcoholic hepatitis without ascites [K70.10]. We have been asked to see the patient in consultation by Darshan Lopez MD for alcoholic hepatitis    [id number] female with a history of ADHD, bipolar disorder, decompensated alcoholic cirrhosis, ascites, alcoholic hepatitis, alcohol induced cardiomyopathy, 9 2 D echo 2023- EF 55 to 60%), chronic pancreatitis, macrocytic anemia, ongoing alcohol abuse who presented to the ED for evaluation of leg swelling and pain. Patient reports she had increasing pain in lower extremities over the past few days prompting her to come to [facility]. [facility]. She reports she follows with [facility] and had appointment to see them today but could not wait to be evaluated due to ankle pain. Patient admits to relapsing alcohol. Reports drinking 1/2 pint of vodka daily. Initial work-up in the ED showed hypokalemia with potassium 2.3, proBNP 360, T bilirubin 18.1, , ALT 28, alkaline phosphatase 143. Hgb 8.3, HCT 25.3, .5, . Initial blood pressure 98/65 and dropped to SBP 85 to 90 after receiving morphine. Patient did receive 1.5 L LR. Baseline hemoglobin 7-8 8 g/dL    Patient denies any overt signs of GI bleeding. Records show patient was recently at [facility] for alcoholic hepatitis and discharged 2023.        Per [facility] note History:   Diagnosis Date    ADHD (attention deficit hyperactivity disorder)     Anxiety     Bipolar 1 disorder (HCC)     Cirrhosis ([address])     Depression    Alcohol induced cardiomyopathy  Alcohol use disorder      Past Surgical History:   Procedure Laterality Date    CHOLECYSTECTOMY         ALLERGIES:  No Known Allergies    HOME MEDICATIONS:  Prior to Admission medications    Medication Sig Start Date End Date Taking? Authorizing Provider   gabapentin (NEURONTIN) 100 MG capsule Take 1 capsule by mouth 3 times daily.   Patient not taking: Reported on 10/2/2023 4/19/22   Historical Provider, MD   ketorolac (TORADOL) 10 MG tablet Take 1 tablet by mouth every 6 hours as needed for Pain  Patient not taking: Reported on 10/2/2023 7/23/23   Liliane Bread, DO   venlafaxine (EFFEXOR XR) 75 MG extended release capsule Take 1 capsule by mouth daily (with breakfast)  Patient not taking: Reported on 7/23/2023 4/26/23   ADAM Jones CNP   OLANZapine (ZYPREXA) 2.5 MG tablet Take 1 tablet by mouth nightly  Patient not taking: Reported on 7/23/2023 4/25/23   ADAM Jones CNP   folic acid (FOLVITE) 1 MG tablet Take 1 tablet by mouth daily  Patient not taking: Reported on 7/23/2023 4/26/23   ADAM Jones CNP   thiamine mononitrate (THIAMINE) 100 MG tablet Take 1 tablet by mouth daily  Patient not taking: Reported on 7/23/2023 4/26/23   ADAM Jones CNP       CURRENT MEDICATIONS:  Scheduled Meds:   sodium chloride flush  5-40 mL IntraVENous 2 times per day    piperacillin-tazobactam  3,375 mg IntraVENous E9A    folic acid  1 mg Oral Daily    OLANZapine  2.5 mg Oral Nightly    sodium chloride flush  5-40 mL IntraVENous 2 times per day    sodium chloride flush  5-40 mL IntraVENous 2 times per day    potassium chloride  40 mEq Oral Q2H    thiamine  100 mg Oral Daily     Continuous Infusions:   sodium chloride 25 mL/hr at 10/03/23 0046    sodium chloride      sodium chloride      sodium

## 2023-10-03 NOTE — ED NOTES
Writer spoke to Dr. Marcy Palacios about patients soft b/p's. Per Dr. Marcy Palacios b/p is ok with a MAP above 60. Patient still appropriate for step down bed.        Gerson Gonsales RN  10/02/23 7120

## 2023-10-03 NOTE — ED PROVIDER NOTES
[address] [place] [address] ED  Emergency Department  Emergency Medicine Resident Sign-out     Care of Jorge Wu was assumed from Dr. Mari Schmidt and is being seen for Leg Swelling  . The patient's initial evaluation and plan have been discussed with the prior provider who initially evaluated the patient.      EMERGENCY DEPARTMENT COURSE / MEDICAL DECISION MAKING:       MEDICATIONS GIVEN:  Orders Placed This Encounter   Medications    morphine injection 4 mg    iopamidol (ISOVUE-370) 76 % injection 75 mL    acetaminophen (TYLENOL) tablet 1,000 mg    prochlorperazine (COMPAZINE) injection 10 mg    diphenhydrAMINE (BENADRYL) injection 25 mg    DISCONTD: sodium chloride 0.9 % bolus 500 mL    thiamine tablet 100 mg    lactated ringers bolus bolus 500 mL    magnesium sulfate 2000 mg in 50 mL IVPB premix    potassium chloride 10 mEq/100 mL IVPB (Peripheral Line)    potassium chloride (KLOR-CON M) extended release tablet 40 mEq    lactated ringers bolus bolus 1,000 mL       LABS / RADIOLOGY:     Labs Reviewed   CBC WITH AUTO DIFFERENTIAL - Abnormal; Notable for the following components:       Result Value    RBC 2.42 (*)     Hemoglobin 8.3 (*)     Hematocrit 25.3 (*)     .5 (*)     MCH 34.3 (*)     RDW 16.4 (*)     Neutrophils % 68 (*)     Lymphocytes % 20 (*)     All other components within normal limits   BASIC METABOLIC PANEL - Abnormal; Notable for the following components:    Potassium 2.3 (*)     Glucose 104 (*)     BUN <2 (*)     Creatinine <0.2 (*)     Calcium 8.0 (*)     All other components within normal limits   BRAIN NATRIURETIC PEPTIDE - Abnormal; Notable for the following components:    Pro- (*)     All other components within normal limits   HEPATIC FUNCTION PANEL - Abnormal; Notable for the following components:    Albumin 2.9 (*)     Alkaline Phosphatase 143 (*)      (*)     Total Bilirubin 18.1 (*)     Bilirubin, Direct 13.0 (*)     Bilirubin, Indirect 5.1 (*) Eloped   FOLLOW-UP: Laureen Felix  [address] [facility]  [phone]    Call in 2 days  As needed     DISCHARGE MEDICATIONS: New Prescriptions    No medications on file          Radames Favre, MD  Emergency Medicine Resident  [address] Mendoza MD  Resident  10/02/23 7221

## 2023-10-03 NOTE — PROGRESS NOTES
Physical Therapy  Facility/Department: [address] STEPDOWN  Physical Therapy Initial Assessment    Name: Pino Caballero  : 1995  MRN: [id number]  Date of Service: 10/3/2023    Chief Complaint   Patient presents with    Leg Swelling       Discharge Recommendations:    Further therapy recommended at discharge. PT Equipment Recommendations  Equipment Needed: No      Patient Diagnosis(es): The primary encounter diagnosis was Hypokalemia. Diagnoses of Leg edema and Hyperbilirubinemia were also pertinent to this visit. Past Medical History:  has a past medical history of ADHD (attention deficit hyperactivity disorder), Anxiety, Bipolar 1 disorder ([address]), Cirrhosis ([address]), and Depression. Past Surgical History:  has a past surgical history that includes Cholecystectomy. Assessment   Body Structures, Functions, Activity Limitations Requiring Skilled Therapeutic Intervention: Decreased functional mobility ; Decreased strength;Decreased balance  Assessment: Pt grossly CGA to SBA, amb 250' CGA no AD. Pt would benefit from continued acute PT to address deficits.   Therapy Prognosis: Good  Decision Making: Low Complexity  Requires PT Follow-Up: Yes  Activity Tolerance  Activity Tolerance: Patient tolerated treatment well;Patient limited by fatigue     Plan   Physcial Therapy Plan  General Plan: 3-5 times per week  Current Treatment Recommendations: Strengthening, Balance training, Gait training, Functional mobility training, Stair training, Transfer training, Home exercise program, Safety education & training, Patient/Caregiver education & training, Therapeutic activities  Safety Devices  Type of Devices: Call light within reach, Nurse notified, Gait belt, Patient at risk for falls, All fall risk precautions in place, Left in bed  Restraints  Restraints Initially in Place: No     Restrictions  Restrictions/Precautions  Restrictions/Precautions: General Precautions, Fall Risk, Seizure  Required Braces or Orthoses?:

## 2023-10-03 NOTE — CARE COORDINATION
Case Management Assessment  Initial Evaluation    Date/Time of Evaluation: 10/3/2023 5:27 PM  Assessment Completed by: Aniket Kohler RN    If patient is discharged prior to next notation, then this note serves as note for discharge by case management. Patient Name: Wiliam Menjivar                   YOB: 1995  Diagnosis: Hypokalemia [E87.6]  Hyperbilirubinemia [E80.6]  Leg edema [B70.7]  Alcoholic hepatitis without ascites [K70.10]                   Date / Time: 10/2/2023  4:56 PM    Patient Admission Status: Inpatient   Readmission Risk (Low < 19, Mod (19-27), High > 27): Readmission Risk Score: 20    Current PCP: Rhea Soriano  PCP verified by CM? Yes    Chart Reviewed: Yes      History Provided by: Patient  Patient Orientation: Alert and Oriented, Person, Place, Situation    Patient Cognition: Alert    Hospitalization in the last 30 days (Readmission):  No    If yes, Readmission Assessment in  Navigator will be completed. Advance Directives:      Code Status: Full Code   Patient's Primary Decision Maker is: Legal Next of Kin      Discharge Planning:    Patient lives with: Spouse/Significant Other Type of Home: House  Primary Care Giver: Self  Patient Support Systems include: Spouse/Significant Other, Family Members   Current Financial resources:    Current community resources:    Current services prior to admission: None            Current DME:              Type of Home Care services:  None    ADLS  Prior functional level: Independent in ADLs/IADLs  Current functional level: Independent in ADLs/IADLs    PT AM-PAC: 20 /24  OT AM-PAC:   /24    Family can provide assistance at DC: Yes  Would you like Case Management to discuss the discharge plan with any other family members/significant others, and if so, who?  No  Plans to Return to Present Housing: Yes  Other Identified Issues/Barriers to RETURNING to current housing: na  Potential Assistance needed at discharge: N/A            Potential DME:    Patient expects to discharge to: House  Plan for transportation at discharge: [address]: [place] [employer] / Plan: [place] [employer] / Product Type: *No Product type* /     Does insurance require precert for SNF: No    Potential assistance Purchasing Medications: No  Meds-to-Beds request: Yes      [id number] - [address], [address] [facility] [address]  Phone: [phone] Fax: [phone]      Notes:    Factors facilitating achievement of predicted outcomes: Family support, Cooperative, Pleasant, and Sense of humor    Barriers to discharge: Medical complications    Additional Case Management Notes: met with patient to discuss transitional planning. Plan is home independently. Patient may need cab ride home. Patient agreeable to plan. The Plan for Transition of Care is related to the following treatment goals of Hypokalemia [E87.6]  Hyperbilirubinemia [E80.6]  Leg edema [O66.2]  Alcoholic hepatitis without ascites [P38.95]    IF APPLICABLE: The Patient and/or patient representative Jenny Beckwith and her family were provided with a choice of provider and agrees with the discharge plan. Freedom of choice list with basic dialogue that supports the patient's individualized plan of care/goals and shares the quality data associated with the providers was provided to: Patient   Patient Representative Name:       The Patient and/or Patient Representative Agree with the Discharge Plan?  Yes    Aston Barnhart RN  Case Management Department  Ph: [phone] Fax: na

## 2023-10-03 NOTE — PLAN OF CARE
Problem: Discharge Planning  Goal: Discharge to home or other facility with appropriate resources  Outcome: Progressing  Flowsheets (Taken 10/3/2023 7889)  Discharge to home or other facility with appropriate resources:   Identify barriers to discharge with patient and caregiver   Arrange for needed discharge resources and transportation as appropriate   Identify discharge learning needs (meds, wound care, etc)   Refer to discharge planning if patient needs post-hospital services based on physician order or complex needs related to functional status, cognitive ability or social support system     Problem: Safety - Adult  Goal: Free from fall injury  Outcome: Progressing  Flowsheets (Taken 10/3/2023 5911)  Free From Fall Injury: Instruct family/caregiver on patient safety     Problem: Pain  Goal: Verbalizes/displays adequate comfort level or baseline comfort level  Outcome: Progressing     Problem: ABCDS Injury Assessment  Goal: Absence of physical injury  Outcome: Progressing

## 2023-10-03 NOTE — PROGRESS NOTES
Occupational [address]  Occupational Therapy Not Seen Note    DATE: 10/3/2023    NAME: Tsering Jack  MRN: [id number]   : 1995      Patient not seen this date for Occupational Therapy due to:      Blood Transfusion: Pt with low hemoglobin this AM and pending blood transfusion      Next Scheduled Treatment: Will check back 10/4/2023 as able     Electronically signed by Riley Mccullough OT on 10/3/2023 at 2:08 PM

## 2023-10-03 NOTE — PROGRESS NOTES
Primary np on call made aware of pt's hemoglobin of 6.7, primary nurse unavailable to do so at this moment. Awaiting response.     Electronically signed by Shane Gross RN on 10/3/2023 at 4:47 AM

## 2023-10-03 NOTE — PLAN OF CARE
Problem: Discharge Planning  Goal: Discharge to home or other facility with appropriate resources  10/3/2023 1727 by Leanna Castillo RN  Outcome: Completed  10/3/2023 1600 by Leanna Castillo RN  Outcome: Progressing  Flowsheets (Taken 10/3/2023 6413)  Discharge to home or other facility with appropriate resources:   Identify barriers to discharge with patient and caregiver   Arrange for needed discharge resources and transportation as appropriate   Identify discharge learning needs (meds, wound care, etc)   Refer to discharge planning if patient needs post-hospital services based on physician order or complex needs related to functional status, cognitive ability or social support system     Problem: Safety - Adult  Goal: Free from fall injury  10/3/2023 1727 by Leanna Castillo RN  Outcome: Completed  10/3/2023 1600 by Leanna Castillo RN  Outcome: Progressing  [address] (Taken 10/3/2023 0957)  Free From Fall Injury: Instruct family/caregiver on patient safety     Problem: Pain  Goal: Verbalizes/displays adequate comfort level or baseline comfort level  10/3/2023 1727 by Leanna Castillo RN  Outcome: Completed  10/3/2023 1600 by Leanna Castillo RN  Outcome: Progressing     Problem: ABCDS Injury Assessment  Goal: Absence of physical injury  10/3/2023 1727 by Leanna Castillo RN  Outcome: Completed  10/3/2023 1600 by Leanna Castillo RN  Outcome: Progressing

## 2023-10-03 NOTE — H&P
Value Ref Range    Magnesium 2.0 1.6 - 2.6 mg/dL     Prior labs from [facility] reviewed independently by myself    (ABNORMAL) CBC auto differential (09/22/2023 5:25 AM EDT)  Lab Results - (ABNORMAL) CBC auto differential (09/22/2023 5:25 AM EDT)  Component Value Ref Range Test Method Analysis Time Performed At [facility]   White Blood Cells 7.6 4.0 - 11.0 X10E9/L   09/22/2023 6:11 AM  [facility]     RBC count 1.90 (L) 3.80 - 5.20 X10E12/L   09/22/2023 6:11 AM  [facility]     Hemoglobin 7.3 (L) 11.7 - 15.5 g/dL   09/22/2023 6:11 AM  [facility]     Hematocrit 21.4 (L) 35 - 47 %   09/22/2023 6:11 AM  [facility]      (H) 80 - 100 fL   09/22/2023 6:11 AM  [facility]     MCH 38.3 (H) 27 - 34 pg   09/22/2023 6:11 AM  [facility]     MCHC 34.0 32 - 36 g/dL   09/22/2023 6:11 AM  [facility]     RDW 16.8 (H) 11.5 - 15.0 %   09/22/2023 6:11 AM  [facility]     Platelets 655 (L) 363 - 450 X10E9/L   09/22/2023 6:11 AM  [facility]     MPV 8.8 7 - 12 fL   09/22/2023 6:11 AM  [facility]     Seg neutrophil 78.0 %   09/22/2023 7:26 AM  [facility]     Lymphocyte 9.0 %   09/22/2023 7:26 AM  [facility]     Monocytes 12.0 %   09/22/2023 7:26 AM  [facility]     Basophil 1.0 %   09/22/2023 7:26 AM  [facility]     Neutrophils Absolute (M) 5.9 1.5 - 6.6 X10E9/L   09/22/2023 7:26 AM  [facility]     Lymphocytes Absolute 0.7 (L) 1.0 - 3.5 X10E9/L   09/22/2023 7:26 AM  [facility]     Monocytes Absolute 0.9 0 - 0.9 X10E9/L   09/22/2023 7:26 AM  [facility]     Basophils Absolute 0.1 0.0 - 0.2 X10E9/L   09/22/2023 7:26 AM  [facility] [address]     Polychromasia 1+ Negative Negative^Negative   09/20/2023 8:48 PM EDT [facility]     Comment: Cocaine screening cut off value = 300 ng/mL   Opiate Quant, Ur Negative Negative^Negative   09/20/2023 8:48 PM EDT [facility]     Comment:   Opiates screening cut off value = 300 ng/mL       NOTE:  This test is used for the detection of  codeine, hydrocodone (>1000 ng/mL), morphine  and hydromorphone (>900 ng/mL) in urine. Phencyclidine Negative Negative^Negative   09/20/2023 8:48 PM EDT [facility]     Comment: Phencyclidine screening cut off value = 25 ng/mL   Oxycodone Negative Negative^Negative   09/20/2023 8:48 PM EDT [facility]     Comment:   Oxycodone screening cut off value = 300 ng/mL       NOTE:  This test is used for the detection of  oxycodone and oxymorphone in urine. Methadone Negative Negative^Negative   09/20/2023 8:48 PM EDT [facility]     Comment: Methadone screening cut off value = 300 ng/mL. Ecstasy Negative Negative^Negative   09/20/2023 8:48 PM EDT [facility]       Component Ref Range & Units 3/8/23 0253   Hemoglobin A1C 4.0 - 6.0 % 5.0    Estimated Avg Glucose mg/dL 97      Imaging/Diagnostics:  CT HEAD WO CONTRAST    Result Date: 10/2/2023  No acute intracranial abnormality. CT ABDOMEN PELVIS W IV CONTRAST Additional Contrast? None    Result Date: 10/2/2023  1. Cirrhotic liver morphology with underlying steatosis. There is evidence of portal hypertension with mild splenomegaly, recanalized paraumbilical vein, small varices and trace abdominopelvic ascites. 2.  No focal liver lesion identified. 3.  Mild diffuse colonic wall thickening is noted, which may be due to portal hypertensive colopathy versus colitis.      Prior records from the hospital reviewed and pending by myself    Imaging  Ultrasound abdomen limited with duplex    Result Date: 9/21/2023  US ABDOMEN LMTD WITH DUPLEX HISTORY: Concerns

## 2023-10-04 LAB
ABO/RH: NORMAL
ANTIBODY SCREEN: NEGATIVE
ARM BAND NUMBER: NORMAL
BLOOD BANK BLOOD PRODUCT EXPIRATION DATE: NORMAL
BLOOD BANK DISPENSE STATUS: NORMAL
BLOOD BANK ISBT PRODUCT BLOOD TYPE: 9500
BLOOD BANK PRODUCT CODE: NORMAL
BLOOD BANK SAMPLE EXPIRATION: NORMAL
BLOOD BANK UNIT TYPE AND RH: NORMAL
BPU ID: NORMAL
COMPONENT: NORMAL
CROSSMATCH RESULT: NORMAL
TRANSFUSION STATUS: NORMAL
UNIT DIVISION: NORMAL
UNIT ISSUE DATE/TIME: NORMAL

## 2023-10-05 NOTE — DISCHARGE SUMMARY
[facility]  Office: [address], DO, Regan Hdez, DO, Elieser Lacristofer, DO, Mingo Richardson, DO, Delmy Villa MD, Teresa Recinos MD, Sarah Tse MD, Nazanin Higginbotham MD,  Jose Hatch MD, Tawnya Dill MD, Jordan Unger, DO, Traci Kaufman MD,  Rita Burgos, DO, Samaria Diaz MD, Andree Sanford MD, Pamela Smith, DO, Manjinder Guthrie MD,  Fabiola Tarango, DO, Leona Eisenmenger, MD, Chapis Arrieta MD, Jasper Leiva MD, Cheryle Spillers, MD,  Nikita Langston MD, Lewis Reyes MD, Vanesa Jamil MD, Danielle Skinner MD, Rosalind Ayoub, DO, Erik Yu MD,  Diogo Coles MD, Ivania Ayon MD, Yokasta Albarado, CNP,  Keyshawn Hanson, CNP,, Anna Hernandez, CNP,  Michelle Pollack, [facility], Lera Sandhoff, CNP, Esequiel Greenwood, CNP, Nader Marie, CNP, Kasia Bradford, CNP, Jazmine Narayan, CNP, Zhane Torres, CNP, Philip Thibodeaux, CNS, Aury Meza, CNP, Kamaljit Parker, [address]    Discharge Summary     Patient ID: Sanket Paula  :  1995   MRN: [id number]     ACCOUNT:  [de-identified]   Patient's PCP: Zara Ramirez Date: 10/2/2023   Discharge Date: 10/3/23    Length of Stay: 1  Code Status:  Prior  Admitting Physician: Traci Kaufman MD  Discharge Physician: Traci Kaufman MD     Active Discharge Diagnoses:     Hospital Problem Lists:  Principal Problem:    Alcoholic hepatitis without ascites  Active Problems:    Alcohol dependence with withdrawal (HCC)    Alcoholic gastritis without bleeding    High anion gap metabolic acidosis    Alcoholic hepatitis    Macrocytic anemia    Bipolar I disorder, most recent episode depressed ([address])    Alcohol abuse    S/P cholecystectomy    Hypokalemia    Portal hypertension ([address])    Underweight    Coagulopathy ([address])  Resolved Problems:    * No resolved hospital problems.  *      Admission Condition:  stable     Discharged Condition: stable    Hospital Stay: Hospital Course:  Main Beckwith is a 25 y.o. female who was admitted for the management of   Alcoholic hepatitis without ascites , presented to ER with Leg Swelling    Patient evaluated by GI and encouraged regular evlauations as outpateint. Patient was recommended to restart all medications. However patiuetn declined refills while in the hospital      Significant therapeutic interventions: n/a    Significant Diagnostic Studies:   Labs / Micro:  CBC:   Lab Results   Component Value Date/Time    WBC 5.0 10/03/2023 03:47 AM    RBC 1.96 10/03/2023 03:47 AM    HGB 7.8 10/03/2023 03:34 PM    HCT 24.3 10/03/2023 03:34 PM    .6 10/03/2023 03:47 AM    MCH 34.2 10/03/2023 03:47 AM    MCHC 32.1 10/03/2023 03:47 AM    RDW 16.7 10/03/2023 03:47 AM     10/03/2023 03:47 AM     BMP:    Lab Results   Component Value Date/Time    GLUCOSE 78 10/03/2023 03:47 AM     10/03/2023 03:47 AM    K 3.2 10/03/2023 03:47 AM     10/03/2023 03:47 AM    CO2 21 10/03/2023 03:47 AM    ANIONGAP 12 10/03/2023 03:47 AM    BUN <2 10/03/2023 03:47 AM    CREATININE <0.2 10/03/2023 03:47 AM    BUNCRER NOT REPORTED 04/14/2014 07:12 PM    CALCIUM 7.3 10/03/2023 03:47 AM    LABGLOM Can not be calculated 10/03/2023 03:47 AM    GFRAA Can not be calculated 03/10/2022 06:14 AM    GFR      03/10/2022 06:14 AM        Radiology:  CT HEAD WO CONTRAST    Result Date: 10/2/2023  No acute intracranial abnormality. CT ABDOMEN PELVIS W IV CONTRAST Additional Contrast? None    Result Date: 10/2/2023  1. Cirrhotic liver morphology with underlying steatosis. There is evidence of portal hypertension with mild splenomegaly, recanalized paraumbilical vein, small varices and trace abdominopelvic ascites. 2.  No focal liver lesion identified. 3.  Mild diffuse colonic wall thickening is noted, which may be due to portal hypertensive colopathy versus colitis.        Consultations:    Consults:     Final Specialist Recommendations/Findings:

## 2025-05-21 RX ORDER — OLANZAPINE 2.5 MG/1
2.5 TABLET, FILM COATED ORAL NIGHTLY
Qty: 30 TABLET | Refills: 0 | OUTPATIENT
Start: 2025-05-21